# Patient Record
Sex: FEMALE | Race: ASIAN | NOT HISPANIC OR LATINO | Employment: FULL TIME | URBAN - METROPOLITAN AREA
[De-identification: names, ages, dates, MRNs, and addresses within clinical notes are randomized per-mention and may not be internally consistent; named-entity substitution may affect disease eponyms.]

---

## 2022-01-25 ENCOUNTER — CONSULT (OUTPATIENT)
Dept: PULMONOLOGY | Facility: MEDICAL CENTER | Age: 47
End: 2022-01-25
Payer: COMMERCIAL

## 2022-01-25 VITALS
DIASTOLIC BLOOD PRESSURE: 82 MMHG | OXYGEN SATURATION: 98 % | HEIGHT: 71 IN | TEMPERATURE: 97.2 F | RESPIRATION RATE: 12 BRPM | BODY MASS INDEX: 41.02 KG/M2 | HEART RATE: 86 BPM | SYSTOLIC BLOOD PRESSURE: 122 MMHG | WEIGHT: 293 LBS

## 2022-01-25 DIAGNOSIS — Z22.7 LATENT TUBERCULOSIS BY SKIN TEST: ICD-10-CM

## 2022-01-25 DIAGNOSIS — F17.210 CIGARETTE NICOTINE DEPENDENCE WITHOUT COMPLICATION: ICD-10-CM

## 2022-01-25 DIAGNOSIS — R91.1 LUNG NODULE: Primary | ICD-10-CM

## 2022-01-25 DIAGNOSIS — F17.210 TOBACCO SMOKER, 1 PACK OF CIGARETTES OR LESS PER DAY: ICD-10-CM

## 2022-01-25 DIAGNOSIS — E66.01 CLASS 3 SEVERE OBESITY DUE TO EXCESS CALORIES WITHOUT SERIOUS COMORBIDITY WITH BODY MASS INDEX (BMI) OF 40.0 TO 44.9 IN ADULT (HCC): ICD-10-CM

## 2022-01-25 DIAGNOSIS — G47.33 OBSTRUCTIVE SLEEP APNEA: ICD-10-CM

## 2022-01-25 PROBLEM — E66.813 CLASS 3 SEVERE OBESITY DUE TO EXCESS CALORIES WITHOUT SERIOUS COMORBIDITY WITH BODY MASS INDEX (BMI) OF 40.0 TO 44.9 IN ADULT (HCC): Status: ACTIVE | Noted: 2022-01-25

## 2022-01-25 PROCEDURE — 94010 BREATHING CAPACITY TEST: CPT | Performed by: INTERNAL MEDICINE

## 2022-01-25 PROCEDURE — 99204 OFFICE O/P NEW MOD 45 MIN: CPT | Performed by: INTERNAL MEDICINE

## 2022-01-25 RX ORDER — OMEPRAZOLE 40 MG/1
CAPSULE, DELAYED RELEASE ORAL DAILY
COMMUNITY

## 2022-01-25 RX ORDER — ACYCLOVIR 800 MG/1
400 TABLET ORAL 2 TIMES DAILY
COMMUNITY

## 2022-01-25 NOTE — ASSESSMENT & PLAN NOTE
She smoking about 3/4 pack of cigarettes per day but has been smoking since her teenage years  I did discuss smoking cessation with her  She tried Chantix in the past which helped but after she stated for while she started to feel very agitated  I did prescribe her Nicotrol inhaler to try    Spirometry done today shows mild restrictive pair which is pie due to patient being overweight rather than air trapping

## 2022-01-25 NOTE — PROGRESS NOTES
Assessment/Plan:       Problem List Items Addressed This Visit        Respiratory    Obstructive sleep apnea     Sharonda did have a home diagnostic sleep study done 05/27/2018  She did show me the report of this test on her portal for her testing center  This was done in [place]  She weighed 316 lb at the time  Overall AHI was 18 1 with oxygen gris of 59%  She spent 4% of time with O2 saturations below 80%  There were 180 apneas and 105 hypopneas    I did discuss the diagnosis and treatment of TERESITA with at present time she is not having excessive daytime fatigue  She does have some snoring  I did discuss option of weight loss and that she can lose more weight she always be testing in future to see if she still has sleep apnea  She has decided to go with this treatment rather than CPAP therapy  She will try to lose weight  Not have any excessive daytime somnolence            Other    Latent tuberculosis by skin test     In 1986 she was diagnosed with latent TB by skin test received 6 months of INH prophylactic therapy  States her sister mother had TB         Lung nodule - Primary     I reviewed CT of chest done 03/19/2021 at [facility]  This shows an 18 mm by 13 mm x 12 mm nodular density and the right lower lobe  This is in a posterior lateral part of the right lower lobe  This may be just a benign tumor or granuloma but with her smoking history cannot a carcinoma  I did order a follow-up CT scan of chest with contrast   She will have this done to [facility] and I did take her DVD of her head CT of chest done 03/19/2021 at [facility] to be uploaded to our PACS system so images can be compared    I did review images with Tessa    I instructed Sharonda to contact me couple days after CT of chest is done and I will review results with her    Follow-up will be determined based on results of this test         Relevant Orders    CT chest with contrast    CBC and differential    Comprehensive metabolic panel    Class 3 severe obesity due to excess calories without serious comorbidity with body mass index (BMI) of 40 0 to 44 9 in adult [facility])     She does have mild TERESITA per present having fatigue  Has some mild shortness of breath at times with more exertional activity  I did encourage her to watch her diet and try to lose weight  I told weight loss she may cause her obstructive sleep apnea to resolve    Tessa did moved to the [facility]  She did spend time in [facility] when she was younger and then was in [place]  She is not having established family doctor in [facility] and I did refer to [facility]    Because of her being overweight I did order hemoglobin A1c other labs including lipid profile         Relevant Orders    HEMOGLOBIN A1C W/ EAG ESTIMATION    Lipid panel    Tobacco smoker, 1 pack of cigarettes or less per day     She smoking about 3/4 pack of cigarettes per day but has been smoking since her teenage years  I did discuss smoking cessation with her  She tried Chantix in the past which helped but after she stated for while she started to feel very agitated  I did prescribe her Nicotrol inhaler to try    Spirometry done today shows mild restrictive pair which is pie due to patient being overweight rather than air trapping  Relevant Medications    nicotine (NICOTROL) 10 MG inhaler    Other Relevant Orders    POCT spirometry            Plan for follow up:      Return if symptoms worsen or fail to improve  All questions are answered to the patient's satisfaction and understanding  She verbalizes understanding  She is encouraged to call with any further questions or concerns  Portions of the record may have been created with voice recognition software  Occasional wrong word or "sound a like" substitutions may have occurred due to the inherent limitations of voice recognition software    Read the chart carefully and recognize, using context, where substitutions have occurred  a    Electronically Signed by Laly Mendes DO    ______________________________________________________________________    Chief Complaint:  Here for evaluation of lung nodule  Patient ID: Sylvia Nj is a 51 y o  y o  female has a past medical history of Acid reflux, Trejo's esophagus, GERD (gastroesophageal reflux disease), Hiatal hernia, Latent tuberculosis by skin test (1986), and Obstructive sleep apnea (05/27/2018)  1/25/2022  Patient presents today for initial visit for lung nodule initially detected in March of 2020 21 by CT scan chest      HPI      Sylvia Nj is 51years old and presents for evaluation of right lower lobe lung nodule noted on prior CT scan done in March of 2021  She is a smoker and has been smoking about half to 3/4 packs of cigarettes per day since around age 11  She started smoking at age 5 presently smoking about 3/4 packs of cigarettes per day  CT of her chest without contrast done on 03/19/2021 at [facility] which reported a lobular 18 mm x 13 mm x 12 mm solitary right lower lobe lung nodule  She did see a pulmonologist Dr Kristofer Dyer for this in the past but did not follow-up  She does have history of latent TB diagnosed 11  She sister family member had TB at that time  She was treated with 6 months of INH  She denies any history of asthma, heart disease or hypertension  She does have history of small hiatal hernia with gastric reflux was told she has Trejo's esophagitis  Gastric reflux symptoms are controlled medication now  She does not get short of breath with her usual activity but may have some mild shortness of breath when she does more exertional activity  No wheezing or cough  No fever or chills  No hemoptysis  She has been smoking about 3/4 pack cigarettes per day for past 36 years      Tessa did have a home diagnostic sleep study done on 05/27/2018  She weighed 316 lb at the time  This showed overall AHI of 18 1 with oxygen gris of 59%  She spent about 4% of the time with O2 saturation below 80%  She is not having any excessive daytime somnolence or nocturnal dyspnea  She did not pursue any type of CPAP therapy  She does have history of GERD for which he takes medication  Also has history of hiatal hernia  She did have upper endoscopy done back in 2018  She has history of hysterectomy in 2014 for dysfunctional uterine bleeding    Family history includes and paternal uncle who had lung cancer  Her father has history of stroke mode with had ovarian cancer    Occupational/Exposure history:  Works from home  Does work reviewing [employer]      Review of Systems   Constitutional: Negative for chills, fatigue, fever and unexpected weight change  HENT: Negative for congestion, rhinorrhea and sore throat  Eyes: Negative for discharge and redness  Respiratory: Negative for cough and wheezing  Does not have shortness of breath with her usual activity   Cardiovascular: Negative for chest pain, palpitations and leg swelling  Gastrointestinal: Negative for abdominal distention, abdominal pain and nausea  Endocrine: Negative for polydipsia and polyphagia  Genitourinary: Negative for dysuria  Musculoskeletal: Negative for joint swelling and myalgias  Skin: Negative for rash  Neurological: Negative for light-headedness  Psychiatric/Behavioral: Negative for decreased concentration  Social history: She reports that she has been smoking cigarettes  She has a 27 00 pack-year smoking history  She has never used smokeless tobacco  She reports that she does not drink alcohol and does not use drugs      Past surgical history:   Past Surgical History:   Procedure Laterality Date    AUGMENTATION BREAST Bilateral 2015    HYSTERECTOMY  2014    [facility] Riley    LIPOSUCTION  2015    REPAIR RECTOCELE  2018    TONSILLECTOMY  1984     Family history:   Family History   Problem Relation Age of Onset    Cancer Mother         ovarian    Stroke Father     Lung cancer Paternal Uncle        Immunization History   Administered Date(s) Administered    COVID-19 PFIZER VACCINE 0 3 ML IM 03/19/2021, 04/11/2021     Current Outpatient Medications   Medication Sig Dispense Refill    acyclovir (ZOVIRAX) 800 mg tablet Take 800 mg by mouth      omeprazole (PriLOSEC) 40 MG capsule omeprazole Take No date recorded No form recorded No frequency recorded No route recorded No set duration recorded No set duration amount recorded active No dosage strength recorded No dosage strength units of measure recorded      nicotine (NICOTROL) 10 MG inhaler Use 1 cartridge per hour as needed but do not exceed 10 cartridges per day 42 each 6     No current facility-administered medications for this visit  Allergies: Metronidazole and Nitrofurantoin    Objective:  Vitals:    01/25/22 1003   BP: 122/82   Pulse: 86   Resp: 12   Temp: (!) 97 2 °F (36 2 °C)   TempSrc: Temporal   SpO2: 98%   Weight: (!) 141 kg (310 lb 6 4 oz)   Height: 5' 11" (1 803 m)   Oxygen Therapy  SpO2: 98 %    Wt Readings from Last 3 Encounters:   01/25/22 (!) 141 kg (310 lb 6 4 oz)     Body mass index is 43 29 kg/m²  Physical Exam  Vitals reviewed  Constitutional:       General: She is not in acute distress  Appearance: Normal appearance  She is well-developed  She is obese  HENT:      Head: Normocephalic  Right Ear: External ear normal       Left Ear: External ear normal       Nose: Nose normal       Mouth/Throat:      Mouth: Mucous membranes are moist       Pharynx: Oropharynx is clear  No oropharyngeal exudate  Comments: Mallampati score is 2  Eyes:      Conjunctiva/sclera: Conjunctivae normal       Pupils: Pupils are equal, round, and reactive to light  Neck:      Vascular: No JVD  Trachea: No tracheal deviation  Cardiovascular:      Rate and Rhythm: Normal rate and regular rhythm  Heart sounds: Normal heart sounds  Pulmonary:      Effort: Pulmonary effort is normal       Comments: Lung sounds are clear  No wheezes crackles or rhonchi  Abdominal:      General: There is no distension  Palpations: Abdomen is soft  Tenderness: There is no abdominal tenderness  There is no guarding  Musculoskeletal:      Cervical back: Neck supple  Comments: No edema, cyanosis or clubbing   Lymphadenopathy:      Cervical: No cervical adenopathy  Skin:     General: Skin is warm and dry  Findings: No rash  Neurological:      Mental Status: She is alert and oriented to person, place, and time  Psychiatric:         Behavior: Behavior normal          Thought Content: Thought content normal          Lab Review:     Blood work from 08/18/2021 was reviewed  White blood cell count 10 5, hemoglobin 14 1, platelet count 354    Sodium 137, potassium 4 7 bicarb 27 BUN 10 and creatinine 0 62    Diagnostics:    CT scan of chest without contrast done at [facility] on 03/19/2021 was reviewed by me and this reveals a slightly lobulated slight linear 18 mm by 13 x 12 mm nodule in the right lower lobe  This is somewhat posterior lateral   No significant mediastinal or hilar adenopathy  No other lung lesions  Office Spirometry Results: done today    FVC - 3 26 L    73%  FEV1 - 2 62 L   73%  FEV1/FVC% - 80%    Mild restrictive impairment likely due to being overweight     ESS: Total score: 2  No results found

## 2022-01-25 NOTE — ASSESSMENT & PLAN NOTE
In 1986 she was diagnosed with latent TB by skin test received 6 months of INH prophylactic therapy    States her sister mother had TB

## 2022-01-25 NOTE — PATIENT INSTRUCTIONS
CT chest without contrast    Nicotrol inhaler - use 1 cartridge per our as needed but do not [id number]/day    Forward copy of sleep test to us     [facility] in [place]    Blood work ordered    Try to get weight down and down the road we can always consider a new home sleep study to see if things are improved    Backline   [phone]

## 2022-01-25 NOTE — ASSESSMENT & PLAN NOTE
Miller Srinivasan did have a home diagnostic sleep study done 05/27/2018  She did show me the report of this test on her portal for her testing center  This was done in [place]  She weighed 316 lb at the time  Overall AHI was 18 1 with oxygen gris of 59%  She spent 4% of time with O2 saturations below 80%  There were 180 apneas and 105 hypopneas    I did discuss the diagnosis and treatment of TERESITA with at present time she is not having excessive daytime fatigue  She does have some snoring  I did discuss option of weight loss and that she can lose more weight she always be testing in future to see if she still has sleep apnea  She has decided to go with this treatment rather than CPAP therapy  She will try to lose weight    Not have any excessive daytime somnolence

## 2022-01-25 NOTE — ASSESSMENT & PLAN NOTE
I reviewed CT of chest done 03/19/2021 at [facility]  This shows an 18 mm by 13 mm x 12 mm nodular density and the right lower lobe  This is in a posterior lateral part of the right lower lobe  This may be just a benign tumor or granuloma but with her smoking history cannot a carcinoma  I did order a follow-up CT scan of chest with contrast   She will have this done to [facility] and I did take her DVD of her head CT of chest done 03/19/2021 at [facility] to be uploaded to our PACS system so images can be compared    I did review images with Tessa    I instructed Sharonda to contact me couple days after CT of chest is done and I will review results with her    Follow-up will be determined based on results of this test

## 2022-01-25 NOTE — ASSESSMENT & PLAN NOTE
She does have mild TERESITA per present having fatigue  Has some mild shortness of breath at times with more exertional activity  I did encourage her to watch her diet and try to lose weight  I told weight loss she may cause her obstructive sleep apnea to resolve    Tessa did moved to the [place] area  She did spend time in [facility] when she was younger and then was in [place]      She is not having established family doctor in [facility] and I did refer to [facility]    Because of her being overweight I did order hemoglobin A1c other labs including lipid profile

## 2022-02-03 ENCOUNTER — APPOINTMENT (OUTPATIENT)
Dept: LAB | Facility: HOSPITAL | Age: 47
End: 2022-02-03
Attending: INTERNAL MEDICINE
Payer: COMMERCIAL

## 2022-02-03 ENCOUNTER — HOSPITAL ENCOUNTER (OUTPATIENT)
Dept: RADIOLOGY | Facility: HOSPITAL | Age: 47
Discharge: HOME | End: 2022-02-03
Attending: INTERNAL MEDICINE
Payer: COMMERCIAL

## 2022-02-03 DIAGNOSIS — R91.1 LUNG NODULE: ICD-10-CM

## 2022-02-03 DIAGNOSIS — E66.01 CLASS 3 SEVERE OBESITY DUE TO EXCESS CALORIES WITHOUT SERIOUS COMORBIDITY WITH BODY MASS INDEX (BMI) OF 40.0 TO 44.9 IN ADULT (HCC): ICD-10-CM

## 2022-02-03 LAB
ALBUMIN SERPL BCP-MCNC: 3.5 G/DL (ref 3.5–5)
ALP SERPL-CCNC: 95 U/L (ref 46–116)
ALT SERPL W P-5'-P-CCNC: 54 U/L (ref 12–78)
ANION GAP SERPL CALCULATED.3IONS-SCNC: 7 MMOL/L (ref 4–13)
AST SERPL W P-5'-P-CCNC: 45 U/L (ref 5–45)
BASOPHILS # BLD AUTO: 0.08 THOUSANDS/ΜL (ref 0–0.1)
BASOPHILS NFR BLD AUTO: 1 % (ref 0–1)
BILIRUB SERPL-MCNC: 0.39 MG/DL (ref 0.2–1)
BUN SERPL-MCNC: 9 MG/DL (ref 5–25)
CALCIUM SERPL-MCNC: 8.5 MG/DL (ref 8.3–10.1)
CHLORIDE SERPL-SCNC: 105 MMOL/L (ref 100–108)
CHOLEST SERPL-MCNC: 238 MG/DL
CO2 SERPL-SCNC: 27 MMOL/L (ref 21–32)
CREAT SERPL-MCNC: 0.81 MG/DL (ref 0.6–1.3)
EOSINOPHIL # BLD AUTO: 0.22 THOUSAND/ΜL (ref 0–0.61)
EOSINOPHIL NFR BLD AUTO: 2 % (ref 0–6)
ERYTHROCYTE [DISTWIDTH] IN BLOOD BY AUTOMATED COUNT: 13.2 % (ref 11.6–15.1)
EST. AVERAGE GLUCOSE BLD GHB EST-MCNC: 105 MG/DL
GFR SERPL CREATININE-BSD FRML MDRD: 86 ML/MIN/1.73SQ M
GLUCOSE P FAST SERPL-MCNC: 96 MG/DL (ref 65–99)
HBA1C MFR BLD: 5.3 %
HCT VFR BLD AUTO: 46.4 % (ref 34.8–46.1)
HDLC SERPL-MCNC: 49 MG/DL
HGB BLD-MCNC: 15.1 G/DL (ref 11.5–15.4)
IMM GRANULOCYTES # BLD AUTO: 0.04 THOUSAND/UL (ref 0–0.2)
IMM GRANULOCYTES NFR BLD AUTO: 0 % (ref 0–2)
LDLC SERPL CALC-MCNC: 161 MG/DL (ref 0–100)
LYMPHOCYTES # BLD AUTO: 3.63 THOUSANDS/ΜL (ref 0.6–4.47)
LYMPHOCYTES NFR BLD AUTO: 37 % (ref 14–44)
MCH RBC QN AUTO: 30 PG (ref 26.8–34.3)
MCHC RBC AUTO-ENTMCNC: 32.5 G/DL (ref 31.4–37.4)
MCV RBC AUTO: 92 FL (ref 82–98)
MONOCYTES # BLD AUTO: 0.47 THOUSAND/ΜL (ref 0.17–1.22)
MONOCYTES NFR BLD AUTO: 5 % (ref 4–12)
NEUTROPHILS # BLD AUTO: 5.28 THOUSANDS/ΜL (ref 1.85–7.62)
NEUTS SEG NFR BLD AUTO: 55 % (ref 43–75)
NONHDLC SERPL-MCNC: 189 MG/DL
NRBC BLD AUTO-RTO: 0 /100 WBCS
PLATELET # BLD AUTO: 199 THOUSANDS/UL (ref 149–390)
PMV BLD AUTO: 9.6 FL (ref 8.9–12.7)
POTASSIUM SERPL-SCNC: 3.9 MMOL/L (ref 3.5–5.3)
PROT SERPL-MCNC: 8.1 G/DL (ref 6.4–8.2)
RBC # BLD AUTO: 5.03 MILLION/UL (ref 3.81–5.12)
SODIUM SERPL-SCNC: 139 MMOL/L (ref 136–145)
TRIGL SERPL-MCNC: 142 MG/DL
WBC # BLD AUTO: 9.72 THOUSAND/UL (ref 4.31–10.16)

## 2022-02-03 PROCEDURE — G1004 CDSM NDSC: HCPCS

## 2022-02-03 PROCEDURE — 85025 COMPLETE CBC W/AUTO DIFF WBC: CPT

## 2022-02-03 PROCEDURE — 80061 LIPID PANEL: CPT

## 2022-02-03 PROCEDURE — 83036 HEMOGLOBIN GLYCOSYLATED A1C: CPT

## 2022-02-03 PROCEDURE — 71260 CT THORAX DX C+: CPT

## 2022-02-03 PROCEDURE — 36415 COLL VENOUS BLD VENIPUNCTURE: CPT

## 2022-02-03 PROCEDURE — 80053 COMPREHEN METABOLIC PANEL: CPT

## 2022-02-03 RX ADMIN — IOHEXOL 85 ML: 350 INJECTION, SOLUTION INTRAVENOUS at 09:29

## 2022-02-08 ENCOUNTER — TELEPHONE (OUTPATIENT)
Dept: PULMONOLOGY | Facility: CLINIC | Age: 47
End: 2022-02-08

## 2022-02-10 NOTE — TELEPHONE ENCOUNTER
Patient called, wanted to know if Dr Stephanie Sellers saw her CT results and what she should do  Please advise

## 2022-02-16 DIAGNOSIS — R91.1 LUNG NODULE: Primary | ICD-10-CM

## 2022-02-16 NOTE — TELEPHONE ENCOUNTER
Pt called re: would like a call from the doctor to go over her CT results    Please advise: [phone]

## 2022-02-16 NOTE — PROGRESS NOTES
Spoke with patient by telephone and discussed CT findings  She would like to pursue PET CT scan  I gave her options this her CT-guided needle biopsy  Nodule did increase slightly in size compared to 1 done in March of last year  She does have history of smoking 3/4 packs per per day since her teenage years

## 2022-03-04 ENCOUNTER — PREP FOR PROCEDURE (OUTPATIENT)
Dept: INTERVENTIONAL RADIOLOGY/VASCULAR | Facility: CLINIC | Age: 47
End: 2022-03-04

## 2022-03-04 ENCOUNTER — TELEPHONE (OUTPATIENT)
Dept: PULMONOLOGY | Facility: CLINIC | Age: 47
End: 2022-03-04

## 2022-03-04 ENCOUNTER — HOSPITAL ENCOUNTER (OUTPATIENT)
Dept: NUCLEAR MEDICINE | Facility: HOSPITAL | Age: 47
Discharge: HOME | End: 2022-03-04
Attending: INTERNAL MEDICINE
Payer: COMMERCIAL

## 2022-03-04 ENCOUNTER — TELEPHONE (OUTPATIENT)
Dept: PULMONOLOGY | Facility: MEDICAL CENTER | Age: 47
End: 2022-03-04

## 2022-03-04 DIAGNOSIS — R94.2 ABNORMAL PET OF RIGHT LUNG: ICD-10-CM

## 2022-03-04 DIAGNOSIS — R91.1 LUNG NODULE: ICD-10-CM

## 2022-03-04 DIAGNOSIS — R59.1 LYMPHADENOPATHY: Primary | ICD-10-CM

## 2022-03-04 DIAGNOSIS — R91.1 NODULE OF LOWER LOBE OF RIGHT LUNG: ICD-10-CM

## 2022-03-04 DIAGNOSIS — R59.0 CERVICAL LYMPHADENOPATHY: Primary | ICD-10-CM

## 2022-03-04 LAB — GLUCOSE SERPL-MCNC: 94 MG/DL (ref 65–140)

## 2022-03-04 PROCEDURE — G1004 CDSM NDSC: HCPCS

## 2022-03-04 PROCEDURE — 78815 PET IMAGE W/CT SKULL-THIGH: CPT

## 2022-03-04 PROCEDURE — A9552 F18 FDG: HCPCS

## 2022-03-04 PROCEDURE — 82948 REAGENT STRIP/BLOOD GLUCOSE: CPT

## 2022-03-04 NOTE — TELEPHONE ENCOUNTER
Attempted to call patient and unable to reach via telephone  Left voicemail for the patient to return phone call to the office to discuss test results       PET CT Results

## 2022-03-04 NOTE — TELEPHONE ENCOUNTER
Lenard Khan from [facility] calling with significant finding on PET scan done on 3/4

## 2022-03-04 NOTE — TELEPHONE ENCOUNTER
Dr Victorina Davidson discussed case w/ patient in regard to options presented for IR consult for cervical lymph node biopsy  Patient was negative for any upper respiratory symptoms  If remains negative thereafter would consider thoracic surgery consult for RLL lung nodule      Orders placed for IR consult

## 2022-03-04 NOTE — TELEPHONE ENCOUNTER
Patient calling asking for the results of her PET scan she had done today   Please advise [phone]

## 2022-03-04 NOTE — TELEPHONE ENCOUNTER
S PET-CT imaging follow-up    48-year-old lifelong smoker follow-up for right lower lobe pulmonary nodule  PET-CT indeterminate  Called and left message for plan to discuss further diagnostics with options for further monitoring versus biopsy  Confounding uptake in cervical lymph node  Consideration for IR guided CT biopsy of right lower lobe nodule      Pending contact follow-up for discussion

## 2022-03-21 ENCOUNTER — HOSPITAL ENCOUNTER (OUTPATIENT)
Dept: NON INVASIVE DIAGNOSTICS | Facility: HOSPITAL | Age: 47
Discharge: HOME | End: 2022-03-21
Attending: RADIOLOGY | Admitting: RADIOLOGY
Payer: COMMERCIAL

## 2022-03-21 VITALS
DIASTOLIC BLOOD PRESSURE: 82 MMHG | SYSTOLIC BLOOD PRESSURE: 139 MMHG | RESPIRATION RATE: 16 BRPM | OXYGEN SATURATION: 100 % | HEART RATE: 85 BPM

## 2022-03-21 DIAGNOSIS — R59.1 LYMPHADENOPATHY: ICD-10-CM

## 2022-03-21 PROCEDURE — 38505 NEEDLE BIOPSY LYMPH NODES: CPT

## 2022-03-21 PROCEDURE — 88341 IMHCHEM/IMCYTCHM EA ADD ANTB: CPT | Performed by: PATHOLOGY

## 2022-03-21 PROCEDURE — 88185 FLOWCYTOMETRY/TC ADD-ON: CPT

## 2022-03-21 PROCEDURE — 76942 ECHO GUIDE FOR BIOPSY: CPT | Performed by: RADIOLOGY

## 2022-03-21 PROCEDURE — 88342 IMHCHEM/IMCYTCHM 1ST ANTB: CPT | Performed by: PATHOLOGY

## 2022-03-21 PROCEDURE — 88184 FLOWCYTOMETRY/ TC 1 MARKER: CPT | Performed by: RADIOLOGY

## 2022-03-21 PROCEDURE — 38505 NEEDLE BIOPSY LYMPH NODES: CPT | Performed by: RADIOLOGY

## 2022-03-21 PROCEDURE — 88333 PATH CONSLTJ SURG CYTO XM 1: CPT | Performed by: PATHOLOGY

## 2022-03-21 RX ORDER — LIDOCAINE WITH 8.4% SOD BICARB 0.9%(10ML)
SYRINGE (ML) INJECTION CODE/TRAUMA/SEDATION MEDICATION
Status: COMPLETED | OUTPATIENT
Start: 2022-03-21 | End: 2022-03-21

## 2022-03-21 RX ADMIN — Medication 10 ML: at 12:47

## 2022-03-21 NOTE — DISCHARGE INSTRUCTIONS
Lymph Node Biopsy   WHAT YOU NEED TO KNOW:   A lymph node biopsy is done to remove all or part of a lymph node  A lymph node can be tested for infection, cancer, and other medical conditions  This information may help your healthcare provider decide if you need more tests or treatments  DISCHARGE INSTRUCTIONS:   Seek care immediately if:   Blood soaks through your bandage  · Your bruise suddenly gets larger and feels hard  Contact your healthcare provider if:   · You have a fever or chills  Your wound is red, swollen, or draining    · Your pain does not get better after you take medicine  · You have questions or concerns about your condition or care  Medicines: You may need any of the following:  · NSAIDs , such as ibuprofen, help decrease swelling, pain, and fever  This medicine is available with or without a doctor's order  NSAIDs can cause stomach bleeding or kidney problems in certain people  If you take blood thinner medicine, always ask your healthcare provider if NSAIDs are safe for you  Always read the medicine label and follow directions  · Acetaminophen  decreases pain and fever  It is available without a doctor's order  Ask how much to take and how often to take it  Follow directions  Read the labels of all other medicines you are using to see if they also contain acetaminophen, or ask your doctor or pharmacist  Acetaminophen can cause liver damage if not taken correctly  Do not use more than 4 grams (4,000 milligrams) total of acetaminophen in one day  · Prescription pain medicine  may be given  Ask your healthcare provider how to take this medicine safely  Some prescription pain medicines contain acetaminophen  Do not take other medicines that contain acetaminophen without talking to your healthcare provider  Too much acetaminophen may cause liver damage  Prescription pain medicine may cause constipation  Ask your healthcare provider how to prevent or treat constipation  · Take your medicine as directed  Call your healthcare provider if you think your medicine is not helping or if you have side effects  Tell him if you are allergic to any medicine  Keep a list of the medicines, vitamins, and herbs you take  Include the amounts, and when and why you take them  Bring the list or the pill bottles to follow-up visits  Carry your medicine list with you in case of an emergency  Care for your wound as directed:  Ask your healthcare provider when your biopsy site can get wet  Carefully wash around the biopsy site with soap and water  It is okay to let soap and water gently run over your biopsy site  Do not  scrub your biopsy site  You may remove the bandage in 24 hours  Check your biopsy site every day for signs of infection, such as redness, swelling, or pus  Do not put powders or lotions on your biopsy site      Self-care:   Apply ice  on your biopsy site for 15 to 20 minutes every hour or as directed  Use an ice pack, or put crushed ice in a plastic bag  Cover it with a towel before you apply it to your skin  Ice helps prevent tissue damage and decreases swelling        Do not do strenuous activities  for 24 to 48 hours  Strenuous activities include heavy lifting, sports, or running  Follow up with your healthcare provider as directed: You may need more tests  Write down your questions so you remember to ask them during your visits    Stella Amaya may contact the Interventional RN for any questions at [phone] til 4pm After 4pm you may contact your ordering MD   AVELINA

## 2022-03-21 NOTE — SEDATION DOCUMENTATION
Right cervical lymph node biopsy done , pt tolerated without incident  Band aid to site  Pt given discharge instructions with ice pack, discharged home in stable condition

## 2022-03-23 ENCOUNTER — TELEPHONE (OUTPATIENT)
Dept: PULMONOLOGY | Facility: CLINIC | Age: 47
End: 2022-03-23

## 2022-03-23 LAB — SCAN RESULT: NORMAL

## 2022-03-25 ENCOUNTER — APPOINTMENT (OUTPATIENT)
Dept: RADIOLOGY | Facility: CLINIC | Age: 47
End: 2022-03-25
Payer: COMMERCIAL

## 2022-03-25 ENCOUNTER — OFFICE VISIT (OUTPATIENT)
Dept: FAMILY MEDICINE CLINIC | Facility: CLINIC | Age: 47
End: 2022-03-25
Payer: COMMERCIAL

## 2022-03-25 ENCOUNTER — OFFICE VISIT (OUTPATIENT)
Dept: OBGYN CLINIC | Facility: CLINIC | Age: 47
End: 2022-03-25
Payer: COMMERCIAL

## 2022-03-25 VITALS
WEIGHT: 293 LBS | BODY MASS INDEX: 41.02 KG/M2 | RESPIRATION RATE: 16 BRPM | HEIGHT: 71 IN | HEART RATE: 88 BPM | TEMPERATURE: 97.9 F | SYSTOLIC BLOOD PRESSURE: 130 MMHG | DIASTOLIC BLOOD PRESSURE: 82 MMHG

## 2022-03-25 VITALS
SYSTOLIC BLOOD PRESSURE: 124 MMHG | HEIGHT: 71 IN | HEART RATE: 96 BPM | DIASTOLIC BLOOD PRESSURE: 84 MMHG | WEIGHT: 293 LBS | TEMPERATURE: 99 F | BODY MASS INDEX: 41.02 KG/M2

## 2022-03-25 DIAGNOSIS — M25.512 ACUTE PAIN OF LEFT SHOULDER: ICD-10-CM

## 2022-03-25 DIAGNOSIS — M75.42 IMPINGEMENT SYNDROME OF LEFT SHOULDER: ICD-10-CM

## 2022-03-25 DIAGNOSIS — R20.2 NUMBNESS AND TINGLING OF LOWER EXTREMITY: ICD-10-CM

## 2022-03-25 DIAGNOSIS — R20.0 NUMBNESS AND TINGLING OF LOWER EXTREMITY: ICD-10-CM

## 2022-03-25 DIAGNOSIS — M48.04 SPINAL STENOSIS, THORACIC REGION: Primary | ICD-10-CM

## 2022-03-25 DIAGNOSIS — E66.01 CLASS 3 SEVERE OBESITY DUE TO EXCESS CALORIES WITHOUT SERIOUS COMORBIDITY WITH BODY MASS INDEX (BMI) OF 40.0 TO 44.9 IN ADULT (HCC): ICD-10-CM

## 2022-03-25 DIAGNOSIS — K22.70 BARRETT'S ESOPHAGUS WITHOUT DYSPLASIA: Primary | ICD-10-CM

## 2022-03-25 DIAGNOSIS — Z12.31 ENCOUNTER FOR MAMMOGRAM TO ESTABLISH BASELINE MAMMOGRAM: ICD-10-CM

## 2022-03-25 DIAGNOSIS — M54.12 RADICULOPATHY, CERVICAL REGION: ICD-10-CM

## 2022-03-25 DIAGNOSIS — N20.0 KIDNEY STONE: ICD-10-CM

## 2022-03-25 PROCEDURE — 73030 X-RAY EXAM OF SHOULDER: CPT

## 2022-03-25 PROCEDURE — 99203 OFFICE O/P NEW LOW 30 MIN: CPT | Performed by: FAMILY MEDICINE

## 2022-03-25 PROCEDURE — 99204 OFFICE O/P NEW MOD 45 MIN: CPT | Performed by: ORTHOPAEDIC SURGERY

## 2022-03-25 PROCEDURE — 3008F BODY MASS INDEX DOCD: CPT | Performed by: FAMILY MEDICINE

## 2022-03-25 PROCEDURE — 3725F SCREEN DEPRESSION PERFORMED: CPT | Performed by: FAMILY MEDICINE

## 2022-03-25 RX ORDER — DICLOFENAC SODIUM 75 MG/1
75 TABLET, DELAYED RELEASE ORAL 2 TIMES DAILY
Qty: 60 TABLET | Refills: 0 | Status: SHIPPED | OUTPATIENT
Start: 2022-03-25 | End: 2022-04-26 | Stop reason: SDUPTHER

## 2022-03-25 NOTE — PROGRESS NOTES
Chief Complaint   Patient presents with   Kee Gibbs Establish Care     discuss medications , pt requests EGD, mammo        Patient ID: Kiran Lyons is a 51 y o  female  HPI  Pt is seeing to establish care -  H/o Trejo's esophagitis, kidney stones, MO    The following portions of the patient's history were reviewed and updated as appropriate: allergies, current medications, past family history, past medical history, past social history, past surgical history and problem list     Review of Systems   Constitutional: Negative  Respiratory: Negative  Cardiovascular: Negative  Gastrointestinal: Negative  Genitourinary: Negative  Musculoskeletal: Negative  Skin: Negative  Neurological: Negative  Current Outpatient Medications   Medication Sig Dispense Refill    acyclovir (ZOVIRAX) 800 mg tablet Take 800 mg by mouth      omeprazole (PriLOSEC) 40 MG capsule omeprazole Take No date recorded No form recorded No frequency recorded No route recorded No set duration recorded No set duration amount recorded active No dosage strength recorded No dosage strength units of measure recorded       No current facility-administered medications for this visit  Objective:    /82 (BP Location: Left arm, Patient Position: Sitting, Cuff Size: Large)   Pulse 88   Temp 97 9 °F (36 6 °C)   Resp 16   Ht 5' 11" (1 803 m)   Wt (!) 140 kg (309 lb 9 6 oz)   BMI 43 18 kg/m²        Physical Exam  Constitutional:       Appearance: She is obese  She is not ill-appearing  Cardiovascular:      Rate and Rhythm: Normal rate and regular rhythm  Heart sounds: No murmur heard  Pulmonary:      Effort: Pulmonary effort is normal  No respiratory distress  Breath sounds: No wheezing, rhonchi or rales  Abdominal:      Palpations: Abdomen is soft  Tenderness: There is no abdominal tenderness  Musculoskeletal:      Right lower leg: No edema  Left lower leg: No edema     Neurological: General: No focal deficit present  Mental Status: She is alert and oriented to person, place, and time  Psychiatric:         Mood and Affect: Mood normal          Thought Content: Thought content normal          Judgment: Judgment normal            Labs in chart were reviewed  Assessment/Plan:         Diagnoses and all orders for this visit:    Trejo's esophagus without dysplasia  -     Ambulatory Referral to Gastroenterology; Future    Encounter for mammogram to establish baseline mammogram  -     Mammo screening bilateral w 3d & cad; Future    Class 3 severe obesity due to excess calories without serious comorbidity with body mass index (BMI) of 40 0 to 44 9 in [facility])  -     Ambulatory Referral to Weight Management; Future    Kidney stone            BMI Counseling: Body mass index is 43 18 kg/m²  Discussed the patient's BMI with her  The BMI is above normal  Nutrition recommendations include reducing portion sizes, decreasing overall calorie intake, 3-5 servings of fruits/vegetables daily, reducing fast food intake, consuming healthier snacks, decreasing soda and/or juice intake and moderation in carbohydrate intake  Exercise recommendations include exercising 3-5 times per week         rto in 6 m           Jerzy Calderon MD

## 2022-03-25 NOTE — PROGRESS NOTES
Assessment/Plan:  1  Spinal stenosis, thoracic region  diclofenac (VOLTAREN) 75 mg EC tablet    Ambulatory referral to Physical Therapy    MRI thoracic spine wo contrast   2  Acute pain of left shoulder  XR shoulder 2+ vw left    Ambulatory referral to Physical Therapy   3  Impingement syndrome of left shoulder     4  Radiculopathy, cervical region  Ambulatory referral to Physical Therapy   5  Numbness and tingling of lower extremity  MRI thoracic spine wo contrast     Tessa has ongoing thoracic back pain and her recent CT scan shows significant degenerative changes and spinal stenosis at this level  Radiology recommended in their CT report to obtain an MRI to further evaluate the stenosis at this point  This does correlate with her ongoing pain in her thoracic spine and radiating pain around the thoracic region of the chest as well as the numbness and tingling in her lower extremities  I have ordered an MRI of her thoracic spine for further evaluation at this time  We will discuss follow-up based on her MRI results  She also has left-sided shoulder pain which may be an element of shoulder bursitis  She clearly does not have any weakness or injury to suggest a rotator cuff tear  Her x-rays are within normal limits  She does have some neck pain and cervical radiculopathy which may be giving her her shoulder pain  I recommended starting with formal physical therapy on her cervical spine and left shoulder region  She will follow up after therapy if pain persists or worsens we could consider further imaging such as an x-ray or MRI if warranted  Subjective:   Imtiaz Mckeon is a 51 y o  female who presents to the office for evaluation for thoracic back pain  She has had ongoing pain in her thoracic and lumbar spinal region for the last 4 years  She denies any specific injury or trauma    She recently had a separate workup following a CT scan of the chest which showed a pulmonary nodule which she is currently being worked up for  The CT also demonstrated stenosis in the thoracic spine and significant degenerative changes  She states that she has had persistent aching throbbing pain in the mid portion of her thoracic spine radiating to the right side  She will occasionally have burning and numbness to her right breast and pectoral region  This seems to increase at night when she is trying to lie flat  She states that certain times she will have numbness and tingling in her feet when lying flat  This seems to improve when she stands  She has tried over-the-counter anti-inflammatory medications and topical heat which have not significantly helped back  She also has left-sided shoulder pain that has been bothering her for 1 year  She feels like the pain began following her COVID vaccination a year ago  She had immediate pain and significant discomfort in her left shoulder down her left arm right after the vaccination  The pain continues to bother her off and on over the anterior aspect of the shoulder  It worsens with movement  She has not tried any treatment other than anti-inflammatories  Review of Systems   Constitutional: Negative for chills, fever and unexpected weight change  HENT: Negative for hearing loss, nosebleeds and sore throat  Eyes: Negative for pain, redness and visual disturbance  Respiratory: Negative for cough, shortness of breath and wheezing  Cardiovascular: Negative for chest pain, palpitations and leg swelling  Gastrointestinal: Negative for abdominal pain, nausea and vomiting  Endocrine: Negative for polydipsia and polyuria  Genitourinary: Negative for dysuria and hematuria  Musculoskeletal:        See HPI   Skin: Negative for rash and wound  Neurological: Negative for dizziness, numbness and headaches  Psychiatric/Behavioral: Negative for decreased concentration and suicidal ideas  The patient is not nervous/anxious            Past Medical History:   Diagnosis Date    Acid reflux     Trejo's esophagus     GERD (gastroesophageal reflux disease)     Hiatal hernia     Latent tuberculosis by skin test 1986    Was treated with INH for 6 months    Obstructive sleep apnea 05/27/2018    Mild TERESITA  AHI 18 1       Past Surgical History:   Procedure Laterality Date    AUGMENTATION BREAST Bilateral 2015    HYSTERECTOMY  2014    IR BIOPSY LYMPH NODE  3/21/2022    LAPAROSCOPIC CHOLECYSTECTOMY  1997    LIPOSUCTION  2015    REPAIR RECTOCELE  2018    TONSILLECTOMY  1984       Family History   Problem Relation Age of Onset    Cancer Mother         ovarian    Stroke Father     Lung cancer Paternal Uncle        Social History     Occupational History    Not on file   Tobacco Use    Smoking status: Current Every Day Smoker     Packs/day: 0 75     Years: 36 00     Pack years: 27 00     Types: Cigarettes    Smokeless tobacco: Never Used   Vaping Use    Vaping Use: Never used   Substance and Sexual Activity    Alcohol use: Never    Drug use: Never    Sexual activity: Not Currently         Current Outpatient Medications:     acyclovir (ZOVIRAX) 800 mg tablet, Take 800 mg by mouth, Disp: , Rfl:     omeprazole (PriLOSEC) 40 MG capsule, omeprazole Take No date recorded No form recorded No frequency recorded No route recorded No set duration recorded No set duration amount recorded active No dosage strength recorded No dosage strength units of measure recorded, Disp: , Rfl:     Allergies   Allergen Reactions    Metronidazole Anaphylaxis     "patient states she has been in anaphylaxis due to this medication prior"      Nitrofurantoin Shortness Of Breath       Objective:  Vitals:    03/25/22 1302   BP: 124/84   Pulse: 96   Temp: 99 °F (37 2 °C)       Left Shoulder Exam     Tenderness   Left shoulder tenderness location: Tenderness to palpation over the anterior aspect of the left shoulder and subacromial bursa  Discomfort over posterior subacromial bursa      Range of Motion   Active abduction: normal   Passive abduction: normal   Extension: normal   External rotation: normal   Forward flexion: normal   Internal rotation 0 degrees: normal     Muscle Strength   Abduction: 5/5   Internal rotation: 5/5   External rotation: 5/5   Supraspinatus: 5/5   Subscapularis: 5/5   Biceps: 5/5     Tests   Castillo test: negative  Impingement: negative  Drop arm: negative    Other   Erythema: absent  Sensation: normal  Pulse: present             Physical Exam  Vitals and nursing note reviewed  Constitutional:       Appearance: She is well-developed  HENT:      Head: Normocephalic and atraumatic  Eyes:      General: No scleral icterus  Conjunctiva/sclera: Conjunctivae normal    Neck:        Comments: Positive Spurling's maneuver on the left    Normal strength and sensation bilateral upper extremity  Cardiovascular:      Rate and Rhythm: Normal rate  Pulmonary:      Effort: Pulmonary effort is normal  No respiratory distress  Musculoskeletal:      Cervical back: Neck supple  No spinous process tenderness or muscular tenderness  Decreased range of motion  Comments: As noted in HPI   Skin:     General: Skin is warm and dry  Neurological:      Mental Status: She is alert and oriented to person, place, and time  Psychiatric:         Behavior: Behavior normal        I have personally reviewed pertinent films in PACS and my interpretation is as follows:  CT scan the chest dated 2/3/2022 demonstrates multiple levels of degenerative change in the thoracic spinal region with significant central canal stenosis  No fracture  Also noted is enlarging pulmonary nodule      Three-view x-ray of the left shoulder in the office today demonstrates no evidence of acute fracture or significant degenerative changes

## 2022-03-28 ENCOUNTER — TELEPHONE (OUTPATIENT)
Dept: OBGYN CLINIC | Facility: CLINIC | Age: 47
End: 2022-03-28

## 2022-03-28 NOTE — TELEPHONE ENCOUNTER
I can print home exercises for her    Please let us know which office she wants us to pick them up at or we can fax them

## 2022-03-28 NOTE — TELEPHONE ENCOUNTER
Patient called PT, PT looked into her insurance and they will not cover PT till she has met her $1,500  She states she cannot do that it is way too expensive  Wondering what else can be done? Can we get home exercises?

## 2022-04-07 ENCOUNTER — TELEPHONE (OUTPATIENT)
Dept: CARDIAC SURGERY | Facility: CLINIC | Age: 47
End: 2022-04-07

## 2022-04-07 NOTE — TELEPHONE ENCOUNTER
 Hello, can I please speak to (patient name) this is (enter your name here) calling from [facility]  [facility]) to remind you of your appointment on (date and time) at (location)  I am calling to review our no-show/cancelation policy and masking policy  Do you have a few minutes? We ask that you come at least 15 minutes early for your appointment to complete all paperwork  However, If you are up to 20 minutes late for your appointment, we may need to reschedule you  Any lateness to an appointment may result in an shorted visit, for our providers to offer you the most out of your consult, please arrive early  We require at least 24-hour notice for cancelations and if you miss your appointment 3 times, we may unfortunately not be able to reschedule any future visits  We ask that you please call our office in the event you are feeling ill as we may need to reschedule your appointment  You are allowed to bring only one visitor with you to your appointment, if your visitor is not feeling well we ask that you don't bring them  Our current masking policy is: if you are vaccinated masking is optional, if you are unvaccinated masking is required  We look forward to seeing you at your upcoming appointment!

## 2022-04-12 ENCOUNTER — OFFICE VISIT (OUTPATIENT)
Dept: CARDIAC SURGERY | Facility: CLINIC | Age: 47
End: 2022-04-12
Payer: COMMERCIAL

## 2022-04-12 VITALS
HEIGHT: 71 IN | BODY MASS INDEX: 41.02 KG/M2 | DIASTOLIC BLOOD PRESSURE: 78 MMHG | TEMPERATURE: 98.3 F | OXYGEN SATURATION: 95 % | WEIGHT: 293 LBS | RESPIRATION RATE: 16 BRPM | HEART RATE: 93 BPM | SYSTOLIC BLOOD PRESSURE: 122 MMHG

## 2022-04-12 DIAGNOSIS — R94.2 ABNORMAL PET OF RIGHT LUNG: ICD-10-CM

## 2022-04-12 DIAGNOSIS — R91.1 LUNG NODULE: Primary | ICD-10-CM

## 2022-04-12 DIAGNOSIS — R91.1 NODULE OF LOWER LOBE OF RIGHT LUNG: ICD-10-CM

## 2022-04-12 DIAGNOSIS — F17.210 TOBACCO SMOKER, 1 PACK OF CIGARETTES OR LESS PER DAY: ICD-10-CM

## 2022-04-12 PROCEDURE — 99205 OFFICE O/P NEW HI 60 MIN: CPT | Performed by: THORACIC SURGERY (CARDIOTHORACIC VASCULAR SURGERY)

## 2022-04-12 NOTE — ASSESSMENT & PLAN NOTE
Ms Chase Winston presents for evaluation of a right lower lobe pulmonary nodule initially discovered one year ago  Recent CT scan demonstrates minimal increase in size of the nodule, and PET shows mild FDG uptake  She also had a right cervical lymph node that was biopsied and pathology was benign  Dr Marilu Calzada is recommending a biopsy of this, via navigational bronchoscopy, as this is suspicious for a low grade malignancy  This was fully described and consent was obtained  She will need full PFTs as well for potential surgery

## 2022-04-12 NOTE — PROGRESS NOTES
Thoracic Consult  Assessment/Plan:    Lung nodule  Ms Yvon Godwin presents for evaluation of a right lower lobe pulmonary nodule initially discovered one year ago  Recent CT scan demonstrates minimal increase in size of the nodule, and PET shows mild FDG uptake  She also had a right cervical lymph node that was biopsied and pathology was benign  Dr Urban Trinh is recommending a biopsy of this, via navigational bronchoscopy, as this is suspicious for a low grade malignancy  This was fully described and consent was obtained  She will need full PFTs as well for potential surgery  Diagnoses and all orders for this visit:    Lung nodule  -     Complete PFT with post bronchodilator; Future  -     Case request operating room: BRONCHOSCOPY NAVIGATIONAL, BRONCHOSCOPY FLEXIBLE, ENDOBRONCHIAL ULTRASOUND (EBUS); Standing  -     Case request operating room: BRONCHOSCOPY NAVIGATIONAL, BRONCHOSCOPY FLEXIBLE, ENDOBRONCHIAL ULTRASOUND (EBUS)    Abnormal PET of right lung  -     Ambulatory Referral to Thoracic Surgery    Nodule of lower lobe of right lung  -     Ambulatory Referral to Thoracic Surgery    Tobacco smoker, 1 pack of cigarettes or less per day  -     Complete PFT with post bronchodilator; Future          Thoracic History   Diagnosis: Right lower lobe pulmonary nodule   Procedures/Surgeries:    Pathology:    Adjuvant Therapy:       Subjective:    Patient ID: Stef Kauffman is a 51 y o  female  HPI   Ms Yvon Godwin is a 51year old female referred to our office by Dr Oc Dao for evaluation of a pulmonary nodule  Chest CT 2/3/22 demonstrated a 1 8x1 2cm right lower lobe nodule, minimally increased compared to a CT one year prior  PET-CT 3/4/22 revealed SUV 3 1 in the nodule  There were mildly avid bilateral axillary lymph nodes, which appeared normal in size  There was a mildly enlarged right cervical lymph node 1 4x1 1cm SUV 9 3  IR biopsy of the cervical node was completed on 3/21/22 and pathology was benign     On discussion, she is smoking 3/4ppd for the last 36 years  She was on Chantix previously  She has tried Wellbutrin which didn't work  She has a lot of shortness of breath on exertion which is stable  She denies fevers, chills, weight loss, chest pain, or coughing  No vision changes, headaches, new bone/joint pains, weakness  She has a history of breast augmentation in 2015  The following portions of the patient's history were reviewed and updated as appropriate: allergies, current medications, past family history, past medical history, past social history, past surgical history and problem list     Past Medical History:   Diagnosis Date    Acid reflux     Trejo's esophagus     GERD (gastroesophageal reflux disease)     Hiatal hernia     Latent tuberculosis by skin test 1986    Was treated with INH for 6 months    Obstructive sleep apnea 05/27/2018    Mild TERESITA    AHI 18 1      Past Surgical History:   Procedure Laterality Date    AUGMENTATION BREAST Bilateral 2015    HYSTERECTOMY  2014    IR BIOPSY LYMPH NODE  3/21/2022    LAPAROSCOPIC CHOLECYSTECTOMY  1997    LIPOSUCTION  2015    REPAIR RECTOCELE  2018    TONSILLECTOMY  1984      Family History   Problem Relation Age of Onset    Cancer Mother         ovarian    Stroke Father     Lung cancer Paternal Uncle       Social History     Socioeconomic History    Marital status: Legally      Spouse name: Not on file    Number of children: Not on file    Years of education: Not on file    Highest education level: Not on file   Occupational History    Not on file   Tobacco Use    Smoking status: Current Every Day Smoker     Packs/day: 0 75     Years: 36 00     Pack years: 27 00     Types: Cigarettes    Smokeless tobacco: Never Used    Tobacco comment: smoking less than a pack a day    Vaping Use    Vaping Use: Never used   Substance and Sexual Activity    Alcohol use: Never    Drug use: Never    Sexual activity: Not Currently   Other Topics Concern    Not on file   Social History Narrative    Not on file     Social Determinants of Health     Financial Resource Strain: Not on file   Food Insecurity: Not on file   Transportation Needs: Not on file   Physical Activity: Not on file   Stress: Not on file   Social Connections: Not on file   Intimate Partner Violence: Not on file   Housing Stability: Not on file      Current Outpatient Medications   Medication Instructions    acyclovir (ZOVIRAX) 800 mg, Oral    diclofenac (VOLTAREN) 75 mg, Oral, 2 times daily    omeprazole (PriLOSEC) 40 MG capsule omeprazole Take No date recorded No form recorded No frequency recorded No route recorded No set duration recorded No set duration amount recorded active No dosage strength recorded No dosage strength units of measure recorded     Allergies   Allergen Reactions    Metronidazole Anaphylaxis     "patient states she has been in anaphylaxis due to this medication prior"      Nitrofurantoin Shortness Of Breath       Review of Systems   Constitutional: Negative  Eyes: Negative  Respiratory: Positive for shortness of breath  Negative for cough  Cardiovascular: Positive for chest pain (paresthesias right side )  Gastrointestinal: Negative  Musculoskeletal: Negative  Skin: Negative  Neurological: Negative  Hematological: Negative  Psychiatric/Behavioral: Negative  Objective:   Physical Exam  Constitutional:       General: She is not in acute distress  Appearance: Normal appearance  HENT:      Head: Normocephalic and atraumatic  Eyes:      Conjunctiva/sclera: Conjunctivae normal    Cardiovascular:      Rate and Rhythm: Normal rate and regular rhythm  Pulmonary:      Effort: Pulmonary effort is normal  No respiratory distress  Breath sounds: Normal breath sounds  Abdominal:      General: There is no distension  Palpations: Abdomen is soft  Musculoskeletal:      Cervical back: Neck supple        Right lower leg: No edema  Left lower leg: No edema  Lymphadenopathy:      Cervical: No cervical adenopathy  Skin:     General: Skin is warm and dry  Neurological:      General: No focal deficit present  Mental Status: She is alert and oriented to person, place, and time  Psychiatric:         Mood and Affect: Mood normal      /78 (BP Location: Left arm, Patient Position: Sitting, Cuff Size: Standard)   Pulse 93   Temp 98 3 °F (36 8 °C) (Temporal)   Resp 16   Ht 5' 11" (1 803 m)   Wt (!) 142 kg (313 lb 7 9 oz)   SpO2 95%   BMI 43 72 kg/m²       CT chest with contrast    Result Date: 2/7/2022  Narrative CT CHEST WITH IV CONTRAST INDICATION:   R91 1: Solitary pulmonary nodule  COMPARISON:  Prior outside CT study dated 3/19/2021  TECHNIQUE: CT examination of the chest was performed  Axial, sagittal, and coronal 2D reformatted images were created from the source data and submitted for interpretation  Radiation dose length product (DLP) for this visit:  1178 mGy-cm   This examination, like all CT scans performed in the [facility], was performed utilizing techniques to minimize radiation dose exposure, including the use of iterative reconstruction and automated exposure control  IV Contrast:  85 mL of iohexol (OMNIPAQUE) FINDINGS: LUNGS:  There is a lobulated nodule in the right lower lobe measuring 1 8 x 1 2 cm in the transverse dimension and 1 1 cm in craniocaudal dimensions  This appears minimally increased in size previously measuring 1 8 x 1 1 cm in the transverse dimension and 0 9 cm in craniocaudal dimensions  This could be due to slight differences in technique but either short-term follow-up in an additional 3 months or PET CT study may be helpful  No new nodules or masses  No obvious endobronchial lesion  PLEURA:  Unremarkable  HEART/GREAT VESSELS: No cardiomegaly  No thoracic aortic aneurysm  Moderate coronary artery calcifications  No pericardial effusion   MEDIASTINUM AND ABHI:  No significant mediastinal, hilar, or axillary lymphadenopathy  CHEST WALL AND LOWER NECK:   Bilateral breast implants are seen  No thyroid gland enlargement  VISUALIZED STRUCTURES IN THE UPPER ABDOMEN:  Small hiatal hernia including a small portion of the stomach and adjacent fat  Fatty infiltration of the liver  Status post cholecystectomy  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  OSSEOUS STRUCTURES:  No acute fracture or destructive osseous lesion  Degenerative changes throughout the thoracic spine with multilevel facet arthropathy resulting in multilevel central stenoses  Impression Stable to minimally increased lobulated pulmonary nodule right lower lobe as above  This can be further evaluated with either short-term follow-up CT in 3 months and/or PET CT study  No lymphadenopathy  Small hiatal hernia  Fatty liver  Left adrenal gland myelolipoma  Degenerative changes in the thoracic spine with multilevel central canal stenoses  If clinically warranted, this can be further evaluated with an MRI of the thoracic spine  The study was marked in EPIC for significant notification  Workstation performed: [id number]     NM PET CT skull base to mid thigh    Result Date: 3/4/2022  Narrative   PET/CT SCAN INDICATION:  R91 1: Solitary pulmonary nodule   further evaluation of right lower lobe opacity seen on CT study MODIFIER: PI COMPARISON: CT examination to 3/20/2022 and outside study 3/19/2021 CELL TYPE:  N/A TECHNIQUE:   12 7 mCi F-18-FDG administered IV  Multiplanar attenuation corrected and non attenuation corrected PET images were acquired 60 minutes post injection  Contiguous, low dose, axial CT sections were obtained from the skull base through the femurs   Intravenous contrast material was not utilized    This examination, like all CT scans performed in the [facility], was performed utilizing techniques to minimize radiation dose exposure, including the use of iterative reconstruction and automated exposure control  Fasting serum glucose: 94 mg/dl FINDINGS: VISUALIZED BRAIN:   No acute abnormalities are seen  HEAD/NECK:   There is a mildly enlarged and hypermetabolic right cervical lymph node image 25, measuring 1 4 x 1 1 cm SUV max 9 3  Smaller and only minimally hypermetabolic lymph node is seen directly posterior to the above-mentioned node, image 26, subcentimeter  CT images: No additional findings CHEST:   Nonspecific morphologically normal-appearing but mildly hypermetabolic axillary lymph nodes, on the left, image 62, SUV max 4 3 but with internal fatty replacement, on the right similar morphology, image 68, SUV max  Ovoid shaped right lower lobe lesion  image 85 measuring 1 9 x 1 1 cm, similar to the previous examination  SUV max is 3 1  There is no evidence of glucose avid intrathoracic adenopathy, no pleural or pericardial effusion  No other pulmonary nodules  Coronary artery calcification present with small hiatal hernia noted ABDOMEN:   No FDG avid soft tissue lesions are seen  CT images: There is mild hepatic steatosis and the patient is status post cholecystectomy  There are several punctate nonobstructing right renal calculi  Induration and nodularity of the anterior abdominal wall may be postsurgical in nature  PELVIS: No FDG avid soft tissue lesions are seen  CT images: Status post hysterectomy  A nonglucose avid soft tissue nodule in the right hemipelvis on image 189 measures 3 4 x 2 0 cm, this may represent the right ovary if it has not been surgically removed  OSSEOUS STRUCTURES: No FDG avid lesions are seen  CT images: No significant findings  Impression 1  Ovoid shaped right lower lobe pulmonary nodule unchanged in size since most recent CT study perhaps minimally increased from 3/19/2021  SUV max is 3 1    Differential consists of low-grade pulmonary neoplasm such as carcinoid versus benign nodule such  as hamartoma (which have been reported to show low levels of glucose avidity)  Management options include soft tissue sampling versus close follow-up noncontrast chest CT in 6 months 2  In addition, there is a mildly enlarged more hypermetabolic right cervical lymph node (image 25, SUV max 9 3 )  Further evaluation is warranted  Targeted ultrasound may be considered, with potential fine-needle aspiration 3  Nonspecific mildly increased glucose activity within otherwise normal-appearing axillary lymph nodes  These may be reactive in nature and can be reassessed during the time of follow-up The examination demonstrates a significant  finding and was documented as such in [facility] for liaison and referring practitioner notification  The examination demonstrates a finding requiring imaging follow-up and was logged as such in [address] [place] [address]   Workstation performed: [id number]

## 2022-04-12 NOTE — H&P (VIEW-ONLY)
Thoracic Consult  Assessment/Plan:    Lung nodule  Ms Marielle Boyce presents for evaluation of a right lower lobe pulmonary nodule initially discovered one year ago  Recent CT scan demonstrates minimal increase in size of the nodule, and PET shows mild FDG uptake  She also had a right cervical lymph node that was biopsied and pathology was benign  Dr Elida Wise is recommending a biopsy of this, via navigational bronchoscopy, as this is suspicious for a low grade malignancy  This was fully described and consent was obtained  She will need full PFTs as well for potential surgery  Diagnoses and all orders for this visit:    Lung nodule  -     Complete PFT with post bronchodilator; Future  -     Case request operating room: BRONCHOSCOPY NAVIGATIONAL, BRONCHOSCOPY FLEXIBLE, ENDOBRONCHIAL ULTRASOUND (EBUS); Standing  -     Case request operating room: BRONCHOSCOPY NAVIGATIONAL, BRONCHOSCOPY FLEXIBLE, ENDOBRONCHIAL ULTRASOUND (EBUS)    Abnormal PET of right lung  -     Ambulatory Referral to Thoracic Surgery    Nodule of lower lobe of right lung  -     Ambulatory Referral to Thoracic Surgery    Tobacco smoker, 1 pack of cigarettes or less per day  -     Complete PFT with post bronchodilator; Future          Thoracic History   Diagnosis: Right lower lobe pulmonary nodule   Procedures/Surgeries:    Pathology:    Adjuvant Therapy:       Subjective:    Patient ID: Lesa Mclaughlin is a 51 y o  female  HPI   Ms Marielle Boyce is a 51year old female referred to our office by Dr Sudeep Calhoun for evaluation of a pulmonary nodule  Chest CT 2/3/22 demonstrated a 1 8x1 2cm right lower lobe nodule, minimally increased compared to a CT one year prior  PET-CT 3/4/22 revealed SUV 3 1 in the nodule  There were mildly avid bilateral axillary lymph nodes, which appeared normal in size  There was a mildly enlarged right cervical lymph node 1 4x1 1cm SUV 9 3  IR biopsy of the cervical node was completed on 3/21/22 and pathology was benign     On discussion, she is smoking 3/4ppd for the last 36 years  She was on Chantix previously  She has tried Wellbutrin which didn't work  She has a lot of shortness of breath on exertion which is stable  She denies fevers, chills, weight loss, chest pain, or coughing  No vision changes, headaches, new bone/joint pains, weakness  She has a history of breast augmentation in 2015  The following portions of the patient's history were reviewed and updated as appropriate: allergies, current medications, past family history, past medical history, past social history, past surgical history and problem list     Past Medical History:   Diagnosis Date    Acid reflux     Trejo's esophagus     GERD (gastroesophageal reflux disease)     Hiatal hernia     Latent tuberculosis by skin test 1986    Was treated with INH for 6 months    Obstructive sleep apnea 05/27/2018    Mild TERESITA    AHI 18 1      Past Surgical History:   Procedure Laterality Date    AUGMENTATION BREAST Bilateral 2015    HYSTERECTOMY  2014    IR BIOPSY LYMPH NODE  3/21/2022    LAPAROSCOPIC CHOLECYSTECTOMY  1997    LIPOSUCTION  2015    REPAIR RECTOCELE  2018    TONSILLECTOMY  1984      Family History   Problem Relation Age of Onset    Cancer Mother         ovarian    Stroke Father     Lung cancer Paternal Uncle       Social History     Socioeconomic History    Marital status: Legally      Spouse name: Not on file    Number of children: Not on file    Years of education: Not on file    Highest education level: Not on file   Occupational History    Not on file   Tobacco Use    Smoking status: Current Every Day Smoker     Packs/day: 0 75     Years: 36 00     Pack years: 27 00     Types: Cigarettes    Smokeless tobacco: Never Used    Tobacco comment: smoking less than a pack a day    Vaping Use    Vaping Use: Never used   Substance and Sexual Activity    Alcohol use: Never    Drug use: Never    Sexual activity: Not Currently   Other Topics Concern    Not on file   Social History Narrative    Not on file     Social Determinants of Health     Financial Resource Strain: Not on file   Food Insecurity: Not on file   Transportation Needs: Not on file   Physical Activity: Not on file   Stress: Not on file   Social Connections: Not on file   Intimate Partner Violence: Not on file   Housing Stability: Not on file      Current Outpatient Medications   Medication Instructions    acyclovir (ZOVIRAX) 800 mg, Oral    diclofenac (VOLTAREN) 75 mg, Oral, 2 times daily    omeprazole (PriLOSEC) 40 MG capsule omeprazole Take No date recorded No form recorded No frequency recorded No route recorded No set duration recorded No set duration amount recorded active No dosage strength recorded No dosage strength units of measure recorded     Allergies   Allergen Reactions    Metronidazole Anaphylaxis     "patient states she has been in anaphylaxis due to this medication prior"      Nitrofurantoin Shortness Of Breath       Review of Systems   Constitutional: Negative  Eyes: Negative  Respiratory: Positive for shortness of breath  Negative for cough  Cardiovascular: Positive for chest pain (paresthesias right side )  Gastrointestinal: Negative  Musculoskeletal: Negative  Skin: Negative  Neurological: Negative  Hematological: Negative  Psychiatric/Behavioral: Negative  Objective:   Physical Exam  Constitutional:       General: She is not in acute distress  Appearance: Normal appearance  HENT:      Head: Normocephalic and atraumatic  Eyes:      Conjunctiva/sclera: Conjunctivae normal    Cardiovascular:      Rate and Rhythm: Normal rate and regular rhythm  Pulmonary:      Effort: Pulmonary effort is normal  No respiratory distress  Breath sounds: Normal breath sounds  Abdominal:      General: There is no distension  Palpations: Abdomen is soft  Musculoskeletal:      Cervical back: Neck supple        Right lower leg: No edema  Left lower leg: No edema  Lymphadenopathy:      Cervical: No cervical adenopathy  Skin:     General: Skin is warm and dry  Neurological:      General: No focal deficit present  Mental Status: She is alert and oriented to person, place, and time  Psychiatric:         Mood and Affect: Mood normal      /78 (BP Location: Left arm, Patient Position: Sitting, Cuff Size: Standard)   Pulse 93   Temp 98 3 °F (36 8 °C) (Temporal)   Resp 16   Ht 5' 11" (1 803 m)   Wt (!) 142 kg (313 lb 7 9 oz)   SpO2 95%   BMI 43 72 kg/m²       CT chest with contrast    Result Date: 2/7/2022  Narrative CT CHEST WITH IV CONTRAST INDICATION:   R91 1: Solitary pulmonary nodule  COMPARISON:  Prior outside CT study dated 3/19/2021  TECHNIQUE: CT examination of the chest was performed  Axial, sagittal, and coronal 2D reformatted images were created from the source data and submitted for interpretation  Radiation dose length product (DLP) for this visit:  1178 mGy-cm   This examination, like all CT scans performed in the [facility], was performed utilizing techniques to minimize radiation dose exposure, including the use of iterative reconstruction and automated exposure control  IV Contrast:  85 mL of iohexol (OMNIPAQUE) FINDINGS: LUNGS:  There is a lobulated nodule in the right lower lobe measuring 1 8 x 1 2 cm in the transverse dimension and 1 1 cm in craniocaudal dimensions  This appears minimally increased in size previously measuring 1 8 x 1 1 cm in the transverse dimension and 0 9 cm in craniocaudal dimensions  This could be due to slight differences in technique but either short-term follow-up in an additional 3 months or PET CT study may be helpful  No new nodules or masses  No obvious endobronchial lesion  PLEURA:  Unremarkable  HEART/GREAT VESSELS: No cardiomegaly  No thoracic aortic aneurysm  Moderate coronary artery calcifications  No pericardial effusion   MEDIASTINUM AND ABHI:  No significant mediastinal, hilar, or axillary lymphadenopathy  CHEST WALL AND LOWER NECK:   Bilateral breast implants are seen  No thyroid gland enlargement  VISUALIZED STRUCTURES IN THE UPPER ABDOMEN:  Small hiatal hernia including a small portion of the stomach and adjacent fat  Fatty infiltration of the liver  Status post cholecystectomy  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  OSSEOUS STRUCTURES:  No acute fracture or destructive osseous lesion  Degenerative changes throughout the thoracic spine with multilevel facet arthropathy resulting in multilevel central stenoses  Impression Stable to minimally increased lobulated pulmonary nodule right lower lobe as above  This can be further evaluated with either short-term follow-up CT in 3 months and/or PET CT study  No lymphadenopathy  Small hiatal hernia  Fatty liver  Left adrenal gland myelolipoma  Degenerative changes in the thoracic spine with multilevel central canal stenoses  If clinically warranted, this can be further evaluated with an MRI of the thoracic spine  The study was marked in EPIC for significant notification  Workstation performed: [id number]     NM PET CT skull base to mid thigh    Result Date: 3/4/2022  Narrative   PET/CT SCAN INDICATION:  R91 1: Solitary pulmonary nodule   further evaluation of right lower lobe opacity seen on CT study MODIFIER: PI COMPARISON: CT examination to 3/20/2022 and outside study 3/19/2021 CELL TYPE:  N/A TECHNIQUE:   12 7 mCi F-18-FDG administered IV  Multiplanar attenuation corrected and non attenuation corrected PET images were acquired 60 minutes post injection  Contiguous, low dose, axial CT sections were obtained from the skull base through the femurs   Intravenous contrast material was not utilized    This examination, like all CT scans performed in the [facility], was performed utilizing techniques to minimize radiation dose exposure, including the use of iterative reconstruction and automated exposure control  Fasting serum glucose: 94 mg/dl FINDINGS: VISUALIZED BRAIN:   No acute abnormalities are seen  HEAD/NECK:   There is a mildly enlarged and hypermetabolic right cervical lymph node image 25, measuring 1 4 x 1 1 cm SUV max 9 3  Smaller and only minimally hypermetabolic lymph node is seen directly posterior to the above-mentioned node, image 26, subcentimeter  CT images: No additional findings CHEST:   Nonspecific morphologically normal-appearing but mildly hypermetabolic axillary lymph nodes, on the left, image 62, SUV max 4 3 but with internal fatty replacement, on the right similar morphology, image 68, SUV max  Ovoid shaped right lower lobe lesion  image 85 measuring 1 9 x 1 1 cm, similar to the previous examination  SUV max is 3 1  There is no evidence of glucose avid intrathoracic adenopathy, no pleural or pericardial effusion  No other pulmonary nodules  Coronary artery calcification present with small hiatal hernia noted ABDOMEN:   No FDG avid soft tissue lesions are seen  CT images: There is mild hepatic steatosis and the patient is status post cholecystectomy  There are several punctate nonobstructing right renal calculi  Induration and nodularity of the anterior abdominal wall may be postsurgical in nature  PELVIS: No FDG avid soft tissue lesions are seen  CT images: Status post hysterectomy  A nonglucose avid soft tissue nodule in the right hemipelvis on image 189 measures 3 4 x 2 0 cm, this may represent the right ovary if it has not been surgically removed  OSSEOUS STRUCTURES: No FDG avid lesions are seen  CT images: No significant findings  Impression 1  Ovoid shaped right lower lobe pulmonary nodule unchanged in size since most recent CT study perhaps minimally increased from 3/19/2021  SUV max is 3 1    Differential consists of low-grade pulmonary neoplasm such as carcinoid versus benign nodule such  as hamartoma (which have been reported to show low levels of glucose avidity)  Management options include soft tissue sampling versus close follow-up noncontrast chest CT in 6 months 2  In addition, there is a mildly enlarged more hypermetabolic right cervical lymph node (image 25, SUV max 9 3 )  Further evaluation is warranted  Targeted ultrasound may be considered, with potential fine-needle aspiration 3  Nonspecific mildly increased glucose activity within otherwise normal-appearing axillary lymph nodes  These may be reactive in nature and can be reassessed during the time of follow-up The examination demonstrates a significant  finding and was documented as such in [facility] for liaison and referring practitioner notification  The examination demonstrates a finding requiring imaging follow-up and was logged as such in [address] [place] [address] [employer]   Workstation performed: [id number]

## 2022-04-13 ENCOUNTER — TELEPHONE (OUTPATIENT)
Dept: OBGYN CLINIC | Facility: CLINIC | Age: 47
End: 2022-04-13

## 2022-04-13 NOTE — TELEPHONE ENCOUNTER
MRI Thoracic denied:    Imaging requires six weeks of provider directed treatment to be completed  Supported treatments include (but are not limited to) drugs for swelling or pain, an in office workout (physical therapy), and/or oral or injected steroids  This must have been completed in the past three months without improved symptoms  Contact (via office visit, phone, email, or messaging) must occur after the treatment is completed  This has not been met because: You have not completed six weeks of provider directed treatment  The provider directed treatment did not occur within the last three months       There was no contact with your provider after completing treatment

## 2022-04-15 NOTE — TELEPHONE ENCOUNTER
Her MRI was denied by insurance  She needs to complete 6 weeks conservative treatment for they would cover an MRI  This is typically physical therapy but I do understand is a large out-of-pocket cost for her  Please have her continue the home exercises that were given to her and oral anti-inflammatory and follow up with me at her next office visit  If her pain continues and none of these treatments help then we could resubmit for the MRI

## 2022-04-15 NOTE — TELEPHONE ENCOUNTER
Patient called back and advised of below   We rescheduled her appointment for approx: 6 weeks from the time she started her home therapy program

## 2022-04-20 ENCOUNTER — TELEPHONE (OUTPATIENT)
Dept: FAMILY MEDICINE CLINIC | Facility: CLINIC | Age: 47
End: 2022-04-20

## 2022-04-20 ENCOUNTER — OFFICE VISIT (OUTPATIENT)
Dept: FAMILY MEDICINE CLINIC | Facility: CLINIC | Age: 47
End: 2022-04-20
Payer: COMMERCIAL

## 2022-04-20 VITALS
DIASTOLIC BLOOD PRESSURE: 80 MMHG | HEART RATE: 84 BPM | RESPIRATION RATE: 18 BRPM | SYSTOLIC BLOOD PRESSURE: 120 MMHG | TEMPERATURE: 97.5 F | WEIGHT: 293 LBS | BODY MASS INDEX: 41.02 KG/M2 | HEIGHT: 71 IN

## 2022-04-20 DIAGNOSIS — N76.0 ACUTE VAGINITIS: Primary | ICD-10-CM

## 2022-04-20 DIAGNOSIS — F41.8 SITUATIONAL ANXIETY: ICD-10-CM

## 2022-04-20 PROCEDURE — 99214 OFFICE O/P EST MOD 30 MIN: CPT | Performed by: FAMILY MEDICINE

## 2022-04-20 PROCEDURE — 3008F BODY MASS INDEX DOCD: CPT | Performed by: FAMILY MEDICINE

## 2022-04-20 PROCEDURE — 4004F PT TOBACCO SCREEN RCVD TLK: CPT | Performed by: FAMILY MEDICINE

## 2022-04-20 RX ORDER — ALPRAZOLAM 0.5 MG/1
0.5 TABLET ORAL
Qty: 15 TABLET | Refills: 0 | Status: SHIPPED | OUTPATIENT
Start: 2022-04-20 | End: 2022-07-05

## 2022-04-20 NOTE — PROGRESS NOTES
Chief Complaint   Patient presents with    Anxiety    Vaginal Discharge     odor, x 2 weeks        Patient ID: Stef Kauffman is a 51 y o  female  HPI  Pt is seeing for increased Anxiety and sleep problem prior to lung biopsy for lung nodule -  Also has vaginal odor x 2 wks -  No d/c -  H/o BV in the past     The following portions of the patient's history were reviewed and updated as appropriate: allergies, current medications, past family history, past medical history, past social history, past surgical history and problem list     Review of Systems   Constitutional: Negative  Respiratory: Negative  Cardiovascular: Negative  Gastrointestinal: Negative  Genitourinary: Negative for difficulty urinating, dysuria, pelvic pain, vaginal discharge and vaginal pain  Musculoskeletal: Negative  Skin: Negative  Neurological: Negative  Psychiatric/Behavioral: The patient is nervous/anxious  Current Outpatient Medications   Medication Sig Dispense Refill    acyclovir (ZOVIRAX) 800 mg tablet Take 800 mg by mouth      diclofenac (VOLTAREN) 75 mg EC tablet Take 1 tablet (75 mg total) by mouth 2 (two) times a day 60 tablet 0    omeprazole (PriLOSEC) 40 MG capsule omeprazole Take No date recorded No form recorded No frequency recorded No route recorded No set duration recorded No set duration amount recorded active No dosage strength recorded No dosage strength units of measure recorded       No current facility-administered medications for this visit  Objective:    /80   Pulse 84   Temp 97 5 °F (36 4 °C)   Resp 18   Ht 5' 11" (1 803 m)   Wt (!) 144 kg (317 lb)   BMI 44 21 kg/m²        Physical Exam  Constitutional:       Appearance: She is obese  She is not ill-appearing  Cardiovascular:      Rate and Rhythm: Normal rate  Pulmonary:      Effort: Pulmonary effort is normal  No respiratory distress     Neurological:      Mental Status: She is alert and oriented to person, place, and time  Psychiatric:         Mood and Affect: Mood normal          Thought Content: Thought content normal          Judgment: Judgment normal                  Assessment/Plan:         Diagnoses and all orders for this visit:    Acute vaginitis  -     clindamycin (CLEOCIN) 100 MG vaginal suppository; Insert 1 suppository (100 mg total) into the vagina daily at bedtime    Situational anxiety  -     ALPRAZolam (XANAX) 0 5 mg tablet;  Take 1 tablet (0 5 mg total) by mouth daily at bedtime as needed for anxiety or sleep            rto prn                Yasmin Cummins MD

## 2022-04-20 NOTE — TELEPHONE ENCOUNTER
Dr Tran Neat    Patient says clindamycin supposotory costs over $100  Wants to know if you can send rx for clidamyacin 40 grams 2 % cream instead to new pharmacy [facility]

## 2022-04-21 ENCOUNTER — HOSPITAL ENCOUNTER (OUTPATIENT)
Dept: RADIOLOGY | Facility: HOSPITAL | Age: 47
Discharge: HOME | End: 2022-04-21
Payer: COMMERCIAL

## 2022-04-21 DIAGNOSIS — R91.1 LUNG NODULE: ICD-10-CM

## 2022-04-21 PROCEDURE — G1004 CDSM NDSC: HCPCS

## 2022-04-21 PROCEDURE — 71250 CT THORAX DX C-: CPT

## 2022-04-22 DIAGNOSIS — F17.210 TOBACCO SMOKER, 1 PACK OF CIGARETTES OR LESS PER DAY: Primary | ICD-10-CM

## 2022-04-25 ENCOUNTER — ANESTHESIA EVENT (OUTPATIENT)
Dept: PERIOP | Facility: HOSPITAL | Age: 47
End: 2022-04-25
Payer: COMMERCIAL

## 2022-04-25 ENCOUNTER — TELEPHONE (OUTPATIENT)
Dept: PULMONOLOGY | Facility: MEDICAL CENTER | Age: 47
End: 2022-04-25

## 2022-04-25 NOTE — TELEPHONE ENCOUNTER
I did speak with patient today  Did have ultrasound-guided core biopsy of right neck cervical PET positive lymph node and no evidence of malignancy  She just had a follow-up CT of chest done on 04/21  I did look at images discussed with her  Is persistent oval-shaped right lower lobe lung lesion looks similar to previous CT scan  Previous on PET scan it had some mild hypermetabolic activity of 3 1  She see thoracic surgery on 04/12 and has been scheduled for navigational bronchoscopy presume biopsy of this right lower lobe lung lesion    Patient states she has been scheduled for May 3rd with Dr Hever Valdez

## 2022-04-29 RX ORDER — MULTIVITAMIN
1 CAPSULE ORAL DAILY
COMMUNITY
End: 2022-07-05

## 2022-04-29 RX ORDER — AMOXICILLIN 500 MG
CAPSULE ORAL DAILY
COMMUNITY

## 2022-04-29 NOTE — PRE-PROCEDURE INSTRUCTIONS
Pre-Surgery Instructions:   Medication Instructions    acyclovir (ZOVIRAX) 800 mg tablet Take day of surgery   ALPRAZolam (XANAX) 0 5 mg tablet Uses PRN- OK to take day of surgery    clindamycin (CLEOCIN) 2 % vaginal cream Hold day of surgery   diclofenac (VOLTAREN) 75 mg EC tablet Stop taking 7 days prior to surgery   Multiple Vitamin (multivitamin) capsule Stop taking 7 days prior to surgery   Omega-3 Fatty Acids (Fish Oil) 1200 MG CAPS Stop taking 7 days prior to surgery   omeprazole (PriLOSEC) 40 MG capsule Take day of surgery  Pt denies fever, sob,sore throat and cough  Pt verbalized understanding of shower and med instructions  Pt instructed to stop nsaids and supplements prior to surgery

## 2022-05-03 ENCOUNTER — APPOINTMENT (OUTPATIENT)
Dept: RADIOLOGY | Facility: HOSPITAL | Age: 47
End: 2022-05-03
Attending: INTERNAL MEDICINE
Payer: COMMERCIAL

## 2022-05-03 ENCOUNTER — APPOINTMENT (OUTPATIENT)
Dept: RADIOLOGY | Facility: HOSPITAL | Age: 47
End: 2022-05-03
Payer: COMMERCIAL

## 2022-05-03 ENCOUNTER — HOSPITAL ENCOUNTER (OUTPATIENT)
Facility: HOSPITAL | Age: 47
Setting detail: OUTPATIENT SURGERY
Discharge: HOME | End: 2022-05-03
Attending: THORACIC SURGERY (CARDIOTHORACIC VASCULAR SURGERY) | Admitting: THORACIC SURGERY (CARDIOTHORACIC VASCULAR SURGERY)
Payer: COMMERCIAL

## 2022-05-03 ENCOUNTER — ANESTHESIA (OUTPATIENT)
Dept: PERIOP | Facility: HOSPITAL | Age: 47
End: 2022-05-03
Payer: COMMERCIAL

## 2022-05-03 VITALS
HEART RATE: 94 BPM | DIASTOLIC BLOOD PRESSURE: 78 MMHG | TEMPERATURE: 97.9 F | OXYGEN SATURATION: 98 % | WEIGHT: 293 LBS | BODY MASS INDEX: 41.02 KG/M2 | SYSTOLIC BLOOD PRESSURE: 110 MMHG | RESPIRATION RATE: 16 BRPM | HEIGHT: 71 IN

## 2022-05-03 DIAGNOSIS — R91.1 LUNG NODULE: ICD-10-CM

## 2022-05-03 PROCEDURE — 88173 CYTOPATH EVAL FNA REPORT: CPT | Performed by: PATHOLOGY

## 2022-05-03 PROCEDURE — 31654 BRONCH EBUS IVNTJ PERPH LES: CPT | Performed by: THORACIC SURGERY (CARDIOTHORACIC VASCULAR SURGERY)

## 2022-05-03 PROCEDURE — 88342 IMHCHEM/IMCYTCHM 1ST ANTB: CPT | Performed by: PATHOLOGY

## 2022-05-03 PROCEDURE — 31627 NAVIGATIONAL BRONCHOSCOPY: CPT | Performed by: THORACIC SURGERY (CARDIOTHORACIC VASCULAR SURGERY)

## 2022-05-03 PROCEDURE — 88341 IMHCHEM/IMCYTCHM EA ADD ANTB: CPT | Performed by: PATHOLOGY

## 2022-05-03 PROCEDURE — 31652 BRONCH EBUS SAMPLNG 1/2 NODE: CPT | Performed by: THORACIC SURGERY (CARDIOTHORACIC VASCULAR SURGERY)

## 2022-05-03 PROCEDURE — 88305 TISSUE EXAM BY PATHOLOGIST: CPT | Performed by: PATHOLOGY

## 2022-05-03 PROCEDURE — 88172 CYTP DX EVAL FNA 1ST EA SITE: CPT | Performed by: PATHOLOGY

## 2022-05-03 PROCEDURE — 31624 DX BRONCHOSCOPE/LAVAGE: CPT | Performed by: THORACIC SURGERY (CARDIOTHORACIC VASCULAR SURGERY)

## 2022-05-03 PROCEDURE — 71045 X-RAY EXAM CHEST 1 VIEW: CPT

## 2022-05-03 PROCEDURE — 31629 BRONCHOSCOPY/NEEDLE BX EACH: CPT | Performed by: THORACIC SURGERY (CARDIOTHORACIC VASCULAR SURGERY)

## 2022-05-03 PROCEDURE — 88112 CYTOPATH CELL ENHANCE TECH: CPT | Performed by: PATHOLOGY

## 2022-05-03 RX ORDER — PROPOFOL 10 MG/ML
INJECTION, EMULSION INTRAVENOUS CONTINUOUS PRN
Status: DISCONTINUED | OUTPATIENT
Start: 2022-05-03 | End: 2022-05-03

## 2022-05-03 RX ORDER — PROPOFOL 10 MG/ML
INJECTION, EMULSION INTRAVENOUS AS NEEDED
Status: DISCONTINUED | OUTPATIENT
Start: 2022-05-03 | End: 2022-05-03

## 2022-05-03 RX ORDER — LEVOCETIRIZINE DIHYDROCHLORIDE 5 MG/1
5 TABLET, FILM COATED ORAL EVERY EVENING
COMMUNITY
End: 2022-06-13 | Stop reason: SDUPTHER

## 2022-05-03 RX ORDER — SODIUM CHLORIDE, SODIUM LACTATE, POTASSIUM CHLORIDE, CALCIUM CHLORIDE 600; 310; 30; 20 MG/100ML; MG/100ML; MG/100ML; MG/100ML
50 INJECTION, SOLUTION INTRAVENOUS CONTINUOUS
Status: DISCONTINUED | OUTPATIENT
Start: 2022-05-03 | End: 2022-05-03 | Stop reason: HOSPADM

## 2022-05-03 RX ORDER — EPHEDRINE SULFATE 50 MG/ML
INJECTION INTRAVENOUS AS NEEDED
Status: DISCONTINUED | OUTPATIENT
Start: 2022-05-03 | End: 2022-05-03

## 2022-05-03 RX ORDER — LIDOCAINE HYDROCHLORIDE 20 MG/ML
INJECTION, SOLUTION EPIDURAL; INFILTRATION; INTRACAUDAL; PERINEURAL AS NEEDED
Status: DISCONTINUED | OUTPATIENT
Start: 2022-05-03 | End: 2022-05-03

## 2022-05-03 RX ORDER — ALBUTEROL SULFATE 90 UG/1
AEROSOL, METERED RESPIRATORY (INHALATION) AS NEEDED
Status: DISCONTINUED | OUTPATIENT
Start: 2022-05-03 | End: 2022-05-03

## 2022-05-03 RX ORDER — FENTANYL CITRATE 50 UG/ML
INJECTION, SOLUTION INTRAMUSCULAR; INTRAVENOUS AS NEEDED
Status: DISCONTINUED | OUTPATIENT
Start: 2022-05-03 | End: 2022-05-03

## 2022-05-03 RX ORDER — LIDOCAINE HYDROCHLORIDE 10 MG/ML
0.5 INJECTION, SOLUTION EPIDURAL; INFILTRATION; INTRACAUDAL; PERINEURAL ONCE AS NEEDED
Status: DISCONTINUED | OUTPATIENT
Start: 2022-05-03 | End: 2022-05-03 | Stop reason: HOSPADM

## 2022-05-03 RX ORDER — HYDROMORPHONE HCL/PF 1 MG/ML
0.5 SYRINGE (ML) INJECTION
Status: DISCONTINUED | OUTPATIENT
Start: 2022-05-03 | End: 2022-05-03 | Stop reason: HOSPADM

## 2022-05-03 RX ORDER — ROCURONIUM BROMIDE 10 MG/ML
INJECTION, SOLUTION INTRAVENOUS AS NEEDED
Status: DISCONTINUED | OUTPATIENT
Start: 2022-05-03 | End: 2022-05-03

## 2022-05-03 RX ORDER — FENTANYL CITRATE/PF 50 MCG/ML
25 SYRINGE (ML) INJECTION
Status: DISCONTINUED | OUTPATIENT
Start: 2022-05-03 | End: 2022-05-03 | Stop reason: HOSPADM

## 2022-05-03 RX ORDER — ONDANSETRON 2 MG/ML
INJECTION INTRAMUSCULAR; INTRAVENOUS AS NEEDED
Status: DISCONTINUED | OUTPATIENT
Start: 2022-05-03 | End: 2022-05-03

## 2022-05-03 RX ORDER — DEXAMETHASONE SODIUM PHOSPHATE 10 MG/ML
INJECTION, SOLUTION INTRAMUSCULAR; INTRAVENOUS AS NEEDED
Status: DISCONTINUED | OUTPATIENT
Start: 2022-05-03 | End: 2022-05-03

## 2022-05-03 RX ORDER — GLYCOPYRROLATE 0.2 MG/ML
INJECTION INTRAMUSCULAR; INTRAVENOUS AS NEEDED
Status: DISCONTINUED | OUTPATIENT
Start: 2022-05-03 | End: 2022-05-03

## 2022-05-03 RX ORDER — ONDANSETRON 2 MG/ML
4 INJECTION INTRAMUSCULAR; INTRAVENOUS ONCE AS NEEDED
Status: DISCONTINUED | OUTPATIENT
Start: 2022-05-03 | End: 2022-05-03 | Stop reason: HOSPADM

## 2022-05-03 RX ORDER — MIDAZOLAM HYDROCHLORIDE 2 MG/2ML
INJECTION, SOLUTION INTRAMUSCULAR; INTRAVENOUS AS NEEDED
Status: DISCONTINUED | OUTPATIENT
Start: 2022-05-03 | End: 2022-05-03

## 2022-05-03 RX ORDER — SODIUM CHLORIDE, SODIUM LACTATE, POTASSIUM CHLORIDE, CALCIUM CHLORIDE 600; 310; 30; 20 MG/100ML; MG/100ML; MG/100ML; MG/100ML
INJECTION, SOLUTION INTRAVENOUS CONTINUOUS PRN
Status: DISCONTINUED | OUTPATIENT
Start: 2022-05-03 | End: 2022-05-03

## 2022-05-03 RX ADMIN — ONDANSETRON 4 MG: 2 INJECTION INTRAMUSCULAR; INTRAVENOUS at 09:58

## 2022-05-03 RX ADMIN — PROPOFOL 150 MG: 10 INJECTION, EMULSION INTRAVENOUS at 08:04

## 2022-05-03 RX ADMIN — EPHEDRINE SULFATE 5 MG: 50 INJECTION INTRAVENOUS at 08:29

## 2022-05-03 RX ADMIN — FENTANYL CITRATE 100 MCG: 50 INJECTION INTRAMUSCULAR; INTRAVENOUS at 08:04

## 2022-05-03 RX ADMIN — REMIFENTANIL HYDROCHLORIDE 0.2 MCG/KG/MIN: 1 INJECTION, POWDER, LYOPHILIZED, FOR SOLUTION INTRAVENOUS at 08:07

## 2022-05-03 RX ADMIN — MIDAZOLAM 2 MG: 1 INJECTION INTRAMUSCULAR; INTRAVENOUS at 07:57

## 2022-05-03 RX ADMIN — ALBUTEROL SULFATE 4 PUFF: 90 AEROSOL, METERED RESPIRATORY (INHALATION) at 08:07

## 2022-05-03 RX ADMIN — SUGAMMADEX 300 MG: 100 INJECTION, SOLUTION INTRAVENOUS at 09:58

## 2022-05-03 RX ADMIN — GLYCOPYRROLATE 0.1 MCG: 0.2 INJECTION, SOLUTION INTRAMUSCULAR; INTRAVENOUS at 08:46

## 2022-05-03 RX ADMIN — PROPOFOL 140 MCG/KG/MIN: 10 INJECTION, EMULSION INTRAVENOUS at 08:07

## 2022-05-03 RX ADMIN — ROCURONIUM BROMIDE 50 MG: 50 INJECTION INTRAVENOUS at 08:05

## 2022-05-03 RX ADMIN — PHENYLEPHRINE HYDROCHLORIDE 40 MCG/MIN: 10 INJECTION INTRAVENOUS at 08:07

## 2022-05-03 RX ADMIN — SODIUM CHLORIDE, SODIUM LACTATE, POTASSIUM CHLORIDE, AND CALCIUM CHLORIDE: .6; .31; .03; .02 INJECTION, SOLUTION INTRAVENOUS at 08:00

## 2022-05-03 RX ADMIN — DEXAMETHASONE SODIUM PHOSPHATE 10 MG: 10 INJECTION, SOLUTION INTRAMUSCULAR; INTRAVENOUS at 08:09

## 2022-05-03 RX ADMIN — PROPOFOL 50 MG: 10 INJECTION, EMULSION INTRAVENOUS at 08:48

## 2022-05-03 RX ADMIN — KETAMINE HYDROCHLORIDE 0.84 MG/KG/HR: 50 INJECTION, SOLUTION INTRAMUSCULAR; INTRAVENOUS at 08:07

## 2022-05-03 RX ADMIN — ALBUTEROL SULFATE 4 PUFF: 90 AEROSOL, METERED RESPIRATORY (INHALATION) at 09:59

## 2022-05-03 RX ADMIN — LIDOCAINE HYDROCHLORIDE 100 MG: 20 INJECTION, SOLUTION EPIDURAL; INFILTRATION; INTRACAUDAL; PERINEURAL at 08:04

## 2022-05-03 RX ADMIN — ROCURONIUM BROMIDE 20 MG: 50 INJECTION INTRAVENOUS at 08:52

## 2022-05-03 RX ADMIN — EPHEDRINE SULFATE 5 MG: 50 INJECTION INTRAVENOUS at 08:39

## 2022-05-03 NOTE — OP NOTE
OPERATIVE REPORT  PATIENT NAME: Marcelo Alba    :  1975  MRN: [id number]  Pt Location: [facility] OR ROOM 08    SURGERY DATE: 5/3/2022    Surgeon(s) and Role:     * Debbie Broussard MD - Primary     * Natalia Art DO - Observing    Preop Diagnosis:  Lung nodule [R91 1]    Post-Op Diagnosis Codes:     * Lung nodule [R91 1]    Procedure(s) (LRB):  BRONCHOSCOPY NAVIGATIONAL (N/A)  BRONCHOSCOPY FLEXIBLE (N/A)  ENDOBRONCHIAL ULTRASOUND (EBUS) (N/A)  1  Endobronchial ultrasound with biopsy of 2 lymph nodes  2  Navigational bronchoscopy with transbronchial biopsy of a right lower lobe mass  3  Radial EBUS to verify mass location   4  BAL      Specimen(s):  ID Type Source Tests Collected by Time Destination   1 :  FNA Lung, Right Lower Lobe FINE NEEDLE ASPIRATION Debbie Broussard MD 5/3/2022 1098    2 :  Lavage Lung, Right Lower Lobe Bronchoalveolar Lavage NON-GYNECOLOGIC CYTOLOGY Debbie Broussard MD 5/3/2022 4695    3 : level 10 R FNA Lymph Node FINE NEEDLE ASPIRATION Debbie Broussard MD 5/3/2022 0840    4 : level 7 FNA Lymph Node FINE NEEDLE ASPIRATION Debbie Broussard MD 5/3/2022 0940        Estimated Blood Loss:   Minimal    Drains:  * No LDAs found *    Anesthesia Type:   General    Operative Indications:  Lung nodule [R801]  21-year-old female with approximate 20 pack-year smoking history and enlarging 2 cm slightly PET avid right lower lobe lesion suspicious for malignancy    Operative Findings:  Definite evidence of malignancy in the right lower lobe mass  No evidence of malignancy in lymph nodes  Complications:   None    Procedure and Technique:  After obtaining informed consent from the patient, they were transported to the operating room and placed supine on the OR table  General anesthesia was administered without issue and an endotracheal tube was placed   A formal time-out was performed at this time including patient, date of birth, intended procedure, antibiotic usage as appropriate, beta-blocker usage as appropriate and plans for specimen handling  An adult bronchoscope was inserted into the endotracheal tube and a thorough bronchoscopy was then performed examining the trachea, mainstem bronchi, sigmoid segmental and subsegmental bronchi  All mucus was suctioned out to improve visualization  There is no suspicion or identified risk for TB or other air born infectious disease  During our bronchoscopy we positioned the patient's endotracheal tube at least 4 cm above the main cindy  The endotracheal tube was then cut just above the patient's nose and secured in place  The  Keaton endotracheal tube simeon and sheath was then attached to the endotracheal tube and secured to the bed in the appropriate position  The [place] field sensor was then positioned on the same side as the target lesion 3 patient pads were placed in the appropriate position  We verified that these were within our field that included the main trachea  The patient's bilateral arms were then tucked and we made sure to minimize any metal within our field  Prior to our procedure the patient had undergone a planning  CT of the chest, navigational protocol with thin slices  We had previously uploaded this to a [employer] where our navigational pathway/ plan was determined  This was uploaded to our system prior to initiation of this procedure  We then started the robotic navigational bronchoscopy portion of our procedure  The robotic bronchoscope was inserted through the sheath simeon and into the endotracheal tube  We then went through our initialization/registration process  Once synched we began to navigate our to our target lesion  There was good correlation between robotic mapping and bronchoscopic imaging  With the assistance of the fused imaging and navigation plan we navigated out to our target mass  Our target lesion was in the right lower lobe    Once we identified our target lesion via navigation this  was verified with radial EBUS  Our radial EBUS image was a eccentric view  Then using fluoroscopy we obtained a baseline image  The target lesion was able to be seen with fluoroscopy  Then using continuous fluoroscopy we began to take biopsies the target lesion  Multiple transbronchial biopsies were taken using the Olympus 21 gauge PeriView needle  Transbronchial needle biopsies were used to create slides that were evaluated via DELVIN with our pathologist in the room  In total 7 transbronchial needle biopsies were taken at our target lesion  Our specimens were immediately analyzed via DELVIN pathology in the room showing definite evidence of malignancy  Unable to make further differentiation in the OR  The remainder of the specimens were sent in cell block  We then performed a BAL  Using 20 mL of sterile saline we flushed this down the robotic bronchoscope  The scope was carefully drawn back with suction to extract our BAL specimen  This was sent for cytology  Once we confirmed hemostasis the robotic bronchoscope was safely withdrawn through the airway and our robotic navigational portion of the procedure was completed at this time  The Olympus EBUS scope was then inserted and used to identify mediastinal lymph node stations  Mediastinal stations 10R, 7, 4R, 2R, 4L and 2L were visualized using ultrasound guidance  Color Doppler was used as needed to identify and avoid surrounding vasculature  A 21 gauge EBUS needle was used to obtain node biopsies under direct visualization  Biopsies were obtained from stations 10R and 7  Level 4R and level 2R measured less than 5mm   These were immediately analyzed by DELVIN cytopathology showing lymphocytes but no malignancy  There was no significant bleeding seen from the airway  This portion of the procedure was completed at this time     An adult bronchoscope was again reintroduced into the airway and the airway was fully suctioned out  Once hemostasis was verified the patient awoke and was extubated without issue  They were transferred to the PACU in stable condition  All specimen, needle, and instrument counts were verified at the conclusion of the procedure and were correct  We ordered a chest x-ray to evaluate for a pneumothorax in the PACU          I was present for the entire procedure    Patient Disposition:  PACU       SIGNATURE: Dariela Rollins MD  DATE: May 3, 2022  TIME: 9:55 AM

## 2022-05-03 NOTE — DISCHARGE INSTRUCTIONS
Dr Evie Barrera office will call you to schedule pulmonary function testing and a PET scan   Dr Laurie Thompson will see you in the office after these are complete  Flexible Bronchoscopy   WHAT YOU NEED TO KNOW:   A flexible bronchoscopy is a procedure to look inside your respiratory system (nose, throat, and lungs)  Healthcare providers use a bronchoscope  This is a soft tube with a light and camera on the end  Pictures of your respiratory system appear on a monitor during the procedure  It is normal to have a sore or scratchy throat after your procedure  You may also cough up dark or brown mucus  These symptoms should get better within a few days  DISCHARGE INSTRUCTIONS:   Call 911 for any of the following:   · You have chest pain or feel like your heart is beating faster than normal       Seek care immediately if:   · You cannot swallow  · You cough up bright red blood  · You are confused and dizzy or feel like you are going to faint  · You have shortness of breath  · Your lips or nails turn blue  Contact your healthcare provider if:   · You have a fever  · Your pain does not go away, even after you take medicine  · You have new symptoms or your symptoms are worse than before  · You have questions or concerns about your condition or care  Do not smoke:  Smoking can cause severe damage to your airway  Ask your healthcare provider for information if you need help quitting  Talk to your healthcare provider about any smokeless tobacco products you regularly use  Follow up with your healthcare provider as directed: Ask when you should expect the results of your procedure  Write down your questions so you remember to ask them during your visits  © Copyright [employer] 2022 Information is for End User's use only and may not be sold, redistributed or otherwise used for commercial purposes   All illustrations and images included in CareNotes® are the copyrighted property of [employer]  or [facility]  The above information is an  only  It is not intended as medical advice for individual conditions or treatments  Talk to your doctor, nurse or pharmacist before following any medical regimen to see if it is safe and effective for you

## 2022-05-03 NOTE — ANESTHESIA PREPROCEDURE EVALUATION
Procedure:  BRONCHOSCOPY NAVIGATIONAL (N/A Bronchus)  BRONCHOSCOPY FLEXIBLE (N/A Bronchus)  ENDOBRONCHIAL ULTRASOUND (EBUS) (N/A Bronchus)    Relevant Problems   /RENAL   (+) Kidney stone      PULMONARY   (+) Obstructive sleep apnea   (+) Tobacco smoker, 1 pack of cigarettes or less per day      Other   (+) Class 3 severe obesity due to excess calories without serious comorbidity with body mass index (BMI) of 40 0 to 44 9 in adult (HCC)   (+) Latent tuberculosis by skin test   (+) Lung nodule             Anesthesia Plan  ASA Score- 3     Anesthesia Type- general with ASA Monitors  Additional Monitors:   Airway Plan: ETT  Plan Factors-    Chart reviewed  Induction- intravenous  Postoperative Plan- Plan for postoperative opioid use  Planned trial extubation    Informed Consent- Anesthetic plan and risks discussed with patient  I personally reviewed this patient with the CRNA  Discussed and agreed on the Anesthesia Plan with the CRNA  Gayle Manuel

## 2022-05-03 NOTE — INTERVAL H&P NOTE
H&P reviewed  After examining the patient I find no changes in the patients condition since the H&P had been written  Vitals:    05/03/22 0603   BP: 124/75   Pulse: 91   Resp: 18   Temp: (!) 96 1 °F (35 6 °C)   SpO2: 97%       The patient was met in the preoperative area and today's plan was discussed at that time  We discussed that they have a concerning lung nodule and the goal of today's procedure was to biopsy the mass as well as evaluate/biopsies any concerning lymph nodes  Technically this procedure will be a robotic navigational bronchoscopy with endobronchial biopsy, endobronchial brushing, and endobronchial forceps biopsy as well as Bal   Afterwards we would do EBUS with biopsy to evaluate the lymph nodes  We discussed risks, benefits and alternatives of the procedure  Specifically, we discussed there is a 3-4% risk of pneumothorax and 1% risk of significant bleeding related to the procedure  Additional potential risks include COPD exacerbation, atelectasis and pneumonia  They understand that diagnostic yield is generally 80-85%  In the event of a nondiagnostic procedure, patient understands additional testing and follow-up may be necessary  Patient verbalizes understanding and consent has been signed  We will plan to perform postprocedure chest x-ray prior to discharge  Proceed with this procedure as scheduled       Neeraj Brody MD

## 2022-05-03 NOTE — ANESTHESIA POSTPROCEDURE EVALUATION
Post-Op Assessment Note    CV Status:  Stable  Pain Score: 0    Pain management: adequate     Mental Status:  Alert   Hydration Status:  Stable   PONV Controlled:  Controlled   Airway Patency:  Patent      Post Op Vitals Reviewed: Yes      Staff: CRNA         No complications documented      /90 (05/03/22 1020)    Temp 98 7 °F (37 1 °C) (05/03/22 1020)    Pulse 92 (05/03/22 1020)   Resp 15 (05/03/22 1020)    SpO2 99 % (05/03/22 1020)

## 2022-05-04 ENCOUNTER — DOCUMENTATION (OUTPATIENT)
Dept: HEMATOLOGY ONCOLOGY | Facility: CLINIC | Age: 47
End: 2022-05-04

## 2022-05-05 NOTE — PROGRESS NOTES
Chart reviewed in preparation of Thoracic Tumor Conference presentation by Dr Dianne Lr tentatively for 5/23/22  She had navigational bronchoscopy/EBUS with biopsy on 5/3/22  Pathology is pending  PET/CT was ordered but not scheduled at this time

## 2022-05-11 ENCOUNTER — TELEPHONE (OUTPATIENT)
Dept: CARDIAC SURGERY | Facility: CLINIC | Age: 47
End: 2022-05-11

## 2022-05-11 NOTE — TELEPHONE ENCOUNTER
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

## 2022-05-18 ENCOUNTER — HOSPITAL ENCOUNTER (OUTPATIENT)
Dept: PULMONOLOGY | Facility: HOSPITAL | Age: 47
Discharge: HOME | End: 2022-05-18
Payer: COMMERCIAL

## 2022-05-18 DIAGNOSIS — R91.1 LUNG NODULE: ICD-10-CM

## 2022-05-18 DIAGNOSIS — F17.210 TOBACCO SMOKER, 1 PACK OF CIGARETTES OR LESS PER DAY: ICD-10-CM

## 2022-05-18 PROCEDURE — 94726 PLETHYSMOGRAPHY LUNG VOLUMES: CPT | Performed by: INTERNAL MEDICINE

## 2022-05-18 PROCEDURE — 94729 DIFFUSING CAPACITY: CPT | Performed by: INTERNAL MEDICINE

## 2022-05-18 PROCEDURE — 94060 EVALUATION OF WHEEZING: CPT | Performed by: INTERNAL MEDICINE

## 2022-05-18 PROCEDURE — 94729 DIFFUSING CAPACITY: CPT

## 2022-05-18 PROCEDURE — 94726 PLETHYSMOGRAPHY LUNG VOLUMES: CPT

## 2022-05-18 PROCEDURE — 94060 EVALUATION OF WHEEZING: CPT

## 2022-05-18 PROCEDURE — 94760 N-INVAS EAR/PLS OXIMETRY 1: CPT

## 2022-05-18 RX ORDER — ALBUTEROL SULFATE 2.5 MG/3ML
2.5 SOLUTION RESPIRATORY (INHALATION) ONCE
Status: COMPLETED | OUTPATIENT
Start: 2022-05-18 | End: 2022-05-18

## 2022-05-18 RX ADMIN — ALBUTEROL SULFATE 2.5 MG: 2.5 SOLUTION RESPIRATORY (INHALATION) at 07:42

## 2022-05-23 ENCOUNTER — DOCUMENTATION (OUTPATIENT)
Dept: HEMATOLOGY ONCOLOGY | Facility: CLINIC | Age: 47
End: 2022-05-23

## 2022-05-23 NOTE — PROGRESS NOTES
THORACIC ONCOLOGY MULTIDISCIPLINARY CASE REVIEW    DATE:  5/23/2022    PRESENTING DOCTOR:  Dr Leonie Stevenson    DIAGNOSIS:  Carcinoid Tumor  STAGING:  Vidal Mendieta is a 51 y o  female who was presented at the Thoracic Oncology Multidisciplinary Tumor Conference today  She has been followed for a RLL lung nodule noted on CT scan done 3/19/2021 at [facility]  PHYSICIAN RECOMMENDED PLAN:  Resection    Imaging reviewed:   4/21/2022 CT chest-Unchanged lobulated right lower lobe nodule measuring 2 0 x 1 2 x 1 3 cm and was mildly PET positive  3/4/2022 PET/CT    Pathology reviewed:   5/3/2022 [place] bronch/EBUS-Lung, RLL: Positive for neoplasm  Carcinoid tumor  Lymph node 10R, 7: negative for malignancy  PFT's reviewed:  No    Future imaging:  None at this time  Referrals:  None at this time  Procedures:  TBD    Team agreed to plan  NCCN guidelines were readily available for review at this discussion    The final treatment plan will be left to the discretion of the patient and the treating physician  DISCLAIMERS:  TO THE TREATING PHYSICIAN:  This conference is a meeting of clinicians from various specialty areas who evaluate and discuss patients for whom a multidisciplinary treatment approach is being considered  Please note that the above opinion was a consensus of the conference attendees and is intended only to assist in quality care of the discussed patient  The responsibility for follow up on the input given during the conference, along with any final decisions regarding plan of care, is that of the patient and the patient's provider  Accordingly, appointments have only been recommended based on this information and have NOT been scheduled unless otherwise noted  TO THE PATIENT:  This summary is a brief record of major aspects of your cancer treatment  You may choose to share a copy with any of your doctors or nurses   However, this is not a detailed or comprehensive record of your care

## 2022-05-24 ENCOUNTER — OFFICE VISIT (OUTPATIENT)
Dept: CARDIAC SURGERY | Facility: CLINIC | Age: 47
End: 2022-05-24
Payer: COMMERCIAL

## 2022-05-24 VITALS
BODY MASS INDEX: 41.02 KG/M2 | SYSTOLIC BLOOD PRESSURE: 132 MMHG | DIASTOLIC BLOOD PRESSURE: 82 MMHG | OXYGEN SATURATION: 99 % | HEART RATE: 88 BPM | WEIGHT: 293 LBS | HEIGHT: 71 IN | RESPIRATION RATE: 16 BRPM | TEMPERATURE: 97.1 F

## 2022-05-24 DIAGNOSIS — R91.1 LUNG NODULE: Primary | ICD-10-CM

## 2022-05-24 PROCEDURE — 99214 OFFICE O/P EST MOD 30 MIN: CPT | Performed by: THORACIC SURGERY (CARDIOTHORACIC VASCULAR SURGERY)

## 2022-05-24 PROCEDURE — 3008F BODY MASS INDEX DOCD: CPT | Performed by: THORACIC SURGERY (CARDIOTHORACIC VASCULAR SURGERY)

## 2022-05-24 PROCEDURE — 4004F PT TOBACCO SCREEN RCVD TLK: CPT | Performed by: THORACIC SURGERY (CARDIOTHORACIC VASCULAR SURGERY)

## 2022-05-24 RX ORDER — HEPARIN SODIUM 5000 [USP'U]/ML
5000 INJECTION, SOLUTION INTRAVENOUS; SUBCUTANEOUS
Status: CANCELLED | OUTPATIENT
Start: 2022-05-25 | End: 2022-05-26

## 2022-05-24 RX ORDER — CELECOXIB 200 MG/1
200 CAPSULE ORAL ONCE
Status: CANCELLED | OUTPATIENT
Start: 2022-05-24 | End: 2022-05-24

## 2022-05-24 RX ORDER — ACETAMINOPHEN 325 MG/1
975 TABLET ORAL ONCE
Status: CANCELLED | OUTPATIENT
Start: 2022-05-24 | End: 2022-05-24

## 2022-05-24 RX ORDER — GABAPENTIN 300 MG/1
600 CAPSULE ORAL ONCE
Status: CANCELLED | OUTPATIENT
Start: 2022-05-24 | End: 2022-05-24

## 2022-05-24 NOTE — PROGRESS NOTES
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  Her PFT's were within normal limits  Since she is clinical stage I, she would be a candidate for resection  This would involve a flexible bronchoscopy and right thoracoscopic lower lobectomy for treatment  The procedure, possible complications, and post op course were explained and all questions were answered  The patient would like to lose about 20 lbs prior to surgery  We could see her back in a couple of months to schedule this surgery, around July 2022  She will work with our   However, she lives 40 min away and would like to obtain an updated H and P with her PCP  I think we are able to do that  We will just order the labs  She does not require an EKG  Diagnoses and all orders for this visit:    Lung nodule  -     Type and screen; Future  -     APTT; Future  -     CBC and Platelet; Future  -     Basic metabolic panel; Future  -     Case request operating room: LOBECTOMY LUNG, THORACOSCOPY VIDEO ASSISTED SURGERY (VATS), BRONCHOSCOPY FLEXIBLE; Standing  -     Protime-INR; Future  -     Case request operating room: LOBECTOMY LUNG, THORACOSCOPY VIDEO ASSISTED SURGERY (VATS), BRONCHOSCOPY FLEXIBLE    Other orders  -     Diet NPO; Sips with meds; Standing  -     Nursing communication Please give pre-op Carbohydrate drink to patient 2-4 hours prior to surgery; Standing  -     Void on call to OR; Standing  -     Insert peripheral IV;  Standing  -     Place sequential compression device; Standing  -     acetaminophen (TYLENOL) tablet 975 mg  -     gabapentin (NEURONTIN) capsule 600 mg  -     heparin (porcine) subcutaneous injection 5,000 Units  -     celecoxib (CeleBREX) capsule 200 mg  -     ceFAZolin (ANCEF) 3,000 mg in dextrose 5 % 100 mL IVPB             Thoracic History Diagnosis: Right lower lobe pulmonary nodule  Procedure: navigational bronchoscopy by Dr Karolina Devries on 5/3/22  Pathology: LN levels 7 and 10R is negative for malignancy  Right lower lobe BAL atypical cellular changes seen  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  Patient ID: Sarah Saravia is a 51 y o  female  ECOG 0    HPI    As per HPI from 4/12/22:     Ms David Roman is a 51year old female referred to our office by Dr Noemi Elizondo for evaluation of a pulmonary nodule  Chest CT 2/3/22 demonstrated a 1 8x1 2cm right lower lobe nodule, minimally increased compared to a CT one year prior  PET-CT 3/4/22 revealed SUV 3 1 in the nodule  There were mildly avid bilateral axillary lymph nodes, which appeared normal in size  There was a mildly enlarged right cervical lymph node 1 4x1 1cm SUV 9 3  IR biopsy of the cervical node was completed on 3/21/22 and pathology was benign  On discussion, she is smoking 3/4ppd for the last 36 years  She was on Chantix previously  She has tried Wellbutrin which didn't work      We recommended a navigational bronchoscopy and PFT's  She returns today to discuss these results  PFT's from 5/18/22 revealed a FVC of 4 258L, 95%, FEV1 of 2 96%, 83%, and a DLCO of 75% predicted  Navigational bronchoscopy revealed a low grade neuroendocrine neoplasm without any lymph node involvement  On discussion, she has some dyspnea on exertion with stairs and inclines, chronic right chest pain which radiates into her back and breast  She also has occasional dizziness, which she attributes to anxiety  She was COVID vaccinated  She is currently smoking 7 cigarettes a day       The following portions of the patient's history were reviewed and updated as appropriate: allergies, current medications, past family history, past medical history, past social history, past surgical history and problem list     Past Medical History:   Diagnosis Date    Acid reflux     Trejo's esophagus     GERD (gastroesophageal reflux disease)     Hiatal hernia     Latent tuberculosis by skin test 1986    Was treated with INH for 6 months    Obstructive sleep apnea 05/27/2018    Mild TERESITA  AHI 18 1     Past Surgical History:   Procedure Laterality Date    ABDOMINOPLASTY      AUGMENTATION BREAST Bilateral 2015    BRONCHOSCOPY N/A 5/3/2022    Procedure: BRONCHOSCOPY NAVIGATIONAL;  Surgeon: Tevin Wilkins MD;  [facility];  Service: Thoracic    ENDOBRONCHIAL ULTRASOUND (EBUS) N/A 5/3/2022    Procedure: ENDOBRONCHIAL ULTRASOUND (EBUS);   Surgeon: Tevin Wilkins MD;  [facility];  Service: Thoracic    HYSTERECTOMY  2014    IR BIOPSY LYMPH NODE  3/21/2022    LAPAROSCOPIC CHOLECYSTECTOMY  1997    LIPOSUCTION  2015    ME [address] N/A 5/3/2022    Procedure: Arpita Deep;  Surgeon: Tevin Wilkins MD;  [facility];  Service: Thoracic    REPAIR RECTOCELE  2018    TONSILLECTOMY  1984     Family History   Problem Relation Age of Onset    Cancer Mother         ovarian    Stroke Father     Lung cancer Paternal Uncle      Social History     Socioeconomic History    Marital status:      Spouse name: Not on file    Number of children: Not on file    Years of education: Not on file    Highest education level: Not on file   Occupational History    Not on file   Tobacco Use    Smoking status: Current Every Day Smoker     Packs/day: 0 50     Years: 36 00     Pack years: 18 00     Types: Cigarettes    Smokeless tobacco: Never Used   Vaping Use    Vaping Use: Never used   Substance and Sexual Activity    Alcohol use: Never    Drug use: Never    Sexual activity: Not Currently   Other Topics Concern    Not on file   Social History Narrative    Not on file     Social Determinants of Health     Financial Resource Strain: Not on file   Food Insecurity: Not on file   Transportation Needs: Not on file   Physical Activity: Not on file   Stress: Not on file   Social Connections: Not on file   Intimate Partner Violence: Not on file   Housing Stability: Not on file     Allergies   Allergen Reactions    Metronidazole Anaphylaxis     "patient states she has been in anaphylaxis due to this medication prior"      Nitrofurantoin Shortness Of Breath     Current Outpatient Medications on File Prior to Visit   Medication Sig Dispense Refill    acyclovir (ZOVIRAX) 800 mg tablet Take 800 mg by mouth 2 (two) times a day        ALPRAZolam (XANAX) 0 5 mg tablet Take 1 tablet (0 5 mg total) by mouth daily at bedtime as needed for anxiety or sleep (Patient taking differently: Take 0 25 mg by mouth daily at bedtime as needed for anxiety or sleep) 15 tablet 0    diclofenac (VOLTAREN) 75 mg EC tablet Take 1 tablet (75 mg total) by mouth 2 (two) times a day 60 tablet 0    levocetirizine (XYZAL) 5 MG tablet Take 5 mg by mouth every evening      Multiple Vitamin (multivitamin) capsule Take 1 capsule by mouth daily      nicotine (NICOTROL) 10 MG inhaler Inhale 1 puff as needed for smoking cessation 120 each 0    Omega-3 Fatty Acids (Fish Oil) 1200 MG CAPS Take by mouth      omeprazole (PriLOSEC) 40 MG capsule daily        [DISCONTINUED] clindamycin (CLEOCIN) 2 % vaginal cream Insert 1 applicator into the vagina daily at bedtime 40 g 0     No current facility-administered medications on file prior to visit  Review of Systems   Constitutional: Negative  HENT: Negative for congestion, postnasal drip, sore throat, trouble swallowing and voice change  Eyes: Negative  Respiratory: Positive for shortness of breath (dyspnea on exertion)  Negative for cough  Cardiovascular: Positive for chest pain (paresthesias right side, chronic since 2018  )  Gastrointestinal: Negative for abdominal pain, nausea and vomiting  Musculoskeletal: Negative for arthralgias, back pain and neck pain  Skin: Negative      Neurological: Positive for dizziness (chronic)  Negative for syncope, light-headedness and headaches  Psychiatric/Behavioral: The patient is nervous/anxious  All other systems reviewed and are negative  Objective:   Physical Exam  Vitals reviewed  Constitutional:       General: She is not in acute distress  Appearance: Normal appearance  She is well-developed  She is not diaphoretic  HENT:      Head: Normocephalic and atraumatic  Mouth/Throat:      Comments: Masked   Eyes:      General: No scleral icterus  Extraocular Movements: Extraocular movements intact  Neck:      Trachea: No tracheal deviation  Cardiovascular:      Rate and Rhythm: Normal rate and regular rhythm  Pulses: Normal pulses  Heart sounds: Normal heart sounds  No murmur heard  Pulmonary:      Effort: Pulmonary effort is normal  No respiratory distress  Breath sounds: Normal breath sounds  No wheezing  Abdominal:      General: Bowel sounds are normal  There is no distension  Palpations: Abdomen is soft  Musculoskeletal:         General: Normal range of motion  Cervical back: Normal range of motion and neck supple  Right lower leg: No edema  Left lower leg: No edema  Lymphadenopathy:      Cervical: No cervical adenopathy  Skin:     General: Skin is warm and dry  Neurological:      Mental Status: She is alert and oriented to person, place, and time  Cranial Nerves: No cranial nerve deficit  Psychiatric:         Mood and Affect: Mood normal          Behavior: Behavior normal          Thought Content: Thought content normal      /82   Pulse 88   Temp (!) 97 1 °F (36 2 °C)   Resp 16   Ht 5' 11" (1 803 m)   Wt (!) 144 kg (317 lb 3 9 oz)   SpO2 99%   BMI 44 25 kg/m²     CT chest wo contrast    Result Date: 4/26/2022  Narrative CT CHEST WITHOUT IV CONTRAST INDICATION:   R91 1: Solitary pulmonary nodule   COMPARISON:  Compared with CT chest of 2/3/2022 and PET scan of 3/4/2022 TECHNIQUE: CT examination of the chest was performed without intravenous contrast   Axial, sagittal, and coronal 2D reformatted images were created from the source data and submitted for interpretation  Radiation dose length product (DLP) for this visit:  904 01 mGy-cm   This examination, like all CT scans performed in the [facility], was performed utilizing techniques to minimize radiation dose exposure, including the use of iterative  reconstruction and automated exposure control  FINDINGS: LUNGS:  Lobulated nodule in the right lower lobe measuring 2 0 x 1 2 x 1 3 cm is unchanged  Lungs are otherwise clear  There is no tracheal or endobronchial lesion  PLEURA:  Unremarkable  HEART/GREAT VESSELS: Heart is unremarkable for patient's age  No thoracic aortic aneurysm  MEDIASTINUM AND ABHI:  Unremarkable  CHEST WALL AND LOWER NECK:  Bilateral saline breast implants  VISUALIZED STRUCTURES IN THE UPPER ABDOMEN:  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

## 2022-05-24 NOTE — ASSESSMENT & PLAN NOTE
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  Her PFT's were within normal limits  Since she is clinical stage I, she would be a candidate for resection  This would involve a flexible bronchoscopy and right thoracoscopic lower lobectomy for treatment  The procedure, possible complications, and post op course were explained and all questions were answered  The patient would like to lose about 20 lbs prior to surgery  We could see her back in a couple of months to schedule this surgery, around July 2022  She will work with our   However, she lives 40 min away and would like to obtain an updated H and P with her PCP  I think we are able to do that  We will just order the labs  She does not require an EKG

## 2022-06-13 DIAGNOSIS — T78.40XD ALLERGY, SUBSEQUENT ENCOUNTER: Primary | ICD-10-CM

## 2022-06-13 RX ORDER — LEVOCETIRIZINE DIHYDROCHLORIDE 5 MG/1
5 TABLET, FILM COATED ORAL EVERY EVENING
Qty: 90 TABLET | Refills: 3 | Status: SHIPPED | OUTPATIENT
Start: 2022-06-13 | End: 2023-04-21

## 2022-06-13 RX ORDER — LEVOCETIRIZINE DIHYDROCHLORIDE 5 MG/1
5 TABLET, FILM COATED ORAL EVERY EVENING
Qty: 90 TABLET | Refills: 0 | Status: CANCELLED | OUTPATIENT
Start: 2022-06-13

## 2022-06-23 ENCOUNTER — OFFICE VISIT (OUTPATIENT)
Dept: FAMILY MEDICINE CLINIC | Facility: CLINIC | Age: 47
End: 2022-06-23
Payer: COMMERCIAL

## 2022-06-23 VITALS
SYSTOLIC BLOOD PRESSURE: 130 MMHG | WEIGHT: 293 LBS | TEMPERATURE: 97.7 F | BODY MASS INDEX: 41.02 KG/M2 | DIASTOLIC BLOOD PRESSURE: 90 MMHG | HEIGHT: 71 IN | RESPIRATION RATE: 14 BRPM | HEART RATE: 84 BPM

## 2022-06-23 DIAGNOSIS — G89.4 CHRONIC PAIN SYNDROME: ICD-10-CM

## 2022-06-23 DIAGNOSIS — R19.7 DIARRHEA, UNSPECIFIED TYPE: Primary | ICD-10-CM

## 2022-06-23 PROCEDURE — 3008F BODY MASS INDEX DOCD: CPT | Performed by: FAMILY MEDICINE

## 2022-06-23 PROCEDURE — 99214 OFFICE O/P EST MOD 30 MIN: CPT | Performed by: FAMILY MEDICINE

## 2022-06-23 PROCEDURE — 1036F TOBACCO NON-USER: CPT | Performed by: FAMILY MEDICINE

## 2022-06-23 RX ORDER — CIPROFLOXACIN 500 MG/1
500 TABLET, FILM COATED ORAL EVERY 12 HOURS SCHEDULED
Qty: 14 TABLET | Refills: 0 | Status: SHIPPED | OUTPATIENT
Start: 2022-06-23 | End: 2022-06-30

## 2022-06-23 RX ORDER — ALBUTEROL SULFATE 2.5 MG/3ML
SOLUTION RESPIRATORY (INHALATION)
COMMUNITY
End: 2022-06-23

## 2022-06-23 RX ORDER — GABAPENTIN 300 MG/1
300 CAPSULE ORAL 3 TIMES DAILY
Qty: 90 CAPSULE | Refills: 1 | Status: SHIPPED | OUTPATIENT
Start: 2022-06-23

## 2022-06-23 NOTE — PROGRESS NOTES
Chief Complaint   Patient presents with    Diarrhea     X2 weeks        Patient ID: Vinay Myaorga is a 51 y o  female  Diarrhea   This is a new problem  The current episode started 1 to 4 weeks ago  The problem occurs 2 to 4 times per day  The problem has been unchanged  The stool consistency is described as watery  The patient states that diarrhea does not awaken her from sleep  Associated symptoms include arthralgias and myalgias  Pertinent negatives include no abdominal pain, bloating, chills, coughing, fever, headaches, increased  flatus, sweats, URI, vomiting or weight loss  Nothing aggravates the symptoms  Risk factors: recently Dx with carcinoid tumor in the lung -  will need surgery  She has tried increased fluids and change of diet for the symptoms  The treatment provided no relief  There is no history of bowel resection, inflammatory bowel disease, irritable bowel syndrome, malabsorption or a recent abdominal surgery  The following portions of the patient's history were reviewed and updated as appropriate: allergies, current medications, past family history, past medical history, past social history, past surgical history and problem list     Review of Systems   Constitutional: Negative for chills, fever and weight loss  HENT: Negative  Respiratory: Negative  Negative for cough  Gastrointestinal: Positive for diarrhea  Negative for abdominal pain, bloating, flatus and vomiting  Musculoskeletal: Positive for arthralgias, back pain, myalgias and neck stiffness  Negative for joint swelling and neck pain  Neurological: Negative for headaches         Current Outpatient Medications   Medication Sig Dispense Refill    acyclovir (ZOVIRAX) 800 mg tablet Take 800 mg by mouth 2 (two) times a day        ALPRAZolam (XANAX) 0 5 mg tablet Take 1 tablet (0 5 mg total) by mouth daily at bedtime as needed for anxiety or sleep (Patient taking differently: Take 0 25 mg by mouth daily at bedtime as needed for anxiety or sleep) 15 tablet 0    levocetirizine (XYZAL) 5 MG tablet Take 1 tablet (5 mg total) by mouth every evening 90 tablet 3    Multiple Vitamin (multivitamin) capsule Take 1 capsule by mouth daily      nicotine (NICOTROL) 10 MG inhaler Inhale 1 puff as needed for smoking cessation 120 each 0    Omega-3 Fatty Acids (Fish Oil) 1200 MG CAPS Take by mouth      omeprazole (PriLOSEC) 40 MG capsule daily        albuterol (2 5 mg/3 mL) 0 083 % nebulizer solution Inhale (Patient not taking: Reported on 6/23/2022)      diclofenac (VOLTAREN) 75 mg EC tablet Take 1 tablet (75 mg total) by mouth 2 (two) times a day (Patient not taking: Reported on 6/23/2022) 60 tablet 0     No current facility-administered medications for this visit  Objective:    /90 (BP Location: Right arm, Patient Position: Sitting, Cuff Size: Adult)   Pulse 84   Temp 97 7 °F (36 5 °C) (Temporal)   Resp 14   Ht 5' 11" (1 803 m)   Wt (!) 141 kg (311 lb)   BMI 43 38 kg/m²        Physical Exam  Constitutional:       Appearance: She is obese  Cardiovascular:      Rate and Rhythm: Normal rate and regular rhythm  Pulmonary:      Effort: Pulmonary effort is normal  No respiratory distress  Breath sounds: No wheezing or rhonchi  Abdominal:      General: There is no distension  Palpations: Abdomen is soft  Tenderness: There is no abdominal tenderness  Neurological:      Mental Status: She is alert  Assessment/Plan:         Diagnoses and all orders for this visit:    Diarrhea, unspecified type  -     ciprofloxacin (CIPRO) 500 mg tablet; Take 1 tablet (500 mg total) by mouth every 12 (twelve) hours for 7 days    Chronic pain syndrome  -     gabapentin (Neurontin) 300 mg capsule; Take 1 capsule (300 mg total) by mouth 3 (three) times a day    Other orders  -     Discontinue: albuterol (2 5 mg/3 mL) 0 083 % nebulizer solution;  Inhale (Patient not taking: Reported on 6/23/2022)        rto prn Rivka Guido MD

## 2022-06-24 ENCOUNTER — ANESTHESIA EVENT (OUTPATIENT)
Dept: PERIOP | Facility: HOSPITAL | Age: 47
DRG: 164 | End: 2022-06-24
Payer: COMMERCIAL

## 2022-06-24 RX ORDER — SACCHAROMYCES BOULARDII 250 MG
250 CAPSULE ORAL 2 TIMES DAILY
COMMUNITY
End: 2022-07-05

## 2022-06-24 NOTE — PRE-PROCEDURE INSTRUCTIONS
Pre-Surgery Instructions:   Medication Instructions    acyclovir (ZOVIRAX) 800 mg tablet Take day of surgery   ciprofloxacin (CIPRO) 500 mg tablet Hold day of surgery   gabapentin (Neurontin) 300 mg capsule Take day of surgery   levocetirizine (XYZAL) 5 MG tablet Take day of surgery   Multiple Vitamin (multivitamin) capsule Stop taking 7 days prior to surgery   nicotine (NICOTROL) 10 MG inhaler Take day of surgery   Omega-3 Fatty Acids (Fish Oil) 1200 MG CAPS Stop taking 7 days prior to surgery   omeprazole (PriLOSEC) 40 MG capsule Take day of surgery   saccharomyces boulardii (FLORASTOR) 250 mg capsule Hold day of surgery  Covid screening negative as per patient  Reviewed pre op medicine and showering instructions with patient via phone call, verbalizes understanding  Advised patient to stop taking non prescribed vitamins and herbal meds 7 days pre op  Advised to stop taking NSAID's 3 days pre op but may take Tylenol products if needed  Advised to take DOS medicine with a small sip water  Advised NPO after MN prior to surgery and surgical services will call (7/13) with scheduled time of hospital arrival     Aware of 3 carb drinks pre op as instructed by surgeon  Pt encouraged to maintain not smoking status  Using nicotine inhaler daily  Pt interested in weight management and has a referral in place  Will pursue after recovery from current surgery  Pt verbalized understanding of all instructions

## 2022-06-28 ENCOUNTER — APPOINTMENT (OUTPATIENT)
Dept: LAB | Facility: CLINIC | Age: 47
End: 2022-06-28
Payer: COMMERCIAL

## 2022-06-28 DIAGNOSIS — R91.1 LUNG NODULE: ICD-10-CM

## 2022-06-28 LAB
ABO GROUP BLD: NORMAL
ANION GAP SERPL CALCULATED.3IONS-SCNC: 5 MMOL/L (ref 4–13)
APTT PPP: 37 SECONDS (ref 23–37)
BLD GP AB SCN SERPL QL: NEGATIVE
BUN SERPL-MCNC: 12 MG/DL (ref 5–25)
CALCIUM SERPL-MCNC: 8.9 MG/DL (ref 8.3–10.1)
CHLORIDE SERPL-SCNC: 107 MMOL/L (ref 100–108)
CO2 SERPL-SCNC: 24 MMOL/L (ref 21–32)
CREAT SERPL-MCNC: 0.81 MG/DL (ref 0.6–1.3)
ERYTHROCYTE [DISTWIDTH] IN BLOOD BY AUTOMATED COUNT: 13.2 % (ref 11.6–15.1)
GFR SERPL CREATININE-BSD FRML MDRD: 86 ML/MIN/1.73SQ M
GLUCOSE P FAST SERPL-MCNC: 98 MG/DL (ref 65–99)
HCT VFR BLD AUTO: 44.4 % (ref 34.8–46.1)
HGB BLD-MCNC: 14.4 G/DL (ref 11.5–15.4)
INR PPP: 1.07 (ref 0.84–1.19)
MCH RBC QN AUTO: 30.1 PG (ref 26.8–34.3)
MCHC RBC AUTO-ENTMCNC: 32.4 G/DL (ref 31.4–37.4)
MCV RBC AUTO: 93 FL (ref 82–98)
PLATELET # BLD AUTO: 238 THOUSANDS/UL (ref 149–390)
PMV BLD AUTO: 10.7 FL (ref 8.9–12.7)
POTASSIUM SERPL-SCNC: 4.2 MMOL/L (ref 3.5–5.3)
PROTHROMBIN TIME: 13.5 SECONDS (ref 11.6–14.5)
RBC # BLD AUTO: 4.79 MILLION/UL (ref 3.81–5.12)
RH BLD: POSITIVE
SODIUM SERPL-SCNC: 136 MMOL/L (ref 136–145)
SPECIMEN EXPIRATION DATE: NORMAL
WBC # BLD AUTO: 11.66 THOUSAND/UL (ref 4.31–10.16)

## 2022-06-28 PROCEDURE — 85027 COMPLETE CBC AUTOMATED: CPT

## 2022-06-28 PROCEDURE — 85730 THROMBOPLASTIN TIME PARTIAL: CPT

## 2022-06-28 PROCEDURE — 85610 PROTHROMBIN TIME: CPT

## 2022-06-28 PROCEDURE — 86901 BLOOD TYPING SEROLOGIC RH(D): CPT

## 2022-06-28 PROCEDURE — 36415 COLL VENOUS BLD VENIPUNCTURE: CPT

## 2022-06-28 PROCEDURE — 86900 BLOOD TYPING SEROLOGIC ABO: CPT

## 2022-06-28 PROCEDURE — 86850 RBC ANTIBODY SCREEN: CPT

## 2022-06-28 PROCEDURE — 80048 BASIC METABOLIC PNL TOTAL CA: CPT

## 2022-06-30 ENCOUNTER — TELEPHONE (OUTPATIENT)
Dept: FAMILY MEDICINE CLINIC | Facility: CLINIC | Age: 47
End: 2022-06-30

## 2022-06-30 DIAGNOSIS — L98.9 SKIN LESION: Primary | ICD-10-CM

## 2022-06-30 NOTE — TELEPHONE ENCOUNTER
Patient noticed a black spot on her inner thigh and had a biopsy done today as it is a possible melanoma  She would like to follow up with Dr Malina Biggs who requests an order be placed in her chart to expedite her apt

## 2022-06-30 NOTE — TELEPHONE ENCOUNTER
I spoke to pt she found a blackened area on her groin she went to see Dr Brandee Rodriguez in [place] [address] who did a biopsy however she did not like him so she wanted to see Dr Litzy Hook who required a script in order for her to be scheduled  Dr Aleksey Carrillo wrote order it is in the pts chart Pt is aware

## 2022-07-05 ENCOUNTER — OFFICE VISIT (OUTPATIENT)
Dept: FAMILY MEDICINE CLINIC | Facility: CLINIC | Age: 47
End: 2022-07-05
Payer: COMMERCIAL

## 2022-07-05 VITALS
HEIGHT: 71 IN | SYSTOLIC BLOOD PRESSURE: 130 MMHG | BODY MASS INDEX: 41.02 KG/M2 | DIASTOLIC BLOOD PRESSURE: 95 MMHG | WEIGHT: 293 LBS | HEART RATE: 88 BPM | RESPIRATION RATE: 16 BRPM | TEMPERATURE: 97.9 F

## 2022-07-05 DIAGNOSIS — D3A.8 NEUROENDOCRINE TUMOR: ICD-10-CM

## 2022-07-05 DIAGNOSIS — R03.0 ELEVATED BP WITHOUT DIAGNOSIS OF HYPERTENSION: ICD-10-CM

## 2022-07-05 DIAGNOSIS — Z01.818 PREOPERATIVE CLEARANCE: Primary | ICD-10-CM

## 2022-07-05 PROCEDURE — 99215 OFFICE O/P EST HI 40 MIN: CPT | Performed by: FAMILY MEDICINE

## 2022-07-05 PROCEDURE — 93000 ELECTROCARDIOGRAM COMPLETE: CPT | Performed by: FAMILY MEDICINE

## 2022-07-05 NOTE — PROGRESS NOTES
Chief Complaint   Patient presents with    Pre-op Exam   surgery date 7/14/2022 Dr Bartlett      Patient ID: Benny Cisneros is a 51 y o  female  HPI  Pt is seeing for preop exam prior to R lung lobectomy for neuroendocrine tumor     The following portions of the patient's history were reviewed and updated as appropriate: allergies, current medications, past family history, past medical history, past social history, past surgical history and problem list     Review of Systems   Constitutional: Negative for fatigue, fever and unexpected weight change  HENT: Negative for congestion, ear discharge, ear pain, hearing loss, rhinorrhea, sinus pressure, sore throat and trouble swallowing  Eyes: Negative  Respiratory: Negative  Cardiovascular: Negative  Gastrointestinal: Negative  Endocrine: Negative  Genitourinary: Negative  Musculoskeletal: Positive for myalgias  Skin: Negative  Neurological: Negative for dizziness, weakness, light-headedness and numbness  Hematological: Negative  Psychiatric/Behavioral: Negative  Current Outpatient Medications   Medication Sig Dispense Refill    acyclovir (ZOVIRAX) 800 mg tablet Take 400 mg by mouth 2 (two) times a day      gabapentin (Neurontin) 300 mg capsule Take 1 capsule (300 mg total) by mouth 3 (three) times a day -  Taking 2 times a day now  90 capsule 1    levocetirizine (XYZAL) 5 MG tablet Take 1 tablet (5 mg total) by mouth every evening (Patient taking differently: Take 5 mg by mouth in the morning) 90 tablet 3    nicotine (NICOTROL) 10 MG inhaler Inhale 1 puff as needed for smoking cessation 120 each 0    omeprazole (PriLOSEC) 40 MG capsule daily        Omega-3 Fatty Acids (Fish Oil) 1200 MG CAPS Take by mouth in the morning (Patient not taking: Reported on 7/5/2022)       No current facility-administered medications for this visit         Objective:    /95 Comment: by MD  Pulse 88   Temp 97 9 °F (36 6 °C)   Resp 16   Ht 5' 11" (1 803 m)   Wt (!) 142 kg (314 lb)   BMI 43 79 kg/m²        Physical Exam  Constitutional:       Appearance: She is obese  She is not ill-appearing  HENT:      Nose: No congestion or rhinorrhea  Mouth/Throat:      Pharynx: No oropharyngeal exudate or posterior oropharyngeal erythema  Cardiovascular:      Rate and Rhythm: Normal rate and regular rhythm  Heart sounds: No murmur heard  Pulmonary:      Effort: Pulmonary effort is normal  No respiratory distress  Breath sounds: No wheezing, rhonchi or rales  Musculoskeletal:      Right lower leg: No edema  Left lower leg: No edema  Neurological:      General: No focal deficit present  Mental Status: She is alert and oriented to person, place, and time  Psychiatric:         Mood and Affect: Mood normal            Labs in chart were reviewed        Assessment/Plan:         Diagnoses and all orders for this visit:    Preoperative clearance  -     POCT ECG    Neuroendocrine tumor    Elevated BP without diagnosis of hypertension    Was advised to monitor BP at home         Pt is cleared for surgery     Was advised to take Neurontin 600 mg at night and 300 mg in am       rto in 3 m for BP check         Madelyn Wilburn MD

## 2022-07-07 ENCOUNTER — CONSULT (OUTPATIENT)
Dept: DERMATOLOGY | Facility: CLINIC | Age: 47
End: 2022-07-07
Payer: COMMERCIAL

## 2022-07-07 VITALS — WEIGHT: 293 LBS | HEIGHT: 71 IN | BODY MASS INDEX: 41.02 KG/M2

## 2022-07-07 DIAGNOSIS — L81.8 LABIAL MELANOTIC MACULE: ICD-10-CM

## 2022-07-07 PROCEDURE — 99203 OFFICE O/P NEW LOW 30 MIN: CPT | Performed by: DERMATOLOGY

## 2022-07-07 NOTE — PROGRESS NOTES
[facility] Note     Patient Name: Magalis Carmichael  Encounter Date: 7/7/22     Have you been cared for by a [facility] Dermatologist in the last 3 years and, if so, which one? No    · Have you traveled outside of the [facility] in the past 3 months? No     May we call your Preferred Phone number to discuss your specific medical information? Yes     May we leave a detailed message that includes your specific medical information? Yes      Today's Chief Concerns:   Concern #1:  Skin lesions      Past Medical History:  Have you personally ever had or currently have any of the following? · Skin cancer (such as Melanoma, Basal Cell Carcinoma, Squamous Cell Carcinoma? (If Yes, please provide more detail)- No  · Eczema: No  · Psoriasis: No  · HIV/AIDS: No  · Hepatitis B or C: No  · Tuberculosis: YES, tests positive for TB   · Systemic Immunosuppression such as Diabetes, Biologic or Immunotherapy, Chemotherapy, Organ Transplantation, Bone Marrow Transplantation (If YES, please provide more detail): No  · Radiation Treatment (If YES, please provide more detail): No  · Any other major medical conditions/concerns? 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 Family history:    Have any of your "first degree relatives" (parent, brother, sister, or child) had any of the following       · Skin cancer such as Melanoma or Merkel Cell Carcinoma or Pancreatic Cancer? No  · Eczema, Asthma, Hay Fever or Seasonal Allergies: No  · Psoriasis or Psoriatic Arthritis: No  · Do any other medical conditions seem to run in your family? If Yes, what condition and which relatives?   Ovarian Cancer    Current Medications   Current Outpatient Medications:     acyclovir (ZOVIRAX) 800 mg tablet, Take 400 mg by mouth 2 (two) times a day, Disp: , Rfl:     gabapentin (Neurontin) 300 mg capsule, Take 1 capsule (300 mg total) by mouth 3 (three) times a day, Disp: 90 capsule, Rfl: 1    levocetirizine (XYZAL) 5 MG tablet, Take 1 tablet (5 mg total) by mouth every evening (Patient taking differently: Take 5 mg by mouth in the morning), Disp: 90 tablet, Rfl: 3    nicotine (NICOTROL) 10 MG inhaler, Inhale 1 puff as needed for smoking cessation, Disp: 120 each, Rfl: 0    Omega-3 Fatty Acids (Fish Oil) 1200 MG CAPS, Take by mouth in the morning (Patient not taking: Reported on 7/5/2022), Disp: , Rfl:     omeprazole (PriLOSEC) 40 MG capsule, daily  , Disp: , Rfl:       Review of Systems/System Alerts:  Have you recently had or currently have any of the following? If YES, what are you doing for the problem? · Fever, chills or unintended weight loss: No  · Sudden loss or change in your vision: No  · Nausea, vomiting or blood in your stool: No  · Painful or swollen joints: YES  · Wheezing or cough: No  · Changing mole or non-healing wound: No  · Nosebleeds: No  · Excessive sweating: No  · Easy or prolonged bleeding? No  · Over the last 2 weeks, how often have you been bothered by the following problems? · Taking little interest or pleasure in doing things: 2 - Several days  · Feeling down, depressed, or hopeless: 2 - Several days  · Rapid heartbeat with epinephrine:  No  · FEMALES ONLY:    · Are you pregnant or planning to become pregnant? N/A  · Are you currently or planning to be nursing or breast feeding? N/A  · Any known allergies? YES, see below  Allergies   Allergen Reactions    Metronidazole Anaphylaxis     "patient states she has been in anaphylaxis due to this medication prior"      Nitrofurantoin Shortness Of Breath   ·       PHYSICAL EXAM:       Was a chaperone (Derm Clinical Assistant) present throughout the entire Physical Exam? Yes     Did the Dermatology Team specifically  the patient on the importance of a Full Skin Exam to be sure that nothing is missed clinically?  Yes}  o Did the patient request or accept a Full Skin Exam?  NO  o Did the patient specifically refuse to have the areas "under-the-bra" examined by the Dermatologist? No  o Did the patient specifically refuse to have the areas "under-the-underwear" examined by the Dermatologist? No      CONSTITUTIONAL  There were no vitals filed for this visit  PSYCH: Normal mood and affect  EYES: Normal conjunctiva  ENT: Normal lips and oral mucosa  CARDIOVASCULAR: No edema  RESPIRATORY: Normal respirations  HEME/LYMPH/IMMUNO:  No regional lymphadenopathy except as noted below in ASSESSMENT AND PLAN BY DIAGNOSIS    SKIN:  FULL ORGAN SYSTEM EXAM  Hair, Scalp, Ears, Face Normal except as noted below in Assessment   Neck, Cervical Chain Nodes Normal except as noted below in Assessment   Right Arm/Hand/Fingers Normal except as noted below in Assessment   Left Arm/Hand/Fingers Normal except as noted below in Assessment   Chest/Breasts/Axillae Viewed areas Normal except as noted below in Assessment   Abdomen, Umbilicus Normal except as noted below in Assessment   Back/Spine Normal except as noted below in Assessment   Groin/Genitalia/Buttocks Normal except as noted below in Assessment   Right Leg, Foot, Toes Normal except as noted below in Assessment   Left Leg, Foot, Toes Normal except as noted below in Assessment        ASSESSMENT AND PLAN BY DIAGNOSIS:    History of Present Condition:     Duration:  How long has this been an issue for you?    o  unsure    Location Affected:  Where on the body is this affecting you?   o  genital area   Quality:  Is there any bleeding, pain, itch, burning/irritation, or redness associated with the skin lesion? o  flat dark lesions   Severity:  Describe any bleeding, pain, itch, burning/irritation, or redness on a scale of 1 to 10 (with 10 being the worst)    o  0 symptoms, but concerned about lesions   Timing:  Does this condition seem to be there pretty constantly or do you notice it more at specific times throughout the day? o  consistent   Context:  Have you ever noticed that this condition seems to be associated with specific activities you do?    o  denies   Modifying Factors:    o Anything that seems to make the condition worse?    -  denies  o What have you tried to do to make the condition better?    -  denies   Associated Signs and Symptoms:  Does this skin lesion seem to be associated with any of the following:  o Dark lesions    NEOPLASM OF UNCERTAIN BEHAVIOR OF SKIN; DIFF DX inlcudes Labial Macule versus Verrucous papule    Physical Exam:   Anatomic Location Affected:  Left posterior aspect of labia majora   Morphological Description:  Two dark brown macules superior to healing biopsy site    Pertinent Positives:   Pertinent Negatives: no itching, no bleeding, no lymphadenopathy    Additional History of Present Condition:  Patient present with concerns of lesions in the groin area  Patient had biopsy previously taken by Dr Krysten Reddy, but results not final   Was told it was a wart and could be treat with cryotherapy  Here to discuss treatment  Patient will send over the results from Dr Demetrio Archer office  Having a Lobectomy done next week of a carcinoma of the lung  Assessment and Plan:  Based on a thorough discussion of this condition and the management approach to it (including a comprehensive discussion of the known risks, side effects and potential benefits of treatment), the patient (family) agrees to implement the following specific plan:    · Awaiting biopsy results from Dr Demetrio Archer office  · Will contact us after she obtains biopsy results, hopefully before her Lobectomy but if results not back before, she will contact us afterwards  If results are a wart she would like to pursue cryotherapy treatment        Scribe Attestation    I,:  Cassie Claude, MA am acting as a scribe while in the presence of the attending physician :       I,:  Roger Cuenca MD personally performed the services described in this documentation    as scribed in my presence :         Jacob Maradiaga MD  Dermatology, PGY-2

## 2022-07-07 NOTE — PATIENT INSTRUCTIONS
LABIAL MACULE      Assessment and Plan:  Based on a thorough discussion of this condition and the management approach to it (including a comprehensive discussion of the known risks, side effects and potential benefits of treatment), the patient (family) agrees to implement the following specific plan:    Awaiting biopsy results from Dr Kathi Mortimer office  Will contact us after the Lobectomy if results are a wart and needs to be treated

## 2022-07-08 ENCOUNTER — TELEPHONE (OUTPATIENT)
Dept: OTHER | Facility: HOSPITAL | Age: 47
End: 2022-07-08

## 2022-07-08 ENCOUNTER — TELEPHONE (OUTPATIENT)
Dept: CARDIAC SURGERY | Facility: CLINIC | Age: 47
End: 2022-07-08

## 2022-07-08 DIAGNOSIS — C7A.090 MALIGNANT CARCINOID TUMOR OF LUNG (HCC): Primary | ICD-10-CM

## 2022-07-08 NOTE — TELEPHONE ENCOUNTER
Patient calling in asking for someone to call her back to discuss pathology results from her dermatologist  She can be reached at [phone]

## 2022-07-08 NOTE — TELEPHONE ENCOUNTER
Spoke with patient  She has MOHS surgery next week  Would like to move to 7/28  Will check with sx  if this is available  Dr Santa Garcia approved  Also, repeat CT chest since it's been nearly 3 months

## 2022-07-11 ENCOUNTER — TELEPHONE (OUTPATIENT)
Dept: DERMATOLOGY | Facility: CLINIC | Age: 47
End: 2022-07-11

## 2022-07-11 NOTE — TELEPHONE ENCOUNTER
Patient called saw her pathology results online and say that she does have Squamous cell carcinoma  Patient would like a call back with next steps   [phone]

## 2022-07-13 ENCOUNTER — HOSPITAL ENCOUNTER (OUTPATIENT)
Dept: RADIOLOGY | Facility: HOSPITAL | Age: 47
Discharge: HOME | End: 2022-07-13
Payer: COMMERCIAL

## 2022-07-13 DIAGNOSIS — F41.9 ANXIETY: Primary | ICD-10-CM

## 2022-07-13 DIAGNOSIS — C7A.090 MALIGNANT CARCINOID TUMOR OF LUNG (HCC): ICD-10-CM

## 2022-07-13 PROCEDURE — G1004 CDSM NDSC: HCPCS

## 2022-07-13 PROCEDURE — 71250 CT THORAX DX C-: CPT

## 2022-07-13 RX ORDER — ALPRAZOLAM 0.25 MG/1
0.25 TABLET ORAL
Qty: 30 TABLET | Refills: 0 | Status: SHIPPED | OUTPATIENT
Start: 2022-07-13 | End: 2022-09-20 | Stop reason: SDUPTHER

## 2022-07-14 ENCOUNTER — ANESTHESIA (OUTPATIENT)
Dept: PERIOP | Facility: HOSPITAL | Age: 47
DRG: 164 | End: 2022-07-14
Payer: COMMERCIAL

## 2022-07-18 ENCOUNTER — OFFICE VISIT (OUTPATIENT)
Dept: FAMILY MEDICINE CLINIC | Facility: CLINIC | Age: 47
End: 2022-07-18
Payer: COMMERCIAL

## 2022-07-18 VITALS
OXYGEN SATURATION: 97 % | RESPIRATION RATE: 16 BRPM | WEIGHT: 293 LBS | HEART RATE: 96 BPM | SYSTOLIC BLOOD PRESSURE: 126 MMHG | BODY MASS INDEX: 41.02 KG/M2 | HEIGHT: 71 IN | DIASTOLIC BLOOD PRESSURE: 80 MMHG | TEMPERATURE: 97.8 F

## 2022-07-18 DIAGNOSIS — R19.7 DIARRHEA, UNSPECIFIED TYPE: Primary | ICD-10-CM

## 2022-07-18 PROCEDURE — 99213 OFFICE O/P EST LOW 20 MIN: CPT | Performed by: FAMILY MEDICINE

## 2022-07-18 RX ORDER — CIPROFLOXACIN 250 MG/1
250 TABLET, FILM COATED ORAL EVERY 12 HOURS SCHEDULED
Qty: 14 TABLET | Refills: 0 | Status: SHIPPED | OUTPATIENT
Start: 2022-07-18 | End: 2022-07-25

## 2022-07-18 NOTE — PROGRESS NOTES
Chief Complaint   Patient presents with    changes in bowls        Patient ID: Siva Gordon is a 51 y o  female  HPI   pt is seeing for recurrence of diarrhea x few days after  Tx with Cipro for 7 days 3 wks ago -  Diarrhea resolved initially, came back last wk for 3 days -  Resolved 2 days ago -  Normal BM yesterday     The following portions of the patient's history were reviewed and updated as appropriate: allergies, current medications, past family history, past medical history, past social history, past surgical history and problem list     Review of Systems   Constitutional: Negative for fatigue, fever and unexpected weight change  Respiratory: Negative  Cardiovascular: Negative  Gastrointestinal: Negative for abdominal pain, blood in stool, nausea and vomiting  Endocrine: Negative  Genitourinary: Negative  Musculoskeletal: Positive for myalgias  Neurological: Negative for dizziness, weakness, light-headedness and numbness  Hematological: Negative  Current Outpatient Medications   Medication Sig Dispense Refill    acyclovir (ZOVIRAX) 800 mg tablet Take 400 mg by mouth 2 (two) times a day      ALPRAZolam (XANAX) 0 25 mg tablet Take 1 tablet (0 25 mg total) by mouth daily at bedtime as needed for anxiety 30 tablet 0    gabapentin (Neurontin) 300 mg capsule Take 1 capsule (300 mg total) by mouth 3 (three) times a day 90 capsule 1    levocetirizine (XYZAL) 5 MG tablet Take 1 tablet (5 mg total) by mouth every evening (Patient taking differently: Take 5 mg by mouth in the morning) 90 tablet 3    nicotine (NICOTROL) 10 MG inhaler Inhale 1 puff as needed for smoking cessation 120 each 0    omeprazole (PriLOSEC) 40 MG capsule daily        Omega-3 Fatty Acids (Fish Oil) 1200 MG CAPS Take by mouth in the morning (Patient not taking: No sig reported)       No current facility-administered medications for this visit         Objective:    /80 (BP Location: Left arm, Patient Position: Sitting, Cuff Size: Large)   Pulse 96   Temp 97 8 °F (36 6 °C)   Resp 16   Ht 5' 11" (1 803 m)   Wt (!) 144 kg (317 lb)   SpO2 97%   BMI 44 21 kg/m²        Physical Exam  Constitutional:       Appearance: She is obese  She is not ill-appearing  Cardiovascular:      Rate and Rhythm: Normal rate  Pulmonary:      Effort: Pulmonary effort is normal  No respiratory distress  Abdominal:      Palpations: Abdomen is soft  Tenderness: There is no abdominal tenderness  Neurological:      Mental Status: She is alert  Assessment/Plan:         Diagnoses and all orders for this visit:    Diarrhea, unspecified type  -  If symptoms return -    ciprofloxacin (CIPRO) 250 mg tablet; Take 1 tablet (250 mg total) by mouth every 12 (twelve) hours for 7 days  -     Stool culture;  Future        rto prn                     Dee Stuart MD

## 2022-07-18 NOTE — H&P (VIEW-ONLY)
Chief Complaint   Patient presents with    changes in bowls        Patient ID: Theador Headings is a 51 y o  female  HPI   pt is seeing for recurrence of diarrhea x few days after  Tx with Cipro for 7 days 3 wks ago -  Diarrhea resolved initially, came back last wk for 3 days -  Resolved 2 days ago -  Normal BM yesterday     The following portions of the patient's history were reviewed and updated as appropriate: allergies, current medications, past family history, past medical history, past social history, past surgical history and problem list     Review of Systems   Constitutional: Negative for fatigue, fever and unexpected weight change  Respiratory: Negative  Cardiovascular: Negative  Gastrointestinal: Negative for abdominal pain, blood in stool, nausea and vomiting  Endocrine: Negative  Genitourinary: Negative  Musculoskeletal: Positive for myalgias  Neurological: Negative for dizziness, weakness, light-headedness and numbness  Hematological: Negative  Current Outpatient Medications   Medication Sig Dispense Refill    acyclovir (ZOVIRAX) 800 mg tablet Take 400 mg by mouth 2 (two) times a day      ALPRAZolam (XANAX) 0 25 mg tablet Take 1 tablet (0 25 mg total) by mouth daily at bedtime as needed for anxiety 30 tablet 0    gabapentin (Neurontin) 300 mg capsule Take 1 capsule (300 mg total) by mouth 3 (three) times a day 90 capsule 1    levocetirizine (XYZAL) 5 MG tablet Take 1 tablet (5 mg total) by mouth every evening (Patient taking differently: Take 5 mg by mouth in the morning) 90 tablet 3    nicotine (NICOTROL) 10 MG inhaler Inhale 1 puff as needed for smoking cessation 120 each 0    omeprazole (PriLOSEC) 40 MG capsule daily        Omega-3 Fatty Acids (Fish Oil) 1200 MG CAPS Take by mouth in the morning (Patient not taking: No sig reported)       No current facility-administered medications for this visit         Objective:    /80 (BP Location: Left arm, Patient Position: Sitting, Cuff Size: Large)   Pulse 96   Temp 97 8 °F (36 6 °C)   Resp 16   Ht 5' 11" (1 803 m)   Wt (!) 144 kg (317 lb)   SpO2 97%   BMI 44 21 kg/m²        Physical Exam  Constitutional:       Appearance: She is obese  She is not ill-appearing  Cardiovascular:      Rate and Rhythm: Normal rate  Pulmonary:      Effort: Pulmonary effort is normal  No respiratory distress  Abdominal:      Palpations: Abdomen is soft  Tenderness: There is no abdominal tenderness  Neurological:      Mental Status: She is alert  Assessment/Plan:         Diagnoses and all orders for this visit:    Diarrhea, unspecified type  -  If symptoms return -    ciprofloxacin (CIPRO) 250 mg tablet; Take 1 tablet (250 mg total) by mouth every 12 (twelve) hours for 7 days  -     Stool culture;  Future        rto prn                     Arin Coles MD

## 2022-07-20 ENCOUNTER — TELEPHONE (OUTPATIENT)
Dept: CARDIAC SURGERY | Facility: CLINIC | Age: 47
End: 2022-07-20

## 2022-07-20 NOTE — TELEPHONE ENCOUNTER
I emailed patients revised Medical Certificate to her personal email as requested and received confirmation that she received the email and document

## 2022-07-20 NOTE — TELEPHONE ENCOUNTER
Called patient and left VM to see if she has received her revised disability paperwork  If she has not I would like a fax or email to send it to assuring that she receives it in a timely manner  When patient calls back I requested that she ask for me

## 2022-07-28 ENCOUNTER — APPOINTMENT (INPATIENT)
Dept: RADIOLOGY | Facility: HOSPITAL | Age: 47
DRG: 164 | End: 2022-07-28
Payer: COMMERCIAL

## 2022-07-28 ENCOUNTER — HOSPITAL ENCOUNTER (INPATIENT)
Facility: HOSPITAL | Age: 47
LOS: 7 days | Discharge: HOME | DRG: 164 | End: 2022-08-04
Attending: THORACIC SURGERY (CARDIOTHORACIC VASCULAR SURGERY) | Admitting: THORACIC SURGERY (CARDIOTHORACIC VASCULAR SURGERY)
Payer: COMMERCIAL

## 2022-07-28 DIAGNOSIS — D3A.090 BENIGN CARCINOID TUMOR OF LUNG: Primary | ICD-10-CM

## 2022-07-28 DIAGNOSIS — J95.811 POSTPROCEDURAL PNEUMOTHORAX: ICD-10-CM

## 2022-07-28 DIAGNOSIS — R91.1 LUNG NODULE: ICD-10-CM

## 2022-07-28 DIAGNOSIS — C34.91 LOCALIZED CANCER OF RIGHT LUNG (HCC): ICD-10-CM

## 2022-07-28 LAB
ABO GROUP BLD: NORMAL
ANION GAP SERPL CALCULATED.3IONS-SCNC: 2 MMOL/L (ref 4–13)
BLD GP AB SCN SERPL QL: NEGATIVE
BUN SERPL-MCNC: 9 MG/DL (ref 5–25)
CALCIUM SERPL-MCNC: 8.2 MG/DL (ref 8.3–10.1)
CHLORIDE SERPL-SCNC: 109 MMOL/L (ref 96–108)
CO2 SERPL-SCNC: 28 MMOL/L (ref 21–32)
CREAT SERPL-MCNC: 0.94 MG/DL (ref 0.6–1.3)
ERYTHROCYTE [DISTWIDTH] IN BLOOD BY AUTOMATED COUNT: 12.8 % (ref 11.6–15.1)
GFR SERPL CREATININE-BSD FRML MDRD: 72 ML/MIN/1.73SQ M
GLUCOSE SERPL-MCNC: 126 MG/DL (ref 65–140)
GLUCOSE SERPL-MCNC: 134 MG/DL (ref 65–140)
HCT VFR BLD AUTO: 43.5 % (ref 34.8–46.1)
HGB BLD-MCNC: 13.9 G/DL (ref 11.5–15.4)
MAGNESIUM SERPL-MCNC: 2.4 MG/DL (ref 1.6–2.6)
MCH RBC QN AUTO: 29.8 PG (ref 26.8–34.3)
MCHC RBC AUTO-ENTMCNC: 32 G/DL (ref 31.4–37.4)
MCV RBC AUTO: 93 FL (ref 82–98)
PLATELET # BLD AUTO: 164 THOUSANDS/UL (ref 149–390)
PMV BLD AUTO: 9.6 FL (ref 8.9–12.7)
POTASSIUM SERPL-SCNC: 4.3 MMOL/L (ref 3.5–5.3)
RBC # BLD AUTO: 4.66 MILLION/UL (ref 3.81–5.12)
RH BLD: POSITIVE
SODIUM SERPL-SCNC: 139 MMOL/L (ref 135–147)
SPECIMEN EXPIRATION DATE: NORMAL
WBC # BLD AUTO: 9.63 THOUSAND/UL (ref 4.31–10.16)

## 2022-07-28 PROCEDURE — 82948 REAGENT STRIP/BLOOD GLUCOSE: CPT

## 2022-07-28 PROCEDURE — 88342 IMHCHEM/IMCYTCHM 1ST ANTB: CPT | Performed by: PATHOLOGY

## 2022-07-28 PROCEDURE — 86850 RBC ANTIBODY SCREEN: CPT | Performed by: THORACIC SURGERY (CARDIOTHORACIC VASCULAR SURGERY)

## 2022-07-28 PROCEDURE — 83735 ASSAY OF MAGNESIUM: CPT | Performed by: PHYSICIAN ASSISTANT

## 2022-07-28 PROCEDURE — 86901 BLOOD TYPING SEROLOGIC RH(D): CPT | Performed by: THORACIC SURGERY (CARDIOTHORACIC VASCULAR SURGERY)

## 2022-07-28 PROCEDURE — 88341 IMHCHEM/IMCYTCHM EA ADD ANTB: CPT | Performed by: PATHOLOGY

## 2022-07-28 PROCEDURE — 88305 TISSUE EXAM BY PATHOLOGIST: CPT | Performed by: PATHOLOGY

## 2022-07-28 PROCEDURE — 32663 THORACOSCOPY W/LOBECTOMY: CPT | Performed by: THORACIC SURGERY (CARDIOTHORACIC VASCULAR SURGERY)

## 2022-07-28 PROCEDURE — 94760 N-INVAS EAR/PLS OXIMETRY 1: CPT

## 2022-07-28 PROCEDURE — 0BBF4ZZ EXCISION OF RIGHT LOWER LUNG LOBE, PERCUTANEOUS ENDOSCOPIC APPROACH: ICD-10-PCS | Performed by: THORACIC SURGERY (CARDIOTHORACIC VASCULAR SURGERY)

## 2022-07-28 PROCEDURE — 0BJ08ZZ INSPECTION OF TRACHEOBRONCHIAL TREE, VIA NATURAL OR ARTIFICIAL OPENING ENDOSCOPIC: ICD-10-PCS | Performed by: THORACIC SURGERY (CARDIOTHORACIC VASCULAR SURGERY)

## 2022-07-28 PROCEDURE — 85027 COMPLETE CBC AUTOMATED: CPT | Performed by: PHYSICIAN ASSISTANT

## 2022-07-28 PROCEDURE — 71045 X-RAY EXAM CHEST 1 VIEW: CPT

## 2022-07-28 PROCEDURE — 07B74ZZ EXCISION OF THORAX LYMPHATIC, PERCUTANEOUS ENDOSCOPIC APPROACH: ICD-10-PCS | Performed by: THORACIC SURGERY (CARDIOTHORACIC VASCULAR SURGERY)

## 2022-07-28 PROCEDURE — 32674 THORACOSCOPY LYMPH NODE EXC: CPT | Performed by: THORACIC SURGERY (CARDIOTHORACIC VASCULAR SURGERY)

## 2022-07-28 PROCEDURE — 80048 BASIC METABOLIC PNL TOTAL CA: CPT | Performed by: PHYSICIAN ASSISTANT

## 2022-07-28 PROCEDURE — 88309 TISSUE EXAM BY PATHOLOGIST: CPT | Performed by: PATHOLOGY

## 2022-07-28 PROCEDURE — C9290 INJ, BUPIVACAINE LIPOSOME: HCPCS | Performed by: THORACIC SURGERY (CARDIOTHORACIC VASCULAR SURGERY)

## 2022-07-28 PROCEDURE — 86900 BLOOD TYPING SEROLOGIC ABO: CPT | Performed by: THORACIC SURGERY (CARDIOTHORACIC VASCULAR SURGERY)

## 2022-07-28 RX ORDER — DOCUSATE SODIUM 100 MG/1
100 CAPSULE, LIQUID FILLED ORAL 2 TIMES DAILY
Status: DISCONTINUED | OUTPATIENT
Start: 2022-07-28 | End: 2022-08-04 | Stop reason: HOSPADM

## 2022-07-28 RX ORDER — HYDROMORPHONE HCL/PF 1 MG/ML
SYRINGE (ML) INJECTION AS NEEDED
Status: DISCONTINUED | OUTPATIENT
Start: 2022-07-28 | End: 2022-07-28

## 2022-07-28 RX ORDER — HYDROMORPHONE HCL/PF 1 MG/ML
0.5 SYRINGE (ML) INJECTION
Status: DISCONTINUED | OUTPATIENT
Start: 2022-07-28 | End: 2022-07-31

## 2022-07-28 RX ORDER — ROCURONIUM BROMIDE 10 MG/ML
INJECTION, SOLUTION INTRAVENOUS AS NEEDED
Status: DISCONTINUED | OUTPATIENT
Start: 2022-07-28 | End: 2022-07-28

## 2022-07-28 RX ORDER — BUPIVACAINE HYDROCHLORIDE 2.5 MG/ML
INJECTION, SOLUTION EPIDURAL; INFILTRATION; INTRACAUDAL AS NEEDED
Status: DISCONTINUED | OUTPATIENT
Start: 2022-07-28 | End: 2022-07-28 | Stop reason: HOSPADM

## 2022-07-28 RX ORDER — HYDROMORPHONE HCL/PF 1 MG/ML
0.4 SYRINGE (ML) INJECTION
Status: DISCONTINUED | OUTPATIENT
Start: 2022-07-28 | End: 2022-07-28 | Stop reason: HOSPADM

## 2022-07-28 RX ORDER — DEXAMETHASONE SODIUM PHOSPHATE 4 MG/ML
INJECTION, SOLUTION INTRA-ARTICULAR; INTRALESIONAL; INTRAMUSCULAR; INTRAVENOUS; SOFT TISSUE AS NEEDED
Status: DISCONTINUED | OUTPATIENT
Start: 2022-07-28 | End: 2022-07-28

## 2022-07-28 RX ORDER — MIDAZOLAM HYDROCHLORIDE 2 MG/2ML
INJECTION, SOLUTION INTRAMUSCULAR; INTRAVENOUS AS NEEDED
Status: DISCONTINUED | OUTPATIENT
Start: 2022-07-28 | End: 2022-07-28

## 2022-07-28 RX ORDER — GABAPENTIN 300 MG/1
600 CAPSULE ORAL ONCE
Status: COMPLETED | OUTPATIENT
Start: 2022-07-28 | End: 2022-07-28

## 2022-07-28 RX ORDER — PANTOPRAZOLE SODIUM 40 MG/1
40 TABLET, DELAYED RELEASE ORAL
Status: DISCONTINUED | OUTPATIENT
Start: 2022-07-28 | End: 2022-08-04 | Stop reason: HOSPADM

## 2022-07-28 RX ORDER — ONDANSETRON 2 MG/ML
4 INJECTION INTRAMUSCULAR; INTRAVENOUS ONCE AS NEEDED
Status: DISCONTINUED | OUTPATIENT
Start: 2022-07-28 | End: 2022-07-28 | Stop reason: HOSPADM

## 2022-07-28 RX ORDER — HEPARIN SODIUM 5000 [USP'U]/ML
5000 INJECTION, SOLUTION INTRAVENOUS; SUBCUTANEOUS
Status: COMPLETED | OUTPATIENT
Start: 2022-07-28 | End: 2022-07-28

## 2022-07-28 RX ORDER — FENTANYL CITRATE 50 UG/ML
INJECTION, SOLUTION INTRAMUSCULAR; INTRAVENOUS AS NEEDED
Status: DISCONTINUED | OUTPATIENT
Start: 2022-07-28 | End: 2022-07-28

## 2022-07-28 RX ORDER — GABAPENTIN 300 MG/1
300 CAPSULE ORAL 3 TIMES DAILY
Status: DISCONTINUED | OUTPATIENT
Start: 2022-07-28 | End: 2022-08-04 | Stop reason: HOSPADM

## 2022-07-28 RX ORDER — ACETAMINOPHEN 325 MG/1
975 TABLET ORAL ONCE
Status: COMPLETED | OUTPATIENT
Start: 2022-07-28 | End: 2022-07-28

## 2022-07-28 RX ORDER — CEFAZOLIN SODIUM 1 G/3ML
INJECTION, POWDER, FOR SOLUTION INTRAMUSCULAR; INTRAVENOUS AS NEEDED
Status: DISCONTINUED | OUTPATIENT
Start: 2022-07-28 | End: 2022-07-28

## 2022-07-28 RX ORDER — CELECOXIB 200 MG/1
200 CAPSULE ORAL ONCE
Status: COMPLETED | OUTPATIENT
Start: 2022-07-28 | End: 2022-07-28

## 2022-07-28 RX ORDER — CELECOXIB 100 MG/1
100 CAPSULE ORAL 2 TIMES DAILY
Status: DISCONTINUED | OUTPATIENT
Start: 2022-07-28 | End: 2022-07-29

## 2022-07-28 RX ORDER — ONDANSETRON 2 MG/ML
INJECTION INTRAMUSCULAR; INTRAVENOUS AS NEEDED
Status: DISCONTINUED | OUTPATIENT
Start: 2022-07-28 | End: 2022-07-28

## 2022-07-28 RX ORDER — ENOXAPARIN SODIUM 100 MG/ML
40 INJECTION SUBCUTANEOUS DAILY
Status: DISCONTINUED | OUTPATIENT
Start: 2022-07-28 | End: 2022-08-04 | Stop reason: HOSPADM

## 2022-07-28 RX ORDER — SODIUM CHLORIDE 9 MG/ML
INJECTION INTRAVENOUS AS NEEDED
Status: DISCONTINUED | OUTPATIENT
Start: 2022-07-28 | End: 2022-07-28 | Stop reason: HOSPADM

## 2022-07-28 RX ORDER — SODIUM CHLORIDE, SODIUM LACTATE, POTASSIUM CHLORIDE, CALCIUM CHLORIDE 600; 310; 30; 20 MG/100ML; MG/100ML; MG/100ML; MG/100ML
100 INJECTION, SOLUTION INTRAVENOUS CONTINUOUS
Status: DISCONTINUED | OUTPATIENT
Start: 2022-07-28 | End: 2022-07-29 | Stop reason: ALTCHOICE

## 2022-07-28 RX ORDER — SODIUM CHLORIDE, SODIUM LACTATE, POTASSIUM CHLORIDE, CALCIUM CHLORIDE 600; 310; 30; 20 MG/100ML; MG/100ML; MG/100ML; MG/100ML
60 INJECTION, SOLUTION INTRAVENOUS CONTINUOUS
Status: DISCONTINUED | OUTPATIENT
Start: 2022-07-28 | End: 2022-07-29 | Stop reason: ALTCHOICE

## 2022-07-28 RX ORDER — OXYCODONE HYDROCHLORIDE 5 MG/1
5 TABLET ORAL EVERY 4 HOURS PRN
Status: DISCONTINUED | OUTPATIENT
Start: 2022-07-28 | End: 2022-07-29

## 2022-07-28 RX ORDER — POLYETHYLENE GLYCOL 3350 17 G/17G
17 POWDER, FOR SOLUTION ORAL DAILY PRN
Status: DISCONTINUED | OUTPATIENT
Start: 2022-07-28 | End: 2022-08-04 | Stop reason: HOSPADM

## 2022-07-28 RX ORDER — FENTANYL CITRATE/PF 50 MCG/ML
50 SYRINGE (ML) INJECTION
Status: DISCONTINUED | OUTPATIENT
Start: 2022-07-28 | End: 2022-07-28 | Stop reason: HOSPADM

## 2022-07-28 RX ORDER — SODIUM CHLORIDE 9 MG/ML
INJECTION, SOLUTION INTRAVENOUS CONTINUOUS PRN
Status: DISCONTINUED | OUTPATIENT
Start: 2022-07-28 | End: 2022-07-28

## 2022-07-28 RX ORDER — PROPOFOL 10 MG/ML
INJECTION, EMULSION INTRAVENOUS AS NEEDED
Status: DISCONTINUED | OUTPATIENT
Start: 2022-07-28 | End: 2022-07-28

## 2022-07-28 RX ORDER — ONDANSETRON 2 MG/ML
4 INJECTION INTRAMUSCULAR; INTRAVENOUS EVERY 6 HOURS PRN
Status: DISCONTINUED | OUTPATIENT
Start: 2022-07-28 | End: 2022-08-04 | Stop reason: HOSPADM

## 2022-07-28 RX ORDER — ACETAMINOPHEN 325 MG/1
975 TABLET ORAL EVERY 6 HOURS
Status: DISCONTINUED | OUTPATIENT
Start: 2022-07-28 | End: 2022-08-04 | Stop reason: HOSPADM

## 2022-07-28 RX ORDER — SODIUM CHLORIDE, SODIUM LACTATE, POTASSIUM CHLORIDE, CALCIUM CHLORIDE 600; 310; 30; 20 MG/100ML; MG/100ML; MG/100ML; MG/100ML
INJECTION, SOLUTION INTRAVENOUS CONTINUOUS PRN
Status: DISCONTINUED | OUTPATIENT
Start: 2022-07-28 | End: 2022-07-28

## 2022-07-28 RX ORDER — SENNOSIDES 8.6 MG
1 TABLET ORAL DAILY
Status: DISCONTINUED | OUTPATIENT
Start: 2022-07-28 | End: 2022-08-04 | Stop reason: HOSPADM

## 2022-07-28 RX ORDER — EPHEDRINE SULFATE 50 MG/ML
INJECTION INTRAVENOUS AS NEEDED
Status: DISCONTINUED | OUTPATIENT
Start: 2022-07-28 | End: 2022-07-28

## 2022-07-28 RX ORDER — LIDOCAINE HYDROCHLORIDE 10 MG/ML
INJECTION, SOLUTION EPIDURAL; INFILTRATION; INTRACAUDAL; PERINEURAL AS NEEDED
Status: DISCONTINUED | OUTPATIENT
Start: 2022-07-28 | End: 2022-07-28

## 2022-07-28 RX ADMIN — Medication 50 MCG: at 10:43

## 2022-07-28 RX ADMIN — HEPARIN SODIUM 5000 UNITS: 5000 INJECTION INTRAVENOUS; SUBCUTANEOUS at 07:13

## 2022-07-28 RX ADMIN — CELECOXIB 100 MG: 100 CAPSULE ORAL at 17:08

## 2022-07-28 RX ADMIN — OXYCODONE HYDROCHLORIDE 5 MG: 5 TABLET ORAL at 17:07

## 2022-07-28 RX ADMIN — LIDOCAINE HYDROCHLORIDE 100 MG: 10 INJECTION, SOLUTION EPIDURAL; INFILTRATION; INTRACAUDAL; PERINEURAL at 08:04

## 2022-07-28 RX ADMIN — SODIUM CHLORIDE: 0.9 INJECTION, SOLUTION INTRAVENOUS at 08:11

## 2022-07-28 RX ADMIN — ONDANSETRON HYDROCHLORIDE 4 MG: 2 INJECTION, SOLUTION INTRAMUSCULAR; INTRAVENOUS at 09:58

## 2022-07-28 RX ADMIN — ACETAMINOPHEN 975 MG: 325 TABLET ORAL at 07:06

## 2022-07-28 RX ADMIN — GABAPENTIN 300 MG: 300 CAPSULE ORAL at 20:10

## 2022-07-28 RX ADMIN — ACETAMINOPHEN 975 MG: 325 TABLET ORAL at 12:45

## 2022-07-28 RX ADMIN — HYDROMORPHONE HYDROCHLORIDE 0.5 MG: 1 INJECTION, SOLUTION INTRAMUSCULAR; INTRAVENOUS; SUBCUTANEOUS at 20:16

## 2022-07-28 RX ADMIN — ENOXAPARIN SODIUM 40 MG: 40 INJECTION SUBCUTANEOUS at 12:45

## 2022-07-28 RX ADMIN — PROPOFOL 30 MG: 10 INJECTION, EMULSION INTRAVENOUS at 10:22

## 2022-07-28 RX ADMIN — EPHEDRINE SULFATE 10 MG: 50 INJECTION INTRAVENOUS at 09:53

## 2022-07-28 RX ADMIN — SODIUM CHLORIDE, SODIUM LACTATE, POTASSIUM CHLORIDE, AND CALCIUM CHLORIDE: .6; .31; .03; .02 INJECTION, SOLUTION INTRAVENOUS at 08:01

## 2022-07-28 RX ADMIN — HYDROMORPHONE HYDROCHLORIDE 0.5 MG: 1 INJECTION, SOLUTION INTRAMUSCULAR; INTRAVENOUS; SUBCUTANEOUS at 08:33

## 2022-07-28 RX ADMIN — CELECOXIB 100 MG: 100 CAPSULE ORAL at 14:07

## 2022-07-28 RX ADMIN — SENNOSIDES 8.6 MG: 8.6 TABLET, FILM COATED ORAL at 12:45

## 2022-07-28 RX ADMIN — DOCUSATE SODIUM 100 MG: 100 CAPSULE, LIQUID FILLED ORAL at 12:45

## 2022-07-28 RX ADMIN — ACETAMINOPHEN 975 MG: 325 TABLET ORAL at 20:10

## 2022-07-28 RX ADMIN — HYDROMORPHONE HYDROCHLORIDE 0.5 MG: 1 INJECTION, SOLUTION INTRAMUSCULAR; INTRAVENOUS; SUBCUTANEOUS at 14:19

## 2022-07-28 RX ADMIN — PROPOFOL 200 MG: 10 INJECTION, EMULSION INTRAVENOUS at 08:04

## 2022-07-28 RX ADMIN — SUGAMMADEX 550 MG: 100 INJECTION, SOLUTION INTRAVENOUS at 10:21

## 2022-07-28 RX ADMIN — CELECOXIB 200 MG: 200 CAPSULE ORAL at 07:06

## 2022-07-28 RX ADMIN — ROCURONIUM BROMIDE 20 MG: 10 INJECTION INTRAVENOUS at 09:32

## 2022-07-28 RX ADMIN — SODIUM CHLORIDE, SODIUM LACTATE, POTASSIUM CHLORIDE, AND CALCIUM CHLORIDE 60 ML/HR: .6; .31; .03; .02 INJECTION, SOLUTION INTRAVENOUS at 11:08

## 2022-07-28 RX ADMIN — FENTANYL CITRATE 100 MCG: 50 INJECTION INTRAMUSCULAR; INTRAVENOUS at 08:03

## 2022-07-28 RX ADMIN — MIDAZOLAM 2 MG: 1 INJECTION INTRAMUSCULAR; INTRAVENOUS at 07:53

## 2022-07-28 RX ADMIN — GABAPENTIN 300 MG: 300 CAPSULE ORAL at 17:07

## 2022-07-28 RX ADMIN — Medication 50 MCG: at 11:01

## 2022-07-28 RX ADMIN — ROCURONIUM BROMIDE 50 MG: 10 INJECTION INTRAVENOUS at 08:05

## 2022-07-28 RX ADMIN — DOCUSATE SODIUM 100 MG: 100 CAPSULE, LIQUID FILLED ORAL at 17:09

## 2022-07-28 RX ADMIN — ROCURONIUM BROMIDE 30 MG: 10 INJECTION INTRAVENOUS at 08:32

## 2022-07-28 RX ADMIN — HYDROMORPHONE HYDROCHLORIDE 0.5 MG: 1 INJECTION, SOLUTION INTRAMUSCULAR; INTRAVENOUS; SUBCUTANEOUS at 23:16

## 2022-07-28 RX ADMIN — OXYCODONE HYDROCHLORIDE 5 MG: 5 TABLET ORAL at 12:45

## 2022-07-28 RX ADMIN — HYDROMORPHONE HYDROCHLORIDE 0.4 MG: 1 INJECTION, SOLUTION INTRAMUSCULAR; INTRAVENOUS; SUBCUTANEOUS at 11:13

## 2022-07-28 RX ADMIN — OXYCODONE HYDROCHLORIDE 5 MG: 5 TABLET ORAL at 21:27

## 2022-07-28 RX ADMIN — GABAPENTIN 600 MG: 300 CAPSULE ORAL at 07:06

## 2022-07-28 RX ADMIN — CEFAZOLIN SODIUM 3000 MG: 1 INJECTION, POWDER, FOR SOLUTION INTRAMUSCULAR; INTRAVENOUS at 08:24

## 2022-07-28 RX ADMIN — DEXAMETHASONE SODIUM PHOSPHATE 10 MG: 4 INJECTION INTRA-ARTICULAR; INTRALESIONAL; INTRAMUSCULAR; INTRAVENOUS; SOFT TISSUE at 08:03

## 2022-07-28 RX ADMIN — HYDROMORPHONE HYDROCHLORIDE 0.5 MG: 1 INJECTION, SOLUTION INTRAMUSCULAR; INTRAVENOUS; SUBCUTANEOUS at 10:05

## 2022-07-28 NOTE — OP NOTE
OPERATIVE REPORT  PATIENT NAME: Jihan Bethea    :  1975  MRN: [id number]  Pt Location: [facility] OR ROOM 08    SURGERY DATE: 2022    Surgeon(s) and Role:     * Armando Kovacs MD - Primary     * Vanesa Glass PA-C - Assisting     * Gino Bedolla MD - Assisting    Preop Diagnosis:  Right lower lobe typical carcinoid tumor  Clinical stage I A    Post-Op Diagnosis Codes:  Right lower lobe typical carcinoid tumor  Clinical stage I A    Procedure(s) (LRB):  BRONCHOSCOPY FLEXIBLE (N/A)  RIGHT THORACOSCOPIC LOWER LOBECTOMY  MEDIASTINAL LYMPH NODE DISSECTION    Specimen(s):  ID Type Source Tests Collected by Time Destination   1 : level 7 Tissue Lymph Node TISSUE EXAM Armando Kovacs MD 2022 0850    2 : level 11 Tissue Lymph Node TISSUE EXAM Armando Kovacs MD 2022 0909    3 : level 11 #2 Tissue Lymph Node TISSUE EXAM Armando Kovacs MD 2022 0144    4 :  Tissue Lung, Right Lower Lobe TISSUE EXAM Armando Kovacs MD 2022 0935    5 : level 2R Tissue Lymph Node TISSUE EXAM Armando Kovacs MD 2022 0945    6 : level 4R Tissue Lymph Node TISSUE EXAM Armando Kovacs MD 2022 1432        Estimated Blood Loss:   Minimal    Drains:  Chest Tube 1 Right Pleural 24 Fr  (Active)   Number of days: 0       Anesthesia Type:   General    Operative Indications:  Right lower lobe typical carcinoid tumor  Clinical stage I A  Ms Jae Becerra has a biopsy-proven clinical stage I A typical carcinoid tumor of her right lower lobe  She has adequate performance status and pulmonary function tests and is brought to the operating room for resection      Operative Findings:  Right lower lobe carcinoid tumor completely removed    Complications:   None    Procedure and Technique:  No suspicion or identified risk for TB or other airborne infectious disease; bronchoscopy procedure being performed for diagnostic purposes  The patient is brought to the operating room and placed in the supine position  After institution of adequate general anesthesia, the fiberoptic bronchoscope was inserted  The distal trachea is normal in the cindy is sharp  The airways are examined to a subsegmental level bilaterally  There are no endobronchial lesions, the anatomy appears normal, and secretions are scant  After placement of a double-lumen endotracheal tube the patient is turned into the left lateral decubitus position  The patient's entire right chest is prepped and draped in the usual sterile fashion  Standard port incisions are utilized with a 0 5 cm incision in the eighth intercostal space in the posterior axillary line as well as a 4 cm incision more anteriorly  The thoracoscope is inserted and there is no evidence of pleural carcinomatosis  The mass was identified in the right lower lobe  The inferior pulmonary ligament was divided and no level 9 lymph nodes are visible  The level 7 space is then opened and completely dissected  The posterior pleura is opened to expose the bronchus intermedius and dissection is performed between the superior segmental bronchus and the bronchus intermedius  The level 11 at lymph node between the superior segmental bronchus and the upper lobe bronchus is resected  The anterior hilum was opened in order to visualize the middle lobe component of the superior pulmonary vein  The inferior pulmonary vein is then circumferentially dissected and divided with a linear stapler  This exposes the underlying bronchus  The middle lobe bronchus was clearly identified and a level 11 lymph node between the middle lobe bronchus and the lower lobe bronchus is completely dissected free for permanent sectioning and labeled level 11 #2   The lower lobe bronchus is then circumferentially dissected and divided with a linear stapler  The distal divided bronchus is grasped and retracted laterally exposing the underlying pulmonary artery   The pulmonary artery is then circumferentially dissected and divided with a linear stapler  The lower lobe is then placed back into its anatomic position and the oblique fissure is divided with several fires of the linear stapler  The specimen is then placed inside of a protective bag and removed  The pleura above the azygos vein is then opened and both levels 4R and 2R are dissected for permanent sections  Hemostasis is assured  Paravertebral blocks are then placed with 20 cc of 0 25% Marcaine mixed with 20 cc of Exparel over 8 levels  A 24 Maori chest tube was then placed through the camera port to lie posteriorly and upwards the apex  The remaining lung is then watched as it reexpands  The anterior access incision was then closed in layers with running absorbable suture to the pectoral fascia, the subcutaneous and subcuticular layers  The chest tube is affixed to the chest wall and placed to Thopaz drainage  Dry sterile dressings were applied, the patient was extubated, and transferred to the thoracic recovery area  I was present and scrubbed for the entire procedure  The physician assistant was critical to the performance of this operation  She was scrubbed for the entire procedure, drove the thoracoscope, provided retraction, as well as wound closure     I was present for the entire procedure    Patient Disposition:  PACU       SIGNATURE: Concetta Faye MD  DATE: July 28, 2022  TIME: 10:11 AM

## 2022-07-28 NOTE — PLAN OF CARE
Problem: MOBILITY - ADULT  Goal: Maintain or return to baseline ADL function  Description: INTERVENTIONS:  -  Assess patient's ability to carry out ADLs; assess patient's baseline for ADL function and identify physical deficits which impact ability to perform ADLs (bathing, care of mouth/teeth, toileting, grooming, dressing, etc )  - Assess/evaluate cause of self-care deficits   - Assess range of motion  - Assess patient's mobility; develop plan if impaired  - Assess patient's need for assistive devices and provide as appropriate  - Encourage maximum independence but intervene and supervise when necessary  - Involve family in performance of ADLs  - Assess for home care needs following discharge   - Consider OT consult to assist with ADL evaluation and planning for discharge  - Provide patient education as appropriate  Outcome: Progressing  Goal: Maintains/Returns to pre admission functional level  Description: INTERVENTIONS:  - Perform BMAT or MOVE assessment daily    - Set and communicate daily mobility goal to care team and patient/family/caregiver     - Collaborate with rehabilitation services on mobility goals if consulted  Problem: RESPIRATORY - ADULT  Goal: Achieves optimal ventilation and oxygenation  Description: INTERVENTIONS:  - Assess for changes in respiratory status  - Assess for changes in mentation and behavior  - Position to facilitate oxygenation and minimize respiratory effort  - Oxygen administered by appropriate delivery if ordered  - Initiate smoking cessation education as indicated  - Encourage broncho-pulmonary hygiene including cough, deep breathe, Incentive Spirometry  - Assess the need for suctioning and aspirate as needed  - Assess and instruct to report SOB or any respiratory difficulty  - Respiratory Therapy support as indicated  Outcome: Progressing     Problem: PAIN - ADULT  Goal: Verbalizes/displays adequate comfort level or baseline comfort level  Description: Interventions:  - Encourage patient to monitor pain and request assistance  - Assess pain using appropriate pain scale  - Administer analgesics based on type and severity of pain and evaluate response  - Implement non-pharmacological measures as appropriate and evaluate response  - Consider cultural and social influences on pain and pain management  - Notify physician/advanced practitioner if interventions unsuccessful or patient reports new pain  Outcome: Progressing     - Record patient progress and toleration of activity level   Outcome: Progressing

## 2022-07-28 NOTE — RESPIRATORY THERAPY NOTE
RT Protocol Note  Nicole Davey 51 y o  female MRN: [id number]  Unit/Bed#: [facility] 409-01 Encounter: [id number]    Assessment    Principal Problem:    Benign carcinoid tumor of lung  Active Problems:    Class 3 severe obesity due to excess calories without serious comorbidity with body mass index (BMI) of 40 0 to 44 9 in adult [facility])      Home Pulmonary Medications:  na       Past Medical History:   Diagnosis Date    Acid reflux     Trejo's esophagus     GERD (gastroesophageal reflux disease)     Hiatal hernia     Kidney stone     Latent tuberculosis by skin test     Was treated with INH for 6 months    Obstructive sleep apnea 2018    Mild TERESITA  AHI 18 1     Social History     Socioeconomic History    Marital status:      Spouse name: None    Number of children: None    Years of education: None    Highest education level: None   Occupational History    None   Tobacco Use    Smoking status: Former Smoker     Packs/day: 0 75     Years: 36 00     Pack years: 27 00     Types: Cigarettes     Quit date: 2022     Years since quittin 1    Smokeless tobacco: Never Used   Vaping Use    Vaping Use: Never used   Substance and Sexual Activity    Alcohol use: Never    Drug use: Never     Comment: CBD gummies    Sexual activity: Not Currently   Other Topics Concern    None   Social History Narrative    None     Social Determinants of Health     Financial Resource Strain: Not on file   Food Insecurity: Not on file   Transportation Needs: Not on file   Physical Activity: Not on file   Stress: Not on file   Social Connections: Not on file   Intimate Partner Violence: Not on file   Housing Stability: Not on file       Subjective         Objective    Physical Exam:   Cough: Dry, Non-productive  O2 Device: (P) nc    Vitals:  Blood pressure 99/65, pulse 80, temperature (!) 96 9 °F (36 1 °C), temperature source Temporal, resp   rate (!) 10, height 5' 11" (1 803 m), weight (!) 144 kg (317 lb), SpO2 98 %  Imaging and other studies: I have personally reviewed pertinent reports        O2 Device: (P) nc     Plan       Airway Clearance Plan: Incentive Spirometer     Resp Comments: (P) pt instructed on the use of IS tolerated very well, will continue to monitor per protocol, cxr shows clear lungs

## 2022-07-28 NOTE — INTERVAL H&P NOTE
H&P reviewed  After examining the patient I find no changes in the patients condition since the H&P had been written  Abdomen soft, non tender    There were no vitals filed for this visit

## 2022-07-28 NOTE — INTERVAL H&P NOTE
H&P reviewed  After examining the patient I find no changes in the patients condition since the H&P had been written  No lower extremity edema  Impression:  Clinical stage I typical carcinoid of the right lower lobe  Plan for bronchoscopy, right thoracoscopic lower lobectomy    There were no vitals filed for this visit

## 2022-07-28 NOTE — ANESTHESIA POSTPROCEDURE EVALUATION
Post-Op Assessment Note    CV Status:  Stable    Pain management: adequate     Mental Status:  Alert and awake   Hydration Status:  Euvolemic   PONV Controlled:  Controlled   Airway Patency:  Patent      Post Op Vitals Reviewed: Yes      Staff: CRNA         No complications documented      /65 (07/28/22 1035)    Temp 97 8 °F (36 6 °C) (07/28/22 1035)    Pulse 85 (07/28/22 1035)   Resp 18 (07/28/22 1035)    SpO2 98 % (07/28/22 1035)

## 2022-07-28 NOTE — QUICK NOTE
Postoperative evaluation:    26-year-old female with right lower lobe carcinoid tumor, now status post right VATS lower lobectomy  Patient doing okay postoperatively  She is experiencing some chest pain  She denies nausea, vomiting  She has not urinated yet since surgery  She is tolerating a diet      Afebrile, VSS  Postoperative labs within normal limits  Postop chest x-ray showed no pneumothorax    Well-developed well-nourished female in no acute distress sitting up in bed on nasal cannula  Right chest tube in place to negative 8 with air leak in the 100s-200s  Chest tube site clean dry and intact      Plan  Regular diet  LR at 60  And maintain right chest tube to -8 and monitor for air leak and output  Multimodal pain regimen  Encourage out of bed and ambulation and IS use  Lovenox for dvt px      Delia Hernadez MD  2:18 PM  07/28/22

## 2022-07-28 NOTE — ANESTHESIA PREPROCEDURE EVALUATION
Procedure:  LOBECTOMY LUNG (Right Chest)  THORACOSCOPY VIDEO ASSISTED SURGERY (VATS) (Right Chest)  BRONCHOSCOPY FLEXIBLE (N/A Bronchus)    Relevant Problems   /RENAL   (+) Kidney stone      PULMONARY   (+) Obstructive sleep apnea   (+) Tobacco smoker, 1 pack of cigarettes or less per day      Respiratory   (+) Benign carcinoid tumor of lung      Other   (+) Class 3 severe obesity due to excess calories without serious comorbidity with body mass index (BMI) of 40 0 to 44 9 in adult [facility])      Lab Results   Component Value Date    WBC 11 66 (H) 06/28/2022    HGB 14 4 06/28/2022    HCT 44 4 06/28/2022    MCV 93 06/28/2022     06/28/2022     Lab Results   Component Value Date    K 4 2 06/28/2022    CO2 24 06/28/2022     06/28/2022    BUN 12 06/28/2022    CREATININE 0 81 06/28/2022     Lab Results   Component Value Date    INR 1 07 06/28/2022    PROTIME 13 5 06/28/2022     Lab Results   Component Value Date    PTT 37 06/28/2022     Lab Results   Component Value Date    HGBA1C 5 3 02/03/2022       Type and Screen:  B    Physical Exam    Airway    Mallampati score: II  TM Distance: >3 FB  Neck ROM: full     Dental   No notable dental hx     Cardiovascular      Pulmonary      Other Findings        Anesthesia Plan  ASA Score- 3     Anesthesia Type- general with ASA Monitors  Additional Monitors:   Airway Plan:     Comment: Discussed need for DL ETT  Discussed possible arterial line if hemodynamic instability, significant blood loss, or unanticipated ventilatory issues requiring blood gas sampling  Plan Factors-Exercise tolerance (METS): >4 METS  Chart reviewed  Existing labs reviewed  Patient summary reviewed  Induction- intravenous  Postoperative Plan- Plan for postoperative opioid use  Planned trial extubation    Informed Consent- Anesthetic plan and risks discussed with patient  I personally reviewed this patient with the CRNA   Discussed and agreed on the Anesthesia Plan with the CRNA  Kelechi Barrera

## 2022-07-29 LAB
ANION GAP SERPL CALCULATED.3IONS-SCNC: 2 MMOL/L (ref 4–13)
BUN SERPL-MCNC: 15 MG/DL (ref 5–25)
CALCIUM SERPL-MCNC: 8.1 MG/DL (ref 8.3–10.1)
CHLORIDE SERPL-SCNC: 110 MMOL/L (ref 96–108)
CO2 SERPL-SCNC: 24 MMOL/L (ref 21–32)
CREAT SERPL-MCNC: 0.74 MG/DL (ref 0.6–1.3)
ERYTHROCYTE [DISTWIDTH] IN BLOOD BY AUTOMATED COUNT: 12.9 % (ref 11.6–15.1)
GFR SERPL CREATININE-BSD FRML MDRD: 96 ML/MIN/1.73SQ M
GLUCOSE SERPL-MCNC: 135 MG/DL (ref 65–140)
HCT VFR BLD AUTO: 39.2 % (ref 34.8–46.1)
HGB BLD-MCNC: 12.4 G/DL (ref 11.5–15.4)
MAGNESIUM SERPL-MCNC: 2.3 MG/DL (ref 1.6–2.6)
MCH RBC QN AUTO: 29.5 PG (ref 26.8–34.3)
MCHC RBC AUTO-ENTMCNC: 31.6 G/DL (ref 31.4–37.4)
MCV RBC AUTO: 93 FL (ref 82–98)
PLATELET # BLD AUTO: 205 THOUSANDS/UL (ref 149–390)
PMV BLD AUTO: 9.9 FL (ref 8.9–12.7)
POTASSIUM SERPL-SCNC: 4.2 MMOL/L (ref 3.5–5.3)
RBC # BLD AUTO: 4.2 MILLION/UL (ref 3.81–5.12)
SODIUM SERPL-SCNC: 136 MMOL/L (ref 135–147)
WBC # BLD AUTO: 17.85 THOUSAND/UL (ref 4.31–10.16)

## 2022-07-29 PROCEDURE — 83735 ASSAY OF MAGNESIUM: CPT | Performed by: PHYSICIAN ASSISTANT

## 2022-07-29 PROCEDURE — 80048 BASIC METABOLIC PNL TOTAL CA: CPT | Performed by: PHYSICIAN ASSISTANT

## 2022-07-29 PROCEDURE — 99024 POSTOP FOLLOW-UP VISIT: CPT | Performed by: THORACIC SURGERY (CARDIOTHORACIC VASCULAR SURGERY)

## 2022-07-29 PROCEDURE — 85027 COMPLETE CBC AUTOMATED: CPT | Performed by: PHYSICIAN ASSISTANT

## 2022-07-29 PROCEDURE — 97162 PT EVAL MOD COMPLEX 30 MIN: CPT

## 2022-07-29 PROCEDURE — 97166 OT EVAL MOD COMPLEX 45 MIN: CPT

## 2022-07-29 RX ORDER — KETOROLAC TROMETHAMINE 30 MG/ML
15 INJECTION, SOLUTION INTRAMUSCULAR; INTRAVENOUS EVERY 6 HOURS SCHEDULED
Status: COMPLETED | OUTPATIENT
Start: 2022-07-29 | End: 2022-07-31

## 2022-07-29 RX ORDER — OXYCODONE HYDROCHLORIDE 5 MG/1
5 TABLET ORAL EVERY 4 HOURS PRN
Status: DISCONTINUED | OUTPATIENT
Start: 2022-07-29 | End: 2022-08-04 | Stop reason: HOSPADM

## 2022-07-29 RX ORDER — HYDROMORPHONE HCL/PF 1 MG/ML
0.5 SYRINGE (ML) INJECTION ONCE
Status: COMPLETED | OUTPATIENT
Start: 2022-07-29 | End: 2022-07-29

## 2022-07-29 RX ORDER — DIPHENHYDRAMINE HCL 25 MG
25 TABLET ORAL EVERY 6 HOURS PRN
Status: DISCONTINUED | OUTPATIENT
Start: 2022-07-29 | End: 2022-08-04 | Stop reason: HOSPADM

## 2022-07-29 RX ORDER — METHOCARBAMOL 500 MG/1
500 TABLET, FILM COATED ORAL EVERY 6 HOURS SCHEDULED
Status: DISCONTINUED | OUTPATIENT
Start: 2022-07-30 | End: 2022-07-31

## 2022-07-29 RX ORDER — LIDOCAINE 50 MG/G
1 PATCH TOPICAL DAILY
Status: DISCONTINUED | OUTPATIENT
Start: 2022-07-30 | End: 2022-08-04 | Stop reason: HOSPADM

## 2022-07-29 RX ORDER — OXYCODONE HYDROCHLORIDE 10 MG/1
10 TABLET ORAL EVERY 4 HOURS PRN
Status: DISCONTINUED | OUTPATIENT
Start: 2022-07-29 | End: 2022-08-04 | Stop reason: HOSPADM

## 2022-07-29 RX ADMIN — HYDROMORPHONE HYDROCHLORIDE 0.5 MG: 1 INJECTION, SOLUTION INTRAMUSCULAR; INTRAVENOUS; SUBCUTANEOUS at 04:01

## 2022-07-29 RX ADMIN — PANTOPRAZOLE SODIUM 40 MG: 40 TABLET, DELAYED RELEASE ORAL at 05:31

## 2022-07-29 RX ADMIN — DIPHENHYDRAMINE HCL 25 MG: 25 TABLET ORAL at 20:37

## 2022-07-29 RX ADMIN — HYDROMORPHONE HYDROCHLORIDE 0.5 MG: 1 INJECTION, SOLUTION INTRAMUSCULAR; INTRAVENOUS; SUBCUTANEOUS at 08:38

## 2022-07-29 RX ADMIN — GABAPENTIN 300 MG: 300 CAPSULE ORAL at 20:00

## 2022-07-29 RX ADMIN — ACETAMINOPHEN 975 MG: 325 TABLET ORAL at 08:33

## 2022-07-29 RX ADMIN — OXYCODONE HYDROCHLORIDE 10 MG: 10 TABLET ORAL at 06:45

## 2022-07-29 RX ADMIN — OXYCODONE HYDROCHLORIDE 10 MG: 10 TABLET ORAL at 12:52

## 2022-07-29 RX ADMIN — HYDROMORPHONE HYDROCHLORIDE 0.5 MG: 1 INJECTION, SOLUTION INTRAMUSCULAR; INTRAVENOUS; SUBCUTANEOUS at 14:26

## 2022-07-29 RX ADMIN — DOCUSATE SODIUM 100 MG: 100 CAPSULE, LIQUID FILLED ORAL at 08:34

## 2022-07-29 RX ADMIN — OXYCODONE HYDROCHLORIDE 10 MG: 10 TABLET ORAL at 16:51

## 2022-07-29 RX ADMIN — OXYCODONE HYDROCHLORIDE 10 MG: 10 TABLET ORAL at 20:38

## 2022-07-29 RX ADMIN — GABAPENTIN 300 MG: 300 CAPSULE ORAL at 16:21

## 2022-07-29 RX ADMIN — ACETAMINOPHEN 975 MG: 325 TABLET ORAL at 20:00

## 2022-07-29 RX ADMIN — DOCUSATE SODIUM 100 MG: 100 CAPSULE, LIQUID FILLED ORAL at 17:03

## 2022-07-29 RX ADMIN — HYDROMORPHONE HYDROCHLORIDE 0.5 MG: 1 INJECTION, SOLUTION INTRAMUSCULAR; INTRAVENOUS; SUBCUTANEOUS at 21:50

## 2022-07-29 RX ADMIN — OXYCODONE HYDROCHLORIDE 5 MG: 5 TABLET ORAL at 02:25

## 2022-07-29 RX ADMIN — GABAPENTIN 300 MG: 300 CAPSULE ORAL at 08:34

## 2022-07-29 RX ADMIN — HYDROMORPHONE HYDROCHLORIDE 0.5 MG: 1 INJECTION, SOLUTION INTRAMUSCULAR; INTRAVENOUS; SUBCUTANEOUS at 18:04

## 2022-07-29 RX ADMIN — ACETAMINOPHEN 975 MG: 325 TABLET ORAL at 02:25

## 2022-07-29 RX ADMIN — CELECOXIB 100 MG: 100 CAPSULE ORAL at 08:34

## 2022-07-29 RX ADMIN — SENNOSIDES 8.6 MG: 8.6 TABLET, FILM COATED ORAL at 08:34

## 2022-07-29 RX ADMIN — ENOXAPARIN SODIUM 40 MG: 40 INJECTION SUBCUTANEOUS at 08:34

## 2022-07-29 RX ADMIN — KETOROLAC TROMETHAMINE 15 MG: 30 INJECTION, SOLUTION INTRAMUSCULAR; INTRAVENOUS at 17:03

## 2022-07-29 RX ADMIN — ACETAMINOPHEN 975 MG: 325 TABLET ORAL at 16:21

## 2022-07-29 RX ADMIN — KETOROLAC TROMETHAMINE 15 MG: 30 INJECTION, SOLUTION INTRAMUSCULAR; INTRAVENOUS at 13:43

## 2022-07-29 NOTE — RESTORATIVE TECHNICIAN NOTE
Restorative Technician Note      Patient Name: Benny Cisneros     Restorative Tech Visit Date: 7/29/2022  Note Type: Mobility  Patient Position Upon Consult: Standing  Activity Performed: Ambulated  Assistive Device: Other (Comment) (none)  Patient Position at End of Consult: All needs within reach;  Bedside chair

## 2022-07-29 NOTE — UTILIZATION REVIEW
Initial Clinical Review    Elective IP surgical procedure  Age/Sex: 51 y o  female  Surgery Date: 7/28/2022  Procedure:   BRONCHOSCOPY FLEXIBLE   RIGHT THORACOSCOPIC LOWER LOBECTOMY  MEDIASTINAL LYMPH NODE DISSECTION  Anesthesia: General  Operative Findings: Right lower lobe carcinoid tumor completely removed    POD#1 Progress Note: Pt reported that she is in a lot of pain and was not able to sleep  She rated her pan 7/10 baseline and 9/10 when using the incentive spirometer  She also endorsed SOB, but she is satting at 99% on 2L NC  She denied fever, chills, nausea, or vomiting  Chest tube and surgical site c/d/i  R CT: (-8,+AL): 225 serous-sang  UOP x3  Regular diet  Maintain CT to water seal  Continue current pain regimen, oxy prn for severe pain added  D/c IVFs  Lovenox sq, SCDs  Encourage incentive spirometry, OOB/ambulation        Admission Orders: Date/Time/Statement:   Admission Orders (From admission, onward)     Ordered        07/28/22 1027  Inpatient Admission  Once                      Orders Placed This Encounter   Procedures    Inpatient Admission     Standing Status:   Standing     Number of Occurrences:   1     Order Specific Question:   Level of Care     Answer:   Level 1 Stepdown [13]     Order Specific Question:   Estimated length of stay     Answer:   Inpatient Only Surgery     Vital Signs:   Date/Time Temp Pulse Resp BP MAP (mmHg) SpO2 Calculated FIO2 (%) - Nasal Cannula O2 Flow Rate (L/min) Nasal Cannula O2 Flow Rate (L/min) O2 Device   07/29/22 0752 98 1 °F (36 7 °C) -- -- 126/72 -- -- -- -- -- --   07/29/22 0300 98 °F (36 7 °C) 74 18 99/61 73 98 % 28 -- 2 L/min Nasal cannula   07/28/22 1900 97 8 °F (36 6 °C) 87 20 95/66 -- 97 % -- -- -- --   07/28/22 1800 -- -- -- 92/54 62 Abnormal  -- -- -- -- --   07/28/22 1641 -- -- -- 89/63 Abnormal  71 -- -- -- -- --   07/28/22 1600 -- 83 -- 83/58 Abnormal  72 98 % 28 -- 2 L/min Nasal cannula   07/28/22 1530 -- 88 -- 90/57 71 96 % 28 -- 2 L/min Nasal cannula   07/28/22 1500 98 7 °F (37 1 °C) 88 18 82/53 Abnormal  63 Abnormal  96 % -- -- -- --   07/28/22 1200 -- 80 10 Abnormal  99/65 72 98 % -- -- -- --   07/28/22 1115 96 9 °F (36 1 °C) Abnormal   82 14 83/64 Abnormal  73 99 % -- 2 L/min -- Nasal cannula   Temp: pt states this is baseline temp for her at 07/28/22 1115   07/28/22 1100 -- 82 14 99/67 74 99 % -- 2 L/min -- Nasal cannula   07/28/22 1035 97 8 °F (36 6 °C) 85 18 116/65 -- 98 % -- 6 L/min -- Simple mask   07/28/22 0653 98 °F (36 7 °C) 91 18 127/83 -- 97 % -- -- -- None (Room air)       Pertinent Labs/Diagnostic Test Results:   XR chest portable   Final Result by Jarvis Meigs, MD (07/28 1222)      Expected postsurgical changes              Results from last 7 days   Lab Units 07/29/22 0527 07/28/22  1045   WBC Thousand/uL 17 85* 9 63   HEMOGLOBIN g/dL 12 4 13 9   HEMATOCRIT % 39 2 43 5   PLATELETS Thousands/uL 205 164     Results from last 7 days   Lab Units 07/29/22 0527 07/28/22  1045   SODIUM mmol/L 136 139   POTASSIUM mmol/L 4 2 4 3   CHLORIDE mmol/L 110* 109*   CO2 mmol/L 24 28   ANION GAP mmol/L 2* 2*   BUN mg/dL 15 9   CREATININE mg/dL 0 74 0 94   EGFR ml/min/1 73sq m 96 72   CALCIUM mg/dL 8 1* 8 2*   MAGNESIUM mg/dL 2 3 2 4     Results from last 7 days   Lab Units 07/28/22  1046   POC GLUCOSE mg/dl 134     Results from last 7 days   Lab Units 07/29/22  0527 07/28/22  1045   GLUCOSE RANDOM mg/dL 135 126     Scheduled Medications:  acetaminophen, 975 mg, Oral, Q6H  celecoxib, 100 mg, Oral, BID  docusate sodium, 100 mg, Oral, BID  enoxaparin, 40 mg, Subcutaneous, Daily  gabapentin, 300 mg, Oral, TID  pantoprazole, 40 mg, Oral, Early Morning  senna, 1 tablet, Oral, Daily    PRN Meds:  HYDROmorphone, 0 5 mg, Intravenous, Q3H PRN 7/28 x3, 7/29 x2  ondansetron, 4 mg, Intravenous, Q6H PRN  oxyCODONE, 10 mg, Oral, Q4H PRN 7/29 x1  oxyCODONE, 5 mg, Oral, Q4H PRN 7/28 x3, 7/29 x1  polyethylene glycol, 17 g, Oral, Daily PRN        Network Utilization Review Department  ATTENTION: Please call with any questions or concerns to [phone] and carefully listen to the prompts so that you are directed to the right person  All voicemails are confidential   Viv Mayes all requests for admission clinical reviews, approved or denied determinations and any other requests to dedicated fax number below belonging to the campus where the patient is receiving treatment   List of dedicated fax numbers for the Facilities:  [address] DENIALS (Administrative/Medical Necessity) [phone]   [address] (Maternity/NICU/Pediatrics) [phone]   [address]  [address] [facility] [address] [address] [place] [address] [address] [place] [address] [phone]

## 2022-07-29 NOTE — DISCHARGE INSTRUCTIONS
Gently wash your incisions daily with soap and water, do not soak in a tub  Do not apply any lotions, creams, or ointments to incisions  No lifting over 10 lbs or strenuous exercise  No driving until seen at your post operative visit  The blue stitch will be removed at your post operative visit  Please obtain a pa/lat chest xray at a [facility] within 3 days of your follow up visit  Please call the office first if you have any questions or concerns during your post op period, prior to going to the emergency room or urgent care  You will be prescribed 3 medications to take at home, that should be taken exactly as directed  These have been shown to greatly improve post-operative pain:     Gabapentin 300mg tablet  Take 1 tablet by mouth 3x a day for 3 weeks  2    Tylenol 325mg tablet  Take 2 tablets by mouth, every 6 hours, for 1 week  3    Ibuprofen 200mg tablet  Take 3 tablets by mouth, 3x a day with meals for 1 week  You will also be prescribed a medication that you may take on an as needed basis:  Oxycodone 5mg tablet  Take 1-2 tablets every 4 hours as needed for pain  TUBE CARE INSTRUCTIONS    Care after your procedure:    Resume your normal diet  Small sips of flat soda will help with nausea  1  The properly functioning catheter should be forward flushed once (1x) daily with 10ml of normal saline using clean technique  You will be given a prescription for flushes  To flush the tube, clean both connections with alcohol swab  Twist off the drainage bag/ bulb  tubing and twist the saline syringe into the drainage tube and flush  Remove the syringe and twist the drainage bag / bulb tubing tubing back on     2  The drainage bag/bulb may be emptied as necessary  Keep a record of the amount of fluid you drain from your tube  This should be done with clean technique as well  3  A fresh dressing should be applied daily over the tube insertion site       4  As the tube is secured to the skin with only a suture,try not to pull on your tube  Tub baths are not permitted  Showers are permitted if the patient's skin entry site is prevented from getting wet  Similarly, washcloth "baths" are acceptable  Contact Interventional Radiology at [phone] [place] PATIENTS: Contact Interventional Radiology at [phone]) Dulce Riser PATIENTS: Contact Interventional Radiology at [phone]) if:    1  Leakage or large amounts of liquid around the catheter  2  Fever of 101 degrees lasting several hours without other obvious cause (such as sore throat, flu, etc)  3  Persistent nausea or vomiting  4  Diminished drainage, which may be associated with pressure or pain  Or when the     drainage from your tube is less than 10mls for 48 hours  5  Catheter pulled back or falls out  The following pharmacies carry the flush syringes  [facility]                     [facility]  [address]  Phone [phone]            Phone [phone] Sherly Duron 61             [address] [facility]                                [phone]  [address] [place] [address] Jeannette CHRISTIANSEN                      [address] [facility]  Phone [phone]            Phone [phone]                      [facility]                                                                                                          [phone]  [facility]  [facility] 46    [address]  Phone [phone]

## 2022-07-29 NOTE — OCCUPATIONAL THERAPY NOTE
Occupational Therapy Evaluation     Patient Name: Sumit Sauceda  KYTXU'K Date: 7/29/2022  Problem List  Principal Problem:    Benign carcinoid tumor of lung  Active Problems:    Class 3 severe obesity due to excess calories without serious comorbidity with body mass index (BMI) of 40 0 to 44 9 in adult [facility])    Past Medical History  Past Medical History:   Diagnosis Date    Acid reflux     Trejo's esophagus     GERD (gastroesophageal reflux disease)     Hiatal hernia     Kidney stone     Latent tuberculosis by skin test 1986    Was treated with INH for 6 months    Obstructive sleep apnea 05/27/2018    Mild TERESITA  AHI 18 1     Past Surgical History  Past Surgical History:   Procedure Laterality Date    ABDOMINOPLASTY      AUGMENTATION BREAST Bilateral 2015    BRONCHOSCOPY N/A 05/03/2022    Procedure: BRONCHOSCOPY NAVIGATIONAL;  Surgeon: Francisco Trotter MD;  [facility];  Service: Thoracic    EGD  2018    ENDOBRONCHIAL ULTRASOUND (EBUS) N/A 05/03/2022    Procedure: ENDOBRONCHIAL ULTRASOUND (EBUS); Surgeon: Francisco Trotter MD;  [facility];  Service: Thoracic    HYSTERECTOMY  2014    IR BIOPSY LYMPH NODE  03/21/2022    LAPAROSCOPIC CHOLECYSTECTOMY  1997    LIPOSUCTION  2015    OK BRONCHOSCOPY,DIAGNOSTIC N/A 05/03/2022    Procedure: Piffard Stall;  Surgeon: Francisco Trotter MD;  [facility];  Service: Thoracic    REPAIR RECTOCELE  2018    TONSILLECTOMY  1984         07/29/22 0811   OT Last Visit   OT Visit Date 07/29/22   Note Type   Note type Evaluation   Restrictions/Precautions   Other Precautions   (topaz)   Pain Assessment   Pain Assessment Tool 0-10   Pain Score 4   Pain Location/Orientation Location: Incision   Hospital Pain Intervention(s) Repositioned; Ambulation/increased activity; Emotional support   Home Living   Type of [address] One level;Stairs to enter with rails   Bathroom Shower/Tub Tub/shower unit   [employer] Standard   Prior Function   Level of [place] Independent with ADLs and functional mobility   Lives With Alone   Receives Help From Family   ADL Assistance Independent   IADLs Independent   Falls in the last 6 months 0   Vocational Full time employment   Lifestyle   Autonomy pta pt reports i  in ADLs/IADls/functional mobility   Reciprocal Relationships lives alone   Service to Others works from home full time   [address] reports she doesn't have many interests   Psychosocial   Psychosocial (WDL) WDL   Subjective   Subjective "I"m ready to go home"   ADL   Where Assessed Chair   Eating Assistance 7  Independent   Grooming Assistance 7  Independent   UB Bathing Assistance 6  Modified Independent   LB [address] 6  [address] [facility] 6  Modified independent   [address] [facility] Floor 6  Modified independent   [address]  6  Modified independent   Transfers   Sit to Stand 6  Modified independent   Stand to Sit 6  Modified independent   Functional Mobility   Functional Mobility 6  Modified independent   Balance   Static Sitting Normal   Dynamic Sitting Good   Static Standing Fair +   Dynamic Standing Fair +   Ambulatory Fair +   Activity Tolerance   Activity Tolerance Patient tolerated treatment well   Medical Staff Made Aware PT hung 2* medical status and comorbidities   Nurse Made Aware okay to see per RN   RUE Assessment   RUE Assessment WFL   LUE Assessment   LUE Assessment WFL   Hand Function   Gross Motor Coordination Functional   Fine Motor Coordination Functional   Cognition   Overall Cognitive Status WFL   Arousal/Participation Cooperative   Attention Within functional limits   Orientation Level Oriented X4   Memory Within functional limits   Following Commands Follows all commands and directions without difficulty   Comments pt pleasant and cooperative   Assessment   Prognosis Good   Assessment Pt is a 51 y o  YO  female admitted to [facility] on 7/28/2022 s/p "POD 1 s/p flexible bronchoscopy, right VATS lower lobectomy, and mediastinal lymph node dissection"  Pt  has a past medical history of Acid reflux, Trejo's esophagus, GERD (gastroesophageal reflux disease), Hiatal hernia, Kidney stone, Latent tuberculosis by skin test, and Obstructive sleep apnea  Pt with active OT orders and ambulate  orders    Pt resides in a apt  Pt was I w/  ADLS and IADLS, (+) drove, & required no use of DME PTA  Currently pt is mod I for ADLs and functional mobility  Pt is limited at this time 2*: pain, endurance, activity tolerance and decreased I w/ ADLS/IADLS  The following Occupational Performance Areas to address include: functional mobility, community mobility, household maintenance and job performance/volunteering  Based on the aforementioned OT evaluation, functional performance deficits, and assessments, pt has been identified as a moderate complexity evaluation  From OT standpoint, anticipate d/c home with family support  The patient's raw score on the AM-PAC Daily Activity inpatient short form is 24, standardized score is 57 54, greater than 39 4  Patients at this level are likely to benefit from discharge to home  Please refer to the recommendation of the Occupational Therapist for safe discharge planning  Recommend continued participation in ADLs and functional mobility w/ staff  No further acute OT needs, d/c OT  Please re-consult if necessary     Goals   Patient Goals go home   Recommendation   OT Discharge Recommendation No rehabilitation needs   AM-PAC Daily Activity Inpatient   Lower Body Dressing 4   Bathing 4   Toileting 4   Upper Body Dressing 4   Grooming 4   Eating 4   Daily Activity Raw Score 24   Daily Activity Standardized Score (Calc for Raw Score >=11) 57 54   AM-PAC Applied Cognition Inpatient   Following a Speech/Presentation 4   Understanding Ordinary Conversation 4   Taking Medications 4   Remembering Where Things Are Placed or Put Away 4   Remembering List of 4-5 Errands 4   Taking Care of Complicated Tasks 4   Applied Cognition Raw Score 24   Applied Cognition Standardized Score 62 21   Modified Malena Scale   Modified Malena Scale 2     Hazel Newton MS, OTR/L

## 2022-07-29 NOTE — PHYSICAL THERAPY NOTE
Physical Therapy Evaluation    Patient's Name: Manuel Park    Admitting Diagnosis  Lung nodule [R91 1]  Localized cancer of right lung ([address] ) [C34 91]    Problem List  Patient Active Problem List   Diagnosis    Latent tuberculosis by skin test    Obstructive sleep apnea    Lung nodule    Class 3 severe obesity due to excess calories without serious comorbidity with body mass index (BMI) of 40 0 to 44 9 in adult [facility])    Tobacco smoker, 1 pack of cigarettes or less per day    Kidney stone    Benign carcinoid tumor of lung       Past Medical History  Past Medical History:   Diagnosis Date    Acid reflux     Trejo's esophagus     GERD (gastroesophageal reflux disease)     Hiatal hernia     Kidney stone     Latent tuberculosis by skin test 1986    Was treated with INH for 6 months    Obstructive sleep apnea 05/27/2018    Mild TERESITA  AHI 18 1       Past Surgical History  Past Surgical History:   Procedure Laterality Date    ABDOMINOPLASTY      AUGMENTATION BREAST Bilateral 2015    BRONCHOSCOPY N/A 05/03/2022    Procedure: BRONCHOSCOPY NAVIGATIONAL;  Surgeon: Sugey Babin MD;  [facility];  Service: Thoracic    EGD  2018    ENDOBRONCHIAL ULTRASOUND (EBUS) N/A 05/03/2022    Procedure: ENDOBRONCHIAL ULTRASOUND (EBUS);   Surgeon: Sugey Babin MD;  [facility];  Service: Thoracic    HYSTERECTOMY  2014    IR BIOPSY LYMPH NODE  03/21/2022    LAPAROSCOPIC CHOLECYSTECTOMY  1997    LIPOSUCTION  2015    SC BRONCHOSCOPY,DIAGNOSTIC N/A 05/03/2022    Procedure: Silvana Runner;  Surgeon: Sugey Babin MD;  [facility];  Service: Thoracic    SC [address] N/A 7/28/2022    Procedure: Silvana Runner;  Surgeon: Elza Boyle MD;  [facility];  Service: Thoracic    SC THORACOSCOPY SURG LOBECTOMY Right 7/28/2022    Procedure: LOBECTOMY LUNG;  Surgeon: Elza Boyle MD;  [facility];  Service: Thoracic    SC THORACOSCOPY SURG LOBECTOMY Right 7/28/2022    Procedure: THORACOSCOPY VIDEO ASSISTED SURGERY (VATS); Surgeon: Abbie Jang MD;  [facility];  Service: Thoracic    REPAIR RECTOCELE  2018    TONSILLECTOMY  1984        07/29/22 0812   PT Last Visit   PT Visit Date 07/29/22   Note Type   Note type Evaluation   Pain Assessment   Pain Assessment Tool 0-10   Pain Score 4   Pain Location/Orientation Orientation: Right;Location: Incision   Hospital Pain Intervention(s) Repositioned; Ambulation/increased activity   Restrictions/Precautions   Other Precautions Pain  (CT)   Home Living   Type of [address] One level  (1-2 HEIDY)   Home Equipment   (denies)   Additional Comments Pt lives in a 1st floor apt with 1-2 HEIDY   Normally ambulates without any AD   Prior Function   Level of [place] Independent with ADLs and functional mobility   Lives With Alone   Receives Help From Other (Comment)  (reports limited social support)   ADL Assistance Independent   IADLs Independent   Falls in the last 6 months 0   Cognition   Overall Cognitive Status WFL   Orientation Level Oriented X4   Memory Within functional limits   Following Commands Follows all commands and directions without difficulty   RLE Assessment   RLE Assessment WFL   LLE Assessment   LLE Assessment WFL   Bed Mobility   Additional Comments pt seated OOB upon arrival   Transfers   Sit to Stand 6  Modified independent   Stand to Sit 6  Modified independent   Additional Comments transfers without any AD   Ambulation/Elevation   Gait pattern Step through pattern   Gait Assistance 6  Modified independent   Additional items Assist x 1  (for CT mgmt)   Assistive Device None   Distance 240ft   Stair Management Assistance 6  Modified independent   Stair Management Technique One rail L;Foreward;Reciprocal   Number of Stairs 7   Balance   Static Sitting Good   Dynamic Sitting Good   Static Standing Fair +   Dynamic Standing Fair +   Ambulatory Fair   Activity Tolerance   Activity Tolerance Patient tolerated treatment well   Medical Staff Made Aware Seen with OT 2* pt's medical complexity, recent surgery  PT focus on functional transfers, gait, and endurance   Nurse Made Aware ok to see per RN   Assessment   Prognosis Good   Assessment Pt is a 51 y o  female seen for PT evaluation s/p admit to [facility] [place] on 7/28/2022  Pt was admitted with a primary dx of: benign carcinoid tumor of lung  Pt is POD#1 s/p "flexible bronchoscopy, right VATS lower lobectomy, and mediastinal lymph node dissection "  PT now consulted for assessment of mobility and d/c needs  Pt with Ambulate, PT evaluation orders  Pts current comorbidities effecting treatment include: arciniega's esophagus, GERD, sleep apnea  Personal factors impacting pt include: living alone  Pts current clinical presentation is Evolving (medium complexity) due to Ongoing medical management for primary dx, s/p surgical intervention, CT in place  Prior to admission, pt was living alone in a 1st floor apt with 1-2 HEIDY  Pt is normally I with ADLs and ambulates without any AD  Upon evaluation, pt currently is at mod (I) level for transfers; mod (I) for ambulation 240 ft w/ no AD; and Mod (I)  for navigating 7 steps up/down with single HR  Pt presents at PT eval functioning near her baseline mobility level  No further acute PT needs identified, PT signing off  Educated pt on ambulating 3-4x/day, pt safe to ambulate independently  At conclusion of PT session pt returned back in chair with phone and call bell within reach  Pt denies any further questions at this time  The patient's AM-PAC Basic Mobility Inpatient Short Form Raw Score is 23  A Raw score of greater than 16 suggests the patient may benefit from discharge to home  Please also refer to the recommendation of the Physical Therapist for safe discharge planning  Recommend home with no PT needs upon hospital D/C     Barriers to Discharge None   Goals   Patient Goals to go home   Plan   PT Frequency Other (Comment)  (eval only, D/C PT)   Recommendation   PT Discharge Recommendation No rehabilitation needs   AM-PAC Basic Mobility Inpatient   Turning in Bed Without Bedrails 4   Lying on Back to Sitting on Edge of Flat Bed 4   Moving Bed to Chair 4   Standing Up From Chair 4   Walk in Room 4   Climb 3-5 Stairs 3   Basic Mobility Inpatient Raw Score 23   Basic Mobility Standardized Score 50 88   Highest Level Of Mobility   JH-HLM Goal 7: Walk 25 feet or more   JH-HLM Achieved 7: Walk 25 feet or more   Modified Arabi Scale   Modified Malena Scale 2   End of Consult   Patient Position at End of Consult Bedside chair; All needs within reach       Bishop Segal, PT, DPT

## 2022-07-29 NOTE — PROGRESS NOTES
Progress Note - General Surgery Thoracic  Lenn Stager 51 y o  female MRN: [id number]  Unit/Bed#: [facility] 409-01 Encounter: [id number]    Assessment:  Adilene Moy is a 54yo F with history of ERD, hiatal hernia, TERESITA, obesity (BMI 44), hysterectomy (2014), lap choly (1997) abdominoplasty, bilateral breast augmentation (2015)    POD 1 s/p flexible bronchoscopy, right VATS lower lobectomy, and mediastinal lymph node dissection  Pt tolerated the procedure well with no complications  EBL: minimal  Right chest tube was placed intra op  Pt is afebrile and all other vital signs stable  Her pain is not very well controlled, rated 7/10  Chest tube and surgical site c/d/i    R CT: (-8,+AL): 225 seroussang  Urine output: x3      Plan:  - regular diet  - maintain CT to water seal  - continue current pain regimen, oxy 10 for severe pain added  - d/c fluids  - DVT ppx: lovenox  - encourage IS use  - encourage ambulation    Subjective/Objective   Subjective: Pt reported that she is in a lot of pain and was not able to sleep  She rated her pan 7/10 baseline and 9/10 when using the incentive spirometer  She also endorsed SOB, but she is satting at 99% on 2L NC  She denied fever, chills, nausea, or vomiting  Objective:    Blood pressure 99/61, pulse 74, temperature 98 °F (36 7 °C), temperature source Oral, resp  rate 18, height 5' 11" (1 803 m), weight (!) 144 kg (317 lb), SpO2 98 %  ,Body mass index is 44 21 kg/m²  I/O last 24 hours: In: 2052 [I V :2052]  Out: 1300 [Urine:1200;  Chest Tube:100]    Invasive Devices  Report    Peripheral Intravenous Line  Duration           Peripheral IV 07/28/22 Left;Dorsal (posterior) Hand <1 day    Peripheral IV 07/28/22 Right;Dorsal (posterior) Hand <1 day          Drain  Duration           Chest Tube 1 Right Pleural 24 Fr  <1 day                Physical Exam: General appearance: alert and oriented, in no acute distress and morbidly obese  Head: Normocephalic, without obvious abnormality, atraumatic  Lungs: clear to auscultation bilaterally  Heart: regular rate and rhythm  Abdomen: soft, non-tender; bowel sounds normal; no masses,  no organomegaly  Pulses: 2+ and symmetric  Skin: Skin color, texture, turgor normal  No rashes or lesions or redness around surgical sites  Neurologic: Grossly normal    Lab, Imaging and other studies:  Lab Results   Component Value Date    WBC 9 63 07/28/2022    HGB 13 9 07/28/2022    HCT 43 5 07/28/2022    MCV 93 07/28/2022     07/28/2022      Lab Results   Component Value Date    CALCIUM 8 2 (L) 07/28/2022    K 4 3 07/28/2022    CO2 28 07/28/2022     (H) 07/28/2022    BUN 9 07/28/2022    CREATININE 0 94 07/28/2022       VTE Pharmacologic Prophylaxis: Enoxaparin (Lovenox)  VTE Mechanical Prophylaxis: sequential compression device    Gini Fofana (MS-4)  [address]

## 2022-07-30 PROCEDURE — 99024 POSTOP FOLLOW-UP VISIT: CPT | Performed by: THORACIC SURGERY (CARDIOTHORACIC VASCULAR SURGERY)

## 2022-07-30 RX ORDER — ACETAMINOPHEN 325 MG/1
650 TABLET ORAL EVERY 6 HOURS
Qty: 56 TABLET | Refills: 0 | Status: SHIPPED | OUTPATIENT
Start: 2022-07-30 | End: 2022-08-06

## 2022-07-30 RX ORDER — DOCUSATE SODIUM 100 MG/1
100 CAPSULE, LIQUID FILLED ORAL 2 TIMES DAILY PRN
Qty: 20 CAPSULE | Refills: 0 | Status: SHIPPED | OUTPATIENT
Start: 2022-07-30 | End: 2022-10-18

## 2022-07-30 RX ORDER — OXYCODONE HYDROCHLORIDE 5 MG/1
5 TABLET ORAL EVERY 4 HOURS PRN
Qty: 30 TABLET | Refills: 0 | Status: SHIPPED | OUTPATIENT
Start: 2022-07-30 | End: 2022-08-09

## 2022-07-30 RX ORDER — IBUPROFEN 600 MG/1
600 TABLET ORAL EVERY 8 HOURS SCHEDULED
Qty: 21 TABLET | Refills: 0 | Status: SHIPPED | OUTPATIENT
Start: 2022-07-30 | End: 2022-08-06

## 2022-07-30 RX ADMIN — KETOROLAC TROMETHAMINE 15 MG: 30 INJECTION, SOLUTION INTRAMUSCULAR; INTRAVENOUS at 00:32

## 2022-07-30 RX ADMIN — GABAPENTIN 300 MG: 300 CAPSULE ORAL at 15:37

## 2022-07-30 RX ADMIN — METHOCARBAMOL TABLETS 500 MG: 500 TABLET, COATED ORAL at 17:41

## 2022-07-30 RX ADMIN — DOCUSATE SODIUM 100 MG: 100 CAPSULE, LIQUID FILLED ORAL at 09:24

## 2022-07-30 RX ADMIN — ACETAMINOPHEN 975 MG: 325 TABLET ORAL at 15:37

## 2022-07-30 RX ADMIN — METHOCARBAMOL TABLETS 500 MG: 500 TABLET, COATED ORAL at 12:07

## 2022-07-30 RX ADMIN — OXYCODONE HYDROCHLORIDE 10 MG: 10 TABLET ORAL at 05:49

## 2022-07-30 RX ADMIN — METHOCARBAMOL TABLETS 500 MG: 500 TABLET, COATED ORAL at 05:49

## 2022-07-30 RX ADMIN — ACETAMINOPHEN 975 MG: 325 TABLET ORAL at 09:24

## 2022-07-30 RX ADMIN — KETOROLAC TROMETHAMINE 15 MG: 30 INJECTION, SOLUTION INTRAMUSCULAR; INTRAVENOUS at 12:07

## 2022-07-30 RX ADMIN — SENNOSIDES 8.6 MG: 8.6 TABLET, FILM COATED ORAL at 09:24

## 2022-07-30 RX ADMIN — PANTOPRAZOLE SODIUM 40 MG: 40 TABLET, DELAYED RELEASE ORAL at 05:49

## 2022-07-30 RX ADMIN — HYDROMORPHONE HYDROCHLORIDE 0.5 MG: 1 INJECTION, SOLUTION INTRAMUSCULAR; INTRAVENOUS; SUBCUTANEOUS at 17:41

## 2022-07-30 RX ADMIN — OXYCODONE HYDROCHLORIDE 10 MG: 10 TABLET ORAL at 20:49

## 2022-07-30 RX ADMIN — KETOROLAC TROMETHAMINE 15 MG: 30 INJECTION, SOLUTION INTRAMUSCULAR; INTRAVENOUS at 17:41

## 2022-07-30 RX ADMIN — HYDROMORPHONE HYDROCHLORIDE 0.5 MG: 1 INJECTION, SOLUTION INTRAMUSCULAR; INTRAVENOUS; SUBCUTANEOUS at 01:57

## 2022-07-30 RX ADMIN — GABAPENTIN 300 MG: 300 CAPSULE ORAL at 20:49

## 2022-07-30 RX ADMIN — HYDROMORPHONE HYDROCHLORIDE 0.5 MG: 1 INJECTION, SOLUTION INTRAMUSCULAR; INTRAVENOUS; SUBCUTANEOUS at 22:31

## 2022-07-30 RX ADMIN — KETOROLAC TROMETHAMINE 15 MG: 30 INJECTION, SOLUTION INTRAMUSCULAR; INTRAVENOUS at 23:43

## 2022-07-30 RX ADMIN — OXYCODONE HYDROCHLORIDE 10 MG: 10 TABLET ORAL at 11:06

## 2022-07-30 RX ADMIN — METHOCARBAMOL TABLETS 500 MG: 500 TABLET, COATED ORAL at 00:33

## 2022-07-30 RX ADMIN — ACETAMINOPHEN 975 MG: 325 TABLET ORAL at 02:02

## 2022-07-30 RX ADMIN — DIPHENHYDRAMINE HCL 25 MG: 25 TABLET ORAL at 23:43

## 2022-07-30 RX ADMIN — OXYCODONE HYDROCHLORIDE 10 MG: 10 TABLET ORAL at 15:36

## 2022-07-30 RX ADMIN — ACETAMINOPHEN 975 MG: 325 TABLET ORAL at 20:49

## 2022-07-30 RX ADMIN — HYDROMORPHONE HYDROCHLORIDE 0.5 MG: 1 INJECTION, SOLUTION INTRAMUSCULAR; INTRAVENOUS; SUBCUTANEOUS at 13:24

## 2022-07-30 RX ADMIN — DOCUSATE SODIUM 100 MG: 100 CAPSULE, LIQUID FILLED ORAL at 17:41

## 2022-07-30 RX ADMIN — METHOCARBAMOL TABLETS 500 MG: 500 TABLET, COATED ORAL at 23:43

## 2022-07-30 RX ADMIN — KETOROLAC TROMETHAMINE 15 MG: 30 INJECTION, SOLUTION INTRAMUSCULAR; INTRAVENOUS at 05:49

## 2022-07-30 RX ADMIN — ENOXAPARIN SODIUM 40 MG: 40 INJECTION SUBCUTANEOUS at 09:24

## 2022-07-30 RX ADMIN — OXYCODONE HYDROCHLORIDE 10 MG: 10 TABLET ORAL at 00:33

## 2022-07-30 RX ADMIN — HYDROMORPHONE HYDROCHLORIDE 0.5 MG: 1 INJECTION, SOLUTION INTRAMUSCULAR; INTRAVENOUS; SUBCUTANEOUS at 07:48

## 2022-07-30 RX ADMIN — GABAPENTIN 300 MG: 300 CAPSULE ORAL at 09:24

## 2022-07-30 NOTE — PROGRESS NOTES
Progress Note - Thoracic Surgery  : Thoracic Surgery Resident on [facility]     Jett Sapp 51 y o  female MRN: [id number]  Unit/Bed#: [facility] 409-01 Encounter: [id number]    Assessment:  51 y o  female s/p VATS RLL, flexible bronchoscopy, and mediastinal lymph node dissection on 7/28  Afebrile, without tachycardia, hypotension or desaturations on room air    Right chest tube - 525cc serosanguineous, 10-30ml air leak on water seal    IS - regular use, max 1750ml    Pain not well controlled at this time, rates 8/10  CT with out air leak and increased output of 525cc SS over the past 24hr  Plan:  -Maintain chest tube to water seal today, monitor air leak and output   -Aggressive pulmonary toilet  -Pain control  -Encourage continued IS use, 10x per hour   -Encourage ambulation  -No rehab needs per PT/ OT notes  -Discharge planning     Subjective/Objective     Subjective: Patient was examined at bedside  She reports pain this morning, localized to her right chest and worsened with deep inspiration  She rates this pain 8/10  She Slept well last night, is ambulating, and using her IS with volumes up to 1750  She denies nausea/ vomiting/ fever/ chills, and SOB at this time  Objective:     Physical Exam:  GEN: NAD  HEENT: MMM  CV: RRR  Lung: Right sided wheeze on ascultation  Chest: Right sided incisions clean, dry, and intact  Chest tube in place, functioning well  Ab: Soft, ND/NT   Extrem: No CCE  Neuro: A+Ox3    Vitals: Temp:  [97 5 °F (36 4 °C)-98 3 °F (36 8 °C)] 97 6 °F (36 4 °C)  HR:  [70-78] 70  Resp:  [18] 18  BP: (103-143)/(56-83) 114/69  Body mass index is 44 62 kg/m²  I/O       07/28 0701 07/29 0700 07/29 0701 07/30 0700    P  O   1080    I V  (mL/kg) 2400 (16 6)     Total Intake(mL/kg) 2400 (16 6) 1080 (7 4)    Urine (mL/kg/hr) 1200 (0 3) 1850 (0 5)    Chest Tube 200 525    Total Output 1400 2375    Net +1000 -1295                  Lab, Imaging and other studies: I have personally reviewed pertinent reports    , CBC with diff: No results found for: WBC, HGB, HCT, MCV, PLT, ADJUSTEDWBC, MCH, MCHC, RDW, MPV, NRBC, BMP/CMP: No results found for: SODIUM, K, CL, CO2, ANIONGAP, BUN, CREATININE, GLUCOSE, CALCIUM, AST, ALT, ALKPHOS, PROT, BILITOT, EGFR  VTE Pharmacologic Prophylaxis: Enoxaparin (Lovenox)  VTE Mechanical Prophylaxis: sequential compression device    UpdateTime 0625    Andrew Brody  7/30/2022 6:08 AM

## 2022-07-31 ENCOUNTER — APPOINTMENT (INPATIENT)
Dept: RADIOLOGY | Facility: HOSPITAL | Age: 47
DRG: 164 | End: 2022-07-31
Payer: COMMERCIAL

## 2022-07-31 PROCEDURE — 71046 X-RAY EXAM CHEST 2 VIEWS: CPT

## 2022-07-31 PROCEDURE — 99024 POSTOP FOLLOW-UP VISIT: CPT | Performed by: THORACIC SURGERY (CARDIOTHORACIC VASCULAR SURGERY)

## 2022-07-31 PROCEDURE — 0WP9X0Z REMOVAL OF DRAINAGE DEVICE FROM RIGHT PLEURAL CAVITY, EXTERNAL APPROACH: ICD-10-PCS | Performed by: THORACIC SURGERY (CARDIOTHORACIC VASCULAR SURGERY)

## 2022-07-31 RX ORDER — METHOCARBAMOL 750 MG/1
750 TABLET, FILM COATED ORAL EVERY 6 HOURS SCHEDULED
Status: DISCONTINUED | OUTPATIENT
Start: 2022-07-31 | End: 2022-08-04 | Stop reason: HOSPADM

## 2022-07-31 RX ORDER — HYDROMORPHONE HCL/PF 1 MG/ML
0.5 SYRINGE (ML) INJECTION EVERY 4 HOURS PRN
Status: DISCONTINUED | OUTPATIENT
Start: 2022-07-31 | End: 2022-08-04 | Stop reason: HOSPADM

## 2022-07-31 RX ADMIN — HYDROMORPHONE HYDROCHLORIDE 0.5 MG: 1 INJECTION, SOLUTION INTRAMUSCULAR; INTRAVENOUS; SUBCUTANEOUS at 08:44

## 2022-07-31 RX ADMIN — ACETAMINOPHEN 975 MG: 325 TABLET ORAL at 02:29

## 2022-07-31 RX ADMIN — DOCUSATE SODIUM 100 MG: 100 CAPSULE, LIQUID FILLED ORAL at 08:15

## 2022-07-31 RX ADMIN — DOCUSATE SODIUM 100 MG: 100 CAPSULE, LIQUID FILLED ORAL at 17:35

## 2022-07-31 RX ADMIN — OXYCODONE HYDROCHLORIDE 10 MG: 10 TABLET ORAL at 08:15

## 2022-07-31 RX ADMIN — OXYCODONE HYDROCHLORIDE 10 MG: 10 TABLET ORAL at 17:39

## 2022-07-31 RX ADMIN — GABAPENTIN 300 MG: 300 CAPSULE ORAL at 21:41

## 2022-07-31 RX ADMIN — ACETAMINOPHEN 975 MG: 325 TABLET ORAL at 08:15

## 2022-07-31 RX ADMIN — HYDROMORPHONE HYDROCHLORIDE 0.5 MG: 1 INJECTION, SOLUTION INTRAMUSCULAR; INTRAVENOUS; SUBCUTANEOUS at 16:18

## 2022-07-31 RX ADMIN — HYDROMORPHONE HYDROCHLORIDE 0.5 MG: 1 INJECTION, SOLUTION INTRAMUSCULAR; INTRAVENOUS; SUBCUTANEOUS at 15:11

## 2022-07-31 RX ADMIN — OXYCODONE HYDROCHLORIDE 10 MG: 10 TABLET ORAL at 02:29

## 2022-07-31 RX ADMIN — OXYCODONE HYDROCHLORIDE 10 MG: 10 TABLET ORAL at 21:41

## 2022-07-31 RX ADMIN — PANTOPRAZOLE SODIUM 40 MG: 40 TABLET, DELAYED RELEASE ORAL at 06:16

## 2022-07-31 RX ADMIN — GABAPENTIN 300 MG: 300 CAPSULE ORAL at 08:15

## 2022-07-31 RX ADMIN — GABAPENTIN 300 MG: 300 CAPSULE ORAL at 15:11

## 2022-07-31 RX ADMIN — HYDROMORPHONE HYDROCHLORIDE 0.5 MG: 1 INJECTION, SOLUTION INTRAMUSCULAR; INTRAVENOUS; SUBCUTANEOUS at 05:02

## 2022-07-31 RX ADMIN — ACETAMINOPHEN 975 MG: 325 TABLET ORAL at 21:41

## 2022-07-31 RX ADMIN — ACETAMINOPHEN 975 MG: 325 TABLET ORAL at 15:11

## 2022-07-31 RX ADMIN — SENNOSIDES 8.6 MG: 8.6 TABLET, FILM COATED ORAL at 08:15

## 2022-07-31 RX ADMIN — OXYCODONE HYDROCHLORIDE 10 MG: 10 TABLET ORAL at 13:04

## 2022-07-31 RX ADMIN — METHOCARBAMOL TABLETS 500 MG: 500 TABLET, COATED ORAL at 06:16

## 2022-07-31 RX ADMIN — KETOROLAC TROMETHAMINE 15 MG: 30 INJECTION, SOLUTION INTRAMUSCULAR; INTRAVENOUS at 06:11

## 2022-07-31 RX ADMIN — METHOCARBAMOL TABLETS 750 MG: 750 TABLET, COATED ORAL at 17:35

## 2022-07-31 RX ADMIN — ENOXAPARIN SODIUM 40 MG: 40 INJECTION SUBCUTANEOUS at 08:15

## 2022-07-31 RX ADMIN — METHOCARBAMOL TABLETS 500 MG: 500 TABLET, COATED ORAL at 12:04

## 2022-07-31 NOTE — QUICK NOTE
07/31/22    Procedure: Chest tube removal    Right chest tube removed in routine fashion without incident  The patient tolerated the procedure well  A dry, sterile dressing was placed  Will check a pa/lat chest x-ray       Mau Newman PA-C

## 2022-07-31 NOTE — PROGRESS NOTES
Progress Note - Thoracic Surgery  [facility]     Nikolay Orozco 51 y o  female MRN: [id number]  Unit/Bed#: [facility] 409-01 Encounter: [id number]    Assessment:  51 y o  female s/p VATS RLL, flexible bronchoscopy, and mediastinal lymph node dissection on 7/28  AVSS    R  cc SS output  No air leak      Plan:  -Diet as tolerated  -Likely discontinue chest tube this AM, follow up with attending   -Aggressive pulmonary toilet, encourage deep breathing coughing, is 10x per hours  -pain control prn  -DVT ppx  -OOB and ambulate  -Dispo planning  Subjective/Objective     Subjective: No acute events overnight, complaining of pain at chest tube  IS 1750  No nausea or vomiting  Objective:     Physical Exam:  General: NAD  HENT: NCAT MMM  Neck: supple, no JVD  CV: nl rate  Lungs: nl wob  No resp distress  Incision c/d/i  Chest tube in place to WS, SS output, +AL with cough  ABD: Soft, nontender, nondistended  Extrem: No CCE  Neuro: AAOx3      Vitals: Temp:  [97 8 °F (36 6 °C)-98 3 °F (36 8 °C)] 98 1 °F (36 7 °C)  HR:  [78-82] 79  Resp:  [18-19] 18  BP: (104-121)/(59-67) 104/59  Body mass index is 44 92 kg/m²  I/O       07/28 0701  07/29 0700 07/29 0701  07/30 0700    P  O   1080    I V  (mL/kg) 2400 (16 6)     Total Intake(mL/kg) 2400 (16 6) 1080 (7 4)    Urine (mL/kg/hr) 1200 (0 3) 1850 (0 5)    Chest Tube 200 525    Total Output 1400 2375    Net +1000 -1295                  Lab, Imaging and other studies: I have personally reviewed pertinent reports    , CBC with diff: No results found for: WBC, HGB, HCT, MCV, PLT, ADJUSTEDWBC, MCH, MCHC, RDW, MPV, NRBC, BMP/CMP: No results found for: SODIUM, K, CL, CO2, ANIONGAP, BUN, CREATININE, GLUCOSE, CALCIUM, AST, ALT, ALKPHOS, PROT, BILITOT, EGFR  VTE Pharmacologic Prophylaxis: Enoxaparin (Lovenox)  VTE Mechanical Prophylaxis: sequential compression device    UpdateTime 0625    Ganga Coronado DO  7/31/2022 6:54 AM

## 2022-08-01 ENCOUNTER — APPOINTMENT (INPATIENT)
Dept: RADIOLOGY | Facility: HOSPITAL | Age: 47
DRG: 164 | End: 2022-08-01
Payer: COMMERCIAL

## 2022-08-01 PROCEDURE — 99024 POSTOP FOLLOW-UP VISIT: CPT | Performed by: THORACIC SURGERY (CARDIOTHORACIC VASCULAR SURGERY)

## 2022-08-01 PROCEDURE — 99024 POSTOP FOLLOW-UP VISIT: CPT | Performed by: RADIOLOGY

## 2022-08-01 PROCEDURE — C1769 GUIDE WIRE: HCPCS

## 2022-08-01 PROCEDURE — 32557 INSERT CATH PLEURA W/ IMAGE: CPT | Performed by: RADIOLOGY

## 2022-08-01 PROCEDURE — C1729 CATH, DRAINAGE: HCPCS

## 2022-08-01 PROCEDURE — 71046 X-RAY EXAM CHEST 2 VIEWS: CPT

## 2022-08-01 PROCEDURE — 0W9930Z DRAINAGE OF RIGHT PLEURAL CAVITY WITH DRAINAGE DEVICE, PERCUTANEOUS APPROACH: ICD-10-PCS | Performed by: RADIOLOGY

## 2022-08-01 PROCEDURE — 99152 MOD SED SAME PHYS/QHP 5/>YRS: CPT

## 2022-08-01 PROCEDURE — 71045 X-RAY EXAM CHEST 1 VIEW: CPT

## 2022-08-01 PROCEDURE — 32557 INSERT CATH PLEURA W/ IMAGE: CPT

## 2022-08-01 RX ORDER — DIAPER,BRIEF,INFANT-TODD,DISP
EACH MISCELLANEOUS 4 TIMES DAILY PRN
Status: DISCONTINUED | OUTPATIENT
Start: 2022-08-01 | End: 2022-08-04 | Stop reason: HOSPADM

## 2022-08-01 RX ORDER — FENTANYL CITRATE 50 UG/ML
INJECTION, SOLUTION INTRAMUSCULAR; INTRAVENOUS CODE/TRAUMA/SEDATION MEDICATION
Status: COMPLETED | OUTPATIENT
Start: 2022-08-01 | End: 2022-08-01

## 2022-08-01 RX ADMIN — FENTANYL CITRATE 50 MCG: 50 INJECTION INTRAMUSCULAR; INTRAVENOUS at 12:46

## 2022-08-01 RX ADMIN — METHOCARBAMOL TABLETS 750 MG: 750 TABLET, COATED ORAL at 17:32

## 2022-08-01 RX ADMIN — HYDROMORPHONE HYDROCHLORIDE 0.5 MG: 1 INJECTION, SOLUTION INTRAMUSCULAR; INTRAVENOUS; SUBCUTANEOUS at 15:02

## 2022-08-01 RX ADMIN — HYDROMORPHONE HYDROCHLORIDE 0.5 MG: 1 INJECTION, SOLUTION INTRAMUSCULAR; INTRAVENOUS; SUBCUTANEOUS at 23:09

## 2022-08-01 RX ADMIN — DIPHENHYDRAMINE HCL 25 MG: 25 TABLET ORAL at 04:25

## 2022-08-01 RX ADMIN — OXYCODONE HYDROCHLORIDE 10 MG: 10 TABLET ORAL at 17:33

## 2022-08-01 RX ADMIN — GABAPENTIN 300 MG: 300 CAPSULE ORAL at 09:39

## 2022-08-01 RX ADMIN — PANTOPRAZOLE SODIUM 40 MG: 40 TABLET, DELAYED RELEASE ORAL at 05:22

## 2022-08-01 RX ADMIN — METHOCARBAMOL TABLETS 750 MG: 750 TABLET, COATED ORAL at 23:09

## 2022-08-01 RX ADMIN — GABAPENTIN 300 MG: 300 CAPSULE ORAL at 15:02

## 2022-08-01 RX ADMIN — OXYCODONE HYDROCHLORIDE 10 MG: 10 TABLET ORAL at 20:28

## 2022-08-01 RX ADMIN — ACETAMINOPHEN 975 MG: 325 TABLET ORAL at 09:39

## 2022-08-01 RX ADMIN — ENOXAPARIN SODIUM 40 MG: 40 INJECTION SUBCUTANEOUS at 09:39

## 2022-08-01 RX ADMIN — OXYCODONE HYDROCHLORIDE 10 MG: 10 TABLET ORAL at 09:42

## 2022-08-01 RX ADMIN — DIPHENHYDRAMINE HCL 25 MG: 25 TABLET ORAL at 10:32

## 2022-08-01 RX ADMIN — METHOCARBAMOL TABLETS 750 MG: 750 TABLET, COATED ORAL at 13:26

## 2022-08-01 RX ADMIN — HYDROMORPHONE HYDROCHLORIDE 0.5 MG: 1 INJECTION, SOLUTION INTRAMUSCULAR; INTRAVENOUS; SUBCUTANEOUS at 19:05

## 2022-08-01 RX ADMIN — FENTANYL CITRATE 50 MCG: 50 INJECTION INTRAMUSCULAR; INTRAVENOUS at 12:33

## 2022-08-01 RX ADMIN — DIPHENHYDRAMINE HCL 25 MG: 25 TABLET ORAL at 17:32

## 2022-08-01 RX ADMIN — HYDROMORPHONE HYDROCHLORIDE 0.5 MG: 1 INJECTION, SOLUTION INTRAMUSCULAR; INTRAVENOUS; SUBCUTANEOUS at 01:06

## 2022-08-01 RX ADMIN — OXYCODONE HYDROCHLORIDE 10 MG: 10 TABLET ORAL at 04:25

## 2022-08-01 RX ADMIN — GABAPENTIN 300 MG: 300 CAPSULE ORAL at 20:19

## 2022-08-01 RX ADMIN — OXYCODONE HYDROCHLORIDE 10 MG: 10 TABLET ORAL at 13:46

## 2022-08-01 RX ADMIN — ACETAMINOPHEN 975 MG: 325 TABLET ORAL at 15:02

## 2022-08-01 RX ADMIN — ACETAMINOPHEN 975 MG: 325 TABLET ORAL at 20:19

## 2022-08-01 NOTE — SEDATION DOCUMENTATION
Right upper chest tube placement completed by Dr Ravi Hall without complications  Pt is for a one hour bedrest starting at 1300

## 2022-08-01 NOTE — BRIEF OP NOTE (RAD/CATH)
INTERVENTIONAL RADIOLOGY PROCEDURE NOTE    Date: 8/1/2022    Procedure:   IR CHEST TUBE PLACEMENT    Preoperative diagnosis:   1  Benign carcinoid tumor of lung    2  Lung nodule    3  Localized cancer of right lung ([address] )    4  Postprocedural pneumothorax         Postoperative diagnosis: Same  Surgeon: Dalton Alexander MD     Assistant: None  No qualified resident was available  Blood loss: 0    Specimens: 0     Findings: large right pneumothorax    10 Macedonian anterior tube placement    Complications: None immediate      Anesthesia: local and IV Fentanyl

## 2022-08-01 NOTE — PROGRESS NOTES
Progress Note - Thoracic Surgery  Kiran Lyons 51 y o  female MRN: [id number]  Unit/Bed#: [facility] 409-01 Encounter: [id number]    Assessment:  48-year-old female with right lower lobe carcinoid tumor, now status post right VATS lower lobectomy on 7/28  R CT removed yesterday, CXR showed r ptx, repeat CXR showed slightly larger small ptx  Afebrile,VSS on RA     IS: 1750    CT site and surgical incisions clean, dry and intact    Plan:  Diet as tolerated  PA/Lateral CXR  Likely dc later today  DVT px  Aggressive pulmonary toilet  Encourage out of bed and ambulation  Encourage IS use 10x per hour while awake  CM for dispo: pt needs lyft home    Subjective/Objective     Subjective:   No acute events overnight  Having erythematous urticarial rash on forearms and a few spots on back as well  Objective:    Blood pressure 155/72, pulse 94, temperature 98 3 °F (36 8 °C), temperature source Oral, resp  rate 20, height 5' 11" (1 803 m), weight (!) 146 kg (322 lb 1 5 oz), SpO2 100 %  ,Body mass index is 44 92 kg/m²  Intake/Output Summary (Last 24 hours) at 8/1/2022 0135  Last data filed at 7/31/2022 0701  Gross per 24 hour   Intake --   Output 100 ml   Net -100 ml       Invasive Devices  Report    Peripheral Intravenous Line  Duration           Peripheral IV 07/31/22 Dorsal (posterior); Right Forearm <1 day                Physical Exam  Vitals reviewed  Constitutional:       General: She is not in acute distress  Appearance: She is not ill-appearing, toxic-appearing or diaphoretic  HENT:      Head: Normocephalic and atraumatic  Eyes:      Extraocular Movements: Extraocular movements intact  Cardiovascular:      Rate and Rhythm: Normal rate  Pulmonary:      Effort: Pulmonary effort is normal  No respiratory distress  Chest:      Comments: Incisions clean, dry and intact  CT site dressing in place c/d/i  Skin:     General: Skin is warm and dry  Findings: Erythema and rash present     Neurological: Mental Status: She is alert and oriented to person, place, and time     Psychiatric:         Mood and Affect: Mood normal          Behavior: Behavior normal              Results from last 7 days   Lab Units 07/29/22  0527 07/28/22  1045   WBC Thousand/uL 17 85* 9 63   HEMOGLOBIN g/dL 12 4 13 9   HEMATOCRIT % 39 2 43 5   PLATELETS Thousands/uL 205 164     Results from last 7 days   Lab Units 07/29/22 0527 07/28/22  1045   POTASSIUM mmol/L 4 2 4 3   CHLORIDE mmol/L 110* 109*   CO2 mmol/L 24 28   BUN mg/dL 15 9   CREATININE mg/dL 0 74 0 94   CALCIUM mg/dL 8 1* 8 2*

## 2022-08-01 NOTE — UTILIZATION REVIEW
Continued Stay Review    Date: 7/30/2022                        Current Patient Class: inpatient  Current Level of Care:   HPI:46 y o  female initially admitted on 7/28  Assessment/Plan: POD #2 s/p VATS RLL lobectomy  Pain not well controlled at chest tube site  CT air leak improved, 525 out, serosang  Using incentive spirometry regularly as well as ambulating  Output too high to remove chest tube  Maintain chest tube to water seal today  Will re-evaluate tube at noon and if output low, will consider d/c tube today  Continue pain control  Encourage IS, OOB/ambulate  SCD's  Reg diet  No rehab needs   Plan to d/c home when medically stable         Vital Signs:   Date/Time Temp Pulse Resp BP MAP (mmHg) SpO2 O2 Device   07/30/22 2300 98 3 °F (36 8 °C) 81 18 111/63 82 97 % None (Room air)   07/30/22 1922 98 1 °F (36 7 °C) 78 18 105/65 81 99 % None (Room air)   07/30/22 1508 98 1 °F (36 7 °C) 82 18 121/67 88 99 % None (Room air)   07/30/22 1106 97 8 °F (36 6 °C) 81 19 113/65 83 99 % None (Room air)   07/30/22 0800 -- -- -- -- -- 96 % None (Room air)   07/30/22 0306 97 6 °F (36 4 °C) 70 18 114/69 84 97 % None (Room air)       Pertinent Labs/Diagnostic Results:     Results from last 7 days   Lab Units 07/29/22  0527 07/28/22  1045   WBC Thousand/uL 17 85* 9 63   HEMOGLOBIN g/dL 12 4 13 9   HEMATOCRIT % 39 2 43 5   PLATELETS Thousands/uL 205 164     Results from last 7 days   Lab Units 07/29/22  0527 07/28/22  1045   SODIUM mmol/L 136 139   POTASSIUM mmol/L 4 2 4 3   CHLORIDE mmol/L 110* 109*   CO2 mmol/L 24 28   ANION GAP mmol/L 2* 2*   BUN mg/dL 15 9   CREATININE mg/dL 0 74 0 94   EGFR ml/min/1 73sq m 96 72   CALCIUM mg/dL 8 1* 8 2*   MAGNESIUM mg/dL 2 3 2 4     Results from last 7 days   Lab Units 07/29/22  0527 07/28/22  1045   GLUCOSE RANDOM mg/dL 135 126       Medications:   Scheduled Medications:  acetaminophen, 975 mg, Oral, Q6H  docusate sodium, 100 mg, Oral, BID  enoxaparin, 40 mg, Subcutaneous, Daily  gabapentin, 300 mg, Oral, TID  lidocaine, 1 patch, Topical, Daily  methocarbamol, 750 mg, Oral, Q6H ANIKET  pantoprazole, 40 mg, Oral, Early Morning  senna, 1 tablet, Oral, Daily    PRN Meds:  diphenhydrAMINE, 25 mg, Oral, Q6H PRN 7/29 x1, 7/30 x1  hydrocortisone, , Topical, 4x Daily PRN  hydrocortisone, , Topical, 4x Daily PRN  HYDROmorphone, 0 5 mg, Intravenous, Q4H PRN 7/29 x4, 7/30 x5  ondansetron, 4 mg, Intravenous, Q6H PRN  oxyCODONE, 10 mg, Oral, Q4H PRN 7/29 x4, 7/3 x5  oxyCODONE, 5 mg, Oral, Q4H PRN 7/29 x1  polyethylene glycol, 17 g, Oral, Daily PRN        Discharge Plan:D    Network Utilization Review Department  ATTENTION: Please call with any questions or concerns to [phone] and carefully listen to the prompts so that you are directed to the right person  All voicemails are confidential   Marley Grand all requests for admission clinical reviews, approved or denied determinations and any other requests to dedicated fax number below belonging to the campus where the patient is receiving treatment   List of dedicated fax numbers for the Facilities:  [address] DENIALS (Administrative/Medical Necessity) [phone]   [address] (Maternity/NICU/Pediatrics) [phone]   [address]  [address] [facility] [place] [address] [address] [place] [address] [address] [facility] [phone] no distress

## 2022-08-01 NOTE — UTILIZATION REVIEW
Continued Stay Review    Date: 7/31 - 8/1/2022                        Current Patient Class: inpatient  Current Level of Care: med/surg    HPI:46 y o  female initially admitted on 7/28 s/p VATS RLL, flexible bronchoscopy, and mediastinal lymph node dissection on 7/28  Assessment/Plan:   7/31 -- POD #3 -- Pain controlled  CT - 250 out, air leak 20-40  D/c chest tube  Check PA/Lat CXR  Aggressive pulmonary toilet, encourage deep breathing coughing, is 10x per hours  Pain control  SCDs  OOB/ambulate  8/1 -- POD #4 -- pain improved today  Urticarial rash present today that wasn't there yesterday  Post-pull CXR from yesterday with small apical ptx  Repeat CXR this AM   Benadryl and hydrocortisone for rash  IR consulted later d/t worsening ptx  Chest tube placement indicated  IR note -- 10 Polish anterior tube placement   Complications: None immediate  Continue analgesics prn  SCD's  Reg diet  SCD's   OOB/ambulate        Vital Signs:   Date/Time Temp Pulse Resp BP MAP (mmHg) SpO2 O2 Device Patient Position - Orthostatic VS   08/01/22 1249 -- 85 -- 131/61 -- 100 % -- --   08/01/22 1244 -- 85 -- 130/59 -- 100 % -- --   08/01/22 1239 -- 86 -- 130/67 -- 98 % -- --   08/01/22 1234 -- 87 -- 131/71 -- 99 % -- --   08/01/22 12:32:14 -- 86 16 130/74 -- 99 % -- --   08/01/22 1130 98 2 °F (36 8 °C) 85 18 130/69 -- 98 % -- --   08/01/22 0652 98 °F (36 7 °C) 98 18 139/78 103 99 % None (Room air) Sitting   07/31/22 2141 98 3 °F (36 8 °C) 94 20 155/72 -- 100 % None (Room air) Sitting   07/31/22 1847 98 1 °F (36 7 °C) 94 18 140/61 88 99 % None (Room air) Lying   07/31/22 1630 98 °F (36 7 °C) 88 17 123/79 94 100 % None (Room air) Sitting   07/31/22 1208 97 7 °F (36 5 °C) 84 19 114/63 84 100 % None (Room air) Sitting   07/31/22 0804 98 3 °F (36 8 °C) 79 19 123/64 87 100 % None (Room air) Sitting   07/31/22 0229 98 1 °F (36 7 °C) 79 -- 104/59 75 96 % -- --         Pertinent Labs/Diagnostic Results:      CXR 7/31 @ 0956: Persistent small right pneumothorax  CXR 7/31 @ 1528: Slightly increased right pneumothorax extending to the 5th rib (previously 4th rib)  CXR 8/1: Enlarging moderate to large right pneumothorax     Medications:   Scheduled Medications:  acetaminophen, 975 mg, Oral, Q6H  docusate sodium, 100 mg, Oral, BID  enoxaparin, 40 mg, Subcutaneous, Daily  gabapentin, 300 mg, Oral, TID  lidocaine, 1 patch, Topical, Daily  methocarbamol, 750 mg, Oral, Q6H ANIKET  pantoprazole, 40 mg, Oral, Early Morning  senna, 1 tablet, Oral, Daily    PRN Meds:  diphenhydrAMINE, 25 mg, Oral, Q6H PRN  hydrocortisone, , Topical, 4x Daily PRN  hydrocortisone, , Topical, 4x Daily PRN  HYDROmorphone, 0 5 mg, Intravenous, Q4H PRN 7/30 x5, 7/31 x4, 8/1 x1  ondansetron, 4 mg, Intravenous, Q6H PRN  oxyCODONE, 10 mg, Oral, Q4H PRN 7/30 x5, 7/31 x5, 8/1 x3  oxyCODONE, 5 mg, Oral, Q4H PRN  polyethylene glycol, 17 g, Oral, Daily PRN        Discharge Plan: D    Network Utilization Review Department  ATTENTION: Please call with any questions or concerns to [phone] and carefully listen to the prompts so that you are directed to the right person  All voicemails are confidential   Sudie Crigler all requests for admission clinical reviews, approved or denied determinations and any other requests to dedicated fax number below belonging to the campus where the patient is receiving treatment   List of dedicated fax numbers for the Facilities:  [address] DENIALS (Administrative/Medical Necessity) [phone]   [address] (Maternity/NICU/Pediatrics) 261 [facility],[address] Floor [address]  [address] Medical [facility] [facility] [address] [facility] [address] [place] [address] Ernie Durant 1481 [address] [facility] [phone]

## 2022-08-01 NOTE — CONSULTS
Consultation - Interventional Radiology  Jett Sapp 51 y o  female MRN: [id number]  Unit/Bed#: [facility] 409-01 Encounter: [id number]        Consults    ASSESSMENT/PLAN:    51F w/ R VATS lobectomy for carcinoid 7/28    Surgical right chest tube removed 7/31    Now with worsening pneumothorax    Chest tube placement indicated    Risks benefits alternatives discussed    Notably she has no dyspnea    consent obtained    We will proceed with local and fentanyl (pt not NPO)    /71   Pulse 87   Temp 98 2 °F (36 8 °C)   Resp 16   Ht 5' 11" (1 803 m)   Wt (!) 146 kg (322 lb 1 5 oz)   SpO2 99%   BMI 44 92 kg/m²       Medical Problems             Problem List     * (Principal) Benign carcinoid tumor of lung    Latent tuberculosis by skin test    Overview Signed 1/25/2022 12:55 PM by Ronan Dubois DO     Was treated with INH for 6 months         Obstructive sleep apnea    Overview Signed 1/25/2022 12:55 PM by Ronan Dubois DO     Mild TERESITA  AHI 18 1         Lung nodule    Class 3 severe obesity due to excess calories without serious comorbidity with body mass index (BMI) of 40 0 to 44 9 in adult [facility])    Tobacco smoker, 1 pack of cigarettes or less per day    Kidney stone                Reason for Consult / Principal Problem:    HPI: Jett Sapp is a 51y o  year old female who presents with Benign carcinoid tumor of lung      Review of Systems      Past Medical History:  Past Medical History:   Diagnosis Date    Acid reflux     Trejo's esophagus     GERD (gastroesophageal reflux disease)     Hiatal hernia     Kidney stone     Latent tuberculosis by skin test 1986    Was treated with INH for 6 months    Obstructive sleep apnea 05/27/2018    Mild TERESITA    AHI 18 1       Past Surgical History:  Past Surgical History:   Procedure Laterality Date    ABDOMINOPLASTY      AUGMENTATION BREAST Bilateral 2015    BRONCHOSCOPY N/A 05/03/2022    Procedure: BRONCHOSCOPY NAVIGATIONAL;  Surgeon: Russell Butt MD;  [facility];  Service: Thoracic    EGD  2018    ENDOBRONCHIAL ULTRASOUND (EBUS) N/A 2022    Procedure: ENDOBRONCHIAL ULTRASOUND (EBUS); Surgeon: Susan Jarquin MD;  [facility];  Service: Thoracic    HYSTERECTOMY      IR BIOPSY LYMPH NODE  2022    LAPAROSCOPIC CHOLECYSTECTOMY  1997    LIPOSUCTION      [place] [address] N/A 2022    Procedure: Dahlia Achilles;  Surgeon: Susan Jraquin MD;  [facility];  Service: Thoracic    [place] [address] N/A 2022    Procedure: Dahlia Achilles;  Surgeon: Abbie Jang MD;  [facility];  Service: Thoracic    TN THORACOSCOPY SURG LOBECTOMY Right 2022    Procedure: LOBECTOMY LUNG;  Surgeon: Abbie Jang MD;  [facility];  Service: Thoracic    TN THORACOSCOPY SURG LOBECTOMY Right 2022    Procedure: THORACOSCOPY VIDEO ASSISTED SURGERY (VATS); Surgeon: Abbie Jang MD;  [facility];  Service: Thoracic    REPAIR RECTOCELE  2018    TONSILLECTOMY  1984       Social History:  Social History     Substance and Sexual Activity   Alcohol Use Never     Social History     Substance and Sexual Activity   Drug Use Never    Comment: CBD gummies     Social History     Tobacco Use   Smoking Status Former Smoker    Packs/day: 0 75    Years: 36 00    Pack years: 27 00    Types: Cigarettes    Quit date: 2022    Years since quittin 1   Smokeless Tobacco Never Used       Family History:  Family History   Problem Relation Age of Onset    Cancer Mother         ovarian    Stroke Father     Lung cancer Paternal Uncle        Allergies:   Allergies   Allergen Reactions    Metronidazole Anaphylaxis     "patient states she has been in anaphylaxis due to this medication prior"      Nitrofurantoin Shortness Of Breath       Medications:  Medications Prior to Admission   Medication    acyclovir (ZOVIRAX) 800 mg tablet    ALPRAZolam (XANAX) 0 25 mg tablet    gabapentin (Neurontin) 300 mg capsule    levocetirizine (XYZAL) 5 MG tablet    omeprazole (PriLOSEC) 40 MG capsule    nicotine (NICOTROL) 10 MG inhaler    Omega-3 Fatty Acids (Fish Oil) 1200 MG CAPS     Current Facility-Administered Medications   Medication Dose Route Frequency    acetaminophen (TYLENOL) tablet 975 mg  975 mg Oral Q6H    diphenhydrAMINE (BENADRYL) tablet 25 mg  25 mg Oral Q6H PRN    docusate sodium (COLACE) capsule 100 mg  100 mg Oral BID    enoxaparin (LOVENOX) subcutaneous injection 40 mg  40 mg Subcutaneous Daily    fentanyl citrate (PF) 100 MCG/2ML   Intravenous Code/Trauma/Sedation Med    gabapentin (NEURONTIN) capsule 300 mg  300 mg Oral TID    hydrocortisone 1 % cream   Topical 4x Daily PRN    hydrocortisone 1 % cream   Topical 4x Daily PRN    HYDROmorphone (DILAUDID) injection 0 5 mg  0 5 mg Intravenous Q4H PRN    lidocaine (LIDODERM) 5 % patch 1 patch  1 patch Topical Daily    methocarbamol (ROBAXIN) tablet 750 mg  750 mg Oral Q6H [address]    ondansetron (ZOFRAN) injection 4 mg  4 mg Intravenous Q6H PRN    oxyCODONE (ROXICODONE) immediate release tablet 10 mg  10 mg Oral Q4H PRN    oxyCODONE (ROXICODONE) IR tablet 5 mg  5 mg Oral Q4H PRN    pantoprazole (PROTONIX) EC tablet 40 mg  40 mg Oral Early Morning    polyethylene glycol (MIRALAX) packet 17 g  17 g Oral Daily PRN    senna (SENOKOT) tablet 8 6 mg  1 tablet Oral Daily       Vitals:  /71   Pulse 87   Temp 98 2 °F (36 8 °C)   Resp 16   Ht 5' 11" (1 803 m)   Wt (!) 146 kg (322 lb 1 5 oz)   SpO2 99%   BMI 44 92 kg/m²   Body mass index is 44 92 kg/m²    Weight (last 2 days)     Date/Time Weight    07/30/22 0600 146 (322 09)          I/Os:  No intake or output data in the 24 hours ending 08/01/22 1252    PHYSICAL EXAM  General appearance: alert and oriented, in no acute distress  Skin:   R flank wound from prior chest tube  Head: Normocephalic, without obvious abnormality    Lungs: on O2  Abdomen: nontender    Anxious  Crying  obese    Neurological: normal without focal findings, mental status, speech normal, alert and oriented x3 and RAMAN    Lab Results and Cultures:   CBC with diff:   Lab Results   Component Value Date    WBC 17 85 (H) 07/29/2022    HGB 12 4 07/29/2022    HCT 39 2 07/29/2022    MCV 93 07/29/2022     07/29/2022    MCH 29 5 07/29/2022    MCHC 31 6 07/29/2022    RDW 12 9 07/29/2022    MPV 9 9 07/29/2022    NRBC 0 02/03/2022      BMP/CMP:  Lab Results   Component Value Date    K 4 2 07/29/2022     (H) 07/29/2022    CO2 24 07/29/2022    BUN 15 07/29/2022    CREATININE 0 74 07/29/2022    CALCIUM 8 1 (L) 07/29/2022    AST 45 02/03/2022    ALT 54 02/03/2022    ALKPHOS 95 02/03/2022    EGFR 96 07/29/2022   ,     Coags:   Lab Results   Component Value Date    PTT 37 06/28/2022    INR 1 07 06/28/2022   ,          Lipid Panel: No results found for: CHOL  Lab Results   Component Value Date    HDL 49 (L) 02/03/2022     Lab Results   Component Value Date    HDL 49 (L) 02/03/2022     Lab Results   Component Value Date    LDLCALC 161 (H) 02/03/2022     Lab Results   Component Value Date    TRIG 142 02/03/2022       HgbA1c:   Lab Results   Component Value Date    HGBA1C 5 3 02/03/2022       Blood Culture: No results found for: BLOODCX,   Urinalysis: No results found for: [place] Fraction, SPECGRAV, PHUR, LEUKOCYTESUR, NITRITE, PROTEINUA, GLUCOSEU, KETONESU, BILIRUBINUR, BLOODU,   Urine Culture: No results found for: URINECX,   Wound Culure:  No results found for: WOUNDCULT    Imaging Studies: I have personally reviewed pertinent films in PACS    Counseling / Coordination of Care  Total time spent today  30 minutes  Greater than 50% of total time was spent with the patient and / or family counseling and / or coordination of care  Thank you for allowing me to participate in the care of Matt Search  Please don't hesitate to contact us with any questions

## 2022-08-01 NOTE — PLAN OF CARE
Problem: MOBILITY - ADULT  Goal: Maintain or return to baseline ADL function  Description: INTERVENTIONS:  -  Assess patient's ability to carry out ADLs; assess patient's baseline for ADL function and identify physical deficits which impact ability to perform ADLs (bathing, care of mouth/teeth, toileting, grooming, dressing, etc )  - Assess/evaluate cause of self-care deficits   - Assess range of motion  - Assess patient's mobility; develop plan if impaired  - Assess patient's need for assistive devices and provide as appropriate  - Encourage maximum independence but intervene and supervise when necessary  - Involve family in performance of ADLs  - Assess for home care needs following discharge   - Consider OT consult to assist with ADL evaluation and planning for discharge  - Provide patient education as appropriate  Outcome: Progressing  Goal: Maintains/Returns to pre admission functional level  Description: INTERVENTIONS:  - Perform BMAT or MOVE assessment daily    - Set and communicate daily mobility goal to care team and patient/family/caregiver     - Collaborate with rehabilitation services on mobility goals if consulted  - Ambulate patient 4 times a day  - Out of bed to chair 3 times a day   - Out of bed for meals 3 times a day  - Out of bed for toileting  - Record patient progress and toleration of activity level   Outcome: Progressing

## 2022-08-02 ENCOUNTER — APPOINTMENT (INPATIENT)
Dept: RADIOLOGY | Facility: HOSPITAL | Age: 47
DRG: 164 | End: 2022-08-02
Payer: COMMERCIAL

## 2022-08-02 PROCEDURE — 99024 POSTOP FOLLOW-UP VISIT: CPT | Performed by: THORACIC SURGERY (CARDIOTHORACIC VASCULAR SURGERY)

## 2022-08-02 PROCEDURE — 71045 X-RAY EXAM CHEST 1 VIEW: CPT

## 2022-08-02 RX ADMIN — METHOCARBAMOL TABLETS 750 MG: 750 TABLET, COATED ORAL at 05:17

## 2022-08-02 RX ADMIN — HYDROMORPHONE HYDROCHLORIDE 0.5 MG: 1 INJECTION, SOLUTION INTRAMUSCULAR; INTRAVENOUS; SUBCUTANEOUS at 03:10

## 2022-08-02 RX ADMIN — ENOXAPARIN SODIUM 40 MG: 40 INJECTION SUBCUTANEOUS at 08:43

## 2022-08-02 RX ADMIN — GABAPENTIN 300 MG: 300 CAPSULE ORAL at 08:44

## 2022-08-02 RX ADMIN — METHOCARBAMOL TABLETS 750 MG: 750 TABLET, COATED ORAL at 13:18

## 2022-08-02 RX ADMIN — ACETAMINOPHEN 975 MG: 325 TABLET ORAL at 08:43

## 2022-08-02 RX ADMIN — OXYCODONE HYDROCHLORIDE 10 MG: 10 TABLET ORAL at 15:52

## 2022-08-02 RX ADMIN — OXYCODONE HYDROCHLORIDE 10 MG: 10 TABLET ORAL at 23:40

## 2022-08-02 RX ADMIN — DIPHENHYDRAMINE HCL 25 MG: 25 TABLET ORAL at 00:42

## 2022-08-02 RX ADMIN — HYDROMORPHONE HYDROCHLORIDE 0.5 MG: 1 INJECTION, SOLUTION INTRAMUSCULAR; INTRAVENOUS; SUBCUTANEOUS at 21:50

## 2022-08-02 RX ADMIN — METHOCARBAMOL TABLETS 750 MG: 750 TABLET, COATED ORAL at 23:40

## 2022-08-02 RX ADMIN — OXYCODONE HYDROCHLORIDE 10 MG: 10 TABLET ORAL at 00:42

## 2022-08-02 RX ADMIN — HYDROMORPHONE HYDROCHLORIDE 0.5 MG: 1 INJECTION, SOLUTION INTRAMUSCULAR; INTRAVENOUS; SUBCUTANEOUS at 08:44

## 2022-08-02 RX ADMIN — OXYCODONE HYDROCHLORIDE 10 MG: 10 TABLET ORAL at 19:20

## 2022-08-02 RX ADMIN — OXYCODONE HYDROCHLORIDE 10 MG: 10 TABLET ORAL at 10:07

## 2022-08-02 RX ADMIN — PANTOPRAZOLE SODIUM 40 MG: 40 TABLET, DELAYED RELEASE ORAL at 05:17

## 2022-08-02 RX ADMIN — OXYCODONE HYDROCHLORIDE 10 MG: 10 TABLET ORAL at 05:17

## 2022-08-02 RX ADMIN — ACETAMINOPHEN 975 MG: 325 TABLET ORAL at 02:44

## 2022-08-02 RX ADMIN — GABAPENTIN 300 MG: 300 CAPSULE ORAL at 21:14

## 2022-08-02 RX ADMIN — GABAPENTIN 300 MG: 300 CAPSULE ORAL at 15:53

## 2022-08-02 RX ADMIN — ACETAMINOPHEN 975 MG: 325 TABLET ORAL at 15:53

## 2022-08-02 RX ADMIN — HYDROMORPHONE HYDROCHLORIDE 0.5 MG: 1 INJECTION, SOLUTION INTRAMUSCULAR; INTRAVENOUS; SUBCUTANEOUS at 17:28

## 2022-08-02 RX ADMIN — HYDROMORPHONE HYDROCHLORIDE 0.5 MG: 1 INJECTION, SOLUTION INTRAMUSCULAR; INTRAVENOUS; SUBCUTANEOUS at 13:05

## 2022-08-02 RX ADMIN — METHOCARBAMOL TABLETS 750 MG: 750 TABLET, COATED ORAL at 17:28

## 2022-08-02 RX ADMIN — ACETAMINOPHEN 975 MG: 325 TABLET ORAL at 21:14

## 2022-08-02 NOTE — UTILIZATION REVIEW
Continued Stay Review    Date: 8/2                          Current Patient Class: INpatient   Current Level of Care: MEd/surg    HPI:46 y o  female initially admitted on 7/28     Assessment/Plan: CXR on 8/1 shows enlarging mod to large right pneumothorax  Monitor chest tube output  POD 5 s/p flexible bronchoscopy, right VATS lower lobectomy, and mediastinal lymph node dissection and CT placement for carcinoid tumor  Diet as tolerated  Leave tube to suction today  Encourage IS  Ambulate  Vital Signs:   Time Temp Pulse Resp BP MAP (mmHg) SpO2 O2 Device Patient Position - Orthostatic VS   08/02/22 1100 98 1 °F (36 7 °C) 83 14 115/60 81 94 % None (Room air) Sitting   08/02/22 0700 98 °F (36 7 °C) 80 16 123/71 93 97 % None (Room air) Sitting   08/02/22 0240 97 7 °F (36 5 °C) 83 -- 131/65 90 95 % -- --       Pertinent Labs/Diagnostic Results:   8/2 CXR:   Improved right pneumothorax status post chest tube placement   Small residual pneumothorax persists         Results from last 7 days   Lab Units 07/29/22 0527 07/28/22  1045   WBC Thousand/uL 17 85* 9 63   HEMOGLOBIN g/dL 12 4 13 9   HEMATOCRIT % 39 2 43 5   PLATELETS Thousands/uL 205 164         Results from last 7 days   Lab Units 07/29/22  0527 07/28/22  1045   SODIUM mmol/L 136 139   POTASSIUM mmol/L 4 2 4 3   CHLORIDE mmol/L 110* 109*   CO2 mmol/L 24 28   ANION GAP mmol/L 2* 2*   BUN mg/dL 15 9   CREATININE mg/dL 0 74 0 94   EGFR ml/min/1 73sq m 96 72   CALCIUM mg/dL 8 1* 8 2*   MAGNESIUM mg/dL 2 3 2 4         Results from last 7 days   Lab Units 07/28/22  1046   POC GLUCOSE mg/dl 134     Results from last 7 days   Lab Units 07/29/22  0527 07/28/22  1045   GLUCOSE RANDOM mg/dL 135 126       Medications:   Scheduled Medications:  acetaminophen, 975 mg, Oral, Q6H  docusate sodium, 100 mg, Oral, BID  enoxaparin, 40 mg, Subcutaneous, Daily  gabapentin, 300 mg, Oral, TID  lidocaine, 1 patch, Topical, Daily  methocarbamol, 750 mg, Oral, Q6H ANIKET  pantoprazole, 40 mg, Oral, Early Morning  senna, 1 tablet, Oral, Daily      Continuous IV Infusions:     PRN Meds:  diphenhydrAMINE, 25 mg, Oral, Q6H PRN  hydrocortisone, , Topical, 4x Daily PRN  hydrocortisone, , Topical, 4x Daily PRN  HYDROmorphone, 0 5 mg, Intravenous, Q4H PRN x 4 on 8/1, x 3 on 8/2   ondansetron, 4 mg, Intravenous, Q6H PRN  oxyCODONE, 10 mg, Oral, Q4H PRN x5 on 8/1, x 3 on 8/2  oxyCODONE, 5 mg, Oral, Q4H PRN  polyethylene glycol, 17 g, Oral, Daily PRN        Discharge Plan: D    Network Utilization Review Department  ATTENTION: Please call with any questions or concerns to [phone] and carefully listen to the prompts so that you are directed to the right person  All voicemails are confidential   [facility] all requests for admission clinical reviews, approved or denied determinations and any other requests to dedicated fax number below belonging to the campus where the patient is receiving treatment   List of dedicated fax numbers for the Facilities:  [address] DENIALS (Administrative/Medical Necessity) [phone]   [address] (Maternity/NICU/Pediatrics) [phone]   [address]  [address] Medical [place] [address] [address] [place] [address] Ernie Marcio Dur[address] 14[address] [place] [address] [phone]

## 2022-08-02 NOTE — PROGRESS NOTES
Progress Note - General Surgery Thoracic  Siva Heidi 51 y o  female MRN: [id number]  Unit/Bed#: [facility] 409-01 Encounter: [id number]    Assessment:  Robbie Marmolejo is a 54yo F with history of GERD, hiatal hernia, TERESITA, obesity (BMI 44), hysterectomy (2014), lap choly (1997) abdominoplasty, bilateral breast augmentation (2015)     POD 5 s/p flexible bronchoscopy, right VATS lower lobectomy, and mediastinal lymph node dissection and CT placement for carcinoid tumor  R CT removed on 7/31  Post-procedure day 1 IR chest tube placement    Pt is afebrile and all other VSS  Her pain is well controlled    CT output: 110, no air leak  CT site and surgical sites are c/d/i    8/1 CXR: Enlarging moderate to large right pneumothorax    Plan:  - diet as tolerated  - monitor chest tube output   - continue current pain regimen  - DVT ppx  - aggressive pulmonary toilet  - encourage IS use  - encourage ambulation    Subjective/Objective   Subjective: Pt reports doing ok, her pain is controlled  She denied any fever, chills, nausea, vomitting, or SOB  Objective:    Blood pressure 131/65, pulse 83, temperature 97 7 °F (36 5 °C), resp  rate 20, height 5' 11" (1 803 m), weight (!) 145 kg (319 lb 10 7 oz), SpO2 95 %  ,Body mass index is 44 58 kg/m²  I/O last 24 hours: In: -   Out: 37 [Chest Tube:43]    Invasive Devices  Report    Peripheral Intravenous Line  Duration           Peripheral IV 07/31/22 Dorsal (posterior); Right Forearm 1 day          Drain  Duration           Chest Tube Right Pleural 10 Fr  <1 day                Physical Exam: General appearance: alert and oriented, in no acute distress  Head: Normocephalic, without obvious abnormality, atraumatic  Lungs: clear to auscultation bilaterally  Heart: regular rate and rhythm, S1, S2 normal, no murmur, click, rub or gallop  Abdomen: soft, non-tender; bowel sounds normal; no masses,  no organomegaly  Pulses: 2+ and symmetric  Skin: Skin color, texture, turgor normal  No rashes or lesions  Neurologic: Grossly normal    Lab, Imaging and other studies:  Lab Results   Component Value Date    WBC 17 85 (H) 07/29/2022    HGB 12 4 07/29/2022    HCT 39 2 07/29/2022    MCV 93 07/29/2022     07/29/2022      Lab Results   Component Value Date    CALCIUM 8 1 (L) 07/29/2022    K 4 2 07/29/2022    CO2 24 07/29/2022     (H) 07/29/2022    BUN 15 07/29/2022    CREATININE 0 74 07/29/2022       VTE Pharmacologic Prophylaxis: Enoxaparin (Lovenox)  VTE Mechanical Prophylaxis: sequential compression device    Saad Hernandez (MS-4)  [facility] [address]

## 2022-08-03 ENCOUNTER — APPOINTMENT (INPATIENT)
Dept: RADIOLOGY | Facility: HOSPITAL | Age: 47
DRG: 164 | End: 2022-08-03
Payer: COMMERCIAL

## 2022-08-03 PROCEDURE — 99024 POSTOP FOLLOW-UP VISIT: CPT | Performed by: THORACIC SURGERY (CARDIOTHORACIC VASCULAR SURGERY)

## 2022-08-03 PROCEDURE — 71046 X-RAY EXAM CHEST 2 VIEWS: CPT

## 2022-08-03 PROCEDURE — 71045 X-RAY EXAM CHEST 1 VIEW: CPT

## 2022-08-03 RX ADMIN — HYDROMORPHONE HYDROCHLORIDE 0.5 MG: 1 INJECTION, SOLUTION INTRAMUSCULAR; INTRAVENOUS; SUBCUTANEOUS at 10:51

## 2022-08-03 RX ADMIN — OXYCODONE HYDROCHLORIDE 10 MG: 10 TABLET ORAL at 22:36

## 2022-08-03 RX ADMIN — OXYCODONE HYDROCHLORIDE 10 MG: 10 TABLET ORAL at 09:06

## 2022-08-03 RX ADMIN — GABAPENTIN 300 MG: 300 CAPSULE ORAL at 15:52

## 2022-08-03 RX ADMIN — HYDROMORPHONE HYDROCHLORIDE 0.5 MG: 1 INJECTION, SOLUTION INTRAMUSCULAR; INTRAVENOUS; SUBCUTANEOUS at 06:14

## 2022-08-03 RX ADMIN — ACETAMINOPHEN 975 MG: 325 TABLET ORAL at 20:25

## 2022-08-03 RX ADMIN — GABAPENTIN 300 MG: 300 CAPSULE ORAL at 09:06

## 2022-08-03 RX ADMIN — HYDROMORPHONE HYDROCHLORIDE 0.5 MG: 1 INJECTION, SOLUTION INTRAMUSCULAR; INTRAVENOUS; SUBCUTANEOUS at 02:13

## 2022-08-03 RX ADMIN — ACETAMINOPHEN 975 MG: 325 TABLET ORAL at 02:13

## 2022-08-03 RX ADMIN — OXYCODONE HYDROCHLORIDE 10 MG: 10 TABLET ORAL at 04:39

## 2022-08-03 RX ADMIN — ENOXAPARIN SODIUM 40 MG: 40 INJECTION SUBCUTANEOUS at 09:06

## 2022-08-03 RX ADMIN — OXYCODONE HYDROCHLORIDE 10 MG: 10 TABLET ORAL at 13:03

## 2022-08-03 RX ADMIN — GABAPENTIN 300 MG: 300 CAPSULE ORAL at 20:25

## 2022-08-03 RX ADMIN — METHOCARBAMOL TABLETS 750 MG: 750 TABLET, COATED ORAL at 17:19

## 2022-08-03 RX ADMIN — OXYCODONE HYDROCHLORIDE 10 MG: 10 TABLET ORAL at 17:19

## 2022-08-03 RX ADMIN — ACETAMINOPHEN 975 MG: 325 TABLET ORAL at 17:20

## 2022-08-03 RX ADMIN — PANTOPRAZOLE SODIUM 40 MG: 40 TABLET, DELAYED RELEASE ORAL at 05:03

## 2022-08-03 RX ADMIN — METHOCARBAMOL TABLETS 750 MG: 750 TABLET, COATED ORAL at 05:03

## 2022-08-03 RX ADMIN — HYDROMORPHONE HYDROCHLORIDE 0.5 MG: 1 INJECTION, SOLUTION INTRAMUSCULAR; INTRAVENOUS; SUBCUTANEOUS at 20:25

## 2022-08-03 RX ADMIN — HYDROMORPHONE HYDROCHLORIDE 0.5 MG: 1 INJECTION, SOLUTION INTRAMUSCULAR; INTRAVENOUS; SUBCUTANEOUS at 15:52

## 2022-08-03 RX ADMIN — METHOCARBAMOL TABLETS 750 MG: 750 TABLET, COATED ORAL at 13:00

## 2022-08-03 RX ADMIN — ACETAMINOPHEN 975 MG: 325 TABLET ORAL at 09:06

## 2022-08-03 NOTE — PROGRESS NOTES
Progress Note - General Surgery Thoracic  Siva Heidi 51 y o  female MRN: [id number]  Unit/Bed#: [facility] 409-01 Encounter: [id number]    Assessment:  Robbie Marmolejo is a 54yo F with history of GERD, hiatal hernia, TERESITA, obesity (BMI 44), hysterectomy (2014), lap choly (1997) abdominoplasty, bilateral breast augmentation (2015)     6 Days Post-Op s/p flexible bronchoscopy, right VATS lower lobectomy, and mediastinal lymph node dissection and CT placement for carcinoid tumor  R CT removed on 7/31  Post-procedure day 2 IR chest tube placement    She is afebrile and hemodynamically stable  She is tolerating PO  Her pain is well controlled  CXR yesterday evening is improved  No air leak today  CT output: 150 ss  Air Leak: No  IS: 1500cc      Plan:  - diet as tolerated  - CT to water seal today, continue to monitor output  - continue multimodal pain control  - DVT ppx  - aggressive pulmonary toilet  - encourage IS use  - encourage ambulation    Subjective/Objective   Subjective: She is afebrile and hemodynamically stable  She is tolerating PO  She endorses pain with movement at the chest tube site  CXR yesterday evening is improved  No air leak today  She denied any fever, chills, nausea, vomitting, or SOB  Objective:    Blood pressure 119/62, pulse 79, temperature 98 1 °F (36 7 °C), resp  rate 19, height 5' 11" (1 803 m), weight (!) 146 kg (322 lb 1 5 oz), SpO2 99 %  ,Body mass index is 44 92 kg/m²  I/O last 24 hours: In: 418 [P O :418]  Out: 150 [Chest Tube:150]    Invasive Devices  Report    Peripheral Intravenous Line  Duration           Peripheral IV 07/31/22 Dorsal (posterior); Right Forearm 2 days          Drain  Duration           Chest Tube Right Pleural 10 Fr  1 day                Physical Exam:   General appearance: alert and oriented, in no acute distress  Head: Normocephalic, without obvious abnormality, atraumatic  Lungs: No increased work of breathing  Heart: regular rate and rhythm  Abdomen: Soft, non-tender, not distended  Pulses: 2+ and symmetric  Skin: Skin color, texture, turgor normal  No rashes or lesions  Neurologic: Grossly normal    Lab, Imaging and other studies:  Lab Results   Component Value Date    WBC 17 85 (H) 07/29/2022    HGB 12 4 07/29/2022    HCT 39 2 07/29/2022    MCV 93 07/29/2022     07/29/2022      Lab Results   Component Value Date    CALCIUM 8 1 (L) 07/29/2022    K 4 2 07/29/2022    CO2 24 07/29/2022     (H) 07/29/2022    BUN 15 07/29/2022    CREATININE 0 74 07/29/2022       VTE Pharmacologic Prophylaxis: Enoxaparin (Lovenox)  VTE Mechanical Prophylaxis: sequential compression device    Gabriela Hernandez MD  General Surgery   08/03/22

## 2022-08-03 NOTE — UTILIZATION REVIEW
Continued Stay Review    Date: 8/3/2022                        Current Patient Class: inpatient  Current Level of Care: med/surg    HPI:46 y o  female initially admitted on 7/28   6 Days Post-Op s/p flexible bronchoscopy, right VATS lower lobectomy, and mediastinal lymph node dissection and CT placement for carcinoid tumor  R CT removed on 7/31  Post-procedure day 2 IR chest tube placement    Assessment/Plan:  VSS  Tolerating po  Pain controlled  CXR yesterday evening is improved  CT output: 150 serosang  Incentive spirometry to 1500 cc  No air leak  CXR this AM with fully expanded lung  Place to water seal, re-check CXR  If reamains as is, will possibly d/c chest tube tomorrow  Continue diet as eliu  Continue to monitor CT output  Multimodal pain control  Aggressive pulm toilet  Encourage incentive spirometry  Encourage ambulation  SCD's    Vital Signs:   Date/Time Temp Pulse Resp BP MAP (mmHg) SpO2 O2 Device   08/03/22 1122 98 3 °F (36 8 °C) 78 17 116/71 88 97 % None (Room air)   08/03/22 0733 97 4 °F (36 3 °C) Abnormal  74 13 112/64 82 100 % None (Room air)   08/03/22 0300 98 1 °F (36 7 °C) 79 -- 119/62 85 99 % --   08/02/22 1900 98 3 °F (36 8 °C) 88 19 108/69 81 100 % None (Room air)   08/02/22 1500 98 2 °F (36 8 °C) 80 19 117/60 80 97 % --   08/02/22 1100 98 1 °F (36 7 °C) 83 14 115/60 81 94 % None (Room air)         Pertinent Labs/Diagnostic Results:      CXR 8/3 @ 0845: Postsurgical changes after right lower lobectomy  Unchanged position of chest tube   No radiographically-evident residua of pneumothorax demonstrated though there is suggestion of a new miniscule effusion  Persistent chest wall emphysema  CXR 8/3 @ 1000 -- Stably positioned right-sided pigtail catheter  No radiographically detectable pneumothorax  Persistent right chest wall subcutaneous emphysema  Minimal right pleural effusion, similar to prior       Results from last 7 days   Lab Units 07/29/22  0527 07/28/22  1045   WBC Thousand/uL 17 85* 9 63   HEMOGLOBIN g/dL 12 4 13 9   HEMATOCRIT % 39 2 43 5   PLATELETS Thousands/uL 205 164     Results from last 7 days   Lab Units 07/29/22  0527 07/28/22  1045   SODIUM mmol/L 136 139   POTASSIUM mmol/L 4 2 4 3   CHLORIDE mmol/L 110* 109*   CO2 mmol/L 24 28   ANION GAP mmol/L 2* 2*   BUN mg/dL 15 9   CREATININE mg/dL 0 74 0 94   EGFR ml/min/1 73sq m 96 72   CALCIUM mg/dL 8 1* 8 2*   MAGNESIUM mg/dL 2 3 2 4     Results from last 7 days   Lab Units 07/28/22  1046   POC GLUCOSE mg/dl 134     Results from last 7 days   Lab Units 07/29/22  0527 07/28/22  1045   GLUCOSE RANDOM mg/dL 135 126       Medications:   Scheduled Medications:  acetaminophen, 975 mg, Oral, Q6H  docusate sodium, 100 mg, Oral, BID  enoxaparin, 40 mg, Subcutaneous, Daily  gabapentin, 300 mg, Oral, TID  lidocaine, 1 patch, Topical, Daily  methocarbamol, 750 mg, Oral, Q6H ANIKET  pantoprazole, 40 mg, Oral, Early Morning  senna, 1 tablet, Oral, Daily    PRN Meds:  diphenhydrAMINE, 25 mg, Oral, Q6H PRN 8/2 x1  hydrocortisone, , Topical, 4x Daily PRN  hydrocortisone, , Topical, 4x Daily PRN  HYDROmorphone, 0 5 mg, Intravenous, Q4H PRN 8/2 x5, 8/3 x3  ondansetron, 4 mg, Intravenous, Q6H PRN  oxyCODONE, 10 mg, Oral, Q4H PRN 8/2 x6, 8/3 x3  oxyCODONE, 5 mg, Oral, Q4H PRN  polyethylene glycol, 17 g, Oral, Daily PRN        Discharge Plan: [facility]    Network Utilization Review Department  ATTENTION: Please call with any questions or concerns to [phone] and carefully listen to the prompts so that you are directed to the right person  All voicemails are confidential   [facility] all requests for admission clinical reviews, approved or denied determinations and any other requests to dedicated fax number below belonging to the campus where the patient is receiving treatment   List of dedicated fax numbers for the Facilities:  [address] DENIALS (Administrative/Medical Necessity) [phone]   [address] (Maternity/NICU/Pediatrics) 261 [facility],7Th Floor [facility] 40 125 [facility]  [phone]   Anshul Calix 50 150 [facility] [address] [address] [address] Marcio Little 1481 [address] [facility] [place] [address] [phone]

## 2022-08-04 ENCOUNTER — APPOINTMENT (INPATIENT)
Dept: RADIOLOGY | Facility: HOSPITAL | Age: 47
DRG: 164 | End: 2022-08-04
Payer: COMMERCIAL

## 2022-08-04 VITALS
HEIGHT: 71 IN | TEMPERATURE: 98.1 F | RESPIRATION RATE: 18 BRPM | WEIGHT: 293 LBS | BODY MASS INDEX: 41.02 KG/M2 | SYSTOLIC BLOOD PRESSURE: 121 MMHG | DIASTOLIC BLOOD PRESSURE: 71 MMHG | OXYGEN SATURATION: 99 % | HEART RATE: 77 BPM

## 2022-08-04 PROCEDURE — 71045 X-RAY EXAM CHEST 1 VIEW: CPT

## 2022-08-04 PROCEDURE — 99024 POSTOP FOLLOW-UP VISIT: CPT | Performed by: THORACIC SURGERY (CARDIOTHORACIC VASCULAR SURGERY)

## 2022-08-04 PROCEDURE — 71046 X-RAY EXAM CHEST 2 VIEWS: CPT

## 2022-08-04 PROCEDURE — NC001 PR NO CHARGE: Performed by: THORACIC SURGERY (CARDIOTHORACIC VASCULAR SURGERY)

## 2022-08-04 RX ADMIN — PANTOPRAZOLE SODIUM 40 MG: 40 TABLET, DELAYED RELEASE ORAL at 05:30

## 2022-08-04 RX ADMIN — ACETAMINOPHEN 975 MG: 325 TABLET ORAL at 10:53

## 2022-08-04 RX ADMIN — METHOCARBAMOL TABLETS 750 MG: 750 TABLET, COATED ORAL at 13:01

## 2022-08-04 RX ADMIN — HYDROMORPHONE HYDROCHLORIDE 0.5 MG: 1 INJECTION, SOLUTION INTRAMUSCULAR; INTRAVENOUS; SUBCUTANEOUS at 15:00

## 2022-08-04 RX ADMIN — ENOXAPARIN SODIUM 40 MG: 40 INJECTION SUBCUTANEOUS at 09:00

## 2022-08-04 RX ADMIN — OXYCODONE HYDROCHLORIDE 10 MG: 10 TABLET ORAL at 02:57

## 2022-08-04 RX ADMIN — HYDROMORPHONE HYDROCHLORIDE 0.5 MG: 1 INJECTION, SOLUTION INTRAMUSCULAR; INTRAVENOUS; SUBCUTANEOUS at 10:53

## 2022-08-04 RX ADMIN — OXYCODONE HYDROCHLORIDE 10 MG: 10 TABLET ORAL at 09:00

## 2022-08-04 RX ADMIN — HYDROMORPHONE HYDROCHLORIDE 0.5 MG: 1 INJECTION, SOLUTION INTRAMUSCULAR; INTRAVENOUS; SUBCUTANEOUS at 01:25

## 2022-08-04 RX ADMIN — HYDROMORPHONE HYDROCHLORIDE 0.5 MG: 1 INJECTION, SOLUTION INTRAMUSCULAR; INTRAVENOUS; SUBCUTANEOUS at 05:35

## 2022-08-04 RX ADMIN — OXYCODONE HYDROCHLORIDE 10 MG: 10 TABLET ORAL at 17:49

## 2022-08-04 RX ADMIN — ACETAMINOPHEN 975 MG: 325 TABLET ORAL at 17:49

## 2022-08-04 RX ADMIN — METHOCARBAMOL TABLETS 750 MG: 750 TABLET, COATED ORAL at 17:48

## 2022-08-04 RX ADMIN — ACETAMINOPHEN 975 MG: 325 TABLET ORAL at 02:57

## 2022-08-04 RX ADMIN — GABAPENTIN 300 MG: 300 CAPSULE ORAL at 09:00

## 2022-08-04 RX ADMIN — METHOCARBAMOL TABLETS 750 MG: 750 TABLET, COATED ORAL at 05:30

## 2022-08-04 RX ADMIN — GABAPENTIN 300 MG: 300 CAPSULE ORAL at 16:17

## 2022-08-04 RX ADMIN — METHOCARBAMOL TABLETS 750 MG: 750 TABLET, COATED ORAL at 01:24

## 2022-08-04 RX ADMIN — OXYCODONE HYDROCHLORIDE 10 MG: 10 TABLET ORAL at 13:01

## 2022-08-04 NOTE — PROGRESS NOTES
Right apical IR chest tube pulled at bedside, dressing placed over chest tube insertion site for reinforcement including Tegaderm, dry gauze, Xeroform  Patient tolerated this well without complications  Nursing made aware, told reach out with any questions or concerns  Will obtain PA/lat CXR now

## 2022-08-04 NOTE — DISCHARGE SUMMARY
Discharge Summary - Colorectal Surgery   Manuel Park 51 y o  female MRN: [id number]  Unit/Bed#: [facility] 409-01 Encounter: [id number]        Admitting Diagnosis: Right lower lobe typical carcinoid tumor    Admit Date: 7/28/2022    Discharge Diagnosis: Right lower lobe typical carcinoid tumor    Discharge Date: 8/4/2022    HPI: Tay Moya PA-C's note from 5/24/2022:  "Ms Radha Woody is a 51year old female referred to our office by Dr Troy Richter for evaluation of a pulmonary nodule  Chest CT 2/3/22 demonstrated a 1 8x1 2cm right lower lobe nodule, minimally increased compared to a CT one year prior  PET-CT 3/4/22 revealed SUV 3 1 in the nodule  There were mildly avid bilateral axillary lymph nodes, which appeared normal in size  There was a mildly enlarged right cervical lymph node 1 4x1 1cm SUV 9 3  IR biopsy of the cervical node was completed on 3/21/22 and pathology was benign  On discussion, she is smoking 3/4ppd for the last 36 years  She was on Chantix previously  She has tried Wellbutrin which didn't work        We recommended a navigational bronchoscopy and PFT's  She returns today to discuss these results  PFT's from 5/18/22 revealed a FVC of 4 258L, 95%, FEV1 of 2 96%, 83%, and a DLCO of 75% predicted  Navigational bronchoscopy revealed a low grade neuroendocrine neoplasm without any lymph node involvement       On discussion, she has some dyspnea on exertion with stairs and inclines, chronic right chest pain which radiates into her back and breast  She also has occasional dizziness, which she attributes to anxiety  She was COVID vaccinated  She is currently smoking 7 cigarettes a day "    Procedures Performed:   7/28/2022: Right thoracoscopic lower lobectomy with mediastinal lymph node dissection      Hospital Course: Ms Radha Woody came [facility] for a scheduled right lower lobectomy with Dr Sammy Dalton on 7/28/2022   There were no intraoperative complications noted and the patient was stable on the night of her surgery  There was a chest tube that was placed during the surgery that was monitored carefully for air leaks and output  POD 1 and 2 were uncomplicated  She was afebrile, had normal vital signs, and her pain was well-controlled  An attempt was made to remove the patient's chest tube on POD 2, but due to the large amount of output, the decision was made to keep the chest tube in place  Ms Tej Cardona continued to recover well otherwise  On POD 3, the chest tube was removed, but a follow-up chest X-ray was concerning for an apical pneumothorax  The decision was made for interventional radiology to place a new chest tube on 8/1/2022  She tolerated that procedure well  Ms Tej Cardona was monitored closely for appropriate lung expansion and the chest tube was connected to thopaz on POD 4 (8/1)  On POD 5 and 6, the chest tube was placed to water seal and subsequent chest X-ray was notable for a very small apical pneumothorax  On POD 7, a clamp trial of the chest tube and following chest X-ray was reassuring for safe removal of this chest tube  Once the chest tube was removed, a follow-up chest X-ray including PA and lateral views confirmed that Ms Hills lungs were stable so she was safely discharged  Lab Results: I have personally reviewed pertinent lab results  Complications:   After removal of the initial chest tube placed on 7/28 and removed on 7/31, IR had to place a second chest tube on 8/1 due to a small apical pneumothorax found on chest xray  Condition at Discharge: stable     Discharge instructions/Information to patient and family:   See after visit summary for information provided to patient and family  Provisions for Follow-Up Care:  See after visit summary for information related to follow-up care and any pertinent home health orders  Disposition: Home    Planned Readmission: No    Discharge Statement   I spent 20 minutes discharging the patient   This time was spent on the day of discharge  I had direct contact with the patient on the day of discharge  Additional documentation is required if more than 30 minutes were spent on discharge  Discharge Medications:  See after visit summary for reconciled discharge medications provided to patient and family

## 2022-08-04 NOTE — PROGRESS NOTES
Progress Note - General Surgery Thoracic  [facility] 51 y o  female MRN: [id number]  Unit/Bed#: [facility] 409-01 Encounter: [id number]    Assessment:  Finn Rain is a 54yo F with history of GERD, hiatal hernia, TERESITA, obesity (BMI 44), hysterectomy (2014), lap choly (1997) abdominoplasty, bilateral breast augmentation (2015)     POD 7 s/p flexible bronchoscopy, right VATS lower lobectomy, and mediastinal lymph node dissection and CT placement for carcinoid tumor  R CT removed on 7/31  Post-procedure day 3 IR chest tube placement     Pt is afebrile and all other VSS  Her pain is well controlled  Chest tube output:   CT site and surgical sites c/d/i    8/3 AM CXR: Stably positioned right-sided pigtail catheter  No radiographically detectable pneumothorax  Persistent right chest wall subcutaneous emphysema  Minimal right pleural effusion, similar to prior  Plan:  - d/c chest tube  - post chest tube pull CXR  - continue current pain regimen  - diet as tolerated  - DVT ppx: lovenox  - encourage IS use  - encourage ambulation    Subjective/Objective   Subjective: Pt reported doing "okay"  She endorsed some rib pain, but not uncontrolled  She denied fever, chills, SOB, adominal pain, nausea or vomitting  Objective:    Blood pressure 131/65, pulse 80, temperature 98 °F (36 7 °C), temperature source Oral, resp  rate 18, height 5' 11" (1 803 m), weight (!) 147 kg (324 lb 4 8 oz), SpO2 97 %  ,Body mass index is 45 23 kg/m²  I/O last 24 hours: In: 100 [P O :100]  Out: 50 [Chest Tube:50]    Invasive Devices  Report    Peripheral Intravenous Line  Duration           Peripheral IV 07/31/22 Dorsal (posterior); Right Forearm 3 days          Drain  Duration           Chest Tube Right Pleural 10 Fr  2 days                Physical Exam: General appearance: alert and oriented, in no acute distress  Head: Normocephalic, without obvious abnormality, atraumatic  Lungs: Normal effort  No respiratory distress    Heart: regular rate and rhythm  Abdomen: normal findings: soft, non-tender  Pulses: 2+ and symmetric  Skin: Skin color, texture, turgor normal  No rashes or lesions or redness around incision sites  Dressings c/d/i    Neurologic: Grossly normal    Lab, Imaging and other studies:  Lab Results   Component Value Date    WBC 17 85 (H) 07/29/2022    HGB 12 4 07/29/2022    HCT 39 2 07/29/2022    MCV 93 07/29/2022     07/29/2022      Lab Results   Component Value Date    CALCIUM 8 1 (L) 07/29/2022    K 4 2 07/29/2022    CO2 24 07/29/2022     (H) 07/29/2022    BUN 15 07/29/2022    CREATININE 0 74 07/29/2022       VTE Pharmacologic Prophylaxis: Enoxaparin (Lovenox)  VTE Mechanical Prophylaxis: sequential compression device    Jossue Bridges (MS-4)  [address]  Sherly NIEVES Dhaliwal 106

## 2022-08-05 DIAGNOSIS — R91.1 LUNG NODULE: Primary | ICD-10-CM

## 2022-08-05 NOTE — UTILIZATION REVIEW
Notification of Discharge   This is a Notification of Discharge from our facility [address]  Please be advised that this patient has been discharge from our facility  Below you will find the admission and discharge date and time including the patients disposition  UTILIZATION REVIEW CONTACT:  Haley Larson  Utilization   Network Utilization Review Department  Phone: [phone] x carefully listen to the prompts  All voicemails are confidential   Email: [email or web address]     PHYSICIAN ADVISORY SERVICES:  FOR ILAQ-CL-FFCC REVIEW - MEDICAL NECESSITY DENIAL  Phone: [phone]  Fax: [phone]  Email: [email or web address]     PRESENTATION DATE: 7/28/2022  5:40 AM  OBERVATION ADMISSION DATE:   INPATIENT ADMISSION DATE: 7/28/22 10:28 AM   DISCHARGE DATE: 8/4/2022  8:30 PM  DISPOSITION: Home/Self Care Home/Self Care      IMPORTANT INFORMATION:  Send all requests for admission clinical reviews, approved or denied determinations and any other requests to dedicated fax number below belonging to the campus where the patient is receiving treatment   List of dedicated fax numbers:  [address] [place] [address] DENIALS (Administrative/Medical Necessity) [phone]   [address] (Maternity/NICU/Pediatrics) [phone]   [facility] [phone]   130 [facility] [phone]   [address] [place] [address] [phone]   [address] [place] [address] [phone]   [facility]  [phone]   [address] [place] [address], [facility]  2401 [facility] 1000 [address] [facility] [phone]

## 2022-08-10 ENCOUNTER — TELEPHONE (OUTPATIENT)
Dept: CARDIAC SURGERY | Facility: CLINIC | Age: 47
End: 2022-08-10

## 2022-08-10 ENCOUNTER — HOSPITAL ENCOUNTER (OUTPATIENT)
Dept: RADIOLOGY | Facility: HOSPITAL | Age: 47
Discharge: HOME | End: 2022-08-10
Payer: COMMERCIAL

## 2022-08-10 ENCOUNTER — PATIENT MESSAGE (OUTPATIENT)
Dept: CARDIAC SURGERY | Facility: CLINIC | Age: 47
End: 2022-08-10

## 2022-08-10 DIAGNOSIS — R91.1 LUNG NODULE: ICD-10-CM

## 2022-08-10 DIAGNOSIS — G89.18 POST-OP PAIN: Primary | ICD-10-CM

## 2022-08-10 PROCEDURE — 71046 X-RAY EXAM CHEST 2 VIEWS: CPT

## 2022-08-10 RX ORDER — OXYCODONE HYDROCHLORIDE 5 MG/1
5 TABLET ORAL EVERY 4 HOURS PRN
Qty: 20 TABLET | Refills: 0 | Status: SHIPPED | OUTPATIENT
Start: 2022-08-10

## 2022-08-10 NOTE — TELEPHONE ENCOUNTER
called patient and addressed her questions  She has a pleural effusion and I can order a thoracentesis  She wishes to wait as her shortness of breath is not that bad  She does have a lot of pain on her side and has run out of oxycodone  I encouraged gabapentin TID, motrin and Tylenol  I will give her a few more oxycodone  She will increase her activity and IS use and call me if symptoms worsen  We will discuss next steps regarding pathology at her visit on Tuesday

## 2022-08-16 ENCOUNTER — OFFICE VISIT (OUTPATIENT)
Dept: CARDIAC SURGERY | Facility: CLINIC | Age: 47
End: 2022-08-16

## 2022-08-16 ENCOUNTER — TELEPHONE (OUTPATIENT)
Dept: DERMATOLOGY | Facility: CLINIC | Age: 47
End: 2022-08-16

## 2022-08-16 ENCOUNTER — DOCUMENTATION (OUTPATIENT)
Dept: SURGICAL ONCOLOGY | Facility: CLINIC | Age: 47
End: 2022-08-16

## 2022-08-16 VITALS
DIASTOLIC BLOOD PRESSURE: 70 MMHG | BODY MASS INDEX: 41.02 KG/M2 | RESPIRATION RATE: 16 BRPM | TEMPERATURE: 97.5 F | WEIGHT: 293 LBS | SYSTOLIC BLOOD PRESSURE: 120 MMHG | HEIGHT: 71 IN | HEART RATE: 95 BPM | OXYGEN SATURATION: 98 %

## 2022-08-16 DIAGNOSIS — C7A.090 MALIGNANT CARCINOID TUMOR OF LUNG (HCC): Primary | ICD-10-CM

## 2022-08-16 DIAGNOSIS — C7A.00 CARCINOID TUMOR METASTATIC TO INTRATHORACIC LYMPH NODE (HCC): ICD-10-CM

## 2022-08-16 DIAGNOSIS — C7B.09 CARCINOID TUMOR METASTATIC TO INTRATHORACIC LYMPH NODE (HCC): ICD-10-CM

## 2022-08-16 PROBLEM — R91.1 LUNG NODULE: Status: RESOLVED | Noted: 2022-01-25 | Resolved: 2022-08-16

## 2022-08-16 PROCEDURE — 99024 POSTOP FOLLOW-UP VISIT: CPT | Performed by: THORACIC SURGERY (CARDIOTHORACIC VASCULAR SURGERY)

## 2022-08-16 NOTE — TELEPHONE ENCOUNTER
PRIOR AUTHORIZATION FORM         TAX GL#[id number]    ? Dionisio Medici (BRANDEE#[id number])   ? Joni Macias (QUIRINO#[id number])       X  Cholo Rincon ([facility]#[id number])    MOHS Procedure:    ? 69800 head, neck , hands, feet, genitalia (include 11725 for each additional stage)     X 16777 trunk, arms, legs (include 43384 for each additional stage)     Repair CPT code:16051-52384 (intermediate closure)    ? Diagnosis/ICD code:SCCIS left buttock        Primary Insurance:   [facility]     Phone#: [phone]    Member [place]#:[id number]     Secondary insurance:     Phone#:    Member ID#:    ?Need Authorization: Yes  , [id number]                         ? Needs Referral: No    Reference#:

## 2022-08-16 NOTE — PROGRESS NOTES
Thoracic Follow-Up  Assessment/Plan:    Carcinoid tumor of lung  Ms  Herb Oconnor presents for a post-operative visit following right thoracoscopic lower lobectomy on 7/28/22  She is recovery from surgery with some right chest wall pain which actually has been chronic for her since 2018  The pain is increased since surgery, and she is taking Tylenol, ibuprofen, and gabapentin  We encouraged her to get off of the oxycodone as soon as she can  She reports that her shortness of breath is improving daily, but she does have a little dry cough when she is conversing with us  XR demonstrates a moderate pleural effusion  We discussed OP thoracentesis which she will call us if she wishes to pursue  Her pathology is consistent with Stage IIIA typical carcinoid tumor  Adjuvant therapy is not suggested for this type of tumor  We will proceed with routine lung cancer surveillance  She will return in one month with another chest xray with routine post-operative check  Diagnoses and all orders for this visit:    Malignant carcinoid tumor of lung (HCC)  -     XR chest pa & lateral; Future    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)  -     XR chest pa & lateral; Future          Thoracic History   Diagnosis: Stage IIIA typical carcinoid tumor  Procedure: right thoracoscopic lower lobectomy 7/28/22  Pathology: 17x1  5x1 5cm typical carcinoid tumor, G1, no visceral pleural invasion  +lymphatic invasion  5/11 lymph nodes involved including 2R, 4R, 11R, and 7  AJCC Prognostic Stage Group (8th Ed ):  Stage IIIA - pT1b, pN2, MX, G1   Cancer Staging  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)  Staging form: Lung, AJCC 8th Edition  - Clinical stage from 7/28/2022: Stage IIIA (cT1b, cN2, cM0) - Signed by Jareth Luis PA-C on 8/16/2022  Histopathologic type: Carcinoid tumor, NOS  Histologic grade (G): G1  Histologic grading system: 4 grade system      Subjective:    Patient ID: Najma Mcmanus is a 51 y o  female  ecog 0  HPI   Ms Tej Cardona is a 51year old female who underwent a right thoracoscopic lower lobectomy 7/28/22 for a clinical stage IA typical carcinoid tumor  Her post-operative course was slightly prolonged secondary to air leak, but her chest tube was successfully removed on 8/4 and she was discharged  CXR 8/10/22 shows a moderate right pleural effusion  We had discussed this over the phone and she declined thoracentesis  On discussion, she is taking oxycodone every 4 hours, 0 5-1 5 tab  She is also taking gabapentin TID, ibuprofen TID and Tylenol TID  She has been having pain actually for years on that side, but it is worse since surgery  She has shortness of breath which is greatly improving every day  She denies fevers, chills, or significant cough  The following portions of the patient's history were reviewed and updated as appropriate: allergies, current medications, past family history, past medical history, past social history, past surgical history and problem list     Review of Systems   Constitutional: Positive for fatigue  Negative for chills and fever  Respiratory: Positive for shortness of breath  Negative for cough  Cardiovascular: Positive for chest pain  Gastrointestinal: Negative  Musculoskeletal: Negative  Skin: Negative  Hematological: Negative  Objective:   Physical Exam  Constitutional:       General: She is not in acute distress  Appearance: Normal appearance  HENT:      Head: Normocephalic and atraumatic  Eyes:      General: No scleral icterus  Conjunctiva/sclera: Conjunctivae normal    Cardiovascular:      Rate and Rhythm: Normal rate and regular rhythm  Pulmonary:      Effort: Pulmonary effort is normal  No respiratory distress  Breath sounds: Normal breath sounds  Comments: Right VATS incisions healing well, one prolene suture removed without incident  Abdominal:      General: There is no distension        Palpations: Abdomen is soft  Musculoskeletal:      Cervical back: Neck supple  Right lower leg: No edema  Left lower leg: No edema  Lymphadenopathy:      Cervical: No cervical adenopathy  Skin:     General: Skin is warm and dry  Neurological:      General: No focal deficit present  Mental Status: She is alert and oriented to person, place, and time  Psychiatric:         Mood and Affect: Mood normal      /70 (BP Location: Left arm, Patient Position: Sitting, Cuff Size: Standard)   Pulse 95   Temp 97 5 °F (36 4 °C) (Temporal)   Resp 16   Ht 5' 11" (1 803 m)   Wt (!) 141 kg (310 lb 13 6 oz)   SpO2 98%   BMI 43 35 kg/m²     XR chest pa & lateral    Result Date: 8/10/2022  Impression Persistent moderate right pleural effusion  Pleural air no longer identified  Otherwise no acute finding compared to 422  Workstation performed: [id number]     CT chest wo contrast    Result Date: 7/14/2022  Narrative CT CHEST WITHOUT IV CONTRAST INDICATION:   C7A 090: Malignant carcinoid tumor of the bronchus and lung  Right lower lobe nodule biopsy via navigational bronchoscopy on 5/3/2022 consistent with low-grade neuroendocrine neoplasm  Preop right lower lobectomy  COMPARISON:  Chest CT from 4/21/2022 and baseline chest CT from 3/19/2021, PET/CT from 3/4/2022 TECHNIQUE: Chest CT without intravenous contrast   Axial, sagittal, coronal 2D reformats and coronal MIPS from source data  Radiation dose length product (DLP):  815 78 mGy-cm   Radiation dose exposure minimized using iterative reconstruction and automated exposure control  FINDINGS: LUNGS:  Stable 2 0 x 1 2 cm right lower lobe nodule (3/75)  No new nodules  AIRWAYS: No significant filling defects  PLEURA:  Unremarkable  HEART/GREAT VESSELS:  Normal heart size  Moderate coronary artery calcification indicating atherosclerotic heart disease  MEDIASTINUM AND ABHI:  Stable herniation of a small amount of omental fat into the posterior mediastinum  CHEST WALL AND LOWER NECK: Bilateral breast implants  UPPER ABDOMEN:  Cholecystectomy  Left adrenal myelolipoma  Stable mild splenomegaly at 15 cm  Mild hepatic steatosis  OSSEOUS STRUCTURES: Mild degenerative disease in the spine  Impression 2 0 x 1 2 cm biopsy-proven right lower lobe carcinoid tumor, stable since April 2022  Moderate coronary artery calcification, greater than expected for the patient's age  Stable mild splenomegaly at 15 cm  Mild hepatic steatosis  Workstation performed: [id number]     CT chest wo contrast    Result Date: 4/26/2022  Narrative CT CHEST WITHOUT IV CONTRAST INDICATION:   R91 1: Solitary pulmonary nodule  COMPARISON:  Compared with CT chest of 2/3/2022 and PET scan of 3/4/2022 TECHNIQUE: CT examination of the chest was performed without intravenous contrast   Axial, sagittal, and coronal 2D reformatted images were created from the source data and submitted for interpretation  Radiation dose length product (DLP) for this visit:  904 01 mGy-cm   This examination, like all CT scans performed in the [facility], was performed utilizing techniques to minimize radiation dose exposure, including the use of iterative  reconstruction and automated exposure control  FINDINGS: LUNGS:  Lobulated nodule in the right lower lobe measuring 2 0 x 1 2 x 1 3 cm is unchanged  Lungs are otherwise clear  There is no tracheal or endobronchial lesion  PLEURA:  Unremarkable  HEART/GREAT VESSELS: Heart is unremarkable for patient's age  No thoracic aortic aneurysm  MEDIASTINUM AND ABHI:  Unremarkable  CHEST WALL AND LOWER NECK:  Bilateral saline breast implants  VISUALIZED STRUCTURES IN THE UPPER ABDOMEN:  Left adrenal myolipoma faintly identified measuring 1 6 cm  Cholecystectomy clips  Herniation of fat in the left paraesophageal region  OSSEOUS STRUCTURES:  No acute fracture or destructive osseous lesion       Impression Unchanged lobulated right lower lobe nodule measuring 2 0 x 1 2 x 1 3 cm and was mildly PET positive     Patient scheduled for navigational bronchoscopy guided biopsy  Workstation performed: [id number]     CT chest with contrast    Result Date: 2/7/2022  Narrative CT CHEST WITH IV CONTRAST INDICATION:   R91 1: Solitary pulmonary nodule  COMPARISON:  Prior outside CT study dated 3/19/2021  TECHNIQUE: CT examination of the chest was performed  Axial, sagittal, and coronal 2D reformatted images were created from the source data and submitted for interpretation  Radiation dose length product (DLP) for this visit:  1178 mGy-cm   This examination, like all CT scans performed in the [facility], was performed utilizing techniques to minimize radiation dose exposure, including the use of iterative reconstruction and automated exposure control  IV Contrast:  85 mL of iohexol (OMNIPAQUE) FINDINGS: LUNGS:  There is a lobulated nodule in the right lower lobe measuring 1 8 x 1 2 cm in the transverse dimension and 1 1 cm in craniocaudal dimensions  This appears minimally increased in size previously measuring 1 8 x 1 1 cm in the transverse dimension and 0 9 cm in craniocaudal dimensions  This could be due to slight differences in technique but either short-term follow-up in an additional 3 months or PET CT study may be helpful  No new nodules or masses  No obvious endobronchial lesion  PLEURA:  Unremarkable  HEART/GREAT VESSELS: No cardiomegaly  No thoracic aortic aneurysm  Moderate coronary artery calcifications  No pericardial effusion  MEDIASTINUM AND ABHI:  No significant mediastinal, hilar, or axillary lymphadenopathy  CHEST WALL AND LOWER NECK:   Bilateral breast implants are seen  No thyroid gland enlargement  VISUALIZED STRUCTURES IN THE UPPER ABDOMEN:  Small hiatal hernia including a small portion of the stomach and adjacent fat  Fatty infiltration of the liver  Status post cholecystectomy  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   OSSEOUS STRUCTURES:  No acute fracture or destructive osseous lesion  Degenerative changes throughout the thoracic spine with multilevel facet arthropathy resulting in multilevel central stenoses  Impression Stable to minimally increased lobulated pulmonary nodule right lower lobe as above  This can be further evaluated with either short-term follow-up CT in 3 months and/or PET CT study  No lymphadenopathy  Small hiatal hernia  Fatty liver  Left adrenal gland myelolipoma  Degenerative changes in the thoracic spine with multilevel central canal stenoses  If clinically warranted, this can be further evaluated with an MRI of the thoracic spine  The study was marked in EPIC for significant notification  Workstation performed: [id number]     NM PET CT skull base to mid thigh    Result Date: 3/4/2022  Narrative   PET/CT SCAN INDICATION:  R91 1: Solitary pulmonary nodule   further evaluation of right lower lobe opacity seen on CT study MODIFIER: PI COMPARISON: CT examination to 3/20/2022 and outside study 3/19/2021 CELL TYPE:  N/A TECHNIQUE:   12 7 mCi F-18-FDG administered IV  Multiplanar attenuation corrected and non attenuation corrected PET images were acquired 60 minutes post injection  Contiguous, low dose, axial CT sections were obtained from the skull base through the femurs   Intravenous contrast material was not utilized  This examination, like all CT scans performed in the [facility], was performed utilizing techniques to minimize radiation dose exposure, including the use of iterative reconstruction and automated exposure control  Fasting serum glucose: 94 mg/dl FINDINGS: VISUALIZED BRAIN:   No acute abnormalities are seen  HEAD/NECK:   There is a mildly enlarged and hypermetabolic right cervical lymph node image 25, measuring 1 4 x 1 1 cm SUV max 9 3  Smaller and only minimally hypermetabolic lymph node is seen directly posterior to the above-mentioned node, image 26, subcentimeter  CT images:  No additional findings CHEST:   Nonspecific morphologically normal-appearing but mildly hypermetabolic axillary lymph nodes, on the left, image 62, SUV max 4 3 but with internal fatty replacement, on the right similar morphology, image 68, SUV max  Ovoid shaped right lower lobe lesion  image 85 measuring 1 9 x 1 1 cm, similar to the previous examination  SUV max is 3 1  There is no evidence of glucose avid intrathoracic adenopathy, no pleural or pericardial effusion  No other pulmonary nodules  Coronary artery calcification present with small hiatal hernia noted ABDOMEN:   No FDG avid soft tissue lesions are seen  CT images: There is mild hepatic steatosis and the patient is status post cholecystectomy  There are several punctate nonobstructing right renal calculi  Induration and nodularity of the anterior abdominal wall may be postsurgical in nature  PELVIS: No FDG avid soft tissue lesions are seen  CT images: Status post hysterectomy  A nonglucose avid soft tissue nodule in the right hemipelvis on image 189 measures 3 4 x 2 0 cm, this may represent the right ovary if it has not been surgically removed  OSSEOUS STRUCTURES: No FDG avid lesions are seen  CT images: No significant findings  Impression 1  Ovoid shaped right lower lobe pulmonary nodule unchanged in size since most recent CT study perhaps minimally increased from 3/19/2021  SUV max is 3 1  Differential consists of low-grade pulmonary neoplasm such as carcinoid versus benign nodule such  as hamartoma (which have been reported to show low levels of glucose avidity)  Management options include soft tissue sampling versus close follow-up noncontrast chest CT in 6 months 2  In addition, there is a mildly enlarged more hypermetabolic right cervical lymph node (image 25, SUV max 9 3 )  Further evaluation is warranted  Targeted ultrasound may be considered, with potential fine-needle aspiration 3   Nonspecific mildly increased glucose activity within otherwise normal-appearing axillary lymph nodes  These may be reactive in nature and can be reassessed during the time of follow-up The examination demonstrates a significant  finding and was documented as such in Epic for liaison and referring practitioner notification  The examination demonstrates a finding requiring imaging follow-up and was logged as such in [facility]   Workstation performed: [id number]

## 2022-08-16 NOTE — TELEPHONE ENCOUNTER
Pre- operative Mohs Telephone Scheduling Note    Do you have a pacemaker or defibrillator? no    Do you take antibiotics before skin or dental procedures? no  If yes, will likely require pre-operative antibiotics  Ask  the patient why they take the antibiotics (usually because of joint replacement)  Do you have a history of a joint replacements within the past 2 years? no   If yes, will likely require pre-operative antibiotics  Ask if orthopaedic surgeon has prescribed pre-operative antibiotics to take before procedures/dental work? Do you take any OTC medications that thin your blood (Aspirin, Aleve, Ibuprofen) or supplements that thin your blood (fish oil, garlic, vitamin E, Ginko Biloba)? no    Do you take any prescribed medications that thin your blood (Coumadin, Plavix, Xarelto, Eliquis or another prescribed blood thinner)? no    Do you have an allergy to lidocaine or epinephrine? no    Do you have an allergy to shellfish? no    Do you smoke? no      If yes,  patient to try and stop 2 days before surgery and 7 days after the surgery  Minimizing smoking as much as possible during this time will improve healing and the cosmetic result after surgery  Date scheduled: 8/18/2022 with Dr Chantell Chan @ 9:30 am, SCCIS Left buttock      Coordination of Care with other provider (Oculoplastics, Plastics, ENT) required? no   IF YES, PLEASE FORWARD TO APPROPRIATE PERSONNEL TO HELP COORDINATE  Are there remaining tumors to be scheduled? no    Was Prior Authorization obtained?  No (please use  [email or web address] to document prior auth)

## 2022-08-16 NOTE — ASSESSMENT & PLAN NOTE
Ms Martina Gambino presents for a post-operative visit following right thoracoscopic lower lobectomy on 7/28/22  She is recovery from surgery with some right chest wall pain which actually has been chronic for her since 2018  The pain is increased since surgery, and she is taking Tylenol, ibuprofen, and gabapentin  We encouraged her to get off of the oxycodone as soon as she can  She reports that her shortness of breath is improving daily, but she does have a little dry cough when she is conversing with us  XR demonstrates a moderate pleural effusion  We discussed OP thoracentesis which she will call us if she wishes to pursue  Her pathology is consistent with Stage IIIA typical carcinoid tumor  Adjuvant therapy is not suggested for this type of tumor  We will proceed with routine lung cancer surveillance  She will return in one month with another chest xray with routine post-operative check

## 2022-08-18 ENCOUNTER — PROCEDURE VISIT (OUTPATIENT)
Dept: DERMATOLOGY | Facility: CLINIC | Age: 47
End: 2022-08-18
Payer: COMMERCIAL

## 2022-08-18 VITALS
DIASTOLIC BLOOD PRESSURE: 70 MMHG | TEMPERATURE: 97.8 F | OXYGEN SATURATION: 97 % | HEIGHT: 71 IN | SYSTOLIC BLOOD PRESSURE: 118 MMHG | HEART RATE: 95 BPM | BODY MASS INDEX: 41.02 KG/M2 | WEIGHT: 293 LBS

## 2022-08-18 DIAGNOSIS — D09.9 SQUAMOUS CELL CARCINOMA IN SITU: Primary | ICD-10-CM

## 2022-08-18 PROCEDURE — 17313 MOHS 1 STAGE T/A/L: CPT | Performed by: STUDENT IN AN ORGANIZED HEALTH CARE EDUCATION/TRAINING PROGRAM

## 2022-08-18 PROCEDURE — 12032 INTMD RPR S/A/T/EXT 2.6-7.5: CPT | Performed by: STUDENT IN AN ORGANIZED HEALTH CARE EDUCATION/TRAINING PROGRAM

## 2022-08-18 NOTE — PATIENT INSTRUCTIONS
Surgery After Care    WOUND CARE AFTER SURGERY:    Try NOT to remove the pressure bandage for 48 hours  Keep the area clean and dry while this bandage is on  After removing the bandage for the first time, gently clean the area with soap and water  If the bandage is difficult to remove, getting the bandage wet in the shower will sometimes help soften the adhesive and allow it to be removed more easily  You will now need to cleanse this area daily in the shower with gentle soap  There is no need to scrub the area  There is no need for further wound care for 2 weeks  You will notice over this time that the glue will start to flake off  Do not apply and Vaseline or Aquaphor to the area as this will dissolve your skin glue  If you would like to keep the area covered, non stick dressing and paper tape (or Hypafix) are recommended for sensitive skin but a bandaid is fine if it covers the area well  After 2 weeks, you can go ahead and use some Vaseline or Aquaphor to help dissolve any remaining glue  RESTRICTIONS:     For two DAYS:   - You will need to take it very easy as this time is highest risk for bleeding  Being a "couch potato" during these two days is generally recommended  - For surgeries on the face/neck/scalp: Avoid leaning down to pick things up off the floor as this brings blood up to your head  Instead, squat down to pick things up  For two WEEKS:   - No heavy lifting (anything greater than 10 pounds)   - You can start to do slow, gentle activities such as slow walking but nothing to increase your heart rate and blood pressure too much (such as cardiovascular exercise)  It is important to take it easy as there is still a risk for bleeding and a high risk popping of stitches open during this time  MANAGING YOUR PAIN AFTER SURGERY     You can expect to have some pain after surgery   This is normal  The pain is typically worse the first two days after surgery, and quickly begins to get better  The best strategy for controlling your pain after surgery is around the clock pain control  You can take over the counter Acetaminophen (Tylenol) for discomfort, if no contraindications  If you are taking this at the maximum dose, you can alternate this with Motrin (ibuprofen or Advil) as well  Alternating these medications with each other allows you to maximize your pain control  In addition to Tylenol and Motrin, you can use heating pads or ice packs on your incisions to help reduce your pain  How will I alternate your regular strength over-the-counter pain medication? You will take a dose of pain medication every three hours  Start by taking 650 mg of Tylenol (2 pills of 325 mg)   3 hours later take 600 mg of Motrin (3 pills of 200 mg)   3 hours after taking the Motrin take 650 mg of Tylenol   3 hours after that take 600 mg of Motrin  See example - if your first dose of Tylenol is at 12:00 PM     12:00 PM  Tylenol 650 mg (2 pills of 325 mg)    3:00 PM  Motrin 600 mg (3 pills of 200 mg)    6:00 PM  Tylenol 650 mg (2 pills of 325 mg)    9:00 PM  Motrin 600 mg (3 pills of 200 mg)    Continue alternating every 3 hours      Important:   Do not take more than 4000mg of Tylenol or 3200mg of Motrin in a 24-hour period  What if I still have pain? If you have pain that is not controlled with the over-the-counter pain medications (Tylenol and Motrin or Advil), don't hesitate to call our staff using the number provided  We will help make sure you are managing your pain in the best way possible, and if necessary, we can provide a prescription for additional pain medication  CALL OUR OFFICE IMMEDIATELY FOR ANY SIGNS OF INFECTION:    This includes fever, chills, increased redness, warmth, tenderness, severe discomfort/pain, or pus or foul smell coming from the wound   [facility] directly at [phone] (SKIN)    IF BLEEDING IS NOTICED:    Place a clean cloth over the area and apply firm pressure for thirty minutes  Check the wound ONLY after 30 minutes of direct pressure; do not cheat and sneak a peak, as that does not count  If bleeding persists after 30 minutes of legitimate direct pressure, then try one more round of direct pressure to the area  Should the bleeding become heavier or not stop after the second attempt, call [facility] directly at [phone] (SKIN)  Your call will get routed to the dermatology surgeon on call even after hours

## 2022-08-18 NOTE — PROGRESS NOTES
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  Past Medical History:   Diagnosis Date    Acid reflux     Trejo's esophagus     GERD (gastroesophageal reflux disease)     Hiatal hernia     Kidney stone     Latent tuberculosis by skin test     Was treated with INH for 6 months    Obstructive sleep apnea 2018    Mild TERESITA  AHI 18 1       Past Surgical History:   Procedure Laterality Date    ABDOMINOPLASTY      AUGMENTATION BREAST Bilateral 2015    BRONCHOSCOPY N/A 2022    Procedure: BRONCHOSCOPY NAVIGATIONAL;  Surgeon: Ehsan De Leon MD;  [facility];  Service: Thoracic    EGD      ENDOBRONCHIAL ULTRASOUND (EBUS) N/A 2022    Procedure: ENDOBRONCHIAL ULTRASOUND (EBUS); Surgeon: Ehsan De Leon MD;  [facility];  Service: Thoracic    HYSTERECTOMY      IR BIOPSY LYMPH NODE  2022    IR CHEST TUBE PLACEMENT  2022    LAPAROSCOPIC CHOLECYSTECTOMY  1997    LIPOSUCTION      [place] [address] N/A 2022    Procedure: Minor Lea;  Surgeon: Ehsan De Leon MD;  [facility];  Service: Thoracic    ND [address] N/A 2022    Procedure: Minorazul Tate;  Surgeon: Charley Donaldson MD;  [facility];  Service: Thoracic    ND THORACOSCOPY SURG LOBECTOMY Right 2022    Procedure: LOBECTOMY LUNG;  Surgeon: Charley Donaldson MD;  [facility];  Service: Thoracic    ND THORACOSCOPY SURG LOBECTOMY Right 2022    Procedure: THORACOSCOPY VIDEO ASSISTED SURGERY (VATS);   Surgeon: Charley Donaldson MD;  [facility];  Service: Thoracic    REPAIR RECTOCELE  2018    TONSILLECTOMY  1984         Current Outpatient Medications:     acyclovir (ZOVIRAX) 800 mg tablet, Take 400 mg by mouth 2 (two) times a day, Disp: , Rfl:     ALPRAZolam (XANAX) 0 25 mg tablet, Take 1 tablet (0 25 mg total) by mouth daily at bedtime as needed for anxiety, Disp: 30 tablet, Rfl: 0    docusate sodium (COLACE) 100 mg capsule, Take 1 capsule (100 mg total) by mouth 2 (two) times a day as needed for constipation, Disp: 20 capsule, Rfl: 0    gabapentin (Neurontin) 300 mg capsule, Take 1 capsule (300 mg total) by mouth 3 (three) times a day, Disp: 90 capsule, Rfl: 1    ibuprofen (MOTRIN) 600 mg tablet, Take 1 tablet (600 mg total) by mouth every 8 (eight) hours for 7 days (Patient not taking: Reported on 8/16/2022), Disp: 21 tablet, Rfl: 0    levocetirizine (XYZAL) 5 MG tablet, Take 1 tablet (5 mg total) by mouth every evening (Patient taking differently: Take 5 mg by mouth in the morning), Disp: 90 tablet, Rfl: 3    nicotine (NICOTROL) 10 MG inhaler, Inhale 1 puff as needed for smoking cessation, Disp: 120 each, Rfl: 0    Omega-3 Fatty Acids (Fish Oil) 1200 MG CAPS, Take by mouth in the morning (Patient not taking: No sig reported), Disp: , Rfl:     omeprazole (PriLOSEC) 40 MG capsule, daily  , Disp: , Rfl:     oxyCODONE (Roxicodone) 5 immediate release tablet, Take 1 tablet (5 mg total) by mouth every 4 (four) hours as needed for severe pain Max Daily Amount: 30 mg, Disp: 20 tablet, Rfl: 0    Allergies   Allergen Reactions    Metronidazole Anaphylaxis     "patient states she has been in anaphylaxis due to this medication prior"      Nitrofurantoin Shortness Of Breath       Physical Exam: There were no vitals filed for this visit    General: Awake, Alert, Oriented x 3, Mood and affect appropriate  Respiratory: Respirations even and unlabored  Cardiovascular: Peripheral pulses intact; no edema  Musculoskeletal Exam: n/a  Skin: 2 0 x 1 8 pink scar on left buttock    Assessment: 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    Plan: MOHS    MOHS Procedure Timeout    Flowsheet Row Most Recent Value Timeout: 0945   Patient Identity Verified: Yes   Correct Site Verified: Yes   Correct Procedure Verified: Yes          MOHS Diagnosis/Indication/Location/ID    Flowsheet Row Most Recent Value   Pathology Type Squamous cell carcinoma   Anatomic Site --  [Left Buttock]   Indications for MOHS Tumor location   MOHS ID [id number]          MOHS Site/Accession/Pre-Post    Flowsheet Row Most Recent Value   Original Site Identified (as submitted by referring clinician) Photo, Referral   Biopsy Accession/Specimen # (as submitted by referring clincian) [id number]   Pre-MOHS Size Length (cm) 2   Pre-MOHS Size Width (cm) 1 8   Post-MOHS Size-Length (cm) 2 5   Post MOHS Size-Width (cm) 2 0   Repair Type Intermediate layered closure- Combined closure, see repair details in Mohs Note 2    Anesthetic Used 1% Lidocaine with epinephrine  [with bicarb]     MOHS Tumor Stage 1 Information    Flowsheet Row Most Recent Value   Tissue Sections (blocks) 1   Microscopic Exam Section 1: No tumor identified in section  Tumor Clear After Stage I? Yes           Frozen Section Biopsy      Physical Exam:  · (Anatomic Location); (Size and Morphological Description); (Differential Diagnosis): ? Ventral left buttocks; 1 5 cm x 1 8 cm dark brown patch, favor macular SK vs macular condyloma vs SCCis       Additional History of Present Condition:  Patient here for MOHS for left buttocks and states the lesion near upper area from previous biopsy looks similar and is concerned        Assessment and Plan:  · I have discussed with the patient that a sample of skin via a "skin biopsy under frozen section would be potentially helpful to further make a specific diagnosis under the microscope  Discussed biopsy rather than taking this as part of first Mohs layer for primary Mohs site given the sites discontinuous nature     · Based on a thorough discussion of this condition and the management approach to it (including a comprehensive discussion of the known risks, side effects and potential benefits of treatment), the patient (family) agrees to implement the following specific plan:     § Procedure:  Frozen section biopsy   § After a thorough discussion of treatment options and risk/benefits/alternatives (including but not limited to local pain, scarring, dyspigmentation, blistering, possible superinfection, and inability to confirm a diagnosis via histopathology), verbal and written consent were obtained and portion of the rash was biopsied for tissue sample  See below for consent that was obtained from patient and subsequent Procedure Note  Pathology on frozen section review: Within the epidermis there are focal areas of  full thickness proliferation of atypical keratinocytes with scattered mitoses  There are also foci of partial thickness atypical keratinocytes  No invasion is noted  The pathology is c/w SCC in situ        MOHS Procedure 2 Note      History of Present Illness: The patient is a 51 y o  female here for Mohs surgery of the ventral buttocks with an additional frozen-section-confirmed  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 ventral      Physical Exam: There were no vitals filed for this visit    General: Awake, Alert, Oriented x 3, Mood and affect appropriate  Respiratory: Respirations even and unlabored  Cardiovascular: Peripheral pulses intact; no edema  Musculoskeletal Exam: n/a  Skin: 1 5 cm x 1 8 cm dark brown patch on the left ventral buttock (left medial buttock, lateral to labia and ventral to first Mohs site)    Assessment: 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 on frozen section    Plan: MOHS     MOHS Procedure Timeout    Flowsheet Row Most Recent Value   Timeout: 0945   Patient Identity Verified: Yes   Correct Site Verified: Yes   Correct Procedure Verified: Yes          MOHS Diagnosis/Indication/Location/ID    Flowsheet Row Most Recent Value   Pathology Type Squamous cell carcinoma, in situ   Anatomic Site --  [Left ventral buttocks] MOHS ID [id number]            MOHS Site/Accession/Pre-Post    Flowsheet Row Most Recent Value   Original Site Identified (as submitted by referring clinician) Identified by pt given concern and biopsied with frozen section today given proximity to initial Mohs site  Biopsy Accession/Specimen # (as submitted by referring clincian) [id number]   Pre-MOHS Size Length (cm) 1 5   Pre-MOHS Size Width (cm) 1 8   Post-MOHS Size-Length (cm) 2   Post MOHS Size-Width (cm) 1 8   Repair Type Intermediate layered closure- Combined closure for site 1 and 2   Suture Type Monocryl, Vicryl, Fast absorbing gut   Fast Absorbing Suture Size 5   Monocryl Suture Size 4   Vicryl Suture Size 3   Final repair length (cm): 4 5   Anesthetic Used 1% Lidocaine with epinephrine  [with bicarb]        MOHS Tumor Stage 1 Information    Flowsheet Row Most Recent Value   Tissue Sections (blocks) 1   Microscopic Exam Section 1: No tumor identified in section  Tumor Clear After Stage I? Yes                   Patient identified, procedure verified, site identified and verified  Time out completed  Surgical removal of the lesion discussed with the patient (risks and benefits, including possibility of scarring, infection, recurrence or potential for further treatment)  I have specifically identified the site with the patient  I have discussed the fact that the patient will have a scar after the procedure regardless of granulation or repair with sutures  I have discussed that the repair options can range from granulation in some cases to linear or curvilinear closures to larger flaps or grafts  There are sometimes flaps or grafts used that require multiples stages of surgery and will not be completed today, rather be completed over a series of appointments   I have discussed that occasionally due to location, size or depth of the lesion I may recommend consultation with and transfer of care for further removal or the reconstruction to another provider such as ophthalmology surgery, plastic surgery, ENT surgery, or surgical oncology  There are cases in which other testing such as imaging with MRI or CT scan or testing of lymph nodes is recommended because of the nature/depth/location of tumor seen during the removal  There is a risk of injury to nerves causing temporary or permanent numbness or the inability to move muscles full such as the inability to lift eyebrows  Questions answered and verbal and written consent was obtained  The tumor qualifies for Mohs based on AUC criteria  With the patient in the supine position and under adequate local anesthesia with 1% lidocaine with epinephrine 1:100,000, the defect was scrubbed with Hibiclens  Sterile drapes were placed from the sterile tray  Because of the location of the surgical defect, an intermediate closure was judged to give the best possible cosmetic and functional result  The edges of the defect were carefully debrided removing any dead or coagulated tissue  Hemostasis was obtained by pinpoint electrocoagulation  Careful planning of removal of redundant tissue at either end of the defect was drawn out so that the suture lines would fall in the optimal orientation with regard to the relaxed skin tension lines  These were then removed with a #15 blade scalpel  The wound was then approximated by a deep layer of buried vertical mattress sutures and the cutaneous margins were approximated and closed by superficial sutures as noted above  Estimated blood loss was less than 5 mL  The patient tolerated the procedure well  The wound was dressed with cyanoacrylate glue, a non-stick pad, and a compression dressing  Sandra Augustin MD served as the surgeon and pathologist during the procedure  Postoperative care: Wound care discussed at length  I urged the patient to call us if any problems or question should arise       Complications: none  Post-op medications: none  Patient condition after procedure: stable  Discharge plans: Plan for follow-up for wound evaluation in 14 days      Scribe Attestation    I,:  Ho Jiménez am acting as a scribe while in the presence of the attending physician :       I,:  Jn Garcia MD personally performed the services described in this documentation    as scribed in my presence :

## 2022-08-26 ENCOUNTER — TELEPHONE (OUTPATIENT)
Dept: DERMATOLOGY | Facility: CLINIC | Age: 47
End: 2022-08-26

## 2022-08-26 NOTE — TELEPHONE ENCOUNTER
Pt LM with wound concerns from her MOHS procedure on 8/18/22 with Dr Manish Clarke  Contacted patient  Pt states the glue that was applied during the procedure came off about 2 days post surgery  She started applying Vaseline and a bandage on the area as directed from that point on  Pt states the wound appears to be open  She tried contacting Dr Manish Clarke through the [facility] portal by replying to Dr Niki Calderon previous messages through the portal  Informed patient that no messages came through dermatology since July, but there are messages through her [facility] which would not notify Dr Manish Clarke directly  Recommended patient to be seen in the office today by at least a nurse to assess the area if she feels the wound has opened  Patient does not want to travel to the office today  She feels this can wait until early next week  Offered patient Monday with Dr Mindi Fishman, but she prefers to see only Dr Manish Clarke given the area of the procedure  Appointment made for Tuesday with Dr Manish Clarke  Advised patient to contact office or be seen at an Urgent Care/ED if symptoms worsen before Tuesday's appt

## 2022-08-29 ENCOUNTER — TELEPHONE (OUTPATIENT)
Dept: OTHER | Facility: OTHER | Age: 47
End: 2022-08-29

## 2022-08-29 NOTE — TELEPHONE ENCOUNTER
Patient wants to know if you received her photo and does she have to come in for her appointment today  She has an appointment at 12:15  She needs to know ASAP  Was the photo sufficient? She doesn't want to drive that far if she doesn't have to  Pls return the patients call

## 2022-08-30 ENCOUNTER — TELEPHONE (OUTPATIENT)
Dept: DERMATOLOGY | Facility: CLINIC | Age: 47
End: 2022-08-30

## 2022-08-30 ENCOUNTER — PATIENT MESSAGE (OUTPATIENT)
Dept: CARDIAC SURGERY | Facility: CLINIC | Age: 47
End: 2022-08-30

## 2022-08-30 ENCOUNTER — OFFICE VISIT (OUTPATIENT)
Dept: DERMATOLOGY | Facility: CLINIC | Age: 47
End: 2022-08-30
Payer: COMMERCIAL

## 2022-08-30 DIAGNOSIS — G89.4 CHRONIC PAIN SYNDROME: ICD-10-CM

## 2022-08-30 DIAGNOSIS — Z51.89 VISIT FOR WOUND CHECK: ICD-10-CM

## 2022-08-30 DIAGNOSIS — B37.31 VULVOVAGINAL CANDIDIASIS: Primary | ICD-10-CM

## 2022-08-30 PROCEDURE — 99213 OFFICE O/P EST LOW 20 MIN: CPT | Performed by: STUDENT IN AN ORGANIZED HEALTH CARE EDUCATION/TRAINING PROGRAM

## 2022-08-30 RX ORDER — FLUCONAZOLE 150 MG/1
150 TABLET ORAL ONCE
Qty: 1 TABLET | Refills: 0 | Status: SHIPPED | OUTPATIENT
Start: 2022-08-30 | End: 2022-08-30

## 2022-08-30 RX ORDER — GABAPENTIN 300 MG/1
300 CAPSULE ORAL 3 TIMES DAILY
Qty: 90 CAPSULE | Refills: 1 | Status: SHIPPED | OUTPATIENT
Start: 2022-08-30 | End: 2022-10-03 | Stop reason: SDUPTHER

## 2022-08-30 NOTE — TELEPHONE ENCOUNTER
Pt called and wanted to make sure she had to come in today even though she is scheduled for 9/6 at the [place] office for the wound check  I did explain that it looks like Dr Neeraj Trejo did say in the notes as of yesterday around 10ish that she would like her to come in b/c it looks like it's open from what she could see in the picture  Pt said ok but she would rather not drive and if she doesn't have to  Pt said she has to leave her house around 11:15 to make it to her appt  She said if she doesn't hear back by that time then she will just have to come in  I did let her know that Dr Neeraj Trejo and her staff are in surgeries and may not be able to respond in time

## 2022-08-30 NOTE — PATIENT INSTRUCTIONS
WOUND CHECK       Assessment and Plan:  Based on a thorough discussion of this condition and the management approach to it (including a comprehensive discussion of the known risks, side effects and potential benefits of treatment), the patient (family) agrees to implement the following specific plan:  Mupirocin applied in office today  Start Mupirocin ointment 2%, 3 times daily as needed  Start Diflucan 150 mg, take one tablet by mouth for 1 dose  Follow up in 6-8 weeks, scheduled for 10/18/22

## 2022-08-30 NOTE — PROGRESS NOTES
WOUND CHECK    Physical Exam:   Anatomic Location Affected:  Left buttock    Description of wound: well healing dehisced wound with fibrous tissue   Closure Type: intermediate     Additional History of Present Condition:  Patient presents today for a wound check on the left buttocks  Patient states that her incision site opened but denies any pain at this time just a little itching near the surgical site  She has been applying Vaseline daily  Notes vulvar itch as well since surgery    Assessment and Plan:  Based on a thorough discussion of this condition and the management approach to it (including a comprehensive discussion of the known risks, side effects and potential benefits of treatment), the patient (family) agrees to implement the following specific plan:   Discussed wound healing well, would allow to heal secondarily at this time  Mupirocin applied in office today  Cont daily cleansing and cleansing and changing bandage with BMs or urination   Start Mupirocin ointment 2%, 3 times daily as needed   Start Diflucan 150 mg, take one tablet by mouth for 1 dose for sx of vulvar itch s/o Vulvovaginal candidiasis   Follow up in 6-8 weeks, scheduled for 10/18/22            Scribe Attestation    I,:  Vanesa Rome MA am acting as a scribe while in the presence of the attending physician :       I,:  Woody Lorenz MD personally performed the services described in this documentation    as scribed in my presence :

## 2022-08-30 NOTE — TELEPHONE ENCOUNTER
Tried calling pt back who left a message asking if she needs to come to her appt, I called 3x times and it was picked up and hung up on  Unable to leave a vm

## 2022-09-01 RX ADMIN — Medication: at 16:48

## 2022-09-06 ENCOUNTER — PATIENT MESSAGE (OUTPATIENT)
Dept: CARDIAC SURGERY | Facility: CLINIC | Age: 47
End: 2022-09-06

## 2022-09-15 ENCOUNTER — HOSPITAL ENCOUNTER (OUTPATIENT)
Dept: RADIOLOGY | Facility: HOSPITAL | Age: 47
Discharge: HOME | End: 2022-09-15
Payer: COMMERCIAL

## 2022-09-15 DIAGNOSIS — C7B.09 CARCINOID TUMOR METASTATIC TO INTRATHORACIC LYMPH NODE (HCC): ICD-10-CM

## 2022-09-15 DIAGNOSIS — C7A.00 CARCINOID TUMOR METASTATIC TO INTRATHORACIC LYMPH NODE (HCC): ICD-10-CM

## 2022-09-15 DIAGNOSIS — C7A.090 MALIGNANT CARCINOID TUMOR OF LUNG (HCC): ICD-10-CM

## 2022-09-15 PROCEDURE — 71046 X-RAY EXAM CHEST 2 VIEWS: CPT

## 2022-09-16 ENCOUNTER — TELEMEDICINE (OUTPATIENT)
Dept: CARDIAC SURGERY | Facility: CLINIC | Age: 47
End: 2022-09-16

## 2022-09-16 ENCOUNTER — TELEPHONE (OUTPATIENT)
Dept: HEMATOLOGY ONCOLOGY | Facility: CLINIC | Age: 47
End: 2022-09-16

## 2022-09-16 DIAGNOSIS — J90 PLEURAL EFFUSION: Primary | ICD-10-CM

## 2022-09-16 DIAGNOSIS — C7A.00 CARCINOID TUMOR METASTATIC TO INTRATHORACIC LYMPH NODE (HCC): ICD-10-CM

## 2022-09-16 DIAGNOSIS — C7B.09 CARCINOID TUMOR METASTATIC TO INTRATHORACIC LYMPH NODE (HCC): ICD-10-CM

## 2022-09-16 PROCEDURE — 99024 POSTOP FOLLOW-UP VISIT: CPT | Performed by: PHYSICIAN ASSISTANT

## 2022-09-16 NOTE — PROGRESS NOTES
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 ([facility] [address] )     Ms Zaina Yarbrough is progressing well from a thoracic surgery standpoint  She does have a moderate right pleural effusion on her cxr, but again declines a thoracentesis  I would like one more cxr in one month with a virtual visit with a PA to ensure it's not affecting her daily activities and decreasing  She is in agreement with the plan  Other Visit Diagnoses     Pleural effusion    -  Primary    Relevant Orders    XR chest pa & lateral               Reason for visit is   Chief Complaint   Patient presents with    Virtual Regular Visit        Encounter provider Sophia Thomas PA-C    Provider located at [address] [place] [id number] [phone]      Recent Visits  No visits were found meeting these conditions  Showing recent visits within past 7 days and meeting all other requirements  Today's Visits  Date Type Provider Dept   09/16/22 Telemedicine Sophia Thomas PA-C [facility]   Showing today's visits and meeting all other requirements  Future Appointments  No visits were found meeting these conditions  Showing future appointments within next 150 days and meeting all other requirements       The patient was identified by name and date of birth  Jenell Sandhoff was informed that this is a telemedicine visit and that the visit is being conducted through [address] [place] [address] Now and patient was informed that this is a secure, HIPAA-compliant platform  She agrees to proceed     My office door was closed  No one else was in the room  She acknowledged consent and understanding of privacy and security of the video platform   The patient has agreed to participate and understands they can discontinue the visit at any time  Patient is aware this is a billable service  ECOG 0    Nikolay Orozco is a 51 y o  female who underwent a right thoracoscopic lower lobectomy on 7/28/22 for a stage IIIA typical carcinoid tumor  She did not require any adjuvant therapy, since it was typical carcinoid  She was last seen on 8/16/22 at which point we offered a thoracentesis for her right pleural effusion, but she declined  Her repeat cxr from 9/15/22 revealed a persistent right moderate pleural effusion, but the patient states she is handling all of her daily activities without limitation and feels her breathing continues to improve  She is still using her IS and is able to get it to the top without a problem  She does have a slight cough with clear sputum, but denies fever, chills, green sputum, hemoptysis, or excessive shortness of breath  She is no longer taking any pain medication  HPI     Past Medical History:   Diagnosis Date    Acid reflux     Trejo's esophagus     GERD (gastroesophageal reflux disease)     Hiatal hernia     Kidney stone     Latent tuberculosis by skin test 1986    Was treated with INH for 6 months    Obstructive sleep apnea 05/27/2018    Mild TERESITA  AHI 18 1    Squamous cell skin cancer     Left buttocks       Past Surgical History:   Procedure Laterality Date    ABDOMINOPLASTY      AUGMENTATION BREAST Bilateral 2015    BRONCHOSCOPY N/A 05/03/2022    Procedure: BRONCHOSCOPY NAVIGATIONAL;  Surgeon: Wanda Winston MD;  [facility];  Service: Thoracic    EGD  2018    ENDOBRONCHIAL ULTRASOUND (EBUS) N/A 05/03/2022    Procedure: ENDOBRONCHIAL ULTRASOUND (EBUS);   Surgeon: Wanda Winston MD;  [facility];  Service: Thoracic    HYSTERECTOMY  2014    IR BIOPSY LYMPH NODE  03/21/2022    IR CHEST TUBE PLACEMENT  08/01/2022    LAPAROSCOPIC CHOLECYSTECTOMY  1997    LIPOSUCTION  2015    MOHS SURGERY Left 08/18/2022    left buttocks    MN BRONCHOSCOPY,DIAGNOSTIC N/A 05/03/2022    Procedure: BRONCHOSCOPY FLEXIBLE;  Surgeon: Nicki Acosta MD;  [facility];  Service: Thoracic    MN BRONCHOSCOPY,DIAGNOSTIC N/A 07/28/2022    Procedure: Pedro Mallet;  Surgeon: Lon Brunner, MD;  [facility];  Service: Thoracic    MN THORACOSCOPY SURG LOBECTOMY Right 07/28/2022    Procedure: LOBECTOMY LUNG;  Surgeon: Lon Brunner, MD;  [facility];  Service: Thoracic    MN THORACOSCOPY SURG LOBECTOMY Right 07/28/2022    Procedure: THORACOSCOPY VIDEO ASSISTED SURGERY (VATS);   Surgeon: Lon Brunner, MD;  [facility];  Service: Thoracic    REPAIR RECTOCELE  2018    TONSILLECTOMY  1984       Current Outpatient Medications   Medication Sig Dispense Refill    acyclovir (ZOVIRAX) 800 mg tablet Take 400 mg by mouth 2 (two) times a day      ALPRAZolam (XANAX) 0 25 mg tablet Take 1 tablet (0 25 mg total) by mouth daily at bedtime as needed for anxiety 30 tablet 0    docusate sodium (COLACE) 100 mg capsule Take 1 capsule (100 mg total) by mouth 2 (two) times a day as needed for constipation 20 capsule 0    gabapentin (Neurontin) 300 mg capsule Take 1 capsule (300 mg total) by mouth 3 (three) times a day 90 capsule 1    ibuprofen (MOTRIN) 600 mg tablet Take 1 tablet (600 mg total) by mouth every 8 (eight) hours for 7 days (Patient not taking: Reported on 8/16/2022) 21 tablet 0    levocetirizine (XYZAL) 5 MG tablet Take 1 tablet (5 mg total) by mouth every evening 90 tablet 3    nicotine (NICOTROL) 10 MG inhaler Inhale 1 puff as needed for smoking cessation 120 each 0    Omega-3 Fatty Acids (Fish Oil) 1200 MG CAPS Take by mouth in the morning (Patient not taking: No sig reported)      omeprazole (PriLOSEC) 40 MG capsule daily        oxyCODONE (Roxicodone) 5 immediate release tablet Take 1 tablet (5 mg total) by mouth every 4 (four) hours as needed for severe pain Max Daily Amount: 30 mg 20 tablet 0     No current facility-administered medications for this visit  Allergies   Allergen Reactions    Metronidazole Anaphylaxis     "patient states she has been in anaphylaxis due to this medication prior"      Nitrofurantoin Shortness Of Breath       Review of Systems   as above in HPI  Video Exam    There were no vitals filed for this visit  Physical Exam  Vitals reviewed  Constitutional:       General: She is not in acute distress  Appearance: Normal appearance  She is not ill-appearing  HENT:      Head: Normocephalic and atraumatic  Eyes:      General: No scleral icterus  Extraocular Movements: Extraocular movements intact  Pulmonary:      Effort: Pulmonary effort is normal  No respiratory distress  Musculoskeletal:      Cervical back: Normal range of motion and neck supple  Skin:     General: Skin is dry  Neurological:      General: No focal deficit present  Mental Status: She is alert and oriented to person, place, and time     Psychiatric:         Mood and Affect: Mood normal          Behavior: Behavior normal           I spent 10 minutes directly with the patient during this visit

## 2022-09-16 NOTE — ASSESSMENT & PLAN NOTE
Ms Dixie Holter is progressing well from a thoracic surgery standpoint  She does have a moderate right pleural effusion on her cxr, but again declines a thoracentesis  I would like one more cxr in one month with a virtual visit with a PA to ensure it's not affecting her daily activities and decreasing  She is in agreement with the plan

## 2022-09-20 DIAGNOSIS — F41.9 ANXIETY: ICD-10-CM

## 2022-09-20 RX ORDER — ALPRAZOLAM 0.25 MG/1
0.25 TABLET ORAL
Qty: 30 TABLET | Refills: 0 | Status: SHIPPED | OUTPATIENT
Start: 2022-09-20

## 2022-09-30 ENCOUNTER — DOCUMENTATION (OUTPATIENT)
Dept: CARDIAC SURGERY | Facility: CLINIC | Age: 47
End: 2022-09-30

## 2022-10-13 ENCOUNTER — TELEPHONE (OUTPATIENT)
Dept: DERMATOLOGY | Facility: CLINIC | Age: 47
End: 2022-10-13

## 2022-10-13 NOTE — TELEPHONE ENCOUNTER
Called patient to ensure that everything is healing well, since she is cancelling her post-op appointment  Patient reports that everything is well healed and she is having no issues at this time  She did message Dr Regan De La Rosa to ensure she doesn't need to return as well as sent pictures  I told the patient I would reach out to Dr Regan De La Rosa to confirm, and that I would call her back if we needed her to come in, if not that we are happy she is healing well and doing great  I encouraged patient to get yearly full body skin exams moving forward now that she has had a skin cancer

## 2022-10-14 ENCOUNTER — TELEPHONE (OUTPATIENT)
Dept: SURGICAL ONCOLOGY | Facility: CLINIC | Age: 47
End: 2022-10-14

## 2022-10-14 NOTE — TELEPHONE ENCOUNTER
Spoke with patient regarding virtual appointment with Ramez Olsen on 10/17/2022  Patient is aware that she needs a CXR done before the appointment, she sates she will be going to get it done on 10/17/2022 before her appointment time with [facility]

## 2022-10-17 ENCOUNTER — HOSPITAL ENCOUNTER (OUTPATIENT)
Dept: RADIOLOGY | Facility: HOSPITAL | Age: 47
Discharge: HOME | End: 2022-10-17
Payer: COMMERCIAL

## 2022-10-17 ENCOUNTER — TELEMEDICINE (OUTPATIENT)
Dept: CARDIAC SURGERY | Facility: CLINIC | Age: 47
End: 2022-10-17

## 2022-10-17 ENCOUNTER — TELEPHONE (OUTPATIENT)
Dept: FAMILY MEDICINE CLINIC | Facility: CLINIC | Age: 47
End: 2022-10-17

## 2022-10-17 DIAGNOSIS — C7A.00 CARCINOID TUMOR METASTATIC TO INTRATHORACIC LYMPH NODE (HCC): Primary | ICD-10-CM

## 2022-10-17 DIAGNOSIS — J90 PLEURAL EFFUSION: ICD-10-CM

## 2022-10-17 DIAGNOSIS — C7B.09 CARCINOID TUMOR METASTATIC TO INTRATHORACIC LYMPH NODE (HCC): Primary | ICD-10-CM

## 2022-10-17 PROCEDURE — 99024 POSTOP FOLLOW-UP VISIT: CPT | Performed by: PHYSICIAN ASSISTANT

## 2022-10-17 PROCEDURE — 71046 X-RAY EXAM CHEST 2 VIEWS: CPT

## 2022-10-17 NOTE — ASSESSMENT & PLAN NOTE
Ms Jose Angel Coreas is progressing well from a thoracic surgerstandpoint  Her breathing is improving and her effusion is small  I will have her return in 3 months with a CT scan of the chest to being routine surveillance  I would also have her discuss her right lower quadrant pain with her PCP, in the even this is a separate entity from her lung surgery/ chest tube  I will also send a note to her PCP regarding this  The patient is in agreement with the plan

## 2022-10-17 NOTE — PROGRESS NOTES
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 ([facility] [address] ) - Primary     Ms  Shane Estevez is progressing well from a thoracic surgerstandpoint  Her breathing is improving and her effusion is small  I will have her return in 3 months with a CT scan of the chest to being routine surveillance  I would also have her discuss her right lower quadrant pain with her PCP, in the even this is a separate entity from her lung surgery/ chest tube  I will also send a note to her PCP regarding this  The patient is in agreement with the plan  Relevant Orders    CT chest wo contrast               Reason for visit is   Chief Complaint   Patient presents with   • Virtual Regular Visit        Encounter provider Joelle Cortez PA-C    Provider located at [address] [place] [id number]  [phone]      Recent Visits  No visits were found meeting these conditions  Showing recent visits within past 7 days and meeting all other requirements  Today's Visits  Date Type Provider Dept   10/17/22 Telemedicine Joelle Cortez PA-C [facility]   Showing today's visits and meeting all other requirements  Future Appointments  No visits were found meeting these conditions  Showing future appointments within next 150 days and meeting all other requirements       The patient was identified by name and date of birth  Juliethgiuseppe Piper was informed that this is a telemedicine visit and that the visit is being conducted through [address] and patient was informed this is a secure, HIPAA-complaint platform  She agrees to proceed     My office door was closed  No one else was in the room    She acknowledged consent and understanding of privacy and security of the video platform  The patient has agreed to participate and understands they can discontinue the visit at any time  She does complain of right lower abdominal quadrant area since her chest tube was pulled  This is unlikely a sequelae of her surgery, but I do think if she continues to have pain in this area, she should be worked up for it by her pcp  Patient is aware this is a billable service  Subjective  Sravani Thomas is a 51 y o  female ECOG 0     Cancer Staging  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 ([facility] [address] )  Staging form: Lung, AJCC 8th Edition  - Clinical stage from 7/28/2022: Stage IIIA (cT1b, cN2, cM0) - Signed by Shashi Willett PA-C on 8/16/2022  Histopathologic type: Carcinoid tumor, NOS  Histologic grade (G): G1  Histologic grading system: 4 grade system    HPI     Ms Norma Bradley is a 50 yo female who underwent a right thoracoscopic lower lobectomy on 7/28/22 for stage IIIA typical carcinoid tumor  She was last seen in a virtual visit on 9/16/22 at which point her cxr revealed a persistent right moderate pleural effusion, but was not symptomatic  She declined a thoracentesis, so we agreed on a 1 month follow up cxr  Her most recent CXR from 10/17/22 revealed a stable small right pleural effusion  On discussion, she does have an occasional cough with clear sputum production  She does have some shortness of breath with exertion, but is able to carry out her daily activities, such as cleaning the house, etc  She feels her shortness of breath is improving  Past Medical History:   Diagnosis Date   • Acid reflux    • Trejo's esophagus    • GERD (gastroesophageal reflux disease)    • Hiatal hernia    • Kidney stone    • Latent tuberculosis by skin test 1986    Was treated with INH for 6 months   • Obstructive sleep apnea 05/27/2018    Mild TERESITA    AHI 18 1   • Squamous cell skin cancer     Left buttocks       Past Surgical History:   Procedure Laterality Date   • ABDOMINOPLASTY     • AUGMENTATION BREAST Bilateral 2015   • BRONCHOSCOPY N/A 05/03/2022    Procedure: BRONCHOSCOPY NAVIGATIONAL;  Surgeon: Elise Azul MD;  [facility];  Service: Thoracic   • EGD  2018   • ENDOBRONCHIAL ULTRASOUND (EBUS) N/A 05/03/2022    Procedure: ENDOBRONCHIAL ULTRASOUND (EBUS); Surgeon: Elise Azul MD;  [facility];  Service: Thoracic   • HYSTERECTOMY  2014   • IR BIOPSY LYMPH NODE  03/21/2022   • IR CHEST TUBE PLACEMENT  08/01/2022   • LAPAROSCOPIC CHOLECYSTECTOMY  1997   • LIPOSUCTION  2015   • MOHS SURGERY Left 08/18/2022    left buttocks   • NH BRONCHOSCOPY,DIAGNOSTIC N/A 05/03/2022    Procedure: Andressa Campoverde;  Surgeon: Elise Azul MD;  [facility];  Service: Thoracic   • NH BRONCHOSCOPY,DIAGNOSTIC N/A 07/28/2022    Procedure: Andressa Campoverde;  Surgeon: Noah King MD;  [facility];  Service: Thoracic   • NH THORACOSCOPY SURG LOBECTOMY Right 07/28/2022    Procedure: LOBECTOMY LUNG;  Surgeon: Noah King MD;  [facility];  Service: Thoracic   • NH THORACOSCOPY SURG LOBECTOMY Right 07/28/2022    Procedure: THORACOSCOPY VIDEO ASSISTED SURGERY (VATS);   Surgeon: Noah King MD;  [facility];  Service: Thoracic   • REPAIR RECTOCELE  2018   • TONSILLECTOMY  1984       Current Outpatient Medications   Medication Sig Dispense Refill   • acyclovir (ZOVIRAX) 800 mg tablet Take 400 mg by mouth 2 (two) times a day     • ALPRAZolam (XANAX) 0 25 mg tablet Take 1 tablet (0 25 mg total) by mouth daily at bedtime as needed for anxiety 30 tablet 0   • docusate sodium (COLACE) 100 mg capsule Take 1 capsule (100 mg total) by mouth 2 (two) times a day as needed for constipation 20 capsule 0   • gabapentin (Neurontin) 300 mg capsule Take 1 capsule (300 mg total) by mouth 3 (three) times a day 270 capsule 0   • ibuprofen (MOTRIN) 600 mg tablet Take 1 tablet (600 mg total) by mouth every 8 (eight) hours for 7 days (Patient not taking: Reported on 8/16/2022) 21 tablet 0   • levocetirizine (XYZAL) 5 MG tablet Take 1 tablet (5 mg total) by mouth every evening 90 tablet 3   • nicotine (NICOTROL) 10 MG inhaler Inhale 1 puff as needed for smoking cessation 120 each 0   • Omega-3 Fatty Acids (Fish Oil) 1200 MG CAPS Take by mouth in the morning (Patient not taking: No sig reported)     • omeprazole (PriLOSEC) 40 MG capsule daily       • oxyCODONE (Roxicodone) 5 immediate release tablet Take 1 tablet (5 mg total) by mouth every 4 (four) hours as needed for severe pain Max Daily Amount: 30 mg 20 tablet 0     No current facility-administered medications for this visit  Allergies   Allergen Reactions   • Metronidazole Anaphylaxis     "patient states she has been in anaphylaxis due to this medication prior"     • Nitrofurantoin Shortness Of Breath       Review of Systems    Video Exam    There were no vitals filed for this visit  Physical Exam  Pulmonary:      Effort: No respiratory distress  Neurological:      Mental Status: She is alert and oriented to person, place, and time  Mental status is at baseline  Psychiatric:         Mood and Affect: Mood normal          Behavior: Behavior normal          Thought Content:  Thought content normal           I spent 15 minutes directly with the patient during this visit

## 2022-10-17 NOTE — TELEPHONE ENCOUNTER
Pl, call pt and check  -  If still having symptoms of abd pain -  Pl, ask pt to come in for a visit     Dr DURAN       ----- Message from Joelle Cortez PA-C sent at 10/17/2022 10:30 AM EDT -----  Good Morning Dr Keegan Lane,     I had a virtual visit with a mutual patient this morning  She is recovering well from removal of her typical carcinoid lung tumor  She has been complaining of right lower quadrant pain since the removal of her chest tube, which is an unusual symptom  I'm not sure if you want to get any further testing on her regarding this symptom  However, I told her I would let you know about it  Thank you for your time

## 2022-10-18 ENCOUNTER — OFFICE VISIT (OUTPATIENT)
Dept: FAMILY MEDICINE CLINIC | Facility: CLINIC | Age: 47
End: 2022-10-18
Payer: COMMERCIAL

## 2022-10-18 VITALS
HEART RATE: 88 BPM | HEIGHT: 71 IN | RESPIRATION RATE: 18 BRPM | DIASTOLIC BLOOD PRESSURE: 90 MMHG | WEIGHT: 293 LBS | SYSTOLIC BLOOD PRESSURE: 132 MMHG | BODY MASS INDEX: 41.02 KG/M2 | TEMPERATURE: 98 F

## 2022-10-18 DIAGNOSIS — G89.29 CHRONIC RIGHT-SIDED THORACIC BACK PAIN: ICD-10-CM

## 2022-10-18 DIAGNOSIS — R10.11 RIGHT UPPER QUADRANT ABDOMINAL PAIN: Primary | ICD-10-CM

## 2022-10-18 DIAGNOSIS — M54.6 CHRONIC RIGHT-SIDED THORACIC BACK PAIN: ICD-10-CM

## 2022-10-18 DIAGNOSIS — K21.9 GASTROESOPHAGEAL REFLUX DISEASE WITHOUT ESOPHAGITIS: ICD-10-CM

## 2022-10-18 DIAGNOSIS — A60.00 GENITAL HERPES SIMPLEX, UNSPECIFIED SITE: ICD-10-CM

## 2022-10-18 PROBLEM — D04.72: Status: ACTIVE | Noted: 2022-07-07

## 2022-10-18 PROBLEM — D04.72: Status: RESOLVED | Noted: 2022-07-07 | Resolved: 2022-10-18

## 2022-10-18 PROCEDURE — 99214 OFFICE O/P EST MOD 30 MIN: CPT | Performed by: FAMILY MEDICINE

## 2022-10-18 RX ORDER — OMEPRAZOLE 40 MG/1
40 CAPSULE, DELAYED RELEASE ORAL DAILY
Qty: 90 CAPSULE | Refills: 1 | Status: SHIPPED | OUTPATIENT
Start: 2022-10-18

## 2022-10-18 RX ORDER — ACYCLOVIR 800 MG/1
400 TABLET ORAL 2 TIMES DAILY
Qty: 180 TABLET | Refills: 3 | Status: SHIPPED | OUTPATIENT
Start: 2022-10-18 | End: 2023-01-16

## 2022-10-18 RX ORDER — DICLOFENAC SODIUM 75 MG/1
TABLET, DELAYED RELEASE ORAL
COMMUNITY
Start: 2022-09-27

## 2022-10-18 NOTE — PROGRESS NOTES
Chief Complaint   Patient presents with   • Abdominal Pain     Pt c/o abdominal pain since surgery 07/2022        Patient ID: Glenna Gaucher is a 51 y o  female  HPI  Pt is seeing for RUQ pain started 2+ m ago after R sided chest tube was removed ( pt had lung surgery for carcinoid tumor of lung  )  -  No weight loss, no N/V, normal BM  -  also has R sided mid back pain for several years -  On Gabapentin  -  Not helping -  Has L shoulder pain for several months after COVID vaccine-  Did not see ortho yet     The following portions of the patient's history were reviewed and updated as appropriate: allergies, current medications, past family history, past medical history, past social history, past surgical history and problem list     Review of Systems   Constitutional: Negative  Negative for activity change, appetite change, fatigue and fever  Respiratory: Negative  Cardiovascular: Negative  Gastrointestinal: Positive for abdominal pain  Negative for abdominal distention, diarrhea, nausea and vomiting  Has GERD   Genitourinary: Negative  Musculoskeletal: Positive for arthralgias (L shoulder pain ) and back pain  Skin: Negative  Neurological: Negative  Psychiatric/Behavioral: Negative          Current Outpatient Medications   Medication Sig Dispense Refill   • acyclovir (ZOVIRAX) 800 mg tablet Take 0 5 tablets (400 mg total) by mouth 2 (two) times a day 180 tablet 3   • ALPRAZolam (XANAX) 0 25 mg tablet Take 1 tablet (0 25 mg total) by mouth daily at bedtime as needed for anxiety 30 tablet 0   • diclofenac (VOLTAREN) 75 mg EC tablet      • gabapentin (Neurontin) 300 mg capsule Take 1 capsule (300 mg total) by mouth 3 (three) times a day 270 capsule 0   • levocetirizine (XYZAL) 5 MG tablet Take 1 tablet (5 mg total) by mouth every evening 90 tablet 3   • omeprazole (PriLOSEC) 40 MG capsule Take 1 capsule (40 mg total) by mouth daily 90 capsule 1   • ibuprofen (MOTRIN) 600 mg tablet Take 1 tablet (600 mg total) by mouth every 8 (eight) hours for 7 days (Patient not taking: Reported on 8/16/2022) 21 tablet 0   • nicotine (NICOTROL) 10 MG inhaler Inhale 1 puff as needed for smoking cessation (Patient not taking: Reported on 10/18/2022) 120 each 0   • Omega-3 Fatty Acids (Fish Oil) 1200 MG CAPS Take by mouth in the morning (Patient not taking: No sig reported)     • oxyCODONE (Roxicodone) 5 immediate release tablet Take 1 tablet (5 mg total) by mouth every 4 (four) hours as needed for severe pain Max Daily Amount: 30 mg 20 tablet 0     No current facility-administered medications for this visit  Objective:    /90 (BP Location: Left arm, Patient Position: Sitting, Cuff Size: Large)   Pulse 88   Temp 98 °F (36 7 °C)   Resp 18   Ht 5' 11" (1 803 m)   Wt (!) 142 kg (313 lb 6 4 oz)   BMI 43 71 kg/m²        Physical Exam  Constitutional:       Appearance: She is obese  She is not ill-appearing  Cardiovascular:      Rate and Rhythm: Normal rate and regular rhythm  Heart sounds: No murmur heard  Pulmonary:      Effort: Pulmonary effort is normal  No respiratory distress  Abdominal:      Palpations: Abdomen is soft  Tenderness: There is no abdominal tenderness  Skin:     Coloration: Skin is not pale  Findings: No rash  Neurological:      General: No focal deficit present  Mental Status: She is alert and oriented to person, place, and time  Psychiatric:         Mood and Affect: Mood is anxious  Labs in chart were reviewed  Assessment/Plan:         Diagnoses and all orders for this visit:    Right upper quadrant abdominal pain  -     CT abdomen pelvis w contrast; Future    Chronic right-sided thoracic back pain  -     Ambulatory Referral to Pain Management; Future    Gastroesophageal reflux disease without esophagitis  -     omeprazole (PriLOSEC) 40 MG capsule;  Take 1 capsule (40 mg total) by mouth daily    Genital herpes simplex, unspecified site  - acyclovir (ZOVIRAX) 800 mg tablet;  Take 0 5 tablets (400 mg total) by mouth 2 (two) times a day    Other orders  -     diclofenac (VOLTAREN) 75 mg EC tablet        rto in [address]

## 2022-11-01 ENCOUNTER — TELEPHONE (OUTPATIENT)
Dept: FAMILY MEDICINE CLINIC | Facility: CLINIC | Age: 47
End: 2022-11-01

## 2022-11-01 NOTE — TELEPHONE ENCOUNTER
----- Message from Rocky Ortega sent at 11/1/2022 12:27 PM EDT -----  Regarding: CT Abd/Pelvis with Contrast  Hello  My CT Scan was denied by my insurance because of the facility  I have rescheduled it at [address], but [facility] requires a call from the ordering physician to provide/confirm the facility so it will be approved  Their number is [phone]  My apologies for this complication  It's insurance    Thank you,  Tessa

## 2022-11-06 DIAGNOSIS — F41.9 ANXIETY: ICD-10-CM

## 2022-11-07 ENCOUNTER — APPOINTMENT (OUTPATIENT)
Dept: RADIOLOGY | Facility: CLINIC | Age: 47
End: 2022-11-07

## 2022-11-07 ENCOUNTER — OFFICE VISIT (OUTPATIENT)
Dept: OBGYN CLINIC | Facility: CLINIC | Age: 47
End: 2022-11-07

## 2022-11-07 VITALS
BODY MASS INDEX: 41.02 KG/M2 | HEIGHT: 71 IN | HEART RATE: 90 BPM | SYSTOLIC BLOOD PRESSURE: 138 MMHG | WEIGHT: 293 LBS | DIASTOLIC BLOOD PRESSURE: 80 MMHG

## 2022-11-07 DIAGNOSIS — C34.91 MALIGNANT NEOPLASM OF RIGHT LUNG, UNSPECIFIED PART OF LUNG (HCC): ICD-10-CM

## 2022-11-07 DIAGNOSIS — M79.671 PAIN IN RIGHT FOOT: ICD-10-CM

## 2022-11-07 DIAGNOSIS — M48.04 SPINAL STENOSIS, THORACIC REGION: ICD-10-CM

## 2022-11-07 DIAGNOSIS — G89.4 CHRONIC PAIN SYNDROME: ICD-10-CM

## 2022-11-07 DIAGNOSIS — M25.512 ACUTE PAIN OF LEFT SHOULDER: Primary | ICD-10-CM

## 2022-11-07 RX ORDER — DICLOFENAC SODIUM 75 MG/1
75 TABLET, DELAYED RELEASE ORAL 2 TIMES DAILY
Qty: 60 TABLET | Refills: 0 | Status: SHIPPED | OUTPATIENT
Start: 2022-11-07

## 2022-11-07 RX ORDER — ALPRAZOLAM 0.25 MG/1
0.25 TABLET ORAL
Qty: 30 TABLET | Refills: 0 | Status: SHIPPED | OUTPATIENT
Start: 2022-11-07

## 2022-11-07 NOTE — PROGRESS NOTES
Assessment/Plan:  1  Acute pain of left shoulder  MRI shoulder left wo contrast    Ambulatory referral to Pain Management    diclofenac (VOLTAREN) 75 mg EC tablet   2  Pain in right foot  XR foot 3+ vw right   3  Spinal stenosis, thoracic region  MRI thoracic spine wo contrast    Ambulatory referral to Pain Management   4  Malignant neoplasm of right lung, unspecified part of lung ([address] )  MRI thoracic spine wo contrast   5  Chronic pain syndrome  Ambulatory referral to Pain Management       Nataliya Thomas has ongoing issues with her left shoulder and thoracic back  I do think it is pertinent to move forward with MRIs of both the shoulder and the thoracic spine given her recent cancer diagnosis to rule out metastasis due to her ongoing deep bony pain in both areas  Once she has MRI is completed she will follow up in our office for review  In her right foot she appears to have some mild degenerative changes on x-ray but no evidence of acute injury  I recommended conservative measures of ice and anti-inflammatory  She will follow up after MRI  She inquired about long-term pain medication due to her chronic pain with all these issues  I informed her that as a primary care sports physician, I cannot prescribe long-term pain medications more than anti-inflammatories in this case  She requested to see pain management so they could provide her with these medications  I did place a referral in her chart for pain management  Subjective:   Rd Alegre is a 49 y o  female who presents to the office for evaluation multiple issues today  She has a history of thoracic back pain for which she was in the office 7 months ago with a CT scan suggesting thoracic spinal stenosis  She had ongoing pain at that time and we sent her for an MRI which was denied by her insurance  In the subsequent time since that office visit she was diagnosed with lung cancer and has had surgery and further treatment for this    She states she was diagnosed with stage III lung cancer as there was metastasis to lymph nodes  She continues to have some pain associated with her back radiating around to her right chest and abdomen  She denies any new injury  She continues to have numbness and tingling into her legs bilaterally, she denies any significant low back pain  She also has ongoing left-sided shoulder pain  She states the shoulder pain is the most severe discomfort she feels  She has had ongoing pain in the left shoulder dating back to 1 year ago when she had injection in the left shoulder for a vaccination  Earlier this year, she did have left shoulder pain on exam but no evidence of rotator cuff injury  She had an x-ray that was unremarkable  She was given home exercises as physical therapy was quite costly  She has been compliant with home exercises and states that the pain is not improving  She has aching throbbing pain in the left shoulder that worsens with any motion and bothers her with sleeping  She does feel pain referring down her arm  She denies any significant numbness or tingling in her arms  She also has a sudden onset of right-sided foot pain that began earlier this week  She denies any injury or rolling her foot  She states she has a history of foot pain in the past and some degenerative changes but nothing recently  Review of Systems   Constitutional: Negative for chills, fever and unexpected weight change  HENT: Negative for hearing loss, nosebleeds and sore throat  Eyes: Negative for pain, redness and visual disturbance  Respiratory: Negative for cough, shortness of breath and wheezing  Cardiovascular: Negative for chest pain, palpitations and leg swelling  Gastrointestinal: Negative for abdominal pain, nausea and vomiting  Endocrine: Negative for polydipsia and polyuria  Genitourinary: Negative for dysuria and hematuria  Musculoskeletal:        See HPI   Skin: Negative for rash and wound  Neurological: Negative for dizziness, numbness and headaches  Psychiatric/Behavioral: Negative for decreased concentration and suicidal ideas  The patient is not nervous/anxious  Past Medical History:   Diagnosis Date   • Acid reflux    • Trejo's esophagus    • GERD (gastroesophageal reflux disease)    • Hiatal hernia    • Kidney stone    • Latent tuberculosis by skin test 1986    Was treated with INH for 6 months   • Obstructive sleep apnea 05/27/2018    Mild TERESITA  AHI 18 1   • Squamous cell skin cancer     Left buttocks       Past Surgical History:   Procedure Laterality Date   • ABDOMINOPLASTY     • AUGMENTATION BREAST Bilateral 2015   • BRONCHOSCOPY N/A 05/03/2022    Procedure: BRONCHOSCOPY NAVIGATIONAL;  Surgeon: Trista Cid MD;  [facility];  Service: Thoracic   • EGD  2018   • ENDOBRONCHIAL ULTRASOUND (EBUS) N/A 05/03/2022    Procedure: ENDOBRONCHIAL ULTRASOUND (EBUS); Surgeon: Trista Cid MD;  [facility];  Service: Thoracic   • HYSTERECTOMY  2014   • IR BIOPSY LYMPH NODE  03/21/2022   • IR CHEST TUBE PLACEMENT  08/01/2022   • LAPAROSCOPIC CHOLECYSTECTOMY  1997   • LIPOSUCTION  2015   • MOHS SURGERY Left 08/18/2022    left buttocks   • MA BRONCHOSCOPY,DIAGNOSTIC N/A 05/03/2022    Procedure: Lige Ing;  Surgeon: Trista Cid MD;  [facility];  Service: Thoracic   • MA BRONCHOSCOPY,DIAGNOSTIC N/A 07/28/2022    Procedure: Lige Ing;  Surgeon: Missael Marie MD;  [facility];  Service: Thoracic   • MA THORACOSCOPY SURG LOBECTOMY Right 07/28/2022    Procedure: LOBECTOMY LUNG;  Surgeon: Missael Marie MD;  [facility];  Service: Thoracic   • MA THORACOSCOPY SURG LOBECTOMY Right 07/28/2022    Procedure: THORACOSCOPY VIDEO ASSISTED SURGERY (VATS);   Surgeon: Missael Marie MD;  [facility];  Service: Thoracic   • REPAIR RECTOCELE  2018   • TONSILLECTOMY  1984       Family History   Problem Relation Age of Onset   • Cancer Mother         ovarian   • Stroke Father    • Lung cancer Paternal Uncle        Social History     Occupational History   • Not on file   Tobacco Use   • Smoking status: Former Smoker     Packs/day: 0 75     Years: 36 00     Pack years: 27 00     Types: Cigarettes     Quit date: 2022     Years since quittin 4   • Smokeless tobacco: Never Used   Vaping Use   • Vaping Use: Never used   Substance and Sexual Activity   • Alcohol use: Never   • Drug use: Never     Comment: CBD gummies   • Sexual activity: Not Currently         Current Outpatient Medications:   •  acyclovir (ZOVIRAX) 800 mg tablet, Take 0 5 tablets (400 mg total) by mouth 2 (two) times a day, Disp: 180 tablet, Rfl: 3  •  ALPRAZolam (XANAX) 0 25 mg tablet, Take 1 tablet (0 25 mg total) by mouth daily at bedtime as needed for anxiety, Disp: 30 tablet, Rfl: 0  •  diclofenac (VOLTAREN) 75 mg EC tablet, , Disp: , Rfl:   •  gabapentin (Neurontin) 300 mg capsule, Take 1 capsule (300 mg total) by mouth 3 (three) times a day, Disp: 270 capsule, Rfl: 0  •  levocetirizine (XYZAL) 5 MG tablet, Take 1 tablet (5 mg total) by mouth every evening, Disp: 90 tablet, Rfl: 3  •  nicotine (NICOTROL) 10 MG inhaler, Inhale 1 puff as needed for smoking cessation, Disp: 120 each, Rfl: 0  •  Omega-3 Fatty Acids (Fish Oil) 1200 MG CAPS, Take by mouth in the morning, Disp: , Rfl:   •  omeprazole (PriLOSEC) 40 MG capsule, Take 1 capsule (40 mg total) by mouth daily, Disp: 90 capsule, Rfl: 1  •  ibuprofen (MOTRIN) 600 mg tablet, Take 1 tablet (600 mg total) by mouth every 8 (eight) hours for 7 days (Patient not taking: Reported on 2022), Disp: 21 tablet, Rfl: 0  •  oxyCODONE (Roxicodone) 5 immediate release tablet, Take 1 tablet (5 mg total) by mouth every 4 (four) hours as needed for severe pain Max Daily Amount: 30 mg, Disp: 20 tablet, Rfl: 0    Allergies   Allergen Reactions   • Metronidazole Anaphylaxis     "patient states she has been in anaphylaxis due to this medication prior"     • Nitrofurantoin Shortness Of Breath       Objective:  Vitals:    11/07/22 1254   BP: 138/80   Pulse: 90       Ortho Exam    Physical Exam  Vitals and nursing note reviewed  Constitutional:       Appearance: She is well-developed  HENT:      Head: Normocephalic and atraumatic  Eyes:      General: No scleral icterus  Extraocular Movements: Extraocular movements intact  Conjunctiva/sclera: Conjunctivae normal    Cardiovascular:      Rate and Rhythm: Normal rate  Pulmonary:      Effort: Pulmonary effort is normal  No respiratory distress  Musculoskeletal:      Cervical back: Normal range of motion and neck supple  Feet:       Comments: As noted in HPI   Skin:     General: Skin is warm and dry  Neurological:      Mental Status: She is alert and oriented to person, place, and time  Psychiatric:         Behavior: Behavior normal          I have personally reviewed pertinent films in PACS and my interpretation is as follows:  X-rays of the right foot demonstrate no evidence of acute fracture, mild dorsal degenerative changes      This document was created using speech voice recognition software  Grammatical errors, random word insertions, pronoun errors, and incomplete sentences are an occasional consequence of this system due to software limitations, ambient noise, and hardware issues  Any formal questions or concerns about content, text, or information contained within the body of this dictation should be directly addressed to the provider for clarification

## 2022-11-09 ENCOUNTER — TELEPHONE (OUTPATIENT)
Dept: OBGYN CLINIC | Facility: CLINIC | Age: 47
End: 2022-11-09

## 2022-11-09 NOTE — TELEPHONE ENCOUNTER
Patient had sent a message for me to call her at earliest convince  I called patient and she expressed how angry and upset she was due to the fact Dr Holly Ku office had called and said he would not prescribe any opiates   She is upset she received this call as Dr Ashish Bailon has not even seen her   She did not appriciate this call and feels as if Dr Ashish Bailon does not want to help her  She states that they said it was because she was on oxycodone , patient confirmed she is not taking the oxy and was given that in august for a surgery she had  Patient also confirmed she was on Gabapentin but it does not work and is in pain  She does not wish to be seen by Dr Ashish Bailon in the future as this was appalling to be shut down before being seen      Messages from [facility] and phone call sent to Practice Administrator

## 2022-11-22 ENCOUNTER — TELEPHONE (OUTPATIENT)
Dept: FAMILY MEDICINE CLINIC | Facility: CLINIC | Age: 47
End: 2022-11-22

## 2022-11-22 DIAGNOSIS — C34.91 MALIGNANT NEOPLASM OF RIGHT LUNG, UNSPECIFIED PART OF LUNG (HCC): Primary | ICD-10-CM

## 2022-11-22 DIAGNOSIS — N83.201 CYSTS OF BOTH OVARIES: Primary | ICD-10-CM

## 2022-11-22 DIAGNOSIS — M48.04 SPINAL STENOSIS, THORACIC REGION: ICD-10-CM

## 2022-11-22 DIAGNOSIS — N83.202 CYSTS OF BOTH OVARIES: Primary | ICD-10-CM

## 2022-11-22 NOTE — TELEPHONE ENCOUNTER
Dr Familia Bazan    Patient is requesting call back to discuss abdominal pelvic ct scan results  Radiologist is recommending pelvic ultrasound

## 2022-11-22 NOTE — TELEPHONE ENCOUNTER
D/w pt -  Pelvic US and Gyn consults were ordered     ----- Message from Shadia Fish sent at 2022  1:33 PM EST -----  Regarding: FW: CT Abd/Pelvis with Contrast  Contact: [phone]    ----- Message -----  From: Belle Rodriges  Sent: 2022  12:32 PM EST  To: [facility]  Subject: CT Abd/Pelvis with Contrast                      Hi, Dr Anais Moeller,  My apologies for the back-to-back messages  I forgot to mention that I [id number] and 2nd degree relatives with ovarian cancer  Both my mother and her mother had it, and my mother  from it in 0     Thank you,   Tessa

## 2022-11-23 ENCOUNTER — TELEPHONE (OUTPATIENT)
Dept: FAMILY MEDICINE CLINIC | Facility: CLINIC | Age: 47
End: 2022-11-23

## 2022-11-23 ENCOUNTER — TELEPHONE (OUTPATIENT)
Dept: GYNECOLOGIC ONCOLOGY | Facility: CLINIC | Age: 47
End: 2022-11-23

## 2022-11-23 NOTE — TELEPHONE ENCOUNTER
I called and spoke with pt she is aware   She stated that after talking to the gynecologist/oncologist they said that she may not need the USD they might just do surgery

## 2022-11-23 NOTE — TELEPHONE ENCOUNTER
Patient requests an order for a pelvic ultrasound placed in her chart  She was unable to schedule with  Dr Sandra Castillo until April 2023  Ariela Portillo at the GYN office recommended she she Dr Jerald Lagunas who is the gynocologist/oncologist which is who she would ultimately need to see anyway  She will schedule with Dr Jerald Lagunas now

## 2022-11-26 ENCOUNTER — HOSPITAL ENCOUNTER (OUTPATIENT)
Dept: RADIOLOGY | Facility: HOSPITAL | Age: 47
End: 2022-11-26
Attending: ORTHOPAEDIC SURGERY

## 2022-11-26 ENCOUNTER — HOSPITAL ENCOUNTER (OUTPATIENT)
Dept: RADIOLOGY | Facility: HOSPITAL | Age: 47
Discharge: HOME | End: 2022-11-26
Attending: ORTHOPAEDIC SURGERY

## 2022-11-26 DIAGNOSIS — M25.512 ACUTE PAIN OF LEFT SHOULDER: ICD-10-CM

## 2022-11-29 ENCOUNTER — HOSPITAL ENCOUNTER (OUTPATIENT)
Dept: RADIOLOGY | Facility: HOSPITAL | Age: 47
Discharge: HOME | End: 2022-11-29
Attending: ORTHOPAEDIC SURGERY

## 2022-11-29 DIAGNOSIS — C34.91 MALIGNANT NEOPLASM OF RIGHT LUNG, UNSPECIFIED PART OF LUNG (HCC): ICD-10-CM

## 2022-11-29 DIAGNOSIS — M48.04 SPINAL STENOSIS, THORACIC REGION: ICD-10-CM

## 2022-11-30 ENCOUNTER — TELEPHONE (OUTPATIENT)
Dept: OBGYN CLINIC | Facility: CLINIC | Age: 47
End: 2022-11-30

## 2022-11-30 DIAGNOSIS — G89.29 CHRONIC MIDLINE THORACIC BACK PAIN: ICD-10-CM

## 2022-11-30 DIAGNOSIS — M54.6 CHRONIC MIDLINE THORACIC BACK PAIN: ICD-10-CM

## 2022-11-30 DIAGNOSIS — M48.04 SPINAL STENOSIS, THORACIC REGION: Primary | ICD-10-CM

## 2022-11-30 NOTE — TELEPHONE ENCOUNTER
darci patient and advised of results and recommendation  Patient states she was aware of all this and needs an MRI of her back       ----- Message from Edmond Pagan DO sent at 11/30/2022  9:07 AM EST -----  Tessa had an MRI of her left shoulder  This showed tendinosis of her rotator cuff and no evidence of large rotator cuff tear  There was a possible small tear of her posterior labrum just not usually a surgical issue  I would recommend formal physical therapy for her shoulder since she has been feeling discomfort for so long as these injuries should significantly improve with therapy  She also had a bone scan to evaluate her back pain  This scan was completely normal and there was no abnormality found and no sign of metastasis to the spine

## 2022-12-05 DIAGNOSIS — M75.82 ROTATOR CUFF TENDINITIS, LEFT: Primary | ICD-10-CM

## 2022-12-07 ENCOUNTER — CONSULT (OUTPATIENT)
Dept: GYNECOLOGIC ONCOLOGY | Facility: CLINIC | Age: 47
End: 2022-12-07

## 2022-12-07 ENCOUNTER — LAB REQUISITION (OUTPATIENT)
Dept: LAB | Facility: HOSPITAL | Age: 47
End: 2022-12-07

## 2022-12-07 ENCOUNTER — OFFICE VISIT (OUTPATIENT)
Dept: LAB | Facility: HOSPITAL | Age: 47
End: 2022-12-07

## 2022-12-07 ENCOUNTER — HOSPITAL ENCOUNTER (OUTPATIENT)
Dept: RADIOLOGY | Facility: HOSPITAL | Age: 47
Discharge: HOME | End: 2022-12-07

## 2022-12-07 ENCOUNTER — PATIENT MESSAGE (OUTPATIENT)
Dept: GYNECOLOGIC ONCOLOGY | Facility: HOSPITAL | Age: 47
End: 2022-12-07

## 2022-12-07 ENCOUNTER — APPOINTMENT (OUTPATIENT)
Dept: LAB | Facility: HOSPITAL | Age: 47
End: 2022-12-07

## 2022-12-07 VITALS
TEMPERATURE: 97.5 F | WEIGHT: 293 LBS | RESPIRATION RATE: 17 BRPM | DIASTOLIC BLOOD PRESSURE: 84 MMHG | SYSTOLIC BLOOD PRESSURE: 122 MMHG | BODY MASS INDEX: 41.02 KG/M2 | OXYGEN SATURATION: 98 % | HEART RATE: 88 BPM | HEIGHT: 71 IN

## 2022-12-07 DIAGNOSIS — N83.8 BILATERAL TUBO-OVARIAN MASS: ICD-10-CM

## 2022-12-07 DIAGNOSIS — N83.8 OTHER NONINFLAMMATORY DISORDERS OF OVARY, FALLOPIAN TUBE AND BROAD LIGAMENT: ICD-10-CM

## 2022-12-07 DIAGNOSIS — N83.202 CYSTS OF BOTH OVARIES: ICD-10-CM

## 2022-12-07 DIAGNOSIS — Z80.41 FAMILY HISTORY OF OVARIAN CANCER: ICD-10-CM

## 2022-12-07 DIAGNOSIS — N83.201 CYSTS OF BOTH OVARIES: ICD-10-CM

## 2022-12-07 DIAGNOSIS — N83.8 BILATERAL TUBO-OVARIAN MASS: Primary | ICD-10-CM

## 2022-12-07 LAB
ABO GROUP BLD: NORMAL
ALBUMIN SERPL BCP-MCNC: 3.6 G/DL (ref 3.5–5)
ALP SERPL-CCNC: 92 U/L (ref 46–116)
ALT SERPL W P-5'-P-CCNC: 50 U/L (ref 12–78)
ANION GAP SERPL CALCULATED.3IONS-SCNC: 2 MMOL/L (ref 4–13)
AST SERPL W P-5'-P-CCNC: 64 U/L (ref 5–45)
BILIRUB SERPL-MCNC: 0.39 MG/DL (ref 0.2–1)
BLD GP AB SCN SERPL QL: NEGATIVE
BUN SERPL-MCNC: 13 MG/DL (ref 5–25)
CALCIUM SERPL-MCNC: 8.9 MG/DL (ref 8.3–10.1)
CANCER AG125 SERPL-ACNC: 6.9 U/ML (ref 0–30)
CHLORIDE SERPL-SCNC: 106 MMOL/L (ref 96–108)
CO2 SERPL-SCNC: 28 MMOL/L (ref 21–32)
CREAT SERPL-MCNC: 0.82 MG/DL (ref 0.6–1.3)
ERYTHROCYTE [DISTWIDTH] IN BLOOD BY AUTOMATED COUNT: 13.9 % (ref 11.6–15.1)
GFR SERPL CREATININE-BSD FRML MDRD: 84 ML/MIN/1.73SQ M
GLUCOSE P FAST SERPL-MCNC: 103 MG/DL (ref 65–99)
HCT VFR BLD AUTO: 44.1 % (ref 34.8–46.1)
HGB BLD-MCNC: 14 G/DL (ref 11.5–15.4)
INR PPP: 1.06 (ref 0.84–1.19)
MCH RBC QN AUTO: 28.5 PG (ref 26.8–34.3)
MCHC RBC AUTO-ENTMCNC: 31.7 G/DL (ref 31.4–37.4)
MCV RBC AUTO: 90 FL (ref 82–98)
PLATELET # BLD AUTO: 182 THOUSANDS/UL (ref 149–390)
PMV BLD AUTO: 9.3 FL (ref 8.9–12.7)
POTASSIUM SERPL-SCNC: 5 MMOL/L (ref 3.5–5.3)
PROT SERPL-MCNC: 8 G/DL (ref 6.4–8.4)
PROTHROMBIN TIME: 13.9 SECONDS (ref 11.6–14.5)
RBC # BLD AUTO: 4.92 MILLION/UL (ref 3.81–5.12)
RH BLD: POSITIVE
SODIUM SERPL-SCNC: 136 MMOL/L (ref 135–147)
SPECIMEN EXPIRATION DATE: NORMAL
WBC # BLD AUTO: 8.04 THOUSAND/UL (ref 4.31–10.16)

## 2022-12-07 NOTE — ASSESSMENT & PLAN NOTE
49year-old with complex bilateral ovarian masses the right measuring 7 1 cm in diameter and the left measuring 5 4 cm in diameter  She has had prior hysterectomy and has a history of stage IIIA lung carcinoid, family history ovarian cancer  I reviewed her CT of the abdomen pelvis from 11/21/2022  There is no evidence of peritoneal disease, ascites, lymphadenopathy  Her performance status is 0   1  I reviewed the imaging findings in detail  2  Referral to oncology Genetics due to family history of ovarian cancer  3  I discussed the risks and benefits of possible robotic assisted total laparoscopic bilateral salpingo-oophorectomy with intraoperative frozen section analysis, possible exploratory laparotomy, all other indicated procedures including staging lymphadenectomy, peritoneal biopsies, omentectomy  I also discussed performing vaginotomy for specimen removal   She understands the risks and benefits of the procedure and agrees to proceed as outlined  Consent for surgery was obtained by me in the office  4  We discussed perioperative medication management  Thank you for the courtesy of this consultation  All questions were answered by the end of the visit

## 2022-12-07 NOTE — PATIENT INSTRUCTIONS
1  Nothing to eat or drink after midnight prior to the operation  You are allowed clear liquids until 3 hours prior to the scheduled start time of surgery  No milk products or solid food for 8 hours prior to surgery  2   Please avoid ibuprofen, aspirin, fish oil for 7 days prior to surgery  3  Take your omeprazole and gabapentin the morning of surgery with a sip of water  Do not take any other medications the morning of surgery

## 2022-12-07 NOTE — PROGRESS NOTES
Assessment/Plan:    Problem List Items Addressed This Visit        Other    Bilateral tubo-ovarian mass - Primary     79-year-old with complex bilateral ovarian masses the right measuring 7 1 cm in diameter and the left measuring 5 4 cm in diameter  She has had prior hysterectomy and has a history of stage IIIA lung carcinoid, family history ovarian cancer  I reviewed her CT of the abdomen pelvis from 11/21/2022  There is no evidence of peritoneal disease, ascites, lymphadenopathy  Her performance status is 0   1  I reviewed the imaging findings in detail  2  Referral to oncology Genetics due to family history of ovarian cancer  3  I discussed the risks and benefits of possible robotic assisted total laparoscopic bilateral salpingo-oophorectomy with intraoperative frozen section analysis, possible exploratory laparotomy, all other indicated procedures including staging lymphadenectomy, peritoneal biopsies, omentectomy  I also discussed performing vaginotomy for specimen removal   She understands the risks and benefits of the procedure and agrees to proceed as outlined  Consent for surgery was obtained by me in the office  4  We discussed perioperative medication management  Thank you for the courtesy of this consultation  All questions were answered by the end of the visit             Relevant Orders    Ambulatory Referral to Oncology Genetics    Family history of ovarian cancer    Relevant Orders    Ambulatory Referral to Oncology Genetics   Other Visit Diagnoses     Cysts of both ovaries                  CHIEF COMPLAINT:  Bilateral ovarian masses          Problem:  Cancer Staging   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 ([facility] [address] )  Staging form: Lung, AJCC 8th Edition  - Clinical stage from 7/28/2022: Stage IIIA (cT1b, cN2, cM0) - Signed by Rachel Cool PA-C on 8/16/2022      Previous therapy:  Oncology History   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 ([facility] [address] )   7/28/2022 - Cancer Staged    Staging form: Lung, AJCC 8th Edition  - Clinical stage from 7/28/2022: Stage IIIA (cT1b, cN2, cM0) - Signed by Ritesh Murphy PA-C on 8/16/2022  Histopathologic type: Carcinoid tumor, NOS  Histologic grade (G): G1  Histologic grading system: 4 grade system       8/16/2022 Initial Diagnosis    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 [facility])           Patient ID: Jenell Sandhoff is a 49 y o  female  41-year-old with morbid obesity, BMI 44 kilograms/meter squared, personal history of stage IIIA lung carcinoid status post thoracoscopic right lower lobectomy in July of 2022, family history of ovarian cancer  She has thoracic spinal stenosis with chronic right upper quadrant pain  She was noted have pelvic pain and had a CT of the abdomen pelvis on 11/21/2022 which revealed a 7 1 cm septated cyst in the right ovary and a 5 4 cm septated cyst in the left ovary  There were no findings of metastatic disease  No ascites, peritoneal disease, lymphadenopathy  She has a surgical history notable for a prior laparoscopic hysterectomy for abnormal uterine bleeding, laparoscopic cholecystectomy, abdominal plasty and the right lower lobectomy  She is able to perform her activities of daily living despite the chronic pain  She is referred as a consultation from Dr Ame Byers to discuss treatment options  Review of Systems   Constitutional: Negative for activity change and unexpected weight change  HENT: Negative  Eyes: Negative  Respiratory: Negative  Cardiovascular: Negative  Gastrointestinal: Positive for abdominal pain  Negative for abdominal distention  Endocrine: Negative  Genitourinary: Positive for pelvic pain  Negative for vaginal bleeding  Musculoskeletal: Positive for back pain  Skin: Negative  Allergic/Immunologic: Negative  Neurological: Negative  Hematological: Negative  Psychiatric/Behavioral: Negative          Current Outpatient Medications Medication Sig Dispense Refill   • acyclovir (ZOVIRAX) 800 mg tablet Take 0 5 tablets (400 mg total) by mouth 2 (two) times a day 180 tablet 3   • ALPRAZolam (XANAX) 0 25 mg tablet Take 1 tablet (0 25 mg total) by mouth daily at bedtime as needed for anxiety 30 tablet 0   • diclofenac (VOLTAREN) 75 mg EC tablet Take 1 tablet (75 mg total) by mouth 2 (two) times a day 60 tablet 0   • gabapentin (Neurontin) 300 mg capsule Take 1 capsule (300 mg total) by mouth 3 (three) times a day 270 capsule 0   • levocetirizine (XYZAL) 5 MG tablet Take 1 tablet (5 mg total) by mouth every evening 90 tablet 3   • nicotine (NICOTROL) 10 MG inhaler Inhale 1 puff as needed for smoking cessation 120 each 0   • Omega-3 Fatty Acids (Fish Oil) 1200 MG CAPS Take by mouth in the morning     • omeprazole (PriLOSEC) 40 MG capsule Take 1 capsule (40 mg total) by mouth daily 90 capsule 1     No current facility-administered medications for this visit  Allergies   Allergen Reactions   • Metronidazole Anaphylaxis     "patient states she has been in anaphylaxis due to this medication prior"     • Nitrofurantoin Shortness Of Breath       Past Medical History:   Diagnosis Date   • Acid reflux    • Trejo's esophagus    • GERD (gastroesophageal reflux disease)    • Hiatal hernia    • Kidney stone    • Latent tuberculosis by skin test 1986    Was treated with INH for 6 months   • Obstructive sleep apnea 05/27/2018    Mild TERESITA  AHI 18 1   • Squamous cell skin cancer     Left buttocks       Past Surgical History:   Procedure Laterality Date   • ABDOMINOPLASTY     • AUGMENTATION BREAST Bilateral 2015   • BRONCHOSCOPY N/A 05/03/2022    Procedure: BRONCHOSCOPY NAVIGATIONAL;  Surgeon: David Breaux MD;  [facility];  Service: Thoracic   • EGD  2018   • ENDOBRONCHIAL ULTRASOUND (EBUS) N/A 05/03/2022    Procedure: ENDOBRONCHIAL ULTRASOUND (EBUS);   Surgeon: David Breaux MD;  [facility];  Service: Thoracic   • HYSTERECTOMY    • IR BIOPSY LYMPH NODE  2022   • IR CHEST TUBE PLACEMENT  2022   • LAPAROSCOPIC CHOLECYSTECTOMY     • LIPOSUCTION     • MOHS SURGERY Left 2022    left buttocks   • IL BRONCHOSCOPY,DIAGNOSTIC N/A 2022    Procedure: BRONCHOSCOPY FLEXIBLE;  Surgeon: Robyn Angel MD;  [facility];  Service: Thoracic   • IL BRONCHOSCOPY,DIAGNOSTIC N/A 2022    Procedure: Halima Sleek;  Surgeon: Dianne Srinivasan MD;  [facility];  Service: Thoracic   • IL THORACOSCOPY SURG LOBECTOMY Right 2022    Procedure: LOBECTOMY LUNG;  Surgeon: Dianne Srinivasan MD;  [facility];  Service: Thoracic   • IL THORACOSCOPY SURG LOBECTOMY Right 2022    Procedure: THORACOSCOPY VIDEO ASSISTED SURGERY (VATS); Surgeon: Dianne Srinivasan MD;  [facility];  Service: Thoracic   • REPAIR RECTOCELE     • TONSILLECTOMY         OB History        6    Para   4    Term   4            AB   1    Living   4       SAB        IAB        Ectopic        Multiple        Live Births                     Family History   Problem Relation Age of Onset   • Cancer Mother         ovarian   • Stroke Father    • Lung cancer Paternal Uncle        The following portions of the patient's history were reviewed and updated as appropriate: allergies, current medications, past family history, past medical history, past social history, past surgical history and problem list       Objective:    Blood pressure 122/84, pulse 88, temperature 97 5 °F (36 4 °C), temperature source Temporal, resp  rate 17, height 5' 11" (1 803 m), weight (!) 143 kg (316 lb), SpO2 98 %  Body mass index is 44 07 kg/m²  Physical Exam  Vitals reviewed  Constitutional:       General: She is not in acute distress  Appearance: Normal appearance  She is obese  She is not ill-appearing, toxic-appearing or diaphoretic     HENT:      Head: Normocephalic and atraumatic  Mouth/Throat:      Mouth: Mucous membranes are moist    Eyes:      General: No scleral icterus  Right eye: No discharge  Left eye: No discharge  Conjunctiva/sclera: Conjunctivae normal    Cardiovascular:      Rate and Rhythm: Normal rate and regular rhythm  Heart sounds: Normal heart sounds  Pulmonary:      Effort: Pulmonary effort is normal       Comments: Lungs are clear  There is loss of breath sounds at the right base  Abdominal:      Palpations: Abdomen is soft  Musculoskeletal:      Right lower leg: No edema  Left lower leg: No edema  Skin:     General: Skin is warm and dry  Coloration: Skin is not jaundiced  Findings: No rash  Neurological:      General: No focal deficit present  Mental Status: She is alert and oriented to person, place, and time  Cranial Nerves: No cranial nerve deficit  Motor: No weakness  Gait: Gait normal    Psychiatric:         Mood and Affect: Mood normal          Behavior: Behavior normal          Thought Content:  Thought content normal          Judgment: Judgment normal            No results found for:   Lab Results   Component Value Date    WBC 17 85 (H) 07/29/2022    HGB 12 4 07/29/2022    HCT 39 2 07/29/2022    MCV 93 07/29/2022     07/29/2022     Lab Results   Component Value Date    K 4 2 07/29/2022     (H) 07/29/2022    CO2 24 07/29/2022    BUN 15 07/29/2022    CREATININE 0 74 07/29/2022    GLUF 98 06/28/2022    CALCIUM 8 1 (L) 07/29/2022    AST 45 02/03/2022    ALT 54 02/03/2022    ALKPHOS 95 02/03/2022    EGFR 96 07/29/2022

## 2022-12-09 LAB
ATRIAL RATE: 86 BPM
P AXIS: 49 DEGREES
PR INTERVAL: 176 MS
QRS AXIS: 44 DEGREES
QRSD INTERVAL: 72 MS
QT INTERVAL: 366 MS
QTC INTERVAL: 437 MS
T WAVE AXIS: 56 DEGREES
VENTRICULAR RATE: 86 BPM

## 2022-12-12 ENCOUNTER — ANESTHESIA EVENT (OUTPATIENT)
Dept: PERIOP | Facility: HOSPITAL | Age: 47
End: 2022-12-12

## 2022-12-12 ENCOUNTER — TELEPHONE (OUTPATIENT)
Dept: GENETICS | Facility: CLINIC | Age: 47
End: 2022-12-12

## 2022-12-12 NOTE — TELEPHONE ENCOUNTER
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

## 2022-12-14 DIAGNOSIS — F41.9 ANXIETY: ICD-10-CM

## 2022-12-15 NOTE — PRE-PROCEDURE INSTRUCTIONS
Pre-Surgery Instructions:   Medication Instructions   • acyclovir (ZOVIRAX) 800 mg tablet Take day of surgery  • ALPRAZolam (XANAX) 0 25 mg tablet Uses PRN- OK to take day of surgery   • gabapentin (Neurontin) 300 mg capsule Take day of surgery  • levocetirizine (XYZAL) 5 MG tablet Take night before surgery   • omeprazole (PriLOSEC) 40 MG capsule Take day of surgery  Pt denies fever, sob, sore throat and cough  Pt instructed to stop nsaids and supplement one week prior to surgery  Pt verbalized an understanding of shower and med instructions

## 2022-12-16 RX ORDER — ALPRAZOLAM 0.25 MG/1
0.25 TABLET ORAL
Qty: 30 TABLET | Refills: 0 | Status: SHIPPED | OUTPATIENT
Start: 2022-12-16

## 2022-12-19 NOTE — ANESTHESIA PREPROCEDURE EVALUATION
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)   (+) Class 3 severe obesity due to excess calories without serious comorbidity with body mass index (BMI) of 40 0 to 44 9 in adult [facility])   Prev R lower lobectomy for benign carcinoid tumor of lung  Mac3 gr1v    Recent labs personally reviewed:  Lab Results   Component Value Date    WBC 8 04 12/07/2022    HGB 14 0 12/07/2022     12/07/2022     Lab Results   Component Value Date    K 5 0 12/07/2022    BUN 13 12/07/2022    CREATININE 0 82 12/07/2022     Lab Results   Component Value Date    PTT 37 06/28/2022      Lab Results   Component Value Date    INR 1 06 12/07/2022       Lab Results   Component Value Date    HGBA1C 5 4 12/07/2022       Type and Screen:  B      Physical Exam    Airway    Mallampati score: II         Dental   No notable dental hx     Cardiovascular      Pulmonary  Decreased breath sounds,     Other Findings        Anesthesia Plan  ASA Score- 3     Anesthesia Type- general with ASA Monitors  Additional Monitors:   Airway Plan: ETT  Plan Factors-Exercise tolerance (METS): >4 METS  Chart reviewed  Existing labs reviewed  Patient summary reviewed  Patient is a current smoker  Induction- intravenous  Postoperative Plan- Plan for postoperative opioid use  Planned trial extubation    Informed Consent- Anesthetic plan and risks discussed with patient    I personally reviewed this patient with the CRNA  Discussed and agreed on the Anesthesia Plan with the CRNA  Mitali Young

## 2022-12-20 ENCOUNTER — HOSPITAL ENCOUNTER (OUTPATIENT)
Facility: HOSPITAL | Age: 47
Setting detail: OUTPATIENT SURGERY
Discharge: HOME | End: 2022-12-20
Attending: OBSTETRICS & GYNECOLOGY | Admitting: OBSTETRICS & GYNECOLOGY

## 2022-12-20 ENCOUNTER — ANESTHESIA (OUTPATIENT)
Dept: PERIOP | Facility: HOSPITAL | Age: 47
End: 2022-12-20

## 2022-12-20 VITALS
OXYGEN SATURATION: 97 % | DIASTOLIC BLOOD PRESSURE: 70 MMHG | WEIGHT: 293 LBS | HEART RATE: 90 BPM | SYSTOLIC BLOOD PRESSURE: 120 MMHG | RESPIRATION RATE: 16 BRPM | HEIGHT: 71 IN | BODY MASS INDEX: 41.02 KG/M2 | TEMPERATURE: 96.4 F

## 2022-12-20 DIAGNOSIS — N83.8 BILATERAL TUBO-OVARIAN MASS: ICD-10-CM

## 2022-12-20 DIAGNOSIS — Z90.722 S/P BILATERAL OOPHORECTOMY: Primary | ICD-10-CM

## 2022-12-20 LAB — GLUCOSE SERPL-MCNC: 100 MG/DL (ref 65–140)

## 2022-12-20 RX ORDER — MIDAZOLAM HYDROCHLORIDE 2 MG/2ML
INJECTION, SOLUTION INTRAMUSCULAR; INTRAVENOUS AS NEEDED
Status: DISCONTINUED | OUTPATIENT
Start: 2022-12-20 | End: 2022-12-20

## 2022-12-20 RX ORDER — OXYCODONE HYDROCHLORIDE 5 MG/1
5 TABLET ORAL EVERY 4 HOURS PRN
Qty: 10 TABLET | Refills: 0 | Status: SHIPPED | OUTPATIENT
Start: 2022-12-20 | End: 2022-12-22 | Stop reason: SDUPTHER

## 2022-12-20 RX ORDER — IBUPROFEN 600 MG/1
600 TABLET ORAL EVERY 6 HOURS PRN
Qty: 30 TABLET | Refills: 0
Start: 2022-12-20

## 2022-12-20 RX ORDER — LIDOCAINE HYDROCHLORIDE 10 MG/ML
0.5 INJECTION, SOLUTION EPIDURAL; INFILTRATION; INTRACAUDAL; PERINEURAL ONCE AS NEEDED
Status: DISCONTINUED | OUTPATIENT
Start: 2022-12-20 | End: 2022-12-20

## 2022-12-20 RX ORDER — CEFAZOLIN SODIUM 1 G/3ML
INJECTION, POWDER, FOR SOLUTION INTRAMUSCULAR; INTRAVENOUS AS NEEDED
Status: DISCONTINUED | OUTPATIENT
Start: 2022-12-20 | End: 2022-12-20

## 2022-12-20 RX ORDER — HYDROMORPHONE HCL/PF 1 MG/ML
SYRINGE (ML) INJECTION AS NEEDED
Status: DISCONTINUED | OUTPATIENT
Start: 2022-12-20 | End: 2022-12-20

## 2022-12-20 RX ORDER — DEXAMETHASONE SODIUM PHOSPHATE 10 MG/ML
INJECTION, SOLUTION INTRAMUSCULAR; INTRAVENOUS AS NEEDED
Status: DISCONTINUED | OUTPATIENT
Start: 2022-12-20 | End: 2022-12-20

## 2022-12-20 RX ORDER — FENTANYL CITRATE 50 UG/ML
INJECTION, SOLUTION INTRAMUSCULAR; INTRAVENOUS AS NEEDED
Status: DISCONTINUED | OUTPATIENT
Start: 2022-12-20 | End: 2022-12-20

## 2022-12-20 RX ORDER — SODIUM CHLORIDE, SODIUM LACTATE, POTASSIUM CHLORIDE, CALCIUM CHLORIDE 600; 310; 30; 20 MG/100ML; MG/100ML; MG/100ML; MG/100ML
125 INJECTION, SOLUTION INTRAVENOUS CONTINUOUS
Status: DISCONTINUED | OUTPATIENT
Start: 2022-12-20 | End: 2022-12-20

## 2022-12-20 RX ORDER — ONDANSETRON 2 MG/ML
4 INJECTION INTRAMUSCULAR; INTRAVENOUS ONCE AS NEEDED
Status: DISCONTINUED | OUTPATIENT
Start: 2022-12-20 | End: 2022-12-20 | Stop reason: HOSPADM

## 2022-12-20 RX ORDER — BUPIVACAINE HYDROCHLORIDE 2.5 MG/ML
INJECTION, SOLUTION EPIDURAL; INFILTRATION; INTRACAUDAL AS NEEDED
Status: DISCONTINUED | OUTPATIENT
Start: 2022-12-20 | End: 2022-12-20 | Stop reason: HOSPADM

## 2022-12-20 RX ORDER — FENTANYL CITRATE/PF 50 MCG/ML
25 SYRINGE (ML) INJECTION
Status: COMPLETED | OUTPATIENT
Start: 2022-12-20 | End: 2022-12-20

## 2022-12-20 RX ORDER — HEPARIN SODIUM 5000 [USP'U]/ML
5000 INJECTION, SOLUTION INTRAVENOUS; SUBCUTANEOUS
Status: COMPLETED | OUTPATIENT
Start: 2022-12-20 | End: 2022-12-20

## 2022-12-20 RX ORDER — ACETAMINOPHEN 325 MG/1
975 TABLET ORAL ONCE
Status: COMPLETED | OUTPATIENT
Start: 2022-12-20 | End: 2022-12-20

## 2022-12-20 RX ORDER — PROPOFOL 10 MG/ML
INJECTION, EMULSION INTRAVENOUS AS NEEDED
Status: DISCONTINUED | OUTPATIENT
Start: 2022-12-20 | End: 2022-12-20

## 2022-12-20 RX ORDER — OXYCODONE HYDROCHLORIDE 5 MG/1
10 TABLET ORAL EVERY 4 HOURS PRN
Status: DISCONTINUED | OUTPATIENT
Start: 2022-12-20 | End: 2022-12-20 | Stop reason: HOSPADM

## 2022-12-20 RX ORDER — KETOROLAC TROMETHAMINE 30 MG/ML
INJECTION, SOLUTION INTRAMUSCULAR; INTRAVENOUS AS NEEDED
Status: DISCONTINUED | OUTPATIENT
Start: 2022-12-20 | End: 2022-12-20

## 2022-12-20 RX ORDER — SODIUM CHLORIDE, SODIUM LACTATE, POTASSIUM CHLORIDE, CALCIUM CHLORIDE 600; 310; 30; 20 MG/100ML; MG/100ML; MG/100ML; MG/100ML
50 INJECTION, SOLUTION INTRAVENOUS CONTINUOUS
Status: DISCONTINUED | OUTPATIENT
Start: 2022-12-20 | End: 2022-12-20

## 2022-12-20 RX ORDER — SODIUM CHLORIDE 9 MG/ML
INJECTION, SOLUTION INTRAVENOUS CONTINUOUS PRN
Status: DISCONTINUED | OUTPATIENT
Start: 2022-12-20 | End: 2022-12-20

## 2022-12-20 RX ORDER — SENNOSIDES 8.6 MG
650 CAPSULE ORAL EVERY 6 HOURS
Qty: 30 TABLET | Refills: 0
Start: 2022-12-20

## 2022-12-20 RX ORDER — ROCURONIUM BROMIDE 10 MG/ML
INJECTION, SOLUTION INTRAVENOUS AS NEEDED
Status: DISCONTINUED | OUTPATIENT
Start: 2022-12-20 | End: 2022-12-20

## 2022-12-20 RX ORDER — MAGNESIUM HYDROXIDE 1200 MG/15ML
LIQUID ORAL AS NEEDED
Status: DISCONTINUED | OUTPATIENT
Start: 2022-12-20 | End: 2022-12-20 | Stop reason: HOSPADM

## 2022-12-20 RX ORDER — ACETAMINOPHEN 325 MG/1
975 TABLET ORAL EVERY 6 HOURS PRN
Status: DISCONTINUED | OUTPATIENT
Start: 2022-12-20 | End: 2022-12-20 | Stop reason: HOSPADM

## 2022-12-20 RX ORDER — SODIUM CHLORIDE, SODIUM LACTATE, POTASSIUM CHLORIDE, CALCIUM CHLORIDE 600; 310; 30; 20 MG/100ML; MG/100ML; MG/100ML; MG/100ML
INJECTION, SOLUTION INTRAVENOUS CONTINUOUS PRN
Status: DISCONTINUED | OUTPATIENT
Start: 2022-12-20 | End: 2022-12-20

## 2022-12-20 RX ORDER — GLYCOPYRROLATE 0.2 MG/ML
INJECTION INTRAMUSCULAR; INTRAVENOUS AS NEEDED
Status: DISCONTINUED | OUTPATIENT
Start: 2022-12-20 | End: 2022-12-20

## 2022-12-20 RX ORDER — OXYCODONE HYDROCHLORIDE 5 MG/1
5 TABLET ORAL EVERY 4 HOURS PRN
Status: DISCONTINUED | OUTPATIENT
Start: 2022-12-20 | End: 2022-12-20 | Stop reason: HOSPADM

## 2022-12-20 RX ORDER — IBUPROFEN 600 MG/1
600 TABLET ORAL EVERY 6 HOURS PRN
Status: DISCONTINUED | OUTPATIENT
Start: 2022-12-20 | End: 2022-12-20 | Stop reason: HOSPADM

## 2022-12-20 RX ORDER — ONDANSETRON 2 MG/ML
INJECTION INTRAMUSCULAR; INTRAVENOUS AS NEEDED
Status: DISCONTINUED | OUTPATIENT
Start: 2022-12-20 | End: 2022-12-20

## 2022-12-20 RX ORDER — KETAMINE HCL IN NACL, ISO-OSM 100MG/10ML
SYRINGE (ML) INJECTION AS NEEDED
Status: DISCONTINUED | OUTPATIENT
Start: 2022-12-20 | End: 2022-12-20

## 2022-12-20 RX ORDER — CELECOXIB 200 MG/1
200 CAPSULE ORAL ONCE
Status: COMPLETED | OUTPATIENT
Start: 2022-12-20 | End: 2022-12-20

## 2022-12-20 RX ORDER — LIDOCAINE HYDROCHLORIDE 20 MG/ML
INJECTION, SOLUTION EPIDURAL; INFILTRATION; INTRACAUDAL; PERINEURAL AS NEEDED
Status: DISCONTINUED | OUTPATIENT
Start: 2022-12-20 | End: 2022-12-20

## 2022-12-20 RX ORDER — ONDANSETRON 2 MG/ML
4 INJECTION INTRAMUSCULAR; INTRAVENOUS EVERY 6 HOURS PRN
Status: DISCONTINUED | OUTPATIENT
Start: 2022-12-20 | End: 2022-12-20 | Stop reason: HOSPADM

## 2022-12-20 RX ORDER — HYDROMORPHONE HCL/PF 1 MG/ML
0.5 SYRINGE (ML) INJECTION
Status: COMPLETED | OUTPATIENT
Start: 2022-12-20 | End: 2022-12-20

## 2022-12-20 RX ADMIN — Medication 20 MG: at 07:36

## 2022-12-20 RX ADMIN — PHENYLEPHRINE HYDROCHLORIDE 20 MCG/MIN: 10 INJECTION INTRAVENOUS at 07:43

## 2022-12-20 RX ADMIN — PROPOFOL 200 MG: 10 INJECTION, EMULSION INTRAVENOUS at 07:37

## 2022-12-20 RX ADMIN — FENTANYL CITRATE 25 MCG: 50 INJECTION INTRAMUSCULAR; INTRAVENOUS at 09:55

## 2022-12-20 RX ADMIN — ACETAMINOPHEN 975 MG: 325 TABLET, FILM COATED ORAL at 12:20

## 2022-12-20 RX ADMIN — HYDROMORPHONE HYDROCHLORIDE 0.5 MG: 1 INJECTION, SOLUTION INTRAMUSCULAR; INTRAVENOUS; SUBCUTANEOUS at 10:17

## 2022-12-20 RX ADMIN — OXYCODONE HYDROCHLORIDE 10 MG: 5 TABLET ORAL at 12:21

## 2022-12-20 RX ADMIN — LIDOCAINE HYDROCHLORIDE 60 MG: 20 INJECTION, SOLUTION EPIDURAL; INFILTRATION; INTRACAUDAL; PERINEURAL at 07:36

## 2022-12-20 RX ADMIN — SODIUM CHLORIDE: 0.9 INJECTION, SOLUTION INTRAVENOUS at 07:46

## 2022-12-20 RX ADMIN — SODIUM CHLORIDE, SODIUM LACTATE, POTASSIUM CHLORIDE, AND CALCIUM CHLORIDE: .6; .31; .03; .02 INJECTION, SOLUTION INTRAVENOUS at 08:47

## 2022-12-20 RX ADMIN — ONDANSETRON 4 MG: 2 INJECTION INTRAMUSCULAR; INTRAVENOUS at 08:00

## 2022-12-20 RX ADMIN — ROCURONIUM BROMIDE 30 MG: 50 INJECTION INTRAVENOUS at 08:19

## 2022-12-20 RX ADMIN — DEXAMETHASONE SODIUM PHOSPHATE 10 MG: 10 INJECTION, SOLUTION INTRAMUSCULAR; INTRAVENOUS at 07:38

## 2022-12-20 RX ADMIN — CEFAZOLIN 3000 MG: 1 INJECTION, POWDER, FOR SOLUTION INTRAMUSCULAR; INTRAVENOUS at 07:55

## 2022-12-20 RX ADMIN — HEPARIN SODIUM 5000 UNITS: 5000 INJECTION INTRAVENOUS; SUBCUTANEOUS at 07:03

## 2022-12-20 RX ADMIN — HYDROMORPHONE HYDROCHLORIDE 0.5 MG: 1 INJECTION, SOLUTION INTRAMUSCULAR; INTRAVENOUS; SUBCUTANEOUS at 10:34

## 2022-12-20 RX ADMIN — SUGAMMADEX 400 MG: 100 INJECTION, SOLUTION INTRAVENOUS at 09:24

## 2022-12-20 RX ADMIN — FENTANYL CITRATE 25 MCG: 50 INJECTION INTRAMUSCULAR; INTRAVENOUS at 10:01

## 2022-12-20 RX ADMIN — FENTANYL CITRATE 50 MCG: 50 INJECTION, SOLUTION INTRAMUSCULAR; INTRAVENOUS at 08:00

## 2022-12-20 RX ADMIN — FENTANYL CITRATE 25 MCG: 50 INJECTION INTRAMUSCULAR; INTRAVENOUS at 10:14

## 2022-12-20 RX ADMIN — ROCURONIUM BROMIDE 50 MG: 50 INJECTION INTRAVENOUS at 07:36

## 2022-12-20 RX ADMIN — SODIUM CHLORIDE, SODIUM LACTATE, POTASSIUM CHLORIDE, AND CALCIUM CHLORIDE: .6; .31; .03; .02 INJECTION, SOLUTION INTRAVENOUS at 07:27

## 2022-12-20 RX ADMIN — ROCURONIUM BROMIDE 20 MG: 50 INJECTION INTRAVENOUS at 08:32

## 2022-12-20 RX ADMIN — FENTANYL CITRATE 50 MCG: 50 INJECTION, SOLUTION INTRAMUSCULAR; INTRAVENOUS at 07:36

## 2022-12-20 RX ADMIN — KETOROLAC TROMETHAMINE 15 MG: 30 INJECTION, SOLUTION INTRAMUSCULAR; INTRAVENOUS at 09:24

## 2022-12-20 RX ADMIN — ACETAMINOPHEN 975 MG: 325 TABLET, FILM COATED ORAL at 06:17

## 2022-12-20 RX ADMIN — MIDAZOLAM 2 MG: 1 INJECTION INTRAMUSCULAR; INTRAVENOUS at 07:27

## 2022-12-20 RX ADMIN — CELECOXIB 200 MG: 200 CAPSULE ORAL at 06:17

## 2022-12-20 RX ADMIN — FENTANYL CITRATE 25 MCG: 50 INJECTION INTRAMUSCULAR; INTRAVENOUS at 10:08

## 2022-12-20 RX ADMIN — Medication 30 MG: at 07:57

## 2022-12-20 RX ADMIN — HYDROMORPHONE HYDROCHLORIDE 0.5 MG: 1 INJECTION, SOLUTION INTRAMUSCULAR; INTRAVENOUS; SUBCUTANEOUS at 09:31

## 2022-12-20 RX ADMIN — GLYCOPYRROLATE 0.1 MG: 0.2 INJECTION INTRAMUSCULAR; INTRAVENOUS at 08:13

## 2022-12-20 NOTE — OP NOTE
OPERATIVE REPORT  PATIENT NAME: Luis Rodriguez    :  1975  MRN: [id number]  Pt Location: [facility] OR ROOM 14    SURGERY DATE: 2022    Surgeon(s) and Role:     * Ashlee Dawson MD - Primary     * Jeanann Kocher, PA-C - Assisting     * Rema Erazo MD - Mirna Lee MD - Assisting    Preop Diagnosis:  Bilateral tubo-ovarian mass [N83 8]    Post-Op Diagnosis Codes:     * Bilateral tubo-ovarian mass [N83 8]    Procedure(s) (LRB):  OOPHORECTOMY W/ ROBOTICS, VAGINOTOMY (Bilateral)  CYSTOSCOPY (N/A)    Specimen(s):  ID Type Source Tests Collected by Time Destination   1 : bilateral ovaries Tissue Ovaries, Right & Left TISSUE EXAM Ashlee Dawosn MD 2022  8:45 AM        Estimated Blood Loss:   Minimal    Drains:  Chest Tube Right Pleural 10 Fr  (Active)   Number of days: 141       [REMOVED] Urethral Catheter Double-lumen;Non-latex 16 Fr  (Removed)   Number of days: 0       Anesthesia Type:   General    Operative Indications:  Bilateral tubo-ovarian mass [N808]  78-year-old with morbid obesity, BMI 44 kg/m², bilateral complex appearing ovarian cysts, prior hysterectomy, prior abdominoplasty presents for definitive operative management and potential surgical staging  Operative Findings:  1  Examination under anesthesia revealed a gross normal vaginal apex  There are no palpable adnexal masses  2   No evidence of peritoneal disease  No ascites  3   Minimal pelvic adhesions  The right ovary was adherent to the right pelvic sidewall  Both ovaries had multiple simple appearing cystic structures  No surface excrescences  4   On gross evaluation in pathology, the cysts appeared simple  Therefore, frozen section was not obtained  5   Cystoscopy with bilateral jets and no evidence of bladder injury      Complications:   None    Procedure and Technique:  After informed consent was obtained, the patient was taken the operating room where general endotracheal anesthesia was administered without incident  She was then prepped and draped in normal sterile fashion in the low dorsolithotomy position  Examination under anesthesia revealed the above-mentioned findings  A Osorio catheter was inserted  Enters entering the abdomen  0 25% Marcaine was used to infiltrate the skin prior to placement of any trocar  The first insertion site was approximately 2 cm superior to the neoumbilicus  An 8 mm incision was made using an 11 blade scalpel  Through this was passed an 8 mm robotic trocar under direct visualization into the abdominal cavity  The abdomen is then insufflated to 15 mmHg using CO2 gas  Findings on laparoscopy are noted as above  Additional robotic trochars then placed under direct visualization  There were 2 trochars in the left upper quadrant and one in the right upper quadrant  A 5 mm assistant port was also placed in the right upper quadrant  The robot was docked  Peritoneal cytology was obtained  It was later discarded  Attention was first turned to the left side  The ovary was elevated  The ureter was identified coursing normally within the medial leaf of the broad ligament  A window was made in the broad ligament to skeletonize the infundibulopelvic ligament  The infundibulopelvic ligament was then cauterized and transected  Residual peritoneal adhesions were then lysed  The ovary was then freed from adhesions to the bladder serosa and the residual utero-ovarian ligaments were cauterized and transected  The ovary was placed in the left gutter  Attention was then turned to the right side  The retroperitoneal space was opened  The pararectal space was developed  The ureter was identified  The ovary was dense adherent to the peritoneum  A window was made in the peritoneum and the ovary was able be skeletonized free of the right parametria as well as the right ureter    Hemostasis was achieved using monopolar and bipolar cautery  The infundibulopelvic ligament was then skeletonized, cauterized and transected  The right ovary was then able to be completely freed from the right pelvic sidewall  An EEA sizer was then advanced transvaginally  The vesicovaginal space was identified  A 2 cm vaginotomy was then made using monopolar cautery  A 15 mm Endo Catch bag was then advanced into the pelvis through the vagina  Both ovaries were placed in the bag  The bag was then exteriorized  The ovaries were ruptured in the bag  There was no spillage of any contents in the abdominal cavity  There were then removed through the vaginotomy incision  Frozen section results are as above  A 2-0 STRATAFIX suture was then placed in the pelvis transvaginally and used to close the vagina in a running fashion  Hemostasis was excellent  The pelvis was irrigated  The pressure on the pelvis was brought down to less than 5 mmHg  There is no evidence of bleeding identified  The pressure was then increased  The robot undocked  The suture was then removed laparoscopically  The gas was removed from abdominal cavity  The ports were removed under direct visualization  The skin was closed at all sites using 4-0 Monocryl followed by Exofin  The Osorio catheter was removed  The bladder was insufflated with 200 cc of normal saline using the suction irrigation device  The 5 mm laparoscope was then used as a cystoscope with findings noted as above  The Osorio catheter was then used to drain the bladder then removed  The vagina was inspected  There is no evidence of bleeding identified  The patient was then awakened and transferred to the recovery room in stable condition  All instrument counts correct x2 for the procedure  No complications    Estimate blood loss is minimal    I was present for the entire procedure and A physician assistant was required during the procedure for retraction tissue handling,dissection and suturing    Patient Disposition:  PACU         SIGNATURE: James Stratton MD  DATE: December 20, 2022  TIME: 9:31 AM

## 2022-12-20 NOTE — INTERVAL H&P NOTE
H&P reviewed  After examining the patient I find no changes in the patients condition since the H&P had been written      Vitals:    12/20/22 0628   BP: (S) 114/83   Pulse: (S) 79   Resp: 20   Temp: (!) 97 2 °F (36 2 °C)   SpO2: 98%

## 2022-12-20 NOTE — ANESTHESIA POSTPROCEDURE EVALUATION
Post-Op Assessment Note    CV Status:  Stable    Pain management: adequate     Mental Status:  Alert and awake   Hydration Status:  Euvolemic   PONV Controlled:  Controlled   Airway Patency:  Patent      Post Op Vitals Reviewed: Yes            No notable events documented      BP   130/73   Temp     Pulse  80   Resp   16   SpO2   100

## 2022-12-22 DIAGNOSIS — Z90.722 S/P BILATERAL OOPHORECTOMY: ICD-10-CM

## 2022-12-22 RX ORDER — OXYCODONE HYDROCHLORIDE 5 MG/1
5 TABLET ORAL EVERY 6 HOURS PRN
Qty: 10 TABLET | Refills: 0 | Status: SHIPPED | OUTPATIENT
Start: 2022-12-22 | End: 2023-01-01

## 2022-12-28 ENCOUNTER — DOCUMENTATION (OUTPATIENT)
Dept: GENETICS | Facility: CLINIC | Age: 47
End: 2022-12-28

## 2022-12-30 DIAGNOSIS — G89.4 CHRONIC PAIN SYNDROME: ICD-10-CM

## 2022-12-30 RX ORDER — GABAPENTIN 300 MG/1
CAPSULE ORAL
Qty: 270 CAPSULE | Refills: 3 | Status: SHIPPED | OUTPATIENT
Start: 2022-12-30

## 2023-01-04 ENCOUNTER — OFFICE VISIT (OUTPATIENT)
Dept: GYNECOLOGIC ONCOLOGY | Facility: CLINIC | Age: 48
End: 2023-01-04

## 2023-01-04 VITALS
OXYGEN SATURATION: 98 % | WEIGHT: 293 LBS | DIASTOLIC BLOOD PRESSURE: 72 MMHG | HEART RATE: 89 BPM | HEIGHT: 71 IN | TEMPERATURE: 99.1 F | SYSTOLIC BLOOD PRESSURE: 134 MMHG | BODY MASS INDEX: 41.02 KG/M2

## 2023-01-04 DIAGNOSIS — Z90.722 S/P BILATERAL OOPHORECTOMY: Primary | ICD-10-CM

## 2023-01-04 NOTE — PROGRESS NOTES
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)  Staging form: Lung, AJCC 8th Edition  - Clinical stage from 7/28/2022: Stage IIIA (cT1b, cN2, cM0) - Signed by Pema Garcia PA-C on 8/16/2022        Previous therapy:  Oncology History   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 ([facility] [address] )   7/28/2022 -  Cancer Staged    Staging form: Lung, AJCC 8th Edition  - Clinical stage from 7/28/2022: Stage IIIA (cT1b, cN2, cM0) - Signed by Pema Garcia PA-C on 8/16/2022  Histopathologic type: Carcinoid tumor, NOS  Histologic grade (G): G1  Histologic grading system: 4 grade system       8/16/2022 Initial Diagnosis    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 [facility])           Patient ID: Imtiaz Mckeon is a 49 y o  female  Who returns for postoperative evaluation  She has no complaints today  She does not require narcotic pain medication  No vaginal bleeding  She is ambulating, voiding, tolerating regular diet, having bowel movements        The following portions of the patient's history were reviewed and updated as appropriate: allergies, current medications, past family history, past medical history, past social history, past surgical history and problem list     Review of Systems      Current Outpatient Medications:   •  acetaminophen (TYLENOL) 650 mg CR tablet, Take 1 tablet (650 mg total) by mouth every 6 (six) hours, Disp: 30 tablet, Rfl: 0  •  acyclovir (ZOVIRAX) 800 mg tablet, Take 0 5 tablets (400 mg total) by mouth 2 (two) times a day, Disp: 180 tablet, Rfl: 3  •  ALPRAZolam (XANAX) 0 25 mg tablet, Take 1 tablet (0 25 mg total) by mouth daily at bedtime as needed for anxiety, Disp: 30 tablet, Rfl: 0  •  gabapentin (NEURONTIN) 300 mg capsule, TAKE 1 CAPSULE BY MOUTH 3  TIMES DAILY, Disp: 270 capsule, Rfl: 3  •  ibuprofen (MOTRIN) 600 mg tablet, Take 1 tablet (600 mg total) by mouth every 6 (six) hours as needed for mild pain, Disp: 30 tablet, Rfl: 0  •  levocetirizine (XYZAL) 5 MG tablet, Take 1 tablet (5 mg total) by mouth every evening, Disp: 90 tablet, Rfl: 3  •  omeprazole (PriLOSEC) 40 MG capsule, Take 1 capsule (40 mg total) by mouth daily, Disp: 90 capsule, Rfl: 1    Objective:    Blood pressure 134/72, pulse 89, temperature 99 1 °F (37 3 °C), temperature source Tympanic, height 5' 11" (1 803 m), weight (!) 141 kg (310 lb), SpO2 98 %  Body mass index is 43 24 kg/m²  Body surface area is 2 54 meters squared  Physical Exam  Vitals reviewed  Constitutional:       General: She is not in acute distress  Appearance: Normal appearance  HENT:      Head: Normocephalic and atraumatic  Mouth/Throat:      Mouth: Mucous membranes are moist    Pulmonary:      Effort: Pulmonary effort is normal    Abdominal:      Palpations: Abdomen is soft  There is no mass  Tenderness: There is no abdominal tenderness  Skin:     General: Skin is warm and dry  Comments: Surgical trocar sites are intact, clean and dry without induration, erythema or purulent drainage  Neurological:      Mental Status: She is alert and oriented to person, place, and time  Psychiatric:         Mood and Affect: Mood normal          Behavior: Behavior normal          Thought Content:  Thought content normal          Judgment: Judgment normal

## 2023-01-04 NOTE — ASSESSMENT & PLAN NOTE
43-year-old status post robotic assisted bilateral oophorectomy, vaginotomy for specimen removal   She is recovering well from surgery  Her performance status is 0   1   Return in 4 weeks for postoperative pelvic examination  2  We discussed ongoing activity limitations

## 2023-01-04 NOTE — LETTER
January 4, 2023     Adiel Bello, [address] [facility]    Patient: Kiran Lyons   YOB: 1975   Date of Visit: 1/4/2023       Dear Dr Araiza Feeling: Thank you for referring Kiran Lyons to me for evaluation  Below are my notes for this consultation  If you have questions, please do not hesitate to call me  I look forward to following your patient along with you  Sincerely,        Nereyda Roe MD        CC: No Recipients  Nereyda Roe MD  1/4/2023 11:41 AM  Sign when Signing Visit  Assessment/Plan:    Problem List Items Addressed This Visit        Other    S/P bilateral oophorectomy - Primary     26-year-old status post robotic assisted bilateral oophorectomy, vaginotomy for specimen removal   She is recovering well from surgery  Her performance status is 0   1   Return in 4 weeks for postoperative pelvic examination  2  We discussed ongoing activity limitations  CHIEF COMPLAINT: Postoperative evaluation       Problem:  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)           Patient ID: Kiran Lyons is a 49 y o  female  Who returns for postoperative evaluation  She has no complaints today  She does not require narcotic pain medication  No vaginal bleeding    She is ambulating, voiding, tolerating regular diet, having bowel movements  The following portions of the patient's history were reviewed and updated as appropriate: allergies, current medications, past family history, past medical history, past social history, past surgical history and problem list     Review of Systems      Current Outpatient Medications:   •  acetaminophen (TYLENOL) 650 mg CR tablet, Take 1 tablet (650 mg total) by mouth every 6 (six) hours, Disp: 30 tablet, Rfl: 0  •  acyclovir (ZOVIRAX) 800 mg tablet, Take 0 5 tablets (400 mg total) by mouth 2 (two) times a day, Disp: 180 tablet, Rfl: 3  •  ALPRAZolam (XANAX) 0 25 mg tablet, Take 1 tablet (0 25 mg total) by mouth daily at bedtime as needed for anxiety, Disp: 30 tablet, Rfl: 0  •  gabapentin (NEURONTIN) 300 mg capsule, TAKE 1 CAPSULE BY MOUTH 3  TIMES DAILY, Disp: 270 capsule, Rfl: 3  •  ibuprofen (MOTRIN) 600 mg tablet, Take 1 tablet (600 mg total) by mouth every 6 (six) hours as needed for mild pain, Disp: 30 tablet, Rfl: 0  •  levocetirizine (XYZAL) 5 MG tablet, Take 1 tablet (5 mg total) by mouth every evening, Disp: 90 tablet, Rfl: 3  •  omeprazole (PriLOSEC) 40 MG capsule, Take 1 capsule (40 mg total) by mouth daily, Disp: 90 capsule, Rfl: 1    Objective:    Blood pressure 134/72, pulse 89, temperature 99 1 °F (37 3 °C), temperature source Tympanic, height 5' 11" (1 803 m), weight (!) 141 kg (310 lb), SpO2 98 %  Body mass index is 43 24 kg/m²  Body surface area is 2 54 meters squared  Physical Exam  Vitals reviewed  Constitutional:       General: She is not in acute distress  Appearance: Normal appearance  HENT:      Head: Normocephalic and atraumatic  Mouth/Throat:      Mouth: Mucous membranes are moist    Pulmonary:      Effort: Pulmonary effort is normal    Abdominal:      Palpations: Abdomen is soft  There is no mass  Tenderness: There is no abdominal tenderness  Skin:     General: Skin is warm and dry  Comments: Surgical trocar sites are intact, clean and dry without induration, erythema or purulent drainage  Neurological:      Mental Status: She is alert and oriented to person, place, and time  Psychiatric:         Mood and Affect: Mood normal          Behavior: Behavior normal          Thought Content:  Thought content normal          Judgment: Judgment normal

## 2023-01-10 ENCOUNTER — OFFICE VISIT (OUTPATIENT)
Dept: DERMATOLOGY | Age: 48
End: 2023-01-10

## 2023-01-10 VITALS — TEMPERATURE: 97.3 F | WEIGHT: 293 LBS | BODY MASS INDEX: 41.02 KG/M2 | HEIGHT: 71 IN

## 2023-01-10 DIAGNOSIS — L21.9 SEBORRHEIC DERMATITIS: Primary | ICD-10-CM

## 2023-01-10 DIAGNOSIS — D22.5 MULTIPLE BENIGN MELANOCYTIC NEVI OF UPPER EXTREMITY, LOWER EXTREMITY, AND TRUNK: ICD-10-CM

## 2023-01-10 DIAGNOSIS — D22.70 MULTIPLE BENIGN MELANOCYTIC NEVI OF UPPER EXTREMITY, LOWER EXTREMITY, AND TRUNK: ICD-10-CM

## 2023-01-10 DIAGNOSIS — L72.0 EPIDERMAL CYST: ICD-10-CM

## 2023-01-10 DIAGNOSIS — D22.60 MULTIPLE BENIGN MELANOCYTIC NEVI OF UPPER EXTREMITY, LOWER EXTREMITY, AND TRUNK: ICD-10-CM

## 2023-01-10 RX ORDER — KETOCONAZOLE 20 MG/ML
1 SHAMPOO TOPICAL 2 TIMES WEEKLY
Qty: 120 ML | Refills: 3 | Status: SHIPPED | OUTPATIENT
Start: 2023-01-12

## 2023-01-10 RX ORDER — CLOBETASOL PROPIONATE 0.05 G/100ML
1 SHAMPOO TOPICAL 2 TIMES WEEKLY
Qty: 118 ML | Refills: 3 | Status: SHIPPED | OUTPATIENT
Start: 2023-01-12

## 2023-01-13 ENCOUNTER — CLINICAL SUPPORT (OUTPATIENT)
Dept: GENETICS | Facility: CLINIC | Age: 48
End: 2023-01-13

## 2023-01-13 ENCOUNTER — DOCUMENTATION (OUTPATIENT)
Dept: GENETICS | Facility: CLINIC | Age: 48
End: 2023-01-13

## 2023-01-13 DIAGNOSIS — N83.8 BILATERAL TUBO-OVARIAN MASS: ICD-10-CM

## 2023-01-13 DIAGNOSIS — Z80.41 FAMILY HISTORY OF OVARIAN CANCER: Primary | ICD-10-CM

## 2023-01-13 DIAGNOSIS — C7B.09 CARCINOID TUMOR METASTATIC TO INTRATHORACIC LYMPH NODE (HCC): ICD-10-CM

## 2023-01-13 DIAGNOSIS — C7A.00 CARCINOID TUMOR METASTATIC TO INTRATHORACIC LYMPH NODE (HCC): ICD-10-CM

## 2023-01-13 NOTE — LETTER
2023     Willie Babb [address]  [place] [address]    Patient: Julieth Piper  YOB: 1975  Date of Visit: 2023      Dear Dr Mya Hamm: Thank you for referring Julieth Piper to me for evaluation  Below are my notes for this consultation  If you have questions, please do not hesitate to call me  I look forward to following your patient along with you  Sincerely,        Jillian Sexton GC        CC: No Recipients        Pre-Test Genetic Counseling Consult Note    Patient Name: Julieth Piper   /Age: 1975/47 y o  Referring Provider: Willie Babb MD    Date of Service: 2023  Genetic Counselor: Joon Kevin MS, [facility]  Interpretation Services: None  Location: Telephone consult   Length of Visit: 60 minutes      Tessa was referred to the [address] [place] [address] and Genetic Assessment Program due to her personal history of a neuroendocrine tumor of the lung and BCC along with a family history of ovarian cancer  She presents today to discuss the possibility of a hereditary cancer syndrome, options for genetic testing, and implications for her and her family  Cancer History and Treatment:     Personal History: Phx of neuroendocrine tumor of lung and BCC at age 49    21 Lung - Right lower Lobectomy  Final Diagnosis  A  Lymph node, level 7:     - Positive for metastatic carcinoid (1/)     Comment:  Positive immunostaining for pankeratin (AE1/AE3) and synaptophysin confirm the presence of metastatic carcinoid      B  Lymph node, level 11:     - Two lymph nodes identified; both negative for malignancy (0/2)      C  Lymph node, level 11, #2:     - Positive for metastatic carcinoid (1/)      D  Lung, right lower lobe: - Typical carcinoid / neuroendocrine tumor, grade 1, 1 7 cm  - Lymphatic channel invasion by tumor identified       - Metastatic carcinoid present in one of three peribronchial lymph nodes (1/3)      - Bronchial and vascular margins are negative for malignancy  - Atypical adenomatous hyperplasia        E  Lymph node, level 2R:     - Positive for metastatic carcinoid (1/1)  - Extracapsular extension by tumor present      F  Lymph node, level 4R:     - Metastatic carcinoid present in one of three lymph nodes (1/3)  12/20/22 BSO  Final Diagnosis  A  Ovaries, Right & Left, bilateral ovaries:  - Bilateral ovaries with inclusion cysts and follicle cysts  - Portion of adherent tubal fimbria, unremarkable  - Ectopic adrenal, 1 cm, slide A4   - Negative for malignancy  Screening Hx:     Breast:  Breast Imaging: Most recent mammogram in 2017  Breast Biopsy: 2017 bilateral bx that were negative   Breast Density: Unknown    Colon:  Colonoscopy: None     Gynecologic:  2014- NARAYAN  2022- BSO path revealed cysts     Skin:  Sees derm annually    Reproductive History  Age at menarche: 5  Age at first live birth: 24  Menopause: Post Menopausal (periods stopped in 2014 following NARAYAN; now recently had BSO at age 49)  Hormone replacement: None     Medical and Surgical History  Pertinent surgical history:   Past Surgical History:   Procedure Laterality Date   • ABDOMINOPLASTY     • AUGMENTATION BREAST Bilateral 2015   • BRONCHOSCOPY N/A 05/03/2022    Procedure: BRONCHOSCOPY NAVIGATIONAL;  Surgeon: Doug Jloley MD;  [facility];  Service: Thoracic   • CYSTOSCOPY N/A 12/20/2022    Procedure: Jhonny Pruitt;  Surgeon: Sabina Smith MD;  [facility];  Service: Gynecology Oncology   • EGD  2018   • ENDOBRONCHIAL ULTRASOUND (EBUS) N/A 05/03/2022    Procedure: ENDOBRONCHIAL ULTRASOUND (EBUS);   Surgeon: Doug Jolley MD;  [facility];  Service: Thoracic   • HYSTERECTOMY  2014   • IR BIOPSY LYMPH NODE  03/21/2022   • IR CHEST TUBE PLACEMENT  08/01/2022   • LAPAROSCOPIC CHOLECYSTECTOMY  1997   • LIPOSUCTION  2015   • MOHS SURGERY Left 08/18/2022    left buttocks   • [place] [address] INCL FLUOR GDNCE DX W/CELL [address] [facility] ER N/A 05/03/2022    Procedure: BRONCHOSCOPY FLEXIBLE;  Surgeon: David Breaux MD;  [facility];  Service: Thoracic   • [place] [address] INCL FLUOR GDNCE DX [address] [facility] ER N/A 07/28/2022    Procedure: BRONCHOSCOPY FLEXIBLE;  Surgeon: Lizbeth Mathews MD;  [facility];  Service: Thoracic   • [place] LAPAROSCOPY W/RMVL ADNEXAL STRUCTURES Bilateral 12/20/2022    Procedure: OOPHORECTOMY W/ ROBOTICS, VAGINOTOMY;  Surgeon: Raina Gunter MD;  [facility];  Service: Gynecology Oncology   • NJ THORACOSCOPY W/LOBECTOMY SINGLE LOBE Right 07/28/2022    Procedure: LOBECTOMY LUNG;  Surgeon: Lizbeth Mathews MD;  [facility];  Service: Thoracic   • [place] THORACOSCOPY W/LOBECTOMY SINGLE LOBE Right 07/28/2022    Procedure: THORACOSCOPY VIDEO ASSISTED SURGERY (VATS); Surgeon: Lizbeth Mathews MD;  [facility];  Service: Thoracic   • REPAIR RECTOCELE  2018   • TONSILLECTOMY  1984      Pertinent medical history:  Past Medical History:   Diagnosis Date   • Acid reflux    • Trejo's esophagus    • GERD (gastroesophageal reflux disease)    • Hiatal hernia    • Kidney stone    • Latent tuberculosis by skin test 1986    Was treated with INH for 6 months   • Obstructive sleep apnea 05/27/2018    Mild TERESITA  AHI 18 1   • Squamous cell skin cancer     Left buttocks       Other History:  Height:   Ht Readings from Last 1 Encounters:   01/10/23 5' 11" (1 803 m)     Weight:   Wt Readings from Last 1 Encounters:   01/10/23 (!) 140 kg (308 lb)     Relevant Family History   Patient reports no Ashkenazi [employer] ancestry  Please refer to the scanned pedigree in the Media Tab for a complete family history     *All history is reported as provided by the patient; records are not available for review, except where indicated  Assessment:  We discussed sporadic, familial and hereditary cancer    We also discussed the many factors that influence our risk for cancer such as age, environmental exposures, lifestyle choices and family history  We reviewed the indications suggestive of a hereditary predisposition to cancer  Genetic testing is indicated for Tessa based on the following criteria: Meets NCCN V1 2023 Testing Criteria for Ovarian Cancer Susceptibility Genes: Family history of a mother (FDR) and maternal grandmother (SDR) with ovarian cancer    The risks, benefits, and limitations of genetic testing were reviewed with the patient, as well as genetic discrimination laws, and possible test results (positive, negative, variants of uncertain significance) and their clinical implications  If positive for a mutation, options for managing cancer risk including increased surveillance, chemoprevention, and in some cases prophylactic surgery were discussed  Tessa was informed that if a hereditary cancer syndrome was identified in her, first degree relatives (parents, siblings, and children) have a chance of also inheriting the condition  Genetic testing would allow for predictive genetic testing in other relatives, who may also be at risk depending on their degree of relation  Billing:  Most individuals pay <$100 for hereditary cancer genetic testing  If insurance covers the cost of the testing, individuals may still pay out of pocket secondary to co-pays, co-insurance, or deductibles  If the cost of the testing exceeds $100, the lab will reach out to the patient via phone or e-mail  The patient will then have the option to proceed with the testing, cancel the testing, or elect the self-pay option of $250  Nikolay Orozco verbalized understanding  Plan: Patient decided to proceed with testing and provided consent      Summary:     Sample Collection:  A blood kit was mailed home to the patient    Genetic Testing Preformed: [employer]: Cancer® +RNAinsight® (59 genes): APC, CARMELITA, AXIN2, BAP1, BARD1, BMPR1A, BRCA1, BRCA2, BRIP1, CDH1, CDK4, CDKN1B, CDKN2A, CHEK2, CTNNA1, DICER1, EGLN1, EPCAM, FH, FLCN, GREM1, HOXB13, KIF1B, MAX, MEN1, MET, MITF, MLH1, MSH2, MSH3, MSH6, MUTYH, NBN, NF1, NTHL1, PALB2, PMS2, POLD1, POLE, POT1, PTEN, RAD51C, RAD51D, RB1, RECQL, RET, SDHA, SDHAF2, SDHB, SDHC, SDHD, SMAD4, SMARCA4, STK11, MHJV293, TP53, TSC1, TSC2, VHL     Result Call Information:  I confirmed the patient's mobile number on file as the best number to call with results - [phone]  I confirmed with the patient that we can leave a voicemail on all provided numbers    Results take approximately 2-3 weeks to complete once test is started  We will contact Tessa once results are available  Additional recommendations for surveillance/medical management will be made pending genetic test results

## 2023-01-13 NOTE — PROGRESS NOTES
Pre-Test Genetic Counseling Consult Note    Patient Name: Manuel Park   /Age: 1975/47 y o  Referring Provider: Tricia Majano MD    Date of Service: 2023  Genetic Counselor: Hussain Alcantara MS, [facility]  Interpretation Services: None  Location: Telephone consult   Length of Visit: 60 minutes      Tessa was referred to the [address] [place] [address] and Genetic Assessment Program due to her personal history of a neuroendocrine tumor of the lung and BCC along with a family history of ovarian cancer  She presents today to discuss the possibility of a hereditary cancer syndrome, options for genetic testing, and implications for her and her family  Cancer History and Treatment:     Personal History: Phx of neuroendocrine tumor of lung and BCC at age 49    21 Lung - Right lower Lobectomy  Final Diagnosis  A  Lymph node, level 7:     - Positive for metastatic carcinoid (1/)     Comment:  Positive immunostaining for pankeratin (AE1/AE3) and synaptophysin confirm the presence of metastatic carcinoid      B  Lymph node, level 11:     - Two lymph nodes identified; both negative for malignancy (0/2)      C  Lymph node, level 11, #2:     - Positive for metastatic carcinoid (1/1)      D  Lung, right lower lobe: - Typical carcinoid / neuroendocrine tumor, grade 1, 1 7 cm  - Lymphatic channel invasion by tumor identified  - Metastatic carcinoid present in one of three peribronchial lymph nodes (1/3)  - Bronchial and vascular margins are negative for malignancy  - Atypical adenomatous hyperplasia        E  Lymph node, level 2R:     - Positive for metastatic carcinoid (1/1)  - Extracapsular extension by tumor present      F  Lymph node, level 4R:     - Metastatic carcinoid present in one of three lymph nodes (1/3)  22 BSO  Final Diagnosis  A  Ovaries, Right & Left, bilateral ovaries:  - Bilateral ovaries with inclusion cysts and follicle cysts    - Portion of adherent tubal fimbria, unremarkable  - Ectopic adrenal, 1 cm, slide A4   - Negative for malignancy  Screening Hx:     Breast:  Breast Imaging: Most recent mammogram in 2017  Breast Biopsy: 2017 bilateral bx that were negative   Breast Density: Unknown    Colon:  Colonoscopy: None     Gynecologic:  2014- NARAYAN  2022- BSO path revealed cysts     Skin:  Sees derm annually    Reproductive History  Age at menarche: 5  Age at first live birth: 24  Menopause: Post Menopausal (periods stopped in 2014 following NARAYAN; now recently had BSO at age 49)  Hormone replacement: None     Medical and Surgical History  Pertinent surgical history:   Past Surgical History:   Procedure Laterality Date   • ABDOMINOPLASTY     • AUGMENTATION BREAST Bilateral 2015   • BRONCHOSCOPY N/A 05/03/2022    Procedure: BRONCHOSCOPY NAVIGATIONAL;  Surgeon: Robby Chan MD;  [facility];  Service: Thoracic   • CYSTOSCOPY N/A 12/20/2022    Procedure: Andria Olivas;  Surgeon: Deejay Carter MD;  [facility];  Service: Gynecology Oncology   • EGD  2018   • ENDOBRONCHIAL ULTRASOUND (EBUS) N/A 05/03/2022    Procedure: ENDOBRONCHIAL ULTRASOUND (EBUS);   Surgeon: Robby Chan MD;  [facility];  Service: Thoracic   • HYSTERECTOMY  2014   • IR BIOPSY LYMPH NODE  03/21/2022   • IR CHEST TUBE PLACEMENT  08/01/2022   • LAPAROSCOPIC CHOLECYSTECTOMY  1997   • LIPOSUCTION  2015   • MOHS SURGERY Left 08/18/2022    left buttocks   • NM [address] INCL FLUOR GDNCE DX W/CELL WASHG SPX N/A 05/03/2022    Procedure: BRONCHOSCOPY FLEXIBLE;  Surgeon: Robby Chan MD;  [facility];  Service: Thoracic   • NM [address] INCL FLUOR GDNCE DX [address] WASHG [facility] N/A 07/28/2022    Procedure: Mamadou Gregg;  Surgeon: Armando Kovacs MD;  [facility];  Service: Thoracic   • NM LAPAROSCOPY W/RMVL ADNEXAL STRUCTURES Bilateral 12/20/2022    Procedure: OOPHORECTOMY W/ ROBOTICS, VAGINOTOMY;  Surgeon: Colletta Spells Demarco Jeter MD;  [facility];  Service: Gynecology Oncology   • VT THORACOSCOPY W/LOBECTOMY SINGLE LOBE Right 07/28/2022    Procedure: LOBECTOMY LUNG;  Surgeon: Cristopher Lesch, MD;  [facility];  Service: Thoracic   • VT THORACOSCOPY W/LOBECTOMY SINGLE LOBE Right 07/28/2022    Procedure: THORACOSCOPY VIDEO ASSISTED SURGERY (VATS); Surgeon: Cristopher Lesch, MD;  [facility];  Service: Thoracic   • REPAIR RECTOCELE  2018   • TONSILLECTOMY  1984      Pertinent medical history:  Past Medical History:   Diagnosis Date   • Acid reflux    • Trejo's esophagus    • GERD (gastroesophageal reflux disease)    • Hiatal hernia    • Kidney stone    • Latent tuberculosis by skin test 1986    Was treated with INH for 6 months   • Obstructive sleep apnea 05/27/2018    Mild TERESITA  AHI 18 1   • Squamous cell skin cancer     Left buttocks       Other History:  Height:   Ht Readings from Last 1 Encounters:   01/10/23 5' 11" (1 803 m)     Weight:   Wt Readings from Last 1 Encounters:   01/10/23 (!) 140 kg (308 lb)     Relevant Family History   Patient reports no Ashkenazi Taoism ancestry  Please refer to the scanned pedigree in the Media Tab for a complete family history     *All history is reported as provided by the patient; records are not available for review, except where indicated  Assessment:  We discussed sporadic, familial and hereditary cancer  We also discussed the many factors that influence our risk for cancer such as age, environmental exposures, lifestyle choices and family history  We reviewed the indications suggestive of a hereditary predisposition to cancer      Genetic testing is indicated for Tessa based on the following criteria: Meets NCCN V1 2023 Testing Criteria for Ovarian Cancer Susceptibility Genes: Family history of a mother (FDR) and maternal grandmother (SDR) with ovarian cancer    The risks, benefits, and limitations of genetic testing were reviewed with the patient, as well as genetic discrimination laws, and possible test results (positive, negative, variants of uncertain significance) and their clinical implications  If positive for a mutation, options for managing cancer risk including increased surveillance, chemoprevention, and in some cases prophylactic surgery were discussed  Tessa was informed that if a hereditary cancer syndrome was identified in her, first degree relatives (parents, siblings, and children) have a chance of also inheriting the condition  Genetic testing would allow for predictive genetic testing in other relatives, who may also be at risk depending on their degree of relation  Billing:  Most individuals pay <$100 for hereditary cancer genetic testing  If insurance covers the cost of the testing, individuals may still pay out of pocket secondary to co-pays, co-insurance, or deductibles  If the cost of the testing exceeds $100, the lab will reach out to the patient via phone or e-mail  The patient will then have the option to proceed with the testing, cancel the testing, or elect the self-pay option of $250  Iris Yadav verbalized understanding  Plan: Patient decided to proceed with testing and provided consent      Summary:     Sample Collection:  A blood kit was mailed home to the patient    Genetic Testing Preformed: CustomNext: Cancer® +RNAinsight® (59 genes): APC, CARMELITA, AXIN2, BAP1, BARD1, BMPR1A, BRCA1, BRCA2, BRIP1, CDH1, CDK4, CDKN1B, CDKN2A, CHEK2, CTNNA1, DICER1, EGLN1, EPCAM, FH, FLCN, GREM1, HOXB13, KIF1B, MAX, MEN1, MET, MITF, MLH1, MSH2, MSH3, MSH6, MUTYH, NBN, NF1, NTHL1, PALB2, PMS2, POLD1, POLE, POT1, PTEN, RAD51C, RAD51D, RB1, RECQL, RET, SDHA, SDHAF2, SDHB, SDHC, SDHD, SMAD4, SMARCA4, STK11, PNHH191, TP53, TSC1, TSC2, VHL     Result Call Information:  I confirmed the patient's mobile number on file as the best number to call with results - [phone]  I confirmed with the patient that we can leave a voicemail on all provided numbers    Results take approximately 2-3 weeks to complete once test is started  We will contact Tessa once results are available  Additional recommendations for surveillance/medical management will be made pending genetic test results

## 2023-01-16 DIAGNOSIS — K21.9 GASTROESOPHAGEAL REFLUX DISEASE WITHOUT ESOPHAGITIS: ICD-10-CM

## 2023-01-17 RX ORDER — OMEPRAZOLE 40 MG/1
CAPSULE, DELAYED RELEASE ORAL
Qty: 90 CAPSULE | Refills: 3 | Status: SHIPPED | OUTPATIENT
Start: 2023-01-17

## 2023-01-19 ENCOUNTER — APPOINTMENT (OUTPATIENT)
Dept: LAB | Facility: HOSPITAL | Age: 48
End: 2023-01-19

## 2023-01-19 ENCOUNTER — HOSPITAL ENCOUNTER (OUTPATIENT)
Dept: RADIOLOGY | Facility: HOSPITAL | Age: 48
Discharge: HOME | End: 2023-01-19

## 2023-01-19 DIAGNOSIS — Z80.41 FAMILY HISTORY OF MALIGNANT NEOPLASM OF OVARY: ICD-10-CM

## 2023-01-19 DIAGNOSIS — N83.8 BROAD LIGAMENT LACERATION SYNDROME: ICD-10-CM

## 2023-01-19 DIAGNOSIS — C7A.00 MALIGNANT CARCINOID TUMOR, UNSPECIFIED SITE (HCC): ICD-10-CM

## 2023-01-19 DIAGNOSIS — C7A.00 CARCINOID TUMOR METASTATIC TO INTRATHORACIC LYMPH NODE (HCC): ICD-10-CM

## 2023-01-19 DIAGNOSIS — C7B.09 CARCINOID TUMOR METASTATIC TO INTRATHORACIC LYMPH NODE (HCC): ICD-10-CM

## 2023-01-20 LAB
Lab: NORMAL
MISCELLANEOUS LAB TEST RESULT: NORMAL
STONE ANALYSIS-IMP: NORMAL

## 2023-01-23 ENCOUNTER — TELEMEDICINE (OUTPATIENT)
Dept: CARDIAC SURGERY | Facility: CLINIC | Age: 48
End: 2023-01-23

## 2023-01-23 DIAGNOSIS — C7A.00 CARCINOID TUMOR METASTATIC TO INTRATHORACIC LYMPH NODE (HCC): ICD-10-CM

## 2023-01-23 DIAGNOSIS — C7B.09 CARCINOID TUMOR METASTATIC TO INTRATHORACIC LYMPH NODE (HCC): ICD-10-CM

## 2023-01-23 DIAGNOSIS — C7A.090 MALIGNANT CARCINOID TUMOR OF LUNG (HCC): Primary | ICD-10-CM

## 2023-01-23 NOTE — ASSESSMENT & PLAN NOTE
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

## 2023-01-23 NOTE — PROGRESS NOTES
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  She is doing well from a thoracic surgery standpoint, and her recent Chest CT personally reviewed in PACs reveals no evidence of recurrent or metastatic lung cancer  She will return in 6 months with a repeat CT for continued lung cancer surveillance  She is in agreement with this plan  Relevant Orders    CT chest wo contrast       Immune and Lymphatic    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 [facility])            Reason for visit is   Chief Complaint   Patient presents with   • Virtual Regular Visit        Encounter provider Lucía Johansen PA-C    Provider located at [address] [place] [id number] [phone]      Recent Visits  No visits were found meeting these conditions  Showing recent visits within past 7 days and meeting all other requirements  Today's Visits  Date Type Provider Dept   01/23/23 Telemedicine Lucía Johansen PA-C [facility]   Showing today's visits and meeting all other requirements  Future Appointments  No visits were found meeting these conditions  Showing future appointments within next 150 days and meeting all other requirements       The patient was identified by name and date of birth  Shantanu Iniguez was informed that this is a telemedicine visit and that the visit is being conducted through the [employer]  She agrees to proceed     My office door was closed  No one else was in the room  She acknowledged consent and understanding of privacy and security of the video platform   The patient has agreed to participate and understands they can discontinue the visit at any time  Patient is aware this is a billable service  Subjective  uAdie Tee is a 49 y o  female       HPI   Ms Fredrick Tate is a 49year old female with a history of Stage IIIA typical carcinoid tumor who underwent a right thoracoscopic lower lobectomy 7/28/22  Recent chest CT 1/19/2023 demonstrates no new pulmonary nodules or enlarged mediastinal or hilar lymph nodes  On discussion, her breathing is much improved  Her dry cough resolved  She still has chronic back and lateral chest wall pain  She is obtaining an MRI for this  No fevers or chills  She underwent a bilateral oophorectomy this past summer and is recovering  Past Medical History:   Diagnosis Date   • Acid reflux    • Trejo's esophagus    • GERD (gastroesophageal reflux disease)    • Hiatal hernia    • Kidney stone    • Latent tuberculosis by skin test 1986    Was treated with INH for 6 months   • Obstructive sleep apnea 05/27/2018    Mild TERESITA  AHI 18 1   • Squamous cell skin cancer     Left buttocks       Past Surgical History:   Procedure Laterality Date   • ABDOMINOPLASTY     • AUGMENTATION BREAST Bilateral 2015   • BRONCHOSCOPY N/A 05/03/2022    Procedure: BRONCHOSCOPY NAVIGATIONAL;  Surgeon: Matt Conn MD;  [facility];  Service: Thoracic   • CYSTOSCOPY N/A 12/20/2022    Procedure: Maurilio Lugo;  Surgeon: Rossy Stokes MD;  [facility];  Service: Gynecology Oncology   • EGD  2018   • ENDOBRONCHIAL ULTRASOUND (EBUS) N/A 05/03/2022    Procedure: ENDOBRONCHIAL ULTRASOUND (EBUS);   Surgeon: Matt Conn MD;  [facility];  Service: Thoracic   • HYSTERECTOMY  2014   • IR BIOPSY LYMPH NODE  03/21/2022   • IR CHEST TUBE PLACEMENT  08/01/2022   • LAPAROSCOPIC CHOLECYSTECTOMY  1997   • LIPOSUCTION  2015   • MOHS SURGERY Left 08/18/2022    left buttocks   • CA BRNCHSC INCL FLUOR GDNCE DX W/CELL WASHG SPX N/A 05/03/2022    Procedure: BRONCHOSCOPY FLEXIBLE;  Surgeon: Camilo Marsh MD;  [facility];  Service: Thoracic   • [place] [address] INCL FLUOR GDNCE DX [address] N/A 07/28/2022    Procedure: Yesikae Led;  Surgeon: Ricarda Freitas MD;  [facility];  Service: Thoracic   • NC LAPAROSCOPY W/RMVL ADNEXAL STRUCTURES Bilateral 12/20/2022    Procedure: OOPHORECTOMY W/ ROBOTICS, VAGINOTOMY;  Surgeon: Sabra Brandon MD;  [facility];  Service: Gynecology Oncology   • NC THORACOSCOPY W/LOBECTOMY SINGLE LOBE Right 07/28/2022    Procedure: LOBECTOMY LUNG;  Surgeon: Ricarda Freitas MD;  [facility];  Service: Thoracic   • NC THORACOSCOPY W/LOBECTOMY SINGLE LOBE Right 07/28/2022    Procedure: THORACOSCOPY VIDEO ASSISTED SURGERY (VATS);   Surgeon: Ricarda Freitas MD;  [facility];  Service: Thoracic   • REPAIR RECTOCELE  2018   • TONSILLECTOMY  1984       Current Outpatient Medications   Medication Sig Dispense Refill   • acetaminophen (TYLENOL) 650 mg CR tablet Take 1 tablet (650 mg total) by mouth every 6 (six) hours 30 tablet 0   • acyclovir (ZOVIRAX) 800 mg tablet Take 0 5 tablets (400 mg total) by mouth 2 (two) times a day 180 tablet 3   • ALPRAZolam (XANAX) 0 25 mg tablet Take 1 tablet (0 25 mg total) by mouth daily at bedtime as needed for anxiety 30 tablet 0   • clobetasol propionate (CLOBEX) 0 05 % shampoo Apply 1 application topically 2 (two) times a week 118 mL 3   • gabapentin (NEURONTIN) 300 mg capsule TAKE 1 CAPSULE BY MOUTH 3  TIMES DAILY 270 capsule 3   • ibuprofen (MOTRIN) 600 mg tablet Take 1 tablet (600 mg total) by mouth every 6 (six) hours as needed for mild pain 30 tablet 0   • ketoconazole (NIZORAL) 2 % shampoo Apply 1 application topically 2 (two) times a week 120 mL 3   • levocetirizine (XYZAL) 5 MG tablet Take 1 tablet (5 mg total) by mouth every evening 90 tablet 3   • omeprazole (PriLOSEC) 40 MG capsule TAKE 1 CAPSULE BY MOUTH  DAILY 90 capsule 3     No current facility-administered medications for this visit  Allergies   Allergen Reactions   • Metronidazole Anaphylaxis     "patient states she has been in anaphylaxis due to this medication prior"     • Nitrofurantoin Shortness Of Breath       Review of Systems   Constitutional: Negative  Eyes: Negative  Respiratory: Positive for shortness of breath (improving)  Negative for cough  Cardiovascular: Positive for chest pain  Gastrointestinal:        +s/p gyn surgery, healing   Musculoskeletal: Positive for back pain  Skin: Negative  Neurological: Negative  Hematological: Negative  Psychiatric/Behavioral: Negative  Video Exam    There were no vitals filed for this visit  Physical Exam  Constitutional:       General: She is not in acute distress  Appearance: Normal appearance  HENT:      Head: Normocephalic and atraumatic  Eyes:      Comments: glasses   Pulmonary:      Effort: Pulmonary effort is normal    Musculoskeletal:      Cervical back: Normal range of motion  Neurological:      Mental Status: She is alert and oriented to person, place, and time     Psychiatric:         Mood and Affect: Mood normal           I spent 5 minutes directly with the patient during this visit

## 2023-01-26 DIAGNOSIS — F41.9 ANXIETY: ICD-10-CM

## 2023-01-30 RX ORDER — ALPRAZOLAM 0.25 MG/1
0.25 TABLET ORAL
Qty: 30 TABLET | Refills: 0 | Status: SHIPPED | OUTPATIENT
Start: 2023-01-30

## 2023-02-01 ENCOUNTER — OFFICE VISIT (OUTPATIENT)
Dept: GYNECOLOGIC ONCOLOGY | Facility: CLINIC | Age: 48
End: 2023-02-01

## 2023-02-01 VITALS
TEMPERATURE: 98.3 F | SYSTOLIC BLOOD PRESSURE: 118 MMHG | WEIGHT: 293 LBS | DIASTOLIC BLOOD PRESSURE: 76 MMHG | HEIGHT: 71 IN | BODY MASS INDEX: 41.02 KG/M2

## 2023-02-01 DIAGNOSIS — N83.8 BILATERAL TUBO-OVARIAN MASS: Primary | ICD-10-CM

## 2023-02-01 NOTE — PROGRESS NOTES
Assessment/Plan:    Problem List Items Addressed This Visit        Other    Bilateral tubo-ovarian mass - Primary     16-year-old status post robotic assisted bilateral salpingo-oophorectomy, vaginotomy for specimen removal   Final pathology was benign  She has seen genetic counseling as of 1/13/2023  She also has a history of lung carcinoid and is getting follow-up with thoracic surgery  She has recovered well from surgery  Her performance status is 0   1   I discussed ongoing activity limitations  2  She was encouraged to call the office with any additional questions or concerns  CHIEF COMPLAINT: Postoperative evaluation       Problem:  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 [facility])           Patient ID: Jae Sin is a 49 y o  female  Who returns for postoperative evaluation  She has no new complaints  Quality of life is good  She is able to perform her activities of daily living without difficulty  No vaginal bleeding        The following portions of the patient's history were reviewed and updated as appropriate: allergies, current medications, past family history, past medical history, past social history, past surgical history and problem list     Review of Systems      Current Outpatient Medications:   •  acetaminophen (TYLENOL) 650 mg CR tablet, Take 1 tablet (650 mg total) by mouth every 6 (six) hours, Disp: 30 tablet, Rfl: 0  •  acyclovir (ZOVIRAX) 800 mg tablet, Take 0 5 tablets (400 mg total) by mouth 2 (two) times a day, Disp: 180 tablet, Rfl: 3  •  ALPRAZolam (XANAX) 0 25 mg tablet, Take 1 tablet (0 25 mg total) by mouth daily at bedtime as needed for anxiety, Disp: 30 tablet, Rfl: 0  •  clobetasol propionate (CLOBEX) 0 05 % shampoo, Apply 1 application topically 2 (two) times a week, Disp: 118 mL, Rfl: 3  •  gabapentin (NEURONTIN) 300 mg capsule, TAKE 1 CAPSULE BY MOUTH 3  TIMES DAILY, Disp: 270 capsule, Rfl: 3  •  ibuprofen (MOTRIN) 600 mg tablet, Take 1 tablet (600 mg total) by mouth every 6 (six) hours as needed for mild pain, Disp: 30 tablet, Rfl: 0  •  ketoconazole (NIZORAL) 2 % shampoo, Apply 1 application topically 2 (two) times a week, Disp: 120 mL, Rfl: 3  •  levocetirizine (XYZAL) 5 MG tablet, Take 1 tablet (5 mg total) by mouth every evening, Disp: 90 tablet, Rfl: 3  •  omeprazole (PriLOSEC) 40 MG capsule, TAKE 1 CAPSULE BY MOUTH  DAILY, Disp: 90 capsule, Rfl: 3    Objective:    Blood pressure 118/76, temperature 98 3 °F (36 8 °C), temperature source Tympanic, height 5' 11" (1 803 m), weight (!) 142 kg (312 lb)  Body mass index is 43 52 kg/m²  Body surface area is 2 55 meters squared  Physical Exam  Vitals reviewed  Exam conducted with a chaperone present  Constitutional:       General: She is not in acute distress  Appearance: Normal appearance  HENT:      Head: Normocephalic and atraumatic  Mouth/Throat:      Mouth: Mucous membranes are moist    Abdominal:      Palpations: Abdomen is soft  There is no mass  Tenderness: There is no abdominal tenderness  Genitourinary:     Comments: The external female genitalia is normal  The bartholin's, uretheral and skenes glands are normal  The urethral meatus is normal (midline with no lesions)  Anus without fissure or lesion   Speculum exam reveals a grossly normal vagina  Vagina is intact, without dehiscense  No masses, lesions, discharge or bleeding  No significant cystocele or rectocele noted  Bimanual exam notes a surgical absent cervix, uterus and adnexal structures  No masses or fullness  Bladder is without fullness, mass or tenderness  Skin:     General: Skin is warm and dry  Comments: Surgical trocar sites are well healed  Neurological:      Mental Status: She is alert and oriented to person, place, and time  Psychiatric:         Mood and Affect: Mood normal          Behavior: Behavior normal          Thought Content:  Thought content normal          Judgment: Judgment normal

## 2023-02-17 ENCOUNTER — TELEPHONE (OUTPATIENT)
Dept: GENETICS | Facility: CLINIC | Age: 48
End: 2023-02-17

## 2023-02-17 NOTE — TELEPHONE ENCOUNTER
Post-Test Genetic Counseling Consult Note    Today I spoke with Tessa over the phone to review the results of her genetic test for hereditary cancer  Tessa met previously with Brady Acosta on 1/13/23 for pre-test counseling  A copy of this consult note and genetic test result will be shared with the patient  SUMMARY:    Test(s): CustomNext: Cancer® +RNAinsight® (59 genes): APC, CARMELITA, AXIN2, BAP1, BARD1, BMPR1A, BRCA1, BRCA2, BRIP1, CDH1, CDK4, CDKN1B, CDKN2A, CHEK2, CTNNA1, DICER1, EGLN1, EPCAM, FH, FLCN, GREM1, HOXB13, KIF1B, MAX, MEN1, MET, MITF, MLH1, MSH2, MSH3, MSH6, MUTYH, NBN, NF1, NTHL1, PALB2, PMS2, POLD1, POLE, POT1, PTEN, RAD51C, RAD51D, RB1, RECQL, RET, SDHA, SDHAF2, SDHB, SDHC, SDHD, SMAD4, SMARCA4, STK11, YNCK239, TP53, TSC1, TSC2, VHL      Result: Variant of uncertain significance     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); heterozygous; uncertain significance     Assessment:  A variant of uncertain significance (VUS) means that a change was identified in a specific gene but it cannot be determined whether the variant is associated with an increased risk of cancer or is a harmless genetic change  The significance of the MLH1 variant is currently not known and therefore this test result cannot be used to help determine Tessa cancer risks  It is possible that the variant was seen in only a handful of individuals, or there may be conflicting or incomplete information in the medical literature about the variant and its association with hereditary cancer  We discussed that a pathogenic variant was not identified in the MLH1 gene at this time  Individuals with pathogenic variants variants in the MLH1 gene have a condition called Liu syndrome  We discussed that individuals with Liu syndrome have a significantly increased risk for colon and uterine cancer among other cancers  Tessa confirmed that there is not aware of a family history of colon or uterine cancer       Risks and Testing for [facility] Members:  Genetic testing for this variant is not recommended for relatives who wish to determine their cancer risks for purposes of determining medical management  The presence or absence of this variant in a relative is not clinically meaningful unless the variant is reclassified in the future  The laboratory will continue to accumulate information on this variant and will reclassify it as either a positive or negative genetic test result when they are confident that they have adequate information  As updated information is obtained, we will notify Tessa with the updated information  It is important to note that the majority of variants of uncertain significance are reclassified as likely benign or benign as additional information about the variant becomes available  Despite this result, Tessa's first-degree relatives may be at increased risk for the cancers based on the family history  We recommend they discuss screening and management recommendations with their healthcare providers  If Tessa has any affected family members with a cancer diagnosis, especially at a young age, they may still consider genetic testing  We recommended that Tessa's sister consider genetic testing based on the family history of ovarian cancer  Relatives who wish to pursue genetic testing can reach out to the [address] [place] [address] Road ([id number]) to schedule an appointment or visit www [facility] org to identify a local genetic counselor  Risk Based on Family History:  The significance of this variant is currently not known and therefore this test result cannot be used to help determine Tessa cancer risks  Rather her personal medical history and family history of cancer are the most important factors used to estimate her risk for developing certain cancers and to direct her medical management  Alannas risk of ovarian cancer is increased, based on the reported family history   As compared with the general population risk of approximately 1 5%, empiric data suggest that Tessa's risk to develop ovarian cancer is approximately 4%-14% given one first-degree relative with ovarian cancer  The risk appears to decline with age  It is important to note that this may be an overestimate of risk, as the BRCA1/2 status of the families used to generate this risk figure is unknown, [PMID: [id number]      Additional Information:  A healthy lifestyle will improve overall health and reduce risk for illness  Eating a healthy diet and exercising for 4 hours per week is recommended  Both diet and exercise have been shown to help maintain a healthy weight  Postmenopausal women who are overweight are at higher risk for breast cancer  Moderate to heavy alcohol use can increase the risk for some cancers  Smoking cigarettes can also increase risk for breast, lung, prostate, pancreatic and other cancers  Plan:   There are no additional recommendations based on Tessa's result  she should continue cancer screening and medical management as clinically indicated and as determined appropriate by her healthcare providers  VUS Result: Tessa was strongly encouraged to contact us regarding any changes in her personal or family history of cancer as these changes could alter our recommendation regarding genetic testing and/or cancer screening  Tessa was also encouraged to follow up with us on an annual basis as variant classifications are subject to change

## 2023-02-27 ENCOUNTER — CONSULT (OUTPATIENT)
Dept: NEUROSURGERY | Facility: CLINIC | Age: 48
End: 2023-02-27

## 2023-02-27 ENCOUNTER — APPOINTMENT (OUTPATIENT)
Dept: LAB | Facility: CLINIC | Age: 48
End: 2023-02-27

## 2023-02-27 VITALS
DIASTOLIC BLOOD PRESSURE: 80 MMHG | HEIGHT: 71 IN | SYSTOLIC BLOOD PRESSURE: 124 MMHG | WEIGHT: 293 LBS | HEART RATE: 89 BPM | BODY MASS INDEX: 41.02 KG/M2 | TEMPERATURE: 98.1 F | RESPIRATION RATE: 18 BRPM

## 2023-02-27 DIAGNOSIS — M54.50 LOWER BACK PAIN: ICD-10-CM

## 2023-02-27 DIAGNOSIS — M54.12 RADICULOPATHY, CERVICAL: ICD-10-CM

## 2023-02-27 DIAGNOSIS — R20.0 LOWER EXTREMITY NUMBNESS: ICD-10-CM

## 2023-02-27 DIAGNOSIS — M54.12 RADICULOPATHY, CERVICAL: Primary | ICD-10-CM

## 2023-02-27 PROBLEM — M54.6 THORACIC BACK PAIN: Status: ACTIVE | Noted: 2023-02-27

## 2023-02-27 LAB
BUN SERPL-MCNC: 13 MG/DL (ref 5–25)
CREAT SERPL-MCNC: 0.75 MG/DL (ref 0.6–1.3)
GFR SERPL CREATININE-BSD FRML MDRD: 94 ML/MIN/1.73SQ M

## 2023-02-27 NOTE — PROGRESS NOTES
Neurosurgery Office Note  Ayse Lara 49 y o  female MRN: [id number]      Assessment/Plan     Thoracic back pain  Presents as referral for evaluation of back pain with associated leg numbness and left arm pain/numbness  · Has been evaluated in the past by Dr Luke Gibbs (sports medicine) for these issues - diagnosed with shoulder tendonitis and possible labrum tear  · Has been completing home PT  · Takes gabapentin 300 mg in the am and 600 mg at night with minimal relief  · Left shoulder and arm pain ongoing since 2021  Recently developed numbness to left hand  · Right sided chest/thoracic pain and mid-back thoracic pain recently worsening with associated bilateral leg numbness from hips down when supine or extended  Multiple episodes of urinary incontinence with spine extension  · On exam, motor strength is intact  Reflexes normal  Subjective numbness from hips down entirety of legs bilaterally when supine  Plan:  · Reviewed results of prior imaging including MRI shoulder and CT chest   · Concern for central canal stenosis vs radiculopathy in setting of left arm numbness and pain  · Concern for compressive etiology to thoracic spine in setting of urinary incontinence and bilateral leg numbness with extension of spine  · Ordered MRI's of cervical and thoracic spine in setting of recent history of lung cancer to evaluate for metastasis  · Has been referred to pain management in the past but is concerned about invasive procedures given neurologic symptoms  · Follow up once imaging has been completed  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)  Stage IIIA carcinoid tumor of lung  S/p right LL VATS 6/2022  Diagnoses and all orders for this visit:    Radiculopathy, cervical  -     MRI cervical spine with and without contrast; Future  -     MRI thoracic spine with and without contrast; Future  -     BUN/Creatinine Ratio;  Future    Lower extremity numbness  -     MRI cervical spine with and without contrast; Future  -     MRI thoracic spine with and without contrast; Future  -     BUN/Creatinine Ratio; Future          I have spent a total time of 30 minutes on 02/27/23 in caring for this patient including Diagnostic results, Impressions, Counseling / Coordination of care, Documenting in the medical record, Reviewing / ordering tests, medicine, procedures   and Obtaining or reviewing history    CHIEF COMPLAINT    Chief Complaint   Patient presents with   • Consult       HISTORY    History of Present Illness     49y o  year old female     With past medical history of GERD, Trejo's esophagus, kidney stones, lung cancer status post VATS in August 2022, TERESITA, who presents for evaluation of multiple issues  She states that beginning in 2019 she developed right rib pain radiating from her right scapula into her right breast with associated tingling  She has had some degree of this pain on and off since that time  In 2021 she received a second COVID injection into her left shoulder/deltoid area and since that time developed left-sided shoulder pain rating down her arm with associated numbness and tingling to her left hand  She states she was evaluated by Dr Mary Ayala of orthopedic surgery for this and was told that etiology is likely not her shoulder as MRI shoulder was negative  She describes that it feels as if someone is constantly sitting on the upper portion of her left arm  Pain will occasionally radiate all the way down to her hand and she will experience associated numbness to all of her fingers  This makes it difficult to grasp objects  In August 2022 she was diagnosed with 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  She underwent right-sided VATS procedure  She states since that time she has been experiencing worsening pain to her right side shoulder her right breast and her right rib cage    About 2 weeks after her surgery she developed worsening mid back pain to her thoracic area that became severe that radiated into her left shoulder  She has been on gabapentin for some time but states that she does not feel it is offering any relief  She describes that when she lays supine at night she will experience numbness from her hips down to her feet  She has chronic bilateral foot numbness that has been ongoing for years  She also describes that when she arches her back or if the middle of her back rubs against something she will experience episodes of urinary incontinence  She will occasionally take ibuprofen or Tylenol for her pain but states that these usually do not offer much benefit  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  She smokes cigarettes  She lives at home alone  See Discussion    REVIEW OF SYSTEMS    Review of Systems   Respiratory:        H/o Carcinoid tumor of lung    Genitourinary: Positive for enuresis  When she hits her back she pees on herself    Musculoskeletal: Positive for back pain, gait problem and neck pain  NP work up     no previous sx    CC Mid Back pain w B/l  legs and feet & left arm Numbness ( when laying down) w  (L side) Rib Cage pain & difficult walk  & Neck pain radiates L arm -Progressing since 2018-Left arm pain due to covid inj    Bowel & bladder incontience     meds Neurontin    no recent PT/JACKI     no recent imaging-MRI denied by [facility]    last ov w Ortho 11/2022    h/o Carcinoid tumor of lung    Neurological: Positive for numbness  All other systems reviewed and are negative  ROS obtained by MA  Reviewed  See HPI       Meds/Allergies     Current Outpatient Medications   Medication Sig Dispense Refill   • ALPRAZolam (XANAX) 0 25 mg tablet Take 1 tablet (0 25 mg total) by mouth daily at bedtime as needed for anxiety 30 tablet 0   • clobetasol propionate (CLOBEX) 0 05 % shampoo Apply 1 application topically 2 (two) times a week 118 mL 3   • gabapentin (NEURONTIN) 300 mg capsule TAKE 1 CAPSULE BY MOUTH 3  TIMES DAILY (Patient taking differently: 300mg AM - 600 mg in the evening) 270 capsule 3   • ibuprofen (MOTRIN) 600 mg tablet Take 1 tablet (600 mg total) by mouth every 6 (six) hours as needed for mild pain 30 tablet 0   • ketoconazole (NIZORAL) 2 % shampoo Apply 1 application topically 2 (two) times a week 120 mL 3   • levocetirizine (XYZAL) 5 MG tablet Take 1 tablet (5 mg total) by mouth every evening 90 tablet 3   • omeprazole (PriLOSEC) 40 MG capsule TAKE 1 CAPSULE BY MOUTH  DAILY 90 capsule 3   • acetaminophen (TYLENOL) 650 mg CR tablet Take 1 tablet (650 mg total) by mouth every 6 (six) hours 30 tablet 0   • acyclovir (ZOVIRAX) 800 mg tablet Take 0 5 tablets (400 mg total) by mouth 2 (two) times a day 180 tablet 3     No current facility-administered medications for this visit  Allergies   Allergen Reactions   • Metronidazole Anaphylaxis     "patient states she has been in anaphylaxis due to this medication prior"     • Nitrofurantoin Shortness Of Breath       PAST HISTORY    Past Medical History:   Diagnosis Date   • Acid reflux    • Trejo's esophagus    • GERD (gastroesophageal reflux disease)    • Hiatal hernia    • Kidney stone    • Latent tuberculosis by skin test 1986    Was treated with INH for 6 months   • Lung cancer ([facility] [address] ) 05/2022   • Obstructive sleep apnea 05/27/2018    Mild TERESITA    AHI 18 1   • Squamous cell skin cancer     Left buttocks   • STD (sexually transmitted disease) 1990    HPV, HSV       Past Surgical History:   Procedure Laterality Date   • ABDOMINOPLASTY     • AUGMENTATION BREAST Bilateral 2015   • BRONCHOSCOPY N/A 05/03/2022    Procedure: BRONCHOSCOPY NAVIGATIONAL;  Surgeon: Kyree Dickey MD;  [facility];  Service: Thoracic   • COLON SURGERY  2018    Rectocele Repair   • CYSTOSCOPY N/A 12/20/2022    Procedure: CYSTOSCOPY;  Surgeon: Yamileth Boogie MD;  [facility];  Service: Gynecology Oncology   • EGD  2018   • ENDOBRONCHIAL ULTRASOUND (EBUS) N/A 05/03/2022    Procedure: ENDOBRONCHIAL ULTRASOUND (EBUS); Surgeon: Kevin Bah MD;  [facility];  Service: Thoracic   • HYSTERECTOMY  2014   • IR BIOPSY LYMPH NODE  03/21/2022   • IR CHEST TUBE PLACEMENT  08/01/2022   • LAPAROSCOPIC CHOLECYSTECTOMY  1997   • LIPOSUCTION  2015   • LUNG SURGERY  2022    VATS Lobectomy RLL   • LYMPH NODE BIOPSY  03/21/2022   • MOHS SURGERY Left 08/18/2022    left buttocks   • CA [address] INCL FLUOR GDNCE DX W/CELL WASHG SPX N/A 05/03/2022    Procedure: BRONCHOSCOPY FLEXIBLE;  Surgeon: Kevin Bah MD;  [facility];  Service: Thoracic   • CA [address] INCL FLUOR GDNCE DX [address] WASH[address] [facility] N/A 07/28/2022    Procedure: BRONCHOSCOPY FLEXIBLE;  Surgeon: Tang Olivarez MD;  [facility];  Service: Thoracic   • CA LAPAROSCOPY W/RMVL ADNEXAL STRUCTURES Bilateral 12/20/2022    Procedure: OOPHORECTOMY W/ ROBOTICS, VAGINOTOMY;  Surgeon: Jessica Villalba MD;  [facility];  Service: Gynecology Oncology   • CA THORACOSCOPY W/LOBECTOMY SINGLE LOBE Right 07/28/2022    Procedure: LOBECTOMY LUNG;  Surgeon: Tang Olivarez MD;  [facility];  Service: Thoracic   • CA THORACOSCOPY W/LOBECTOMY SINGLE LOBE Right 07/28/2022    Procedure: THORACOSCOPY VIDEO ASSISTED SURGERY (VATS);   Surgeon: Tang Olivarez MD;  [facility];  Service: Thoracic   • REPAIR RECTOCELE  2018   • TONSILLECTOMY  1984   • UPPER GASTROINTESTINAL ENDOSCOPY  2018       Social History     Tobacco Use   • Smoking status: Every Day     Packs/day: 0 50     Years: 36 00     Pack years: 18 00     Types: Cigarettes     Start date: 9/1/2022   • Smokeless tobacco: Never   Vaping Use   • Vaping Use: Never used   Substance Use Topics   • Alcohol use: Not Currently   • Drug use: Never       Family History   Problem Relation Age of Onset   • Cancer Mother         Ovarian   • Ovarian cancer Mother    • Stroke Father    • Lung cancer Paternal Uncle • Cancer Maternal Grandmother         Ovarian   • Diabetes Paternal Grandfather    • Diabetes Paternal Grandmother          Above history personally reviewed  EXAM    Vitals:Blood pressure 124/80, pulse 89, temperature 98 1 °F (36 7 °C), temperature source Temporal, resp  rate 18, height 5' 11" (1 803 m), weight (!) 142 kg (312 lb)  ,Body mass index is 43 52 kg/m²  Physical Exam  Constitutional:       Appearance: She is well-developed  She is obese  HENT:      Head: Normocephalic and atraumatic  Eyes:      Extraocular Movements: EOM normal       Pupils: Pupils are equal, round, and reactive to light  Pulmonary:      Effort: Pulmonary effort is normal    Abdominal:      Palpations: Abdomen is soft  Musculoskeletal:         General: Normal range of motion  Cervical back: Normal range of motion and neck supple  Skin:     General: Skin is warm and dry  Neurological:      General: No focal deficit present  Mental Status: She is alert and oriented to person, place, and time  Mental status is at baseline  Cranial Nerves: No cranial nerve deficit  Sensory: No sensory deficit  Motor: No weakness  Coordination: Coordination normal  Finger-Nose-Finger Test normal       Deep Tendon Reflexes: Reflexes normal       Reflex Scores:       Tricep reflexes are 1+ on the right side and 1+ on the left side  Bicep reflexes are 1+ on the right side and 1+ on the left side  Brachioradialis reflexes are 1+ on the right side and 1+ on the left side  Patellar reflexes are 1+ on the right side and 1+ on the left side  Achilles reflexes are 1+ on the right side and 1+ on the left side  Psychiatric:         Speech: Speech normal          Neurologic Exam     Mental Status   Oriented to person, place, and time  Oriented to person  Oriented to place  Oriented to time  Oriented to year, month and date  Registration: recalls 3 of 3 objects     Attention: normal  Concentration: normal    Speech: speech is normal   Level of consciousness: alert  Knowledge: good and consistent with education  Able to name object  Cranial Nerves     CN III, IV, VI   Pupils are equal, round, and reactive to light  Extraocular motions are normal    Right pupil: Size: 3 mm  Shape: regular  Reactivity: brisk  Consensual response: intact  Accommodation: intact  Left pupil: Size: 3 mm  Shape: regular  Reactivity: brisk  Consensual response: intact  Accommodation: intact  Nystagmus: none   Diplopia: none  Conjugate gaze: present    CN V   Right facial sensation deficit: none  Left facial sensation deficit: none    CN VII   Facial expression full, symmetric       CN VIII   Hearing: intact    CN IX, X   Palate: symmetric    CN XI   Right sternocleidomastoid strength: normal  Left sternocleidomastoid strength: normal  Right trapezius strength: normal  Left trapezius strength: normal    CN XII   Tongue: not atrophic  Fasciculations: absent  Tongue deviation: none    Motor Exam   Muscle bulk: normal  Overall muscle tone: normal  Right arm pronator drift: absent  Left arm pronator drift: absent    Strength   Right deltoid: 5/5  Left deltoid: 5/5  Right biceps: 5/5  Left biceps: 5/5  Right triceps: 5/5  Left triceps: 5/5  Right wrist flexion: 5/5  Left wrist flexion: 5/5  Right wrist extension: 5/5  Left wrist extension: 5/5  Right interossei: 5/5  Left interossei: 5/5  Right iliopsoas: 5/5  Left iliopsoas: 5/5  Right quadriceps: 5/5  Left quadriceps: 5/5  Right hamstrin/5  Left hamstrin/5  Right glutei: 5/5  Left glutei: 5/5  Right anterior tibial: 5/5  Left anterior tibial: 5/5  Right posterior tibial: 5/5  Left posterior tibial: 5/5  Right peroneal: 5/5  Left peroneal: 5/5  Right gastroc: 5/5  Left gastroc: 5/5    Sensory Exam   Bilateral LE numbness with laying supine from hips to toes     Gait, Coordination, and Reflexes     Coordination   Finger to nose coordination: normal    Tremor Resting tremor: absent  Intention tremor: absent  Action tremor: absent    Reflexes   Right brachioradialis: 1+  Left brachioradialis: 1+  Right biceps: 1+  Left biceps: 1+  Right triceps: 1+  Left triceps: 1+  Right patellar: 1+  Left patellar: 1+  Right achilles: 1+  Left achilles: 1+  Right : 1+  Left : 1+  Right Dow: absent  Left Dow: absent  Right ankle clonus: absent  Left ankle clonus: absent        MEDICAL DECISION MAKING    Imaging Studies:     No results found  I have personally reviewed pertinent reports     and I have personally reviewed pertinent films in PACS

## 2023-02-27 NOTE — ASSESSMENT & PLAN NOTE
Presents as referral for evaluation of back pain with associated leg numbness and left arm pain/numbness  · Has been evaluated in the past by Dr Sebastian Castillo (sports medicine) for these issues - diagnosed with shoulder tendonitis and possible labrum tear  · Has been completing home PT  · Takes gabapentin 300 mg in the am and 600 mg at night with minimal relief  · Left shoulder and arm pain ongoing since 2021  Recently developed numbness to left hand  · Right sided chest/thoracic pain and mid-back thoracic pain recently worsening with associated bilateral leg numbness from hips down when supine or extended  Multiple episodes of urinary incontinence with spine extension  · On exam, motor strength is intact  Reflexes normal  Subjective numbness from hips down entirety of legs bilaterally when supine  Plan:  · Reviewed results of prior imaging including MRI shoulder and CT chest   · Concern for central canal stenosis vs radiculopathy in setting of left arm numbness and pain  · Concern for compressive etiology to thoracic spine in setting of urinary incontinence and bilateral leg numbness with extension of spine  · Ordered MRI's of cervical and thoracic spine in setting of recent history of lung cancer to evaluate for metastasis  · Has been referred to pain management in the past but is concerned about invasive procedures given neurologic symptoms  · Follow up once imaging has been completed

## 2023-03-08 DIAGNOSIS — F41.9 ANXIETY: ICD-10-CM

## 2023-03-08 RX ORDER — ALPRAZOLAM 0.25 MG/1
0.25 TABLET ORAL
Qty: 30 TABLET | Refills: 0 | OUTPATIENT
Start: 2023-03-08

## 2023-03-08 RX ORDER — ALPRAZOLAM 0.25 MG/1
0.25 TABLET ORAL
Qty: 30 TABLET | Refills: 0 | Status: SHIPPED | OUTPATIENT
Start: 2023-03-08 | End: 2023-03-13 | Stop reason: SDUPTHER

## 2023-03-13 DIAGNOSIS — F41.9 ANXIETY: ICD-10-CM

## 2023-03-14 RX ORDER — ALPRAZOLAM 0.25 MG/1
0.25 TABLET ORAL
Qty: 30 TABLET | Refills: 0 | Status: SHIPPED | OUTPATIENT
Start: 2023-03-14

## 2023-03-15 ENCOUNTER — PROCEDURE VISIT (OUTPATIENT)
Dept: DERMATOLOGY | Age: 48
End: 2023-03-15

## 2023-03-15 ENCOUNTER — PATIENT MESSAGE (OUTPATIENT)
Dept: NEUROSURGERY | Facility: CLINIC | Age: 48
End: 2023-03-15

## 2023-03-15 VITALS — HEIGHT: 71 IN | WEIGHT: 293 LBS | TEMPERATURE: 96.8 F | BODY MASS INDEX: 41.02 KG/M2

## 2023-03-15 DIAGNOSIS — L72.0 EPIDERMAL CYST: Primary | ICD-10-CM

## 2023-03-15 NOTE — PATIENT INSTRUCTIONS
POSTOP DISCUSSION DISCUSSION AND INSTRUCTIONS FOR PATIENT      Rationale for Procedure  A skin excision allows the dermatologist to remove a lesion  The procedure involves a local numbing medication and removing the entire lesion  Typically, the lesion is being removed because it is atypical, traumatized, or for significant appearance reasons  The area will be open like a brush burn and allowed to heal    There will be no sutures  Tissue is sent to pathologist who will reconfirm diagnosis and verify completeness of lesion removal     Description of Procedure  We would like to perform a skin excision today  A local anesthetic, similar to the kind that a dentist uses when filling a cavity, will be injected with a very small needle into the skin area to be sampled  The injected skin and tissue underneath should “go to sleep” and become numbed so that no further pain should be felt  A scalpel will be used to cut around the lesion and tissue will be submitted to pathology for examination  Depending on the diagnosis the lesion will be excised with a certain amount of normal skin to help assure completeness of lesion removal   The physician will discuss in advance the anticipated size and extent of removal    Bleeding will occur, but it will stopped with direct pressure, sutures, and electrocautery  Surgical “Vaseline-type” ointment will also applied after the procedure to help create a barrier between the wound and the outside world  Risks and Potential Complications  The advantage of a skin excision is that it allows us to remove a problem lesion quickly  Although this usually permits the lesion to heal as soon as possible with the least scarring, there are some risks and potential complications that include but are not limited to the following:  Some bleeding is normal at time of procedure and some bleeding on gauze is normal  the first few days after surgery    Profuse bleeding and bleeding with swelling and pain should be reported as detailed  below  Infection is uncommon in skin surgery  Infection should be reported and is indicated by pain, redness, and discharge of purulent material   Some dull to at time sharp pain could occur initially the day after surgery  Persistent pain or increasing pain days after surgery is not expected and should be reported  Every effort is made to minimize scar, but location, size, and genetics do play a role in scar appearance  A surgical wound does not achieve its optimal appearance until 6 months  There are several treatments available if scarring would be problematic including scar creams, silicone pad, laser and scar revision  Skin discoloration can occur especially in people of color  Its important to avoid sun on wound in first 6 months after surgery  Treatment is available if pigment is problematic  Incomplete removal of the lesion or recurrence of lesion can occur and this would then require further treatment and more surgery  Nerve Damage/Numbness/Loss of Function is very rare, but is most likely to occur if lesion is large or if it is in a high risk location  Allergic Reaction to lidocaine is rare  More commonly,  epinephrine is used  with the lidocaine  Occasionally, epinephrine (aka adrenalin) may cause a brief  feeling of anxiety or jitteriness  The person at the microscope  (pathologist) may provide additional information that was unexpected  This unexpected finding could provoke the need for additional treatment or evaluation  What You Will Need to Do After the Procedure  Keep the area clean and dry the first day  Try NOT to remove the bandage for the first day  Gently clean the area with soap and water and apply Vaseline ointment (this is over the counter and not a prescription) to the excision  site for up to 2 weeks  Apply a clean appropriately sized bandage to area  Gauze and paper tape are recommended for sensitive skin    Return for suture removal as instructed (generally 1 week for the face, 2 weeks for the body)  Take Acetaminophen (Tylenol) for discomfort, if no contraindications  Do NOT take Ibuprofen or aspirin unless specifically told to do so by your Dermatologist because these medications can make bleeding worse  Call our office immediately for signs of infection: fever, chills, increased redness, warmth, tenderness, discomfort/pain, or pus or foul smell coming from the wound  If bleeding is noticed, place a clean cloth over the area and apply firm pressure for thirty minutes  Check the wound ONLY after 30 minutes of direct pressure; do not cheat and sneak a peak, as that does not count  If bleeding persists after 30 minutes of legitimate direct pressure, then try one more round of direct pressure for an additional 10 minutes to the area  Should the bleeding become heavier or not stop after the second attempt, call [facility] directly at [phone] (SKIN) or, if after hours, go to your local Emergency Room/Emergency Department

## 2023-03-15 NOTE — PATIENT COMMUNICATION
Spoke directly to GENESIS GRIGGS who is aware of the reported symptoms  At this time she feels the cervical and thoracic MRI are adequate to further assess her complaints

## 2023-03-15 NOTE — TELEPHONE ENCOUNTER
Hello! Do you have any thoughts about this patients reported incident? I see cervical and Thoracic MRI ordered and she is back [facility] with you and Dr Paula Bueno next month

## 2023-03-15 NOTE — PROGRESS NOTES
PROCEDURE:  EXCISION WITH INTERMEDIATE LAYERED CLOSURE     Attending: Mackenzie Flores  Assistant: Jackie Srinivasan    Pre-Op Diagnosis: Epidermal Inclusion Cyst  Post-Op Diagnosis: Same   Benign versus Malignant Benign      Lesion Anatomic Location: Mid Upper Back   Pre-op size:  1 cm  Size of defect:  1 5 cm (with 0 5 centimeter margins)  Final repaired wound length:  1 5 cm    Written and verbal, witnessed informed consent was obtained  I discussed that excision is a method of removing lesions both benign and malignant lesions  A portion of normal skin is often taken to ensure completeness of removal   I reviewed that procedure will include numbing the area,  cutting around and under defect, undermining tissue, and closing the wound with sutures both inside and out  These sutures are usually removed in 7 to 14 days  Risks (bleeding, pain, infection, scarring, recurrence) and benefits discussed  It was discussed with patient that every effort is made to minimize scar, but scarring is influenced also by extrinsic factor such as location, age and genetics  Time Out: performed:  yes  Correct patient: yes  Correct site per Clinic Report: yes  Correct site per Patient Report: yes    LOCAL ANESTHESIA: 1% xylocaine with epi     DESCRIPTION OF PROCEDURE: The patient was brought back into the procedure room, anesthetized locally, prepped and draped in the usual fashion  Using a #15 blade with a scalpel, the lesion was excised in elliptical fashion  The wound was  undermined in the  fascial plane  Hemostasis was achieved with light electrocoagulation  Purpose of undermining was to decrease wound tension and facilitate closure  The wound was closed with subcutaneous sutures as follows:    Deep suture:3-0 Vicryl      Epidermal edge closure was accomplished with superficial sutures as follows:    Superficial suture: 4-0 Ethilon  Superficial suture type: interrupted    Estimated blood loss less than 3ml      The patient tolerated the procedure well without any complications  The wound was cleaned with sterile saline, dried off, surgical vaseline ointment was applied, and the wound was covered  A pressure dressing was applied for stabilization and light pressure  The patient was given detailed oral and written instructions on postoperative care as detailed in consent  The patient left in good medical condition  POSTOP DISCUSSION DISCUSSION AND INSTRUCTIONS FOR PATIENT      Rationale for Procedure  A skin excision allows the dermatologist to remove a lesion  The procedure involves a local numbing medication and removing the entire lesion  Typically, the lesion is being removed because it is atypical, traumatized, or for significant appearance reasons  The area will be open like a brush burn and allowed to heal    There will be no sutures  Tissue is sent to pathologist who will reconfirm diagnosis and verify completeness of lesion removal     Description of Procedure  We would like to perform a skin excision today  A local anesthetic, similar to the kind that a dentist uses when filling a cavity, will be injected with a very small needle into the skin area to be sampled  The injected skin and tissue underneath should “go to sleep” and become numbed so that no further pain should be felt  A scalpel will be used to cut around the lesion and tissue will be submitted to pathology for examination  Depending on the diagnosis the lesion will be excised with a certain amount of normal skin to help assure completeness of lesion removal   The physician will discuss in advance the anticipated size and extent of removal    Bleeding will occur, but it will stopped with direct pressure, sutures, and electrocautery  Surgical “Vaseline-type” ointment will also applied after the procedure to help create a barrier between the wound and the outside world        Risks and Potential Complications  The advantage of a skin excision is that it allows us to remove a problem lesion quickly  Although this usually permits the lesion to heal as soon as possible with the least scarring, there are some risks and potential complications that include but are not limited to the following:  - Some bleeding is normal at time of procedure and some bleeding on gauze is normal  the first few days after surgery  Profuse bleeding and bleeding with swelling and pain should be reported as detailed  below  - Infection is uncommon in skin surgery  Infection should be reported and is indicated by pain, redness, and discharge of purulent material   - Some dull to at time sharp pain could occur initially the day after surgery  Persistent pain or increasing pain days after surgery is not expected and should be reported  - Every effort is made to minimize scar, but location, size, and genetics do play a role in scar appearance  A surgical wound does not achieve its optimal appearance until 6 months  There are several treatments available if scarring would be problematic including scar creams, silicone pad, laser and scar revision   - Skin discoloration can occur especially in people of color  Its important to avoid sun on wound in first 6 months after surgery  Treatment is available if pigment is problematic   - Incomplete removal of the lesion or recurrence of lesion can occur and this would then require further treatment and more surgery   - Nerve Damage/Numbness/Loss of Function is very rare, but is most likely to occur if lesion is large or if it is in a high risk location  - Allergic Reaction to lidocaine is rare  More commonly,  epinephrine is used  with the lidocaine  Occasionally, epinephrine (aka adrenalin) may cause a brief  feeling of anxiety or jitteriness  - The person at the microscope  (pathologist) may provide additional information that was unexpected  This unexpected finding could provoke the need for additional treatment or evaluation      What You Will Need to Do After the Procedure  1  Keep the area clean and dry the first day  Try NOT to remove the bandage for the first day  2  Gently clean the area with soap and water and apply Vaseline ointment (this is over the counter and not a prescription) to the excision  site for up to 2 weeks  3  Apply a clean appropriately sized bandage to area  Gauze and paper tape are recommended for sensitive skin  4  Return for suture removal as instructed (generally 1 week for the face, 2 weeks for the body)  5  Take Acetaminophen (Tylenol) for discomfort, if no contraindications  Do NOT take Ibuprofen or aspirin unless specifically told to do so by your Dermatologist because these medications can make bleeding worse  6  Call our office immediately for signs of infection: fever, chills, increased redness, warmth, tenderness, discomfort/pain, or pus or foul smell coming from the wound  If bleeding is noticed, place a clean cloth over the area and apply firm pressure for thirty minutes  Check the wound ONLY after 30 minutes of direct pressure; do not cheat and sneak a peak, as that does not count  If bleeding persists after 30 minutes of legitimate direct pressure, then try one more round of direct pressure for an additional 10 minutes to the area  Should the bleeding become heavier or not stop after the second attempt, call [facility] directly at [phone] (SKIN) or, if after hours, go to your local Emergency Room/Emergency Department        Scribe Attestation    I,:  Corinne Kay am acting as a scribe while in the presence of the attending physician :       I,:  Judson Child MD personally performed the services described in this documentation    as scribed in my presence :

## 2023-03-29 ENCOUNTER — OFFICE VISIT (OUTPATIENT)
Dept: DERMATOLOGY | Age: 48
End: 2023-03-29

## 2023-03-29 DIAGNOSIS — Z48.02 VISIT FOR SUTURE REMOVAL: Primary | ICD-10-CM

## 2023-03-29 NOTE — PROGRESS NOTES
Suture removal    Date/Time: 3/29/2023 10:02 AM  Performed by: Jeramie Thomas  Authorized by: Bishop Queen MD   Universal Protocol:  Consent: Verbal consent obtained  Consent given by: patient  Timeout called at: 3/29/2023 10:02 AM   Patient understanding: patient states understanding of the procedure being performed  Patient consent: the patient's understanding of the procedure matches consent given  Procedure consent: procedure consent matches procedure scheduled  Patient identity confirmed: verbally with patient        Patient location:  Clinic  Location:     Anesthesia laterality: mid  Location: upper back  Procedure details: Tools used:  Suture removal kit    Wound appearance:  No sign(s) of infection, nonpurulent, good wound healing, nontender and clean    Sutures removed: 4  Post-procedure details:     Post-removal:  Band-Aid applied    Patient tolerance of procedure:   Tolerated well, no immediate complications

## 2023-04-04 ENCOUNTER — HOSPITAL ENCOUNTER (OUTPATIENT)
Dept: RADIOLOGY | Facility: HOSPITAL | Age: 48
Discharge: HOME | End: 2023-04-04

## 2023-04-04 DIAGNOSIS — M54.12 RADICULOPATHY, CERVICAL: ICD-10-CM

## 2023-04-04 DIAGNOSIS — R20.0 LOWER EXTREMITY NUMBNESS: ICD-10-CM

## 2023-04-04 RX ADMIN — GADOBUTROL 14 ML: 604.72 INJECTION INTRAVENOUS at 15:31

## 2023-04-24 ENCOUNTER — TELEPHONE (OUTPATIENT)
Dept: NEUROSURGERY | Facility: CLINIC | Age: 48
End: 2023-04-24

## 2023-04-24 NOTE — TELEPHONE ENCOUNTER
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

## 2023-04-28 ENCOUNTER — HOSPITAL ENCOUNTER (OUTPATIENT)
Dept: RADIOLOGY | Facility: HOSPITAL | Age: 48
Discharge: HOME | End: 2023-04-28

## 2023-04-28 ENCOUNTER — CONSULT (OUTPATIENT)
Age: 48
End: 2023-04-28

## 2023-04-28 VITALS
HEART RATE: 78 BPM | BODY MASS INDEX: 42.68 KG/M2 | TEMPERATURE: 98.2 F | SYSTOLIC BLOOD PRESSURE: 130 MMHG | WEIGHT: 293 LBS | DIASTOLIC BLOOD PRESSURE: 80 MMHG

## 2023-04-28 DIAGNOSIS — M62.838 MUSCLE SPASM: ICD-10-CM

## 2023-04-28 DIAGNOSIS — M50.222 HERNIATION OF INTERVERTEBRAL DISC AT C5-C6 LEVEL: ICD-10-CM

## 2023-04-28 DIAGNOSIS — M50.120 CERVICAL DISC DISORDER WITH RADICULOPATHY OF MID-CERVICAL REGION: Primary | ICD-10-CM

## 2023-04-28 DIAGNOSIS — M54.6 ACUTE BILATERAL THORACIC BACK PAIN: ICD-10-CM

## 2023-04-28 RX ORDER — COVID-19 ANTIGEN TEST
KIT MISCELLANEOUS
COMMUNITY
End: 2023-04-28

## 2023-04-28 RX ORDER — GABAPENTIN 600 MG/1
600 TABLET ORAL 3 TIMES DAILY
Qty: 270 TABLET | Refills: 1 | Status: SHIPPED | OUTPATIENT
Start: 2023-04-28

## 2023-04-28 RX ORDER — DICLOFENAC SODIUM 75 MG/1
75 TABLET, DELAYED RELEASE ORAL 2 TIMES DAILY
Qty: 180 TABLET | Refills: 0 | Status: SHIPPED | OUTPATIENT
Start: 2023-04-28

## 2023-04-28 RX ORDER — TIZANIDINE 4 MG/1
4 TABLET ORAL EVERY 8 HOURS PRN
Qty: 90 TABLET | Refills: 1 | Status: SHIPPED | OUTPATIENT
Start: 2023-04-28

## 2023-04-28 NOTE — PROGRESS NOTES
Assessment:  1  Cervical disc disorder with radiculopathy of mid-cervical region    2  Acute bilateral thoracic back pain    3  Herniation of intervertebral disc at C5-C6 level    4  Muscle spasm        Plan:  Ms Nicole Grey is a pleasant 25-year-old female who presents to [facility] for initial evaluation    New Medications Ordered This Visit   Medications   • gabapentin (Neurontin) 600 MG tablet     Sig: Take 1 tablet (600 mg total) by mouth 3 (three) times a day     Dispense:  270 tablet     Refill:  1   • tiZANidine (Zanaflex) 4 mg tablet     Sig: Take 1 tablet (4 mg total) by mouth every 8 (eight) hours as needed for muscle spasms     Dispense:  90 tablet     Refill:  1   • diclofenac (VOLTAREN) 75 mg EC tablet     Sig: Take 1 tablet (75 mg total) by mouth 2 (two) times a day     Dispense:  180 tablet     Refill:  0       Ms Nicole Grey is a pleasant 25-year-old female presents as referral from Dr Valdemar Salazar regarding neck, mid back and low back pain and med management  During his evaluation patient has undergone an MRI of the cervical and thoracic spine which demonstrates diffuse thoracic degenerative changes with hypertrophic changes most notable at T9-T10 resulting in severe canal stenosis with mild compression of the lower thoracic cord as well as an MRI of the cervical spine demonstrating a large central and left-sided disc extrusion at C5-C6 resulting in severe left lateral canal stenosis and foraminal narrowing  Decision has been made to undergo surgical intervention for cervical disc disease at C5-C6 as well as decompression at T9-T10 with plans to start with the thoracic surgery first   She was already started on gabapentin and at this time we will titrate her up to a more therapeutic dose 600 mg 3 times daily and also provide her with Zanaflex 4 mg every 8 hours as needed for muscle spasms and pain  We will follow-up as needed and her surgeries will start in June 2023    My impressions and treatment recommendations were discussed in detail with the patient, who verbalized understanding and had no further questions  History of Present Illness:    Dejon Nascimento is a 49 y o  female who presents to [facility] for initial evaluation of the above stated pain complaints  The patient has a past medical and chronic pain history as outlined in the assessment section  She was referred by Israel Hanna MD  [address] [facility] [address]  [place],  [address] [facility]   Patient presents for initial evaluation and referral from Dr Kash Salgado for medication management regarding acute onset neck, mid back and low back pain with intermittent radiating symptoms into bilateral thighs  She has already been seen by neurosurgery with plans to undergo thoracic followed by cervical surgery  Today patient reports moderate to severe pain rated 7 out of 10 and interfering with activities  Pain is constant 100% of the time that is present throughout the day and night  Describes symptoms as a burning, shooting, numbness, sharp, cutting, pins-and-needles, pressure-like, throbbing sensation  Also reports upper and lower extremity weakness  Does not use any durable medical equipment for ambulation  Symptoms are worse with standing, bending, sitting, walking, exercise  Has had no significant relief with heat  Admits to smoking a pack per day for the last 37 years  Currently taking Lidoderm patches, Aspercreme, naproxen with minimal relief  Presents today for initial evaluation  Review of Systems:    Review of Systems   Constitutional: Positive for unexpected weight change  Negative for chills and fatigue  HENT: Negative for ear pain, mouth sores and sinus pressure  Eyes: Negative for pain, redness and visual disturbance  Respiratory: Negative for shortness of breath and wheezing  Cardiovascular: Negative for chest pain and palpitations     Gastrointestinal: Positive for abdominal pain  Negative for nausea  Endocrine: Negative for polyphagia  Musculoskeletal: Positive for back pain, gait problem, neck pain and neck stiffness  Negative for arthralgias  Joint pain, muscle pain in back and upper thighs   Skin: Negative for wound  Neurological: Positive for weakness and numbness  Negative for seizures  Psychiatric/Behavioral: Positive for dysphoric mood and sleep disturbance  The patient is nervous/anxious  Past Medical History:   Diagnosis Date   • Acid reflux    • Anxiety    • Trejo's esophagus    • Cancer (HCC)    • GERD (gastroesophageal reflux disease)    • Hiatal hernia    • Hyperlipidemia    • Kidney stone    • Latent tuberculosis by skin test 1986    Was treated with INH for 6 months   • Lung cancer ([facility] [address] ) 05/2022   • Obstructive sleep apnea 05/27/2018    Mild TERESITA  AHI 18 1   • Squamous cell skin cancer     Left buttocks   • STD (sexually transmitted disease) 1990    HPV, HSV       Past Surgical History:   Procedure Laterality Date   • ABDOMINOPLASTY     • AUGMENTATION BREAST Bilateral 2015   • BRONCHOSCOPY N/A 05/03/2022    Procedure: BRONCHOSCOPY NAVIGATIONAL;  Surgeon: Kevyn Carbajal MD;  [facility];  Service: Thoracic   • COLON SURGERY  2018    Rectocele Repair   • CYSTOSCOPY N/A 12/20/2022    Procedure: CYSTOSCOPY;  Surgeon: Ja Kline MD;  [facility];  Service: Gynecology Oncology   • EGD  2018   • ENDOBRONCHIAL ULTRASOUND (EBUS) N/A 05/03/2022    Procedure: ENDOBRONCHIAL ULTRASOUND (EBUS);   Surgeon: Kevyn Carbajal MD;  [facility];  Service: Thoracic   • HYSTERECTOMY  2014   • IR BIOPSY LYMPH NODE  03/21/2022   • IR CHEST TUBE PLACEMENT  08/01/2022   • LAPAROSCOPIC CHOLECYSTECTOMY  1997   • LIPOSUCTION  2015   • LUNG SURGERY  2022    VATS Lobectomy RLL   • LYMPH NODE BIOPSY  03/21/2022   • MOHS SURGERY Left 08/18/2022    left buttocks   • [place] [address] INCL FLUOR GDNCE DX W/CELL WASHG SPX N/A 05/03/2022 Procedure: BRONCHOSCOPY FLEXIBLE;  Surgeon: Cheng Odom MD;  [facility];  Service: Thoracic   • DC [address] INCL FLUOR GDNCE DX [address] WASHG [address] [facility] N/A 07/28/2022    Procedure: Chidi Rankinman;  Surgeon: Katrin Jensen MD;  [facility];  Service: Thoracic   • DC LAPAROSCOPY W/RMVL ADNEXAL STRUCTURES Bilateral 12/20/2022    Procedure: OOPHORECTOMY W/ ROBOTICS, VAGINOTOMY;  Surgeon: Jennifer Johnson MD;  [facility];  Service: Gynecology Oncology   • DC THORACOSCOPY W/LOBECTOMY SINGLE LOBE Right 07/28/2022    Procedure: LOBECTOMY LUNG;  Surgeon: Katrin Jensen MD;  [facility];  Service: Thoracic   • DC THORACOSCOPY W/LOBECTOMY SINGLE LOBE Right 07/28/2022    Procedure: THORACOSCOPY VIDEO ASSISTED SURGERY (VATS);   Surgeon: Katrin Jensen MD;  [facility];  Service: Thoracic   • REPAIR RECTOCELE  2018   • TONSILLECTOMY  1984   • UPPER GASTROINTESTINAL ENDOSCOPY  2018       Family History   Problem Relation Age of Onset   • Cancer Mother         Ovarian   • Ovarian cancer Mother    • Stroke Father    • Lung cancer Paternal Uncle    • Cancer Maternal Grandmother         Ovarian   • Diabetes Paternal Grandfather    • Diabetes Paternal Grandmother        Social History     Occupational History   • Not on file   Tobacco Use   • Smoking status: Every Day     Packs/day: 0 50     Years: 36 00     Pack years: 18 00     Types: Cigarettes     Start date: 9/1/2022   • Smokeless tobacco: Never   Vaping Use   • Vaping Use: Never used   Substance and Sexual Activity   • Alcohol use: Not Currently   • Drug use: Never   • Sexual activity: Not Currently     Partners: Male     Comment: Hysterectomy         Current Outpatient Medications:   •  ALPRAZolam (XANAX) 0 25 mg tablet, Take 1 tablet (0 25 mg total) by mouth daily at bedtime as needed for anxiety, Disp: 30 tablet, Rfl: 0  •  clobetasol propionate (CLOBEX) 0 05 % shampoo, Apply 1 "application topically 2 (two) times a week, Disp: 118 mL, Rfl: 3  •  diclofenac (VOLTAREN) 75 mg EC tablet, Take 1 tablet (75 mg total) by mouth 2 (two) times a day, Disp: 180 tablet, Rfl: 0  •  gabapentin (Neurontin) 600 MG tablet, Take 1 tablet (600 mg total) by mouth 3 (three) times a day, Disp: 270 tablet, Rfl: 1  •  ketoconazole (NIZORAL) 2 % shampoo, Apply 1 application topically 2 (two) times a week, Disp: 120 mL, Rfl: 3  •  levocetirizine (XYZAL) 5 MG tablet, TAKE 1 TABLET BY MOUTH IN  THE EVENING, Disp: 90 tablet, Rfl: 3  •  omeprazole (PriLOSEC) 40 MG capsule, TAKE 1 CAPSULE BY MOUTH  DAILY, Disp: 90 capsule, Rfl: 3  •  tiZANidine (Zanaflex) 4 mg tablet, Take 1 tablet (4 mg total) by mouth every 8 (eight) hours as needed for muscle spasms, Disp: 90 tablet, Rfl: 1  •  acyclovir (ZOVIRAX) 800 mg tablet, Take 0 5 tablets (400 mg total) by mouth 2 (two) times a day, Disp: 180 tablet, Rfl: 3    Allergies   Allergen Reactions   • Metronidazole Anaphylaxis     \"patient states she has been in anaphylaxis due to this medication prior\"     • Nitrofurantoin Shortness Of Breath       Physical Exam:    /80   Pulse 78   Temp 98 2 °F (36 8 °C)   Wt (!) 139 kg (306 lb)   BMI 42 68 kg/m²     Constitutional: normal, well developed, well nourished, alert, in no distress and non-toxic and no overt pain behavior    Eyes: anicteric  HEENT: grossly intact  Neck: supple, symmetric, trachea midline and no masses   Pulmonary:even and unlabored  Cardiovascular:No edema or pitting edema present  Skin:Normal without rashes or lesions and well hydrated  Psychiatric:Mood and affect appropriate  Neurologic:Cranial Nerves II-XII grossly intact  Musculoskeletal:normal   Neck exam  Tenderness palpation bilateral cervical paraspinals, decreased active and passive range of motion with cervical flexion and extension limited by pain, MMT 5 out of 5 bilateral upper extremities except for elbow extension 4 out of 5, decreased sensation " to light touch in patchy distribution left upper extremity, positive Spurling with radicular symptoms into the left arm  Back exam  Tenderness palpation bilateral lumbar paraspinals, decreased active and passive range of motion with lumbar flexion and extension limited by pain, MMT 5 out of 5 bilateral lower extremities, decree sensation to light touch in patchy distribution bilateral lower extremities anterior thighs, positive straight leg raise in the supine position radicular pain into the bilateral lower extremities    Imaging    MRI cervical spine with and without contrast  Status: Final result     PACS Images     Show images for MRI cervical spine with and without contrast  Study Result    Narrative & Impression   MRI CERVICAL SPINE WITH AND WITHOUT CONTRAST     INDICATION: M54 12: Radiculopathy, cervical region  R20 0: Anesthesia of skin      COMPARISON:  None      TECHNIQUE:  Multiplanar, multisequence imaging of the cervical spine was performed before and after gadolinium administration             IV Contrast:  14 mL of Gadobutrol injection (SINGLE-DOSE)      IMAGE QUALITY:  Diagnostic      FINDINGS:     ALIGNMENT:  Normal alignment of the cervical spine  No compression fracture  No subluxation  No scoliosis      MARROW SIGNAL:  Normal marrow signal is identified within the visualized bony structures  No discrete marrow lesion      CERVICAL AND VISUALIZED UPPER THORACIC CORD:  Advanced canal stenosis within the left lateral aspect of the canal at the level of the C5-6 disc space with mild compression of the cord  No focal cord edema identified      PREVERTEBRAL AND PARASPINAL SOFT TISSUES:  Normal      VISUALIZED POSTERIOR FOSSA:  The visualized posterior fossa demonstrates no abnormal signal      CERVICAL DISC SPACES:     C2-C3:  Normal      C3-C4:  Normal      C4-C5:  Normal      C5-C6:  There is no loss of disc height    However, there is a large central and left paramedian disc extrusion with "associated moderately large uncinate joint hypertrophic degenerative change resulting in advanced left lateral canal stenosis with mild   compression of the lateral aspect of the cord  There is compression of the ventral and dorsal nerve root as well as severe foraminal narrowing  Correlate for corresponding radiculopathy      C6-C7:  Small broad-based central disc protrusion  No canal stenosis  No foraminal narrowing      C7-T1:  Normal      UPPER THORACIC DISC SPACES:  There is facet degenerative change within the upper thoracic spine, primarily right-sided with no discrete disc herniation  Mild right-sided foraminal narrowing      POSTCONTRAST IMAGING:  Normal      OTHER FINDINGS:  None      IMPRESSION:     Large central and left-sided disc extrusion at C5-6 with associated endplate and uncinate joint hypertrophic changes resulting in severe left lateral canal stenosis and foraminal narrowing  There is resulting mild compression of the cervical cord with   no cord edema      Spine surgery consultation recommended      This examination was marked \"immediate notification\" in Epic in order to begin the standard process by which the radiology reading room liaison alerts the referring practitioner                 Workstation performed: [id number]        Imaging    MRI cervical spine with and without contrast (Order: [id number]) - 4/4/2023    Result History    MRI cervical spine with and without contrast (Order #[id number]) on 4/4/2023 - Order Result History Report    Order Report     Order Details  Order Questions    Question Answer   What is the patient's sedation requirement? No Sedation   Pregnant? No   Release to patient through Mychart Immediate   Is order priority selected as STAT?  No   Exam reason cervical radiculopathy   Note: Enter reason for exam            Result Information    Status Priority Source   Final result (4/4/2023  3:48 PM) Routine      Reason for Exam    cervical radiculopathy; cervical " "radiculopathy   Dx: Radiculopathy, cervical Janay Lai (ICD-10-CM)]; Lower extremity numbness [R20 0 (ICD-10-CM)]       All Reviewers List    Sugey 68 on 4/5/2023  7:57 AM         MRI cervical spine with and without contrast: Patient Communication     Add Comments   Seen         Signed by    Signed Date/Time  Phone Pager   Brain Huertas 4/04/2023 15:48 [phone]        Exam Information    Status Exam Begun  Exam Ended  Performing Tech   Final [99] 4/04/2023 14:20 4/04/2023 15:29 [place] Gals       Screening Form Questions     important suggestion  Questionnaires are displayed in the order they were answered  Answer Comment   Has patient changed into the appropriate hospital gown? Yes    What are your symptoms? Pain    Do you have a cardiac pacemaker or internal defibrillator? NA    Please list /make/model:     Have you had any HEART surgery? None    Please list make/model:     Heart surgery: If \"other\", please describe:     Have you had any BRAIN surgery? None    Please list  or describe implant:     Brain surgery: If \"other\", please describe:     Have you had any EYE surgery? None    Eye surgery: If \"other\", please describe:     Have you ever injured your eyes with metal or metal fragments (grinding,metallic slivers)? No    Eye injury: Please describe:     Have you had any EAR surgery? None    Ear surgery: If \"other\", please describe:      Do you have an electronic \"stimulation\" device? None    Please provide  information:     Have you had any abdominal or pelvic surgery? Yes    Have you had any of the following abdominal or pelvic surgeries: Other    Abdominal/pelvic surgery: If \"other\", please describe: Abdominoplasty; Liposuction; Lap Eda; Hysterectomy; Laparoscopic assisted bilateral salpingo-oopherectomy    Do you have any ORTHOPEDIC implants/devices? None    Do you have the following: None    Do you have liver disease?  None    Do you have " "kidney disease? None    Have you had a problem with a previous MRI? No    Does the patient require pre-medication? Do you have a personal history of cancer? Other    If \"other\", please specify:   Carcinoid tumor of lung; squamous cell carcinoma    Are you wearing medication patches?   No    Are you wearing any of the following: None    Date of last menstrual cycle: 2014    Postmenopausal? Yes    Pregnant? Breastfeeding? Breast tissue expander? Do any of the following apply to you:     Please specify:     Did the patient provide this information? Yes    Who provided this information (if not the patient)? Relationship to the patient:     Contact number:     MRI STAFF ONLY- Prior imaging reviewed in PACS (initials): [place]    MRI STAFF ONLY- Epic chart reviewed (initials): [place]    MRI STAFF ONLY: The patient was scheduled to receive MRI contrast and has received the Medication Guide  Yes        External Results Report    Open External Results Report      Encounter    View Encounter                     Patient Care Timeline    No data selected in time range    Pre-op Summary    Pre-op             Recovery Summary    Recovery                 Routing History    None           MRI thoracic spine with and without contrast  Status: Final result     PACS Images     Show images for MRI thoracic spine with and without contrast    MRI thoracic spine with and without contrast: Result Notes   GENESIS Aldrich   4/5/2023  7:57 AM EDT       Reviewed results  Concern for areas of cord compression at cervical and thoracic levels  Will be discussed with patient at next scheduled appointment on 4/17             Study Result    Narrative & Impression   MRI THORACIC SPINE WITH AND WITHOUT CONTRAST     INDICATION: M54 12:  Radiculopathy, cervical region  R20 0: Anesthesia of skin      COMPARISON:     TECHNIQUE:  Multiplanar, multisequence imaging of the thoracic spine was performed before and after gadolinium " administration          IV Contrast:  14 mL of Gadobutrol injection (SINGLE-DOSE)      IMAGE QUALITY:  Diagnostic      FINDINGS:     ALIGNMENT:  Normal alignment of the thoracic spine  No compression fracture  No subluxation  No evidence of scoliosis      MARROW SIGNAL:  Normal marrow signal is identified within the visualized bony structures  No discrete marrow lesion      THORACIC CORD:  There is cord compression at the level of the T9-T10 disc space primarily result of posterior element hypertrophic change  However, no definite cord edema is identified      PREVERTEBRAL AND PARASPINAL SOFT TISSUES:   Normal      THORACIC DEGENERATIVE CHANGE:  Mild posterior element hypertrophic degenerative change at the level of T3-4 involving ligamentum flavum thickening and facet hypertrophic changes  Mild canal stenosis without cord compression  No foraminal nerve   impingement      Mild posterior element hypertrophic change on the right at both the T4-5 and T5-6 levels with mild foraminal narrowing  At T6-7 there is mild facet degenerative change bilaterally with  left greater than right changes  There is a tiny central disc   protrusion  Mild canal stenosis and moderate bilateral foraminal narrowing      At T7-8 there is mild annular bulging and bilateral facet hypertrophic degenerative changes resulting in mild canal stenosis and mild to moderate bilateral foraminal narrowing      At the T9-10 level there is normal disc height and signal   There is however fairly extensive ligamentum flavum thickening and facet hypertrophic degenerative change resulting in severe canal stenosis with mild compression of the thoracic cord, see   series 17 image 37    Mild left and moderate right foraminal narrowing      POSTCONTRAST:  No abnormal enhancement      OTHER FINDINGS:  None      IMPRESSION:     Diffuse thoracic degenerative change primarily a result of posterior element hypertrophic changes scattered throughout the thoracic spine  This is most pronounced at T9-10 where the posterior element degenerative changes are resulting in severe canal   stenosis with mild compression of the lower thoracic cord  Spine surgery consultation recommended            Workstation performed: [id number]        Imaging    MRI thoracic spine with and without contrast (Order: [id number]) - 4/4/2023    Result History    MRI thoracic spine with and without contrast (Order #[id number]) on 4/4/2023 - Order Result History Report    Order Report     Order Details      Order Questions    Question Answer   What is the patient's sedation requirement? No Sedation   Pregnant? No   Release to patient through Mychart Immediate   Is order priority selected as STAT? No   Exam reason bilateral LE numbness   Note: Enter reason for exam              Result Information    Status Priority Source   Final result (4/4/2023  3:56 PM) Routine        MRI thoracic spine with and without contrast: Result Notes     GENESIS Chavez   4/5/2023  7:57 AM EDT         Reviewed results  Concern for areas of cord compression at cervical and thoracic levels  Will be discussed with patient at next scheduled appointment on 4/17             Reason for Exam    bilateral LE numbness; Mid-back pain, neuro deficit; bilateral LE numbness   Dx: Radiculopathy, cervical [M54 12 (ICD-10-CM)]; Lower extremity numbness [R20 0 (ICD-10-CM)]     All Reviewers List    Sugey Peter on 4/5/2023  7:57 AM         MRI thoracic spine with and without contrast: Patient Communication     Add Comments   Seen         Signed by    Signed Date/Time  Phone Pager   Dahlia Reeder 4/04/2023 15:56 [phone]        Exam Information    Status Exam Begun  Exam Ended  Performing Tech   Final [99] 4/04/2023 14:23 4/04/2023 15:34 [facility]       Screening Form Questions     important suggestion  Questionnaires are displayed in the order they were answered        Answer Comment   Has patient "changed into the appropriate hospital gown? Yes    What are your symptoms? Pain    Do you have a cardiac pacemaker or internal defibrillator? NA    Please list /make/model:     Have you had any HEART surgery? None    Please list make/model:     Heart surgery: If \"other\", please describe:     Have you had any BRAIN surgery? None    Please list  or describe implant:     Brain surgery: If \"other\", please describe:     Have you had any EYE surgery? None    Eye surgery: If \"other\", please describe:     Have you ever injured your eyes with metal or metal fragments (grinding,metallic slivers)? No    Eye injury: Please describe:     Have you had any EAR surgery? None    Ear surgery: If \"other\", please describe:      Do you have an electronic \"stimulation\" device? None    Please provide  information:     Have you had any abdominal or pelvic surgery? Yes    Have you had any of the following abdominal or pelvic surgeries: Other    Abdominal/pelvic surgery: If \"other\", please describe: Abdominoplasty; Liposuction; Lap Eda; Hysterectomy; Laparoscopic assisted bilateral salpingo-oopherectomy    Do you have any ORTHOPEDIC implants/devices? None    Do you have the following: None    Do you have liver disease? None    Do you have kidney disease? None    Have you had a problem with a previous MRI? No    Does the patient require pre-medication? Do you have a personal history of cancer? Other    If \"other\", please specify:   Carcinoid tumor of lung; squamous cell carcinoma    Are you wearing medication patches?   No    Are you wearing any of the following: None    Date of last menstrual cycle: 2014    Postmenopausal? Yes    Pregnant? Breastfeeding? Breast tissue expander? Do any of the following apply to you:     Please specify:     Did the patient provide this information? Yes    Who provided this information (if not the patient)?      Relationship to the patient:     Contact " number:     MRI STAFF ONLY- Prior imaging reviewed in PACS (initials): [place]    MRI STAFF ONLY- Epic chart reviewed (initials): [place]    MRI STAFF ONLY: The patient was scheduled to receive MRI contrast and has received the Medication Guide  Yes        External Results Report    Open External Results Report      Encounter    View Encounter                     Patient Care Timeline    No data selected in time range    Pre-op Summary    Pre-op             Recovery Summary    Recovery                 Routing History    None         No orders to display       No orders of the defined types were placed in this encounter

## 2023-05-13 ENCOUNTER — LAB REQUISITION (OUTPATIENT)
Dept: LAB | Facility: HOSPITAL | Age: 48
End: 2023-05-13

## 2023-05-13 ENCOUNTER — APPOINTMENT (OUTPATIENT)
Dept: LAB | Facility: HOSPITAL | Age: 48
End: 2023-05-13

## 2023-05-13 DIAGNOSIS — M47.14 THORACIC SPONDYLOSIS WITH MYELOPATHY: ICD-10-CM

## 2023-05-13 DIAGNOSIS — G95.9 MYELOPATHY (HCC): ICD-10-CM

## 2023-05-13 DIAGNOSIS — M54.6 ACUTE BILATERAL THORACIC BACK PAIN: ICD-10-CM

## 2023-05-13 DIAGNOSIS — G95.89 MYELOMALACIA (HCC): ICD-10-CM

## 2023-05-13 DIAGNOSIS — M47.14 OTHER SPONDYLOSIS WITH MYELOPATHY, THORACIC REGION: ICD-10-CM

## 2023-05-13 LAB
ABO GROUP BLD: NORMAL
ALBUMIN SERPL BCP-MCNC: 3.7 G/DL (ref 3.5–5)
ALP SERPL-CCNC: 86 U/L (ref 34–104)
ALT SERPL W P-5'-P-CCNC: 40 U/L (ref 7–52)
ANION GAP SERPL CALCULATED.3IONS-SCNC: 7 MMOL/L (ref 4–13)
APTT PPP: 36 SECONDS (ref 23–37)
AST SERPL W P-5'-P-CCNC: 40 U/L (ref 13–39)
BASOPHILS # BLD AUTO: 0.09 THOUSANDS/ÂΜL (ref 0–0.1)
BASOPHILS NFR BLD AUTO: 1 % (ref 0–1)
BILIRUB SERPL-MCNC: 0.46 MG/DL (ref 0.2–1)
BLD GP AB SCN SERPL QL: NEGATIVE
BUN SERPL-MCNC: 12 MG/DL (ref 5–25)
CALCIUM SERPL-MCNC: 8.7 MG/DL (ref 8.4–10.2)
CHLORIDE SERPL-SCNC: 108 MMOL/L (ref 96–108)
CO2 SERPL-SCNC: 25 MMOL/L (ref 21–32)
CREAT SERPL-MCNC: 0.74 MG/DL (ref 0.6–1.3)
EOSINOPHIL # BLD AUTO: 0.21 THOUSAND/ÂΜL (ref 0–0.61)
EOSINOPHIL NFR BLD AUTO: 3 % (ref 0–6)
ERYTHROCYTE [DISTWIDTH] IN BLOOD BY AUTOMATED COUNT: 13.2 % (ref 11.6–15.1)
EST. AVERAGE GLUCOSE BLD GHB EST-MCNC: 108 MG/DL
GFR SERPL CREATININE-BSD FRML MDRD: 96 ML/MIN/1.73SQ M
GLUCOSE SERPL-MCNC: 89 MG/DL (ref 65–140)
HBA1C MFR BLD: 5.4 %
HCT VFR BLD AUTO: 42.9 % (ref 34.8–46.1)
HGB BLD-MCNC: 14.1 G/DL (ref 11.5–15.4)
IMM GRANULOCYTES # BLD AUTO: 0.02 THOUSAND/UL (ref 0–0.2)
IMM GRANULOCYTES NFR BLD AUTO: 0 % (ref 0–2)
INR PPP: 1 (ref 0.84–1.19)
LYMPHOCYTES # BLD AUTO: 2.86 THOUSANDS/ÂΜL (ref 0.6–4.47)
LYMPHOCYTES NFR BLD AUTO: 44 % (ref 14–44)
MCH RBC QN AUTO: 29.8 PG (ref 26.8–34.3)
MCHC RBC AUTO-ENTMCNC: 32.9 G/DL (ref 31.4–37.4)
MCV RBC AUTO: 91 FL (ref 82–98)
MONOCYTES # BLD AUTO: 0.37 THOUSAND/ÂΜL (ref 0.17–1.22)
MONOCYTES NFR BLD AUTO: 6 % (ref 4–12)
NEUTROPHILS # BLD AUTO: 3 THOUSANDS/ÂΜL (ref 1.85–7.62)
NEUTS SEG NFR BLD AUTO: 46 % (ref 43–75)
NRBC BLD AUTO-RTO: 0 /100 WBCS
PLATELET # BLD AUTO: 166 THOUSANDS/UL (ref 149–390)
PMV BLD AUTO: 9.8 FL (ref 8.9–12.7)
POTASSIUM SERPL-SCNC: 4.1 MMOL/L (ref 3.5–5.3)
PROT SERPL-MCNC: 7.3 G/DL (ref 6.4–8.4)
PROTHROMBIN TIME: 13.3 SECONDS (ref 11.6–14.5)
RBC # BLD AUTO: 4.73 MILLION/UL (ref 3.81–5.12)
RH BLD: POSITIVE
SODIUM SERPL-SCNC: 140 MMOL/L (ref 135–147)
SPECIMEN EXPIRATION DATE: NORMAL
WBC # BLD AUTO: 6.55 THOUSAND/UL (ref 4.31–10.16)

## 2023-05-15 RX ORDER — MULTIVITAMIN
1 TABLET ORAL DAILY
COMMUNITY
End: 2023-06-09

## 2023-05-15 NOTE — PRE-PROCEDURE INSTRUCTIONS
Pre-Surgery Instructions:   Medication Instructions   • acyclovir (ZOVIRAX) 800 mg tablet Take day of surgery  • ALPRAZolam (XANAX) 0 25 mg tablet Uses PRN- OK to take day of surgery   • diclofenac (VOLTAREN) 75 mg EC tablet Stop taking 7 days prior to surgery  • gabapentin (Neurontin) 600 MG tablet Take day of surgery  • levocetirizine (XYZAL) 5 MG tablet Take night before surgery   • Multiple Vitamin (multivitamin) tablet Stop taking 7 days prior to surgery  • omeprazole (PriLOSEC) 40 MG capsule Take day of surgery  • tiZANidine (Zanaflex) 4 mg tablet Take day of surgery  Medication instructions for day surgery reviewed  Please use only a sip of water to take your instructed medications  Avoid all over the counter vitamins, supplements and NSAIDS for one week prior to surgery per anesthesia guidelines  Tylenol is ok to take as needed  You will receive a call one business day prior to surgery with an arrival time and hospital directions  If your surgery is scheduled on a Monday, the hospital will be calling you on the Friday prior to your surgery  If you have not heard from anyone by 8pm, please call the hospital supervisor through the hospital  at [phone]  Amy Howard [phone])  Do not eat or drink anything after midnight the night before your surgery, including candy, mints, lifesavers, or chewing gum  Do not drink alcohol 24hrs before your surgery  Try not to smoke at least 24hrs before your surgery  Follow the pre surgery showering instructions as listed in the [facility] Surgical Experience Booklet” or otherwise provided by your surgeon's office  Do not shave the surgical area 24 hours before surgery  Do not apply any lotions, creams, including makeup, cologne, deodorant, or perfumes after showering on the day of your surgery  No contact lenses, eye make-up, or artificial eyelashes   Remove nail polish, including gel polish, and any artificial, gel, or acrylic nails if possible  Remove all jewelry including rings and body piercing jewelry  Wear causal clothing that is easy to take on and off  Consider your type of surgery  Keep any valuables, jewelry, piercings at home  Please bring any specially ordered equipment (sling, braces) if indicated  Arrange for a responsible person to drive you to and from the hospital on the day of your surgery  Visitor Guidelines discussed  Call the surgeon's office with any new illnesses, exposures, or additional questions prior to surgery  Please reference your [facility] Surgical Experience Booklet” for additional information to prepare for your upcoming surgery

## 2023-05-19 ENCOUNTER — OFFICE VISIT (OUTPATIENT)
Dept: FAMILY MEDICINE CLINIC | Facility: CLINIC | Age: 48
End: 2023-05-19

## 2023-05-19 ENCOUNTER — APPOINTMENT (OUTPATIENT)
Dept: LAB | Facility: CLINIC | Age: 48
End: 2023-05-19
Payer: COMMERCIAL

## 2023-05-19 VITALS
DIASTOLIC BLOOD PRESSURE: 84 MMHG | BODY MASS INDEX: 41.02 KG/M2 | SYSTOLIC BLOOD PRESSURE: 130 MMHG | HEIGHT: 71 IN | HEART RATE: 88 BPM | TEMPERATURE: 98 F | WEIGHT: 293 LBS | RESPIRATION RATE: 16 BRPM

## 2023-05-19 DIAGNOSIS — Z12.31 ENCOUNTER FOR SCREENING MAMMOGRAM FOR BREAST CANCER: ICD-10-CM

## 2023-05-19 DIAGNOSIS — G89.29 CHRONIC BILATERAL THORACIC BACK PAIN: ICD-10-CM

## 2023-05-19 DIAGNOSIS — Z01.818 PREOP EXAMINATION: Primary | ICD-10-CM

## 2023-05-19 DIAGNOSIS — M54.6 CHRONIC BILATERAL THORACIC BACK PAIN: ICD-10-CM

## 2023-05-19 NOTE — PROGRESS NOTES
Chief Complaint   Patient presents with   • Pre-op Exam     Back surgery- Dr Alcira Gunter        Patient ID: Danyelle Queen is a 49 y o  female  HPI  Pt is seeing for preop exam prior to back surgery ( laminectomy with possible fusion ) on 6/8/23      The following portions of the patient's history were reviewed and updated as appropriate: allergies, current medications, past family history, past medical history, past social history, past surgical history and problem list     Review of Systems   Constitutional: Negative  HENT: Negative  Respiratory: Negative  Cardiovascular: Negative  Gastrointestinal: Negative  Genitourinary: Negative  Musculoskeletal: Positive for back pain, neck pain and neck stiffness  Skin: Negative  Neurological: Negative  Psychiatric/Behavioral: Negative          Current Outpatient Medications   Medication Sig Dispense Refill   • acyclovir (ZOVIRAX) 800 mg tablet Take 0 5 tablets (400 mg total) by mouth 2 (two) times a day 180 tablet 3   • ALPRAZolam (XANAX) 0 25 mg tablet Take 1 tablet (0 25 mg total) by mouth daily at bedtime as needed for anxiety 30 tablet 0   • clobetasol propionate (CLOBEX) 0 05 % shampoo Apply 1 application topically 2 (two) times a week 118 mL 3   • diclofenac (VOLTAREN) 75 mg EC tablet Take 1 tablet (75 mg total) by mouth 2 (two) times a day 180 tablet 0   • gabapentin (Neurontin) 600 MG tablet Take 1 tablet (600 mg total) by mouth 3 (three) times a day 270 tablet 1   • ketoconazole (NIZORAL) 2 % shampoo Apply 1 application topically 2 (two) times a week 120 mL 3   • levocetirizine (XYZAL) 5 MG tablet TAKE 1 TABLET BY MOUTH IN  THE EVENING 90 tablet 3   • Multiple Vitamin (multivitamin) tablet Take 1 tablet by mouth daily     • omeprazole (PriLOSEC) 40 MG capsule TAKE 1 CAPSULE BY MOUTH  DAILY 90 capsule 3   • tiZANidine (Zanaflex) 4 mg tablet Take 1 tablet (4 mg total) by mouth every 8 (eight) hours as needed for muscle spasms 90 tablet 1     No "current facility-administered medications for this visit  Objective:    /84 (BP Location: Left arm, Patient Position: Sitting, Cuff Size: Large)   Pulse 88   Temp 98 °F (36 7 °C)   Resp 16   Ht 5' 11\" (1 803 m)   Wt (!) 143 kg (315 lb)   BMI 43 93 kg/m²        Physical Exam  Constitutional:       General: She is not in acute distress  Appearance: She is obese  She is not ill-appearing  HENT:      Nose: No congestion or rhinorrhea  Mouth/Throat:      Pharynx: No oropharyngeal exudate or posterior oropharyngeal erythema  Cardiovascular:      Rate and Rhythm: Normal rate and regular rhythm  Heart sounds: No murmur heard  Pulmonary:      Effort: Pulmonary effort is normal  No respiratory distress  Breath sounds: No wheezing, rhonchi or rales  Abdominal:      Palpations: Abdomen is soft  Tenderness: There is no abdominal tenderness  Musculoskeletal:      Right lower leg: No edema  Left lower leg: No edema  Skin:     Coloration: Skin is not pale  Neurological:      General: No focal deficit present  Mental Status: She is alert and oriented to person, place, and time  Motor: No weakness  Gait: Gait normal    Psychiatric:         Mood and Affect: Mood normal          Thought Content: Thought content normal          Judgment: Judgment normal            Labs in chart were reviewed    Recent Results (from the past 672 hour(s))   Type and screen    Collection Time: 05/13/23  7:45 AM   Result Value Ref Range    ABO Grouping B     Rh Factor Positive     Antibody Screen Negative     Specimen Expiration Date [id number]    Comprehensive metabolic panel    Collection Time: 05/13/23  7:51 AM   Result Value Ref Range    Sodium 140 135 - 147 mmol/L    Potassium 4 1 3 5 - 5 3 mmol/L    Chloride 108 96 - 108 mmol/L    CO2 25 21 - 32 mmol/L    ANION GAP 7 4 - 13 mmol/L    BUN 12 5 - 25 mg/dL    Creatinine 0 74 0 60 - 1 30 mg/dL    Glucose 89 65 - 140 mg/dL    Calcium " 8 7 8 4 - 10 2 mg/dL    AST 40 (H) 13 - 39 U/L    ALT 40 7 - 52 U/L    Alkaline Phosphatase 86 34 - 104 U/L    Total Protein 7 3 6 4 - 8 4 g/dL    Albumin 3 7 3 5 - 5 0 g/dL    Total Bilirubin 0 46 0 20 - 1 00 mg/dL    eGFR 96 ml/min/1 73sq m   CBC and differential    Collection Time: 05/13/23  7:51 AM   Result Value Ref Range    WBC 6 55 4 31 - 10 16 Thousand/uL    RBC 4 73 3 81 - 5 12 Million/uL    Hemoglobin 14 1 11 5 - 15 4 g/dL    Hematocrit 42 9 34 8 - 46 1 %    MCV 91 82 - 98 fL    MCH 29 8 26 8 - 34 3 pg    MCHC 32 9 31 4 - 37 4 g/dL    RDW 13 2 11 6 - 15 1 %    MPV 9 8 8 9 - 12 7 fL    Platelets 071 678 - 829 Thousands/uL    nRBC 0 /100 WBCs    Neutrophils Relative 46 43 - 75 %    Immat GRANS % 0 0 - 2 %    Lymphocytes Relative 44 14 - 44 %    Monocytes Relative 6 4 - 12 %    Eosinophils Relative 3 0 - 6 %    Basophils Relative 1 0 - 1 %    Neutrophils Absolute 3 00 1 85 - 7 62 Thousands/µL    Immature Grans Absolute 0 02 0 00 - 0 20 Thousand/uL    Lymphocytes Absolute 2 86 0 60 - 4 47 Thousands/µL    Monocytes Absolute 0 37 0 17 - 1 22 Thousand/µL    Eosinophils Absolute 0 21 0 00 - 0 61 Thousand/µL    Basophils Absolute 0 09 0 00 - 0 10 Thousands/µL   APTT    Collection Time: 05/13/23  7:51 AM   Result Value Ref Range    PTT 36 23 - 37 seconds   Protime-INR    Collection Time: 05/13/23  7:51 AM   Result Value Ref Range    Protime 13 3 11 6 - 14 5 seconds    INR 1 00 0 84 - 1 19   HEMOGLOBIN A1C W/ EAG ESTIMATION    Collection Time: 05/13/23  7:51 AM   Result Value Ref Range    Hemoglobin A1C 5 4 Normal 3 8-5 6%; PreDiabetic 5 7-6 4%;  Diabetic >=6 5%; Glycemic control for adults with diabetes <7 0% %     mg/dl       Assessment/Plan:         Diagnoses and all orders for this visit:    Preop examination   pt is cleared for surgery   Chronic bilateral thoracic back pain    Encounter for screening mammogram for breast cancer  -     Mammo screening bilateral w 3d & cad; Future      rto prn Hema Vang MD

## 2023-05-31 DIAGNOSIS — F41.9 ANXIETY: ICD-10-CM

## 2023-05-31 DIAGNOSIS — L21.9 SEBORRHEIC DERMATITIS: ICD-10-CM

## 2023-06-01 RX ORDER — ALPRAZOLAM 0.25 MG/1
0.25 TABLET ORAL
Qty: 30 TABLET | Refills: 0 | Status: ON HOLD | OUTPATIENT
Start: 2023-06-01

## 2023-06-01 RX ORDER — KETOCONAZOLE 20 MG/ML
1 SHAMPOO TOPICAL 2 TIMES WEEKLY
Qty: 120 ML | Refills: 0 | Status: ON HOLD | OUTPATIENT
Start: 2023-06-01

## 2023-06-01 RX ORDER — CLOBETASOL PROPIONATE 0.05 G/100ML
1 SHAMPOO TOPICAL 2 TIMES WEEKLY
Qty: 118 ML | Refills: 0 | Status: ON HOLD | OUTPATIENT
Start: 2023-06-01

## 2023-06-07 ENCOUNTER — ANESTHESIA EVENT (OUTPATIENT)
Dept: PERIOP | Facility: HOSPITAL | Age: 48
DRG: 029 | End: 2023-06-07
Payer: COMMERCIAL

## 2023-06-08 ENCOUNTER — ANESTHESIA (OUTPATIENT)
Dept: PERIOP | Facility: HOSPITAL | Age: 48
DRG: 029 | End: 2023-06-08
Payer: COMMERCIAL

## 2023-06-08 ENCOUNTER — HOSPITAL ENCOUNTER (INPATIENT)
Facility: HOSPITAL | Age: 48
LOS: 1 days | Discharge: HOME | DRG: 029 | End: 2023-06-09
Attending: NEUROLOGICAL SURGERY | Admitting: NEUROLOGICAL SURGERY
Payer: COMMERCIAL

## 2023-06-08 ENCOUNTER — APPOINTMENT (OUTPATIENT)
Dept: RADIOLOGY | Facility: HOSPITAL | Age: 48
DRG: 029 | End: 2023-06-08
Payer: COMMERCIAL

## 2023-06-08 DIAGNOSIS — M47.14 THORACIC SPONDYLOSIS WITH MYELOPATHY: Primary | ICD-10-CM

## 2023-06-08 PROBLEM — F41.9 ANXIETY: Status: ACTIVE | Noted: 2023-06-08

## 2023-06-08 PROBLEM — K21.9 GASTROESOPHAGEAL REFLUX DISEASE: Status: ACTIVE | Noted: 2023-06-08

## 2023-06-08 PROBLEM — K44.9 HIATAL HERNIA: Status: ACTIVE | Noted: 2023-06-08

## 2023-06-08 LAB — GLUCOSE SERPL-MCNC: 104 MG/DL (ref 65–140)

## 2023-06-08 PROCEDURE — 82948 REAGENT STRIP/BLOOD GLUCOSE: CPT

## 2023-06-08 PROCEDURE — 00NX0ZZ RELEASE THORACIC SPINAL CORD, OPEN APPROACH: ICD-10-PCS | Performed by: NEUROLOGICAL SURGERY

## 2023-06-08 PROCEDURE — 72070 X-RAY EXAM THORAC SPINE 2VWS: CPT

## 2023-06-08 RX ORDER — EPHEDRINE SULFATE 50 MG/ML
INJECTION INTRAVENOUS AS NEEDED
Status: DISCONTINUED | OUTPATIENT
Start: 2023-06-08 | End: 2023-06-08

## 2023-06-08 RX ORDER — HYDROMORPHONE HCL/PF 1 MG/ML
SYRINGE (ML) INJECTION AS NEEDED
Status: DISCONTINUED | OUTPATIENT
Start: 2023-06-08 | End: 2023-06-08

## 2023-06-08 RX ORDER — FENTANYL CITRATE 50 UG/ML
INJECTION, SOLUTION INTRAMUSCULAR; INTRAVENOUS AS NEEDED
Status: DISCONTINUED | OUTPATIENT
Start: 2023-06-08 | End: 2023-06-08

## 2023-06-08 RX ORDER — ACETAMINOPHEN 325 MG/1
975 TABLET ORAL EVERY 8 HOURS SCHEDULED
Status: DISCONTINUED | OUTPATIENT
Start: 2023-06-08 | End: 2023-06-09 | Stop reason: HOSPADM

## 2023-06-08 RX ORDER — SODIUM CHLORIDE 9 MG/ML
125 INJECTION, SOLUTION INTRAVENOUS CONTINUOUS
Status: DISCONTINUED | OUTPATIENT
Start: 2023-06-08 | End: 2023-06-09 | Stop reason: HOSPADM

## 2023-06-08 RX ORDER — ONDANSETRON 2 MG/ML
4 INJECTION INTRAMUSCULAR; INTRAVENOUS ONCE AS NEEDED
Status: DISCONTINUED | OUTPATIENT
Start: 2023-06-08 | End: 2023-06-08 | Stop reason: HOSPADM

## 2023-06-08 RX ORDER — LIDOCAINE HYDROCHLORIDE 10 MG/ML
INJECTION, SOLUTION EPIDURAL; INFILTRATION; INTRACAUDAL; PERINEURAL AS NEEDED
Status: DISCONTINUED | OUTPATIENT
Start: 2023-06-08 | End: 2023-06-08

## 2023-06-08 RX ORDER — GABAPENTIN 300 MG/1
600 CAPSULE ORAL 3 TIMES DAILY
Status: DISCONTINUED | OUTPATIENT
Start: 2023-06-08 | End: 2023-06-09 | Stop reason: HOSPADM

## 2023-06-08 RX ORDER — PANTOPRAZOLE SODIUM 40 MG/1
40 TABLET, DELAYED RELEASE ORAL
Status: DISCONTINUED | OUTPATIENT
Start: 2023-06-09 | End: 2023-06-09 | Stop reason: HOSPADM

## 2023-06-08 RX ORDER — SODIUM CHLORIDE, SODIUM LACTATE, POTASSIUM CHLORIDE, CALCIUM CHLORIDE 600; 310; 30; 20 MG/100ML; MG/100ML; MG/100ML; MG/100ML
20 INJECTION, SOLUTION INTRAVENOUS CONTINUOUS
Status: DISCONTINUED | OUTPATIENT
Start: 2023-06-08 | End: 2023-06-09 | Stop reason: HOSPADM

## 2023-06-08 RX ORDER — CALCIUM CARBONATE 500 MG/1
1000 TABLET, CHEWABLE ORAL DAILY PRN
Status: DISCONTINUED | OUTPATIENT
Start: 2023-06-08 | End: 2023-06-09 | Stop reason: HOSPADM

## 2023-06-08 RX ORDER — HYDROMORPHONE HCL/PF 1 MG/ML
0.4 SYRINGE (ML) INJECTION
Status: DISCONTINUED | OUTPATIENT
Start: 2023-06-08 | End: 2023-06-09 | Stop reason: HOSPADM

## 2023-06-08 RX ORDER — OXYCODONE HYDROCHLORIDE AND ACETAMINOPHEN 5; 325 MG/1; MG/1
1 TABLET ORAL ONCE AS NEEDED
Status: COMPLETED | OUTPATIENT
Start: 2023-06-08 | End: 2023-06-08

## 2023-06-08 RX ORDER — DOCUSATE SODIUM 100 MG/1
100 CAPSULE, LIQUID FILLED ORAL 2 TIMES DAILY
Status: DISCONTINUED | OUTPATIENT
Start: 2023-06-08 | End: 2023-06-09 | Stop reason: HOSPADM

## 2023-06-08 RX ORDER — PROPOFOL 10 MG/ML
INJECTION, EMULSION INTRAVENOUS AS NEEDED
Status: DISCONTINUED | OUTPATIENT
Start: 2023-06-08 | End: 2023-06-08

## 2023-06-08 RX ORDER — CHLORHEXIDINE GLUCONATE 0.12 MG/ML
15 RINSE ORAL ONCE
Status: COMPLETED | OUTPATIENT
Start: 2023-06-08 | End: 2023-06-08

## 2023-06-08 RX ORDER — ONDANSETRON 2 MG/ML
4 INJECTION INTRAMUSCULAR; INTRAVENOUS EVERY 6 HOURS PRN
Status: DISCONTINUED | OUTPATIENT
Start: 2023-06-08 | End: 2023-06-09 | Stop reason: HOSPADM

## 2023-06-08 RX ORDER — BUPIVACAINE HYDROCHLORIDE AND EPINEPHRINE 5; 5 MG/ML; UG/ML
INJECTION, SOLUTION EPIDURAL; INTRACAUDAL; PERINEURAL AS NEEDED
Status: DISCONTINUED | OUTPATIENT
Start: 2023-06-08 | End: 2023-06-08 | Stop reason: HOSPADM

## 2023-06-08 RX ORDER — SENNOSIDES 8.6 MG
1 TABLET ORAL DAILY
Status: DISCONTINUED | OUTPATIENT
Start: 2023-06-09 | End: 2023-06-09 | Stop reason: HOSPADM

## 2023-06-08 RX ORDER — SIMETHICONE 80 MG
80 TABLET,CHEWABLE ORAL ONCE
Status: COMPLETED | OUTPATIENT
Start: 2023-06-08 | End: 2023-06-08

## 2023-06-08 RX ORDER — LIDOCAINE HYDROCHLORIDE AND EPINEPHRINE 10; 10 MG/ML; UG/ML
INJECTION, SOLUTION INFILTRATION; PERINEURAL AS NEEDED
Status: DISCONTINUED | OUTPATIENT
Start: 2023-06-08 | End: 2023-06-08 | Stop reason: HOSPADM

## 2023-06-08 RX ORDER — HEPARIN SODIUM 5000 [USP'U]/ML
5000 INJECTION, SOLUTION INTRAVENOUS; SUBCUTANEOUS EVERY 8 HOURS SCHEDULED
Status: DISCONTINUED | OUTPATIENT
Start: 2023-06-09 | End: 2023-06-09 | Stop reason: HOSPADM

## 2023-06-08 RX ORDER — PROPOFOL 10 MG/ML
INJECTION, EMULSION INTRAVENOUS CONTINUOUS PRN
Status: DISCONTINUED | OUTPATIENT
Start: 2023-06-08 | End: 2023-06-08

## 2023-06-08 RX ORDER — DIPHENHYDRAMINE HCL 25 MG
25 TABLET ORAL EVERY 6 HOURS PRN
Status: DISCONTINUED | OUTPATIENT
Start: 2023-06-08 | End: 2023-06-09 | Stop reason: HOSPADM

## 2023-06-08 RX ORDER — KETAMINE HCL IN NACL, ISO-OSM 100MG/10ML
SYRINGE (ML) INJECTION AS NEEDED
Status: DISCONTINUED | OUTPATIENT
Start: 2023-06-08 | End: 2023-06-08

## 2023-06-08 RX ORDER — OXYCODONE HYDROCHLORIDE 5 MG/1
5 TABLET ORAL EVERY 4 HOURS PRN
Status: DISCONTINUED | OUTPATIENT
Start: 2023-06-08 | End: 2023-06-09 | Stop reason: HOSPADM

## 2023-06-08 RX ORDER — SODIUM CHLORIDE, SODIUM LACTATE, POTASSIUM CHLORIDE, CALCIUM CHLORIDE 600; 310; 30; 20 MG/100ML; MG/100ML; MG/100ML; MG/100ML
125 INJECTION, SOLUTION INTRAVENOUS CONTINUOUS
Status: DISCONTINUED | OUTPATIENT
Start: 2023-06-08 | End: 2023-06-08

## 2023-06-08 RX ORDER — ALPRAZOLAM 0.25 MG/1
0.25 TABLET ORAL
Status: DISCONTINUED | OUTPATIENT
Start: 2023-06-08 | End: 2023-06-09 | Stop reason: HOSPADM

## 2023-06-08 RX ORDER — MIDAZOLAM HYDROCHLORIDE 2 MG/2ML
INJECTION, SOLUTION INTRAMUSCULAR; INTRAVENOUS AS NEEDED
Status: DISCONTINUED | OUTPATIENT
Start: 2023-06-08 | End: 2023-06-08

## 2023-06-08 RX ORDER — HYDROMORPHONE HCL/PF 1 MG/ML
0.5 SYRINGE (ML) INJECTION EVERY 2 HOUR PRN
Status: DISCONTINUED | OUTPATIENT
Start: 2023-06-08 | End: 2023-06-09 | Stop reason: HOSPADM

## 2023-06-08 RX ORDER — DEXAMETHASONE SODIUM PHOSPHATE 10 MG/ML
INJECTION, SOLUTION INTRAMUSCULAR; INTRAVENOUS AS NEEDED
Status: DISCONTINUED | OUTPATIENT
Start: 2023-06-08 | End: 2023-06-08

## 2023-06-08 RX ORDER — FENTANYL CITRATE/PF 50 MCG/ML
25 SYRINGE (ML) INJECTION
Status: DISCONTINUED | OUTPATIENT
Start: 2023-06-08 | End: 2023-06-08 | Stop reason: HOSPADM

## 2023-06-08 RX ORDER — TIZANIDINE 4 MG/1
4 TABLET ORAL EVERY 8 HOURS PRN
Status: DISCONTINUED | OUTPATIENT
Start: 2023-06-08 | End: 2023-06-09 | Stop reason: HOSPADM

## 2023-06-08 RX ORDER — CEFAZOLIN SODIUM 2 G/50ML
2000 SOLUTION INTRAVENOUS EVERY 8 HOURS
Status: COMPLETED | OUTPATIENT
Start: 2023-06-08 | End: 2023-06-09

## 2023-06-08 RX ORDER — OXYCODONE HYDROCHLORIDE 10 MG/1
10 TABLET ORAL EVERY 4 HOURS PRN
Status: DISCONTINUED | OUTPATIENT
Start: 2023-06-08 | End: 2023-06-09 | Stop reason: HOSPADM

## 2023-06-08 RX ORDER — SUCCINYLCHOLINE/SOD CL,ISO/PF 100 MG/5ML
SYRINGE (ML) INTRAVENOUS AS NEEDED
Status: DISCONTINUED | OUTPATIENT
Start: 2023-06-08 | End: 2023-06-08

## 2023-06-08 RX ORDER — ONDANSETRON 2 MG/ML
INJECTION INTRAMUSCULAR; INTRAVENOUS AS NEEDED
Status: DISCONTINUED | OUTPATIENT
Start: 2023-06-08 | End: 2023-06-08

## 2023-06-08 RX ORDER — LORATADINE 10 MG/1
10 TABLET ORAL DAILY
Status: DISCONTINUED | OUTPATIENT
Start: 2023-06-09 | End: 2023-06-09 | Stop reason: HOSPADM

## 2023-06-08 RX ORDER — SODIUM CHLORIDE 9 MG/ML
INJECTION, SOLUTION INTRAVENOUS CONTINUOUS PRN
Status: DISCONTINUED | OUTPATIENT
Start: 2023-06-08 | End: 2023-06-08

## 2023-06-08 RX ORDER — ROCURONIUM BROMIDE 10 MG/ML
INJECTION, SOLUTION INTRAVENOUS AS NEEDED
Status: DISCONTINUED | OUTPATIENT
Start: 2023-06-08 | End: 2023-06-08

## 2023-06-08 RX ORDER — HYDROMORPHONE HCL IN WATER/PF 6 MG/30 ML
0.2 PATIENT CONTROLLED ANALGESIA SYRINGE INTRAVENOUS
Status: COMPLETED | OUTPATIENT
Start: 2023-06-08 | End: 2023-06-08

## 2023-06-08 RX ORDER — ACYCLOVIR 200 MG/1
400 CAPSULE ORAL 2 TIMES DAILY
Status: DISCONTINUED | OUTPATIENT
Start: 2023-06-08 | End: 2023-06-09 | Stop reason: HOSPADM

## 2023-06-08 RX ADMIN — ACYCLOVIR 400 MG: 200 CAPSULE ORAL at 20:16

## 2023-06-08 RX ADMIN — MIDAZOLAM 2 MG: 1 INJECTION INTRAMUSCULAR; INTRAVENOUS at 10:44

## 2023-06-08 RX ADMIN — Medication 3000 MG: at 11:30

## 2023-06-08 RX ADMIN — HYDROMORPHONE HYDROCHLORIDE 0.5 MG: 1 INJECTION, SOLUTION INTRAMUSCULAR; INTRAVENOUS; SUBCUTANEOUS at 20:10

## 2023-06-08 RX ADMIN — REMIFENTANIL HYDROCHLORIDE 0.1 MCG/KG/MIN: 1 INJECTION, POWDER, LYOPHILIZED, FOR SOLUTION INTRAVENOUS at 10:55

## 2023-06-08 RX ADMIN — HYDROMORPHONE HYDROCHLORIDE 0.2 MG: 0.2 INJECTION, SOLUTION INTRAMUSCULAR; INTRAVENOUS; SUBCUTANEOUS at 14:26

## 2023-06-08 RX ADMIN — LIDOCAINE HYDROCHLORIDE 100 MG: 10 INJECTION, SOLUTION EPIDURAL; INFILTRATION; INTRACAUDAL; PERINEURAL at 10:51

## 2023-06-08 RX ADMIN — EPHEDRINE SULFATE 20 MG: 50 INJECTION INTRAVENOUS at 11:39

## 2023-06-08 RX ADMIN — SIMETHICONE 80 MG: 80 TABLET, CHEWABLE ORAL at 09:56

## 2023-06-08 RX ADMIN — Medication 20 MG: at 12:11

## 2023-06-08 RX ADMIN — HYDROMORPHONE HYDROCHLORIDE 0.2 MG: 0.2 INJECTION, SOLUTION INTRAMUSCULAR; INTRAVENOUS; SUBCUTANEOUS at 14:49

## 2023-06-08 RX ADMIN — GABAPENTIN 600 MG: 300 CAPSULE ORAL at 17:31

## 2023-06-08 RX ADMIN — DIPHENHYDRAMINE HCL 25 MG: 25 TABLET, COATED ORAL at 20:10

## 2023-06-08 RX ADMIN — HYDROMORPHONE HYDROCHLORIDE 0.4 MG: 1 INJECTION, SOLUTION INTRAMUSCULAR; INTRAVENOUS; SUBCUTANEOUS at 15:48

## 2023-06-08 RX ADMIN — DOCUSATE SODIUM 100 MG: 100 CAPSULE, LIQUID FILLED ORAL at 17:29

## 2023-06-08 RX ADMIN — FENTANYL CITRATE 25 MCG: 50 INJECTION INTRAMUSCULAR; INTRAVENOUS at 14:11

## 2023-06-08 RX ADMIN — ONDANSETRON 4 MG: 2 INJECTION INTRAMUSCULAR; INTRAVENOUS at 13:24

## 2023-06-08 RX ADMIN — Medication 10 MG: at 12:41

## 2023-06-08 RX ADMIN — OXYCODONE HYDROCHLORIDE 10 MG: 10 TABLET ORAL at 21:38

## 2023-06-08 RX ADMIN — HYDROMORPHONE HYDROCHLORIDE 0.4 MG: 1 INJECTION, SOLUTION INTRAMUSCULAR; INTRAVENOUS; SUBCUTANEOUS at 15:21

## 2023-06-08 RX ADMIN — HYDROMORPHONE HYDROCHLORIDE 0.5 MG: 1 INJECTION, SOLUTION INTRAMUSCULAR; INTRAVENOUS; SUBCUTANEOUS at 18:05

## 2023-06-08 RX ADMIN — PROPOFOL 100 MCG/KG/MIN: 10 INJECTION, EMULSION INTRAVENOUS at 10:55

## 2023-06-08 RX ADMIN — SODIUM CHLORIDE, SODIUM LACTATE, POTASSIUM CHLORIDE, AND CALCIUM CHLORIDE 125 ML/HR: .6; .31; .03; .02 INJECTION, SOLUTION INTRAVENOUS at 09:58

## 2023-06-08 RX ADMIN — HYDROMORPHONE HYDROCHLORIDE 0.4 MG: 1 INJECTION, SOLUTION INTRAMUSCULAR; INTRAVENOUS; SUBCUTANEOUS at 15:33

## 2023-06-08 RX ADMIN — ACETAMINOPHEN 975 MG: 325 TABLET, FILM COATED ORAL at 20:15

## 2023-06-08 RX ADMIN — SODIUM CHLORIDE: 0.9 INJECTION, SOLUTION INTRAVENOUS at 11:05

## 2023-06-08 RX ADMIN — HYDROMORPHONE HYDROCHLORIDE 0.2 MG: 0.2 INJECTION, SOLUTION INTRAMUSCULAR; INTRAVENOUS; SUBCUTANEOUS at 14:19

## 2023-06-08 RX ADMIN — HYDROMORPHONE HYDROCHLORIDE 0.2 MG: 0.2 INJECTION, SOLUTION INTRAMUSCULAR; INTRAVENOUS; SUBCUTANEOUS at 15:01

## 2023-06-08 RX ADMIN — FENTANYL CITRATE 50 MCG: 50 INJECTION, SOLUTION INTRAMUSCULAR; INTRAVENOUS at 11:20

## 2023-06-08 RX ADMIN — PHENYLEPHRINE HYDROCHLORIDE 20 MCG/MIN: 10 INJECTION INTRAVENOUS at 12:13

## 2023-06-08 RX ADMIN — EPHEDRINE SULFATE 5 MG: 50 INJECTION INTRAVENOUS at 13:35

## 2023-06-08 RX ADMIN — CEFAZOLIN SODIUM 2000 MG: 2 SOLUTION INTRAVENOUS at 20:10

## 2023-06-08 RX ADMIN — FENTANYL CITRATE 50 MCG: 50 INJECTION, SOLUTION INTRAMUSCULAR; INTRAVENOUS at 10:51

## 2023-06-08 RX ADMIN — HYDROMORPHONE HYDROCHLORIDE 0.5 MG: 1 INJECTION, SOLUTION INTRAMUSCULAR; INTRAVENOUS; SUBCUTANEOUS at 22:29

## 2023-06-08 RX ADMIN — ROCURONIUM BROMIDE 30 MG: 10 INJECTION, SOLUTION INTRAVENOUS at 10:51

## 2023-06-08 RX ADMIN — EPHEDRINE SULFATE 10 MG: 50 INJECTION INTRAVENOUS at 11:03

## 2023-06-08 RX ADMIN — PHENYLEPHRINE HYDROCHLORIDE 50 MCG/MIN: 10 INJECTION INTRAVENOUS at 11:16

## 2023-06-08 RX ADMIN — OXYCODONE HYDROCHLORIDE AND ACETAMINOPHEN 1 TABLET: 5; 325 TABLET ORAL at 15:24

## 2023-06-08 RX ADMIN — CHLORHEXIDINE GLUCONATE 15 ML: 1.2 SOLUTION ORAL at 08:10

## 2023-06-08 RX ADMIN — HYDROMORPHONE HYDROCHLORIDE 0.2 MG: 0.2 INJECTION, SOLUTION INTRAMUSCULAR; INTRAVENOUS; SUBCUTANEOUS at 14:43

## 2023-06-08 RX ADMIN — HYDROMORPHONE HYDROCHLORIDE 0.5 MG: 1 INJECTION, SOLUTION INTRAMUSCULAR; INTRAVENOUS; SUBCUTANEOUS at 13:51

## 2023-06-08 RX ADMIN — EPHEDRINE SULFATE 20 MG: 50 INJECTION INTRAVENOUS at 11:26

## 2023-06-08 RX ADMIN — EPHEDRINE SULFATE 10 MG: 50 INJECTION INTRAVENOUS at 13:31

## 2023-06-08 RX ADMIN — DEXAMETHASONE SODIUM PHOSPHATE 10 MG: 10 INJECTION, SOLUTION INTRAMUSCULAR; INTRAVENOUS at 11:40

## 2023-06-08 RX ADMIN — TIZANIDINE 4 MG: 4 TABLET ORAL at 17:34

## 2023-06-08 RX ADMIN — Medication 100 MG: at 10:53

## 2023-06-08 RX ADMIN — FENTANYL CITRATE 25 MCG: 50 INJECTION INTRAMUSCULAR; INTRAVENOUS at 14:05

## 2023-06-08 RX ADMIN — PROPOFOL 200 MG: 10 INJECTION, EMULSION INTRAVENOUS at 10:51

## 2023-06-08 RX ADMIN — OXYCODONE HYDROCHLORIDE 10 MG: 10 TABLET ORAL at 17:29

## 2023-06-08 NOTE — ANESTHESIA PROCEDURE NOTES
Arterial Line Insertion    Performed by: Kirstie Grove MD  Authorized by: Kirstie Grove MD  Consent: Written consent obtained    Risks and benefits: risks, benefits and alternatives were discussed  Consent given by: patient  Patient identity confirmed: arm band  Indications: hemodynamic monitoring  Orientation:  Right  Location: radial artery  Procedure Details:  Needle gauge: 20  Seldinger technique: Seldinger technique used  Number of attempts: 3

## 2023-06-08 NOTE — OP NOTE
Neurosurgery Operative Room Note    Malena Burton  6/8/2023    Pre-op Diagnosis:   Myelomalacia ([address] ) [G95 89]  Myelopathy ([address] ) [G95 9]  Thoracic spondylosis with myelopathy [M47 14]    Post-op Dignosis:     Post-Op Diagnosis Codes: * Myelomalacia ([address] ) [G95 89]     * Myelopathy ([address] ) [G95 9]     * Thoracic spondylosis with myelopathy [M47 14]    Procedure:  Procedure(s):  Open T8 spinous process, T9 total, superior T10 laminectomy for decompression    Surgeon: Surgeon(s) and Role:     * Mariella Koch MD - Primary     * Dennis Calderon PA-C - Assisting     Anesthesia: General    Staff:   Circulator: Linwood Ross RN  Radiology Technologist: Elva Mcfadden  Relief Circulator: Alfrieda Krabbe, RN; Laina Farah RN  Relief Scrub: Alfrieda Krabbe, RN  Scrub Person: Yajaira Olmstead CST  No qualified Resident was available  Estimated Blood Loss: Minimal    Specimens:                No specimens collected during this procedure  Drains:         Closed/Suction Drain Left;Superior Back Bulb (Active)       Findings:  Heavily calcified ligamentum flavum causing severe spinal cord compression    Complications:  none    OR note:      Details of Procedure    The patient was brought to the OR and induced with GETA  A radial arterial line was placed, and MAPs kept >85  Neuro monitoring leads were placed, and the patient positioned prone on the Keanu table  AP fluoroscopy was to identify 12th rib, and the T9-10 level  Also identifying was the rather left lateralized projection on of the T8 spinous process  A midline incision was marked over this area  The posterior skin was prepped and draped in the usual fashion  A time out was performed, and the skin infiltrated with lidocaine with epinephrine  Skin incision was made and dissection carried down to the Bovie  The T8 spinous process, T9 lamina and spinous process, and superior T10 lamina were exposed in subperiosteal fashion   An Xray was taken and confirmed with radiology for the level exposed  The Andria Mcdaniel was used to remove the T8 spinous process, T9 spinous process, and some of the T9 lamina  The drill was then used to removed some inferior T8 lamina, the entire T9 lamina, and some superior T10 lamina  Under the normal lamina was considerable calcifications bilaterally, compressing the dura ventrally and medially  These were very carefully thinned with the drill, and ultimately resected off the thecal sac  Monitoring remained stable  There was no sign of durotomy  The decompression was confirmed to be 1 75 cm wide, as intended from the pre-op imaging  I was able to palpate superiorly and inferiorly with a nerve hook to confirm there was no residual compression  Epidural hemostasis was achieved  The incision was copiously irrigated with antibiotic saline  A 7 mm flat DANIEL was placed epidural, and tunneled out a separate stab inciion  The fascia was reapproximated with 0 Vicryl plus suture, then the deeper tissue and subdermal layer with 2-0 Vicryl plus suture  The skin was closed with staples, and the drain secured with a drain stitch  The incision was blocked with 30 cc of Marcaine with Epinephrine, and then dressed with a silver Mepilex  All instrument counts, sponge counts, and needle counts were correct prior to closure the skin  The patient was rotated onto her hospital bed, and awoken from general endotracheal anesthesia, extubated, and taken to the PACU in cardiovascularly stable condition  No qualified resident was available  Caty Arguelles PA-C, was present for the entire procedure, and provided essential assistance with with proper exposure, retraction, hemostasis, and wound closure, which was necessary secondary to the complex nature of this case         Eduin Hunt MD     Date: 6/8/2023  Time: 1:31 PM

## 2023-06-08 NOTE — ANESTHESIA POSTPROCEDURE EVALUATION
Post-Op Assessment Note    CV Status:  Stable    Pain management: adequate     Mental Status:  Awake   Hydration Status:  Stable   PONV Controlled:  Controlled   Airway Patency:  Patent      Post Op Vitals Reviewed: Yes      Staff: CRNA         No notable events documented      /76 (06/08/23 1401)    Temp (!) 97 °F (36 1 °C) (06/08/23 1401)    Pulse 88 (06/08/23 1401)   Resp (!) 24 (06/08/23 1401)    SpO2 98 % (06/08/23 1401)

## 2023-06-08 NOTE — PROGRESS NOTES
Neurosurgery Post Op Note    Day of Surgery Procedure(s):  Open T8 spinous process, T9 total, superior T10 laminectomy for decompression (Dr Lonny Holcomb, 6/8/2023)    Subjective:    My back hurts    Objective:  General appearance: alert, appears stated age, cooperative and no distress  Head: Normocephalic, without obvious abnormality  Eyes: conjugate gaze  Neck: supple, symmetrical, trachea midline  Lungs: non labored breathing  Heart: regular heart rate  Neurologic:   Mental status: Alert, speech clear, follows commands, answers questions appropriately  Motor: moving all extremities without focal weakness  Reflexes: 2+ and symmetric    Plan:  • Continue to monitor neurological exam  • Imaging - none  • Drain care - 1 DANIEL to Full suction  • Pain control - continue home Zanaflex, Oxy 5mg and 10mg prn moderate and sever pain, respectively  IV Dilaudid for breakthrough  • Bowel regimen - colace and senna  • Labs / vitals - labs in am, vital per unit  • Post op abx - ancef x 24H  • DVT ppx - SCDs only for now, will reassess pharm dvt ppx tomorrow  SQH ordered for tomorrow 1400  • Mobilize as tolerated with assistance, PT / OT evaluation pending for tomorrow  • Dispo - Medsurg  Pending PT/OT eval may benefit from rehab  Neurosurgery will follow as primary team  Please call with questions or concerns

## 2023-06-08 NOTE — ANESTHESIA PREPROCEDURE EVALUATION
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)   (+) Class 3 severe obesity due to excess calories without serious comorbidity with body mass index (BMI) of 40 0 to 44 9 in adult (HCC)   (+) S/P bilateral oophorectomy        Physical Exam    Airway    Mallampati score: I  TM Distance: >3 FB  Neck ROM: full     Dental   No notable dental hx     Cardiovascular  Cardiovascular exam normal    Pulmonary  Pulmonary exam normal     Other Findings        Anesthesia Plan  ASA Score- 3     Anesthesia Type- general with ASA Monitors  Additional Monitors: arterial line  Airway Plan: ETT  Plan Factors-Exercise tolerance (METS): >4 METS  Chart reviewed  EKG reviewed  Imaging results reviewed  Existing labs reviewed  Patient summary reviewed  Induction- intravenous  Postoperative Plan- Plan for postoperative opioid use  Planned trial extubation    Informed Consent- Anesthetic plan and risks discussed with patient  I personally reviewed this patient with the CRNA  Discussed and agreed on the Anesthesia Plan with the CRNA  Jorden Bethea

## 2023-06-08 NOTE — H&P
H&P Exam - Patricia Diallo 49 y o  female MRN: [id number]    Unit/Bed#: OR POOL Encounter: [id number]    Assessment:    Thoracic myelopathy 2/2 cord compression from calcified ligamentum flavum/facet overgrowth T9-10  Plan:    Patient with 2 foci of compression, namely her C5-6 cervical disc osteophyte complex ectopic to the left, but I feel more significant at present is the T9-10 posteriorly based calcified ligamentous complex causing cord compression and myelomalacia  Expect this is causing her lower extremity symptoms  I recommended addressing that first, and we will likely also need to address her cervical spine in a delayed fashion      I reviewed the patient the goals of surgery, namely to prevent neurologic decline and allow for opportunity for improvement  Risks of surgery were reviewed in detail, personally, including but not limited to infection, bleeding, VTE, spinal fluid leak, need for further surgery secondary instability, transient or permanent neurologic dysfunction  She understands would like to proceed  I have recommended an open decompression only with no fixation  I will need to resect part of the T8 spinous process, the entire T9 lamina and spinous process and the superior aspect of the T10 spinous process to be able to fully access the calcified ligamentum flavum between 9 and 10  We have obtained a lumbar spine CT scan that extends up to that level of her thoracic spine to confirm and plan level verification during surgery  Significant bilateral decompression is required as such I have planned for open rather than MIS  History of Present Illness   With past medical history of GERD, Trejo's esophagus, kidney stones, lung cancer status post VATS in August 2022, TERESITA, who presents for follow up evaluation of thoracic and cervical pain with radicular symptoms   She states that beginning in 2019 she developed right rib pain radiating from her right scapula into her right breast with associated tingling  She has had some degree of this pain on and off since that time  In 2021 she received a second COVID injection into her left shoulder/deltoid area and since that time developed left-sided shoulder pain rating down her arm with associated numbness and tingling to her left hand  She states she was evaluated by Dr Manfred Closs of orthopedic surgery for this and was told that etiology is likely not her shoulder as MRI shoulder was negative  She describes that it feels as if someone is constantly sitting on the upper portion of her left arm  Pain will occasionally radiate all the way down to her hand and she will experience associated numbness to all of her fingers  This makes it difficult to grasp objects      In August 2022 she was diagnosed with 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  She underwent right-sided VATS procedure  She states since that time she has been experiencing worsening pain to her right side shoulder her right breast and her right rib cage  About 2 weeks after her surgery she developed worsening mid back pain to her thoracic area that became severe that radiated into her left shoulder  She has been on gabapentin for some time but states that she does not feel it is offering any relief  She describes that when she lays supine at night she will experience numbness from her hips down to her feet  She has chronic bilateral foot numbness that has been ongoing for years  She also describes that when she arches her back or if the middle of her back rubs against something she will experience episodes of urinary incontinence  She will occasionally take ibuprofen or Tylenol for her pain but states that these usually do not offer much benefit      Since her last appointment, she developed new pain to her anterior thighs that is worse on the right, and states her legs have begun feeling more weak and heavy  Her thoracic radicular pain also now radiates around to the left as well     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  She smokes cigarettes  She lives at home alone  Review of Systems     Constitutional: Positive for fatigue (feels exhausted from fighting pain)  HENT: Negative  Eyes: Negative  Respiratory:        H/o Carcinoid tumor of lung    Cardiovascular: Negative  Gastrointestinal: Negative  Endocrine: Negative  Genitourinary: Positive for enuresis  When she hits her back she pees on herself    Musculoskeletal: Positive for arthralgias (paoin in both legs buttock to thigh area new symptom), back pain (radiates to both left and right side  down to sqacryl area wraps around to right rib cage area and left shoulder blade), gait problem and neck pain (neck pain radiates to both left and right shpoulders more severe on left side radiates down left arm to hand and all finger tips)  NP work up      no previous sx     CC Mid Back pain w B/l  legs and feet & left arm Numbness ( when laying down) w  (L side) Rib Cage pain & difficult walk  & Neck pain radiates L arm -Progressing since 2018-Left arm pain due to covid inj     Bowel & bladder incontience      meds Neurontin     no recent PT/JACKI      no recent imaging-MRI denied by [facility]     last ov w Ortho 11/2022     h/o Carcinoid tumor of lung    Skin: Negative  Allergic/Immunologic: Negative  Neurological: Positive for weakness ( feels more weak in legs,  arms and  are ok) and numbness (in both legs ocassuionally when stretching or archinmg back radiates all the way to aLL TOES FEEL LIKE JELLO)  Hematological: Does not bruise/bleed easily  Psychiatric/Behavioral: Negative  All other systems reviewed and are negative      Historical Information   Past Medical History:   Diagnosis Date   • Acid reflux    • Allergic 2006    Flagyl; anaphylaxis   • Anxiety    • Trejo's esophagus    • Cancer (HCC)    • GERD (gastroesophageal reflux disease)    • Headache(784 0) 2022   • Hiatal hernia • Hyperlipidemia    • Kidney stone    • Latent tuberculosis by skin test     Was treated with INH for 6 months   • Lung cancer ([facility] [address] ) 2022   • Obesity    • Obstructive sleep apnea 2018    Mild TERESITA  AHI 18 1   • Squamous cell skin cancer     Left buttocks   • STD (sexually transmitted disease)     HPV, HSV   • Urinary tract infection      Past Surgical History:   Procedure Laterality Date   • ABDOMINOPLASTY     • AUGMENTATION BREAST Bilateral    • BREAST SURGERY     • BRONCHOSCOPY N/A 2022    Procedure: BRONCHOSCOPY NAVIGATIONAL;  Surgeon: Ginna Fofana MD;  [facility];  Service: Thoracic   •  SECTION     • COLON SURGERY  2018    Rectocele Repair   • CYSTOSCOPY N/A 2022    Procedure: CYSTOSCOPY;  Surgeon: Adrian Cervantes MD;  [facility];  Service: Gynecology Oncology   • EGD     • ENDOBRONCHIAL ULTRASOUND (EBUS) N/A 2022    Procedure: ENDOBRONCHIAL ULTRASOUND (EBUS);   Surgeon: Ginna Fofana MD;  [facility];  Service: Thoracic   • HYSTERECTOMY     • IR BIOPSY LYMPH NODE  2022   • IR CHEST TUBE PLACEMENT  2022   • LAPAROSCOPIC CHOLECYSTECTOMY     • LIPOSUCTION     • LUNG SURGERY      VATS Lobectomy RLL   • LYMPH NODE BIOPSY  2022   • MOHS SURGERY Left 2022    left buttocks   • ND [address] INCL FLUOR GDNCE DX W/CELL WASHG SPX N/A 2022    Procedure: BRONCHOSCOPY FLEXIBLE;  Surgeon: Ginna Fofana MD;  [facility];  Service: Thoracic   • ND [address] INCL FLUOR GDNCE DX [address] WASHG [address] [facility] N/A 2022    Procedure: BRONCHOSCOPY FLEXIBLE;  Surgeon: Viktoria Selby MD;  [facility];  Service: Thoracic   • ND LAPAROSCOPY W/RMVL ADNEXAL STRUCTURES Bilateral 2022    Procedure: OOPHORECTOMY W/ ROBOTICS, VAGINOTOMY;  Surgeon: Adrian Cervantes MD;  [facility];  Service: Gynecology Oncology   • ND THORACOSCOPY W/LOBECTOMY SINGLE LOBE Right 07/28/2022    Procedure: LOBECTOMY LUNG;  Surgeon: Lai Fofana MD;  [facility];  Service: Thoracic   • FL THORACOSCOPY W/LOBECTOMY SINGLE LOBE Right 07/28/2022    Procedure: THORACOSCOPY VIDEO ASSISTED SURGERY (VATS); Surgeon: Lai Fofana MD;  [facility];  Service: Thoracic   • REPAIR RECTOCELE  2018   • TONSILLECTOMY  1984   • TUBAL LIGATION  2002   • UPPER GASTROINTESTINAL ENDOSCOPY  2018     Social History   Social History     Substance and Sexual Activity   Alcohol Use Not Currently     Social History     Substance and Sexual Activity   Drug Use Never     Social History     Tobacco Use   Smoking Status Every Day   • Packs/day: 0 50   • Years: 36 00   • Total pack years: 18 00   • Types: Cigarettes   • Start date: 9/1/2022   Smokeless Tobacco Never     E-Cigarette Use: Never User     E-Cigarette/Vaping Substances   • Nicotine No    • THC No    • CBD No    • Flavoring No    • Other No    • Unknown No        Meds/Allergies      PTA meds:   Prior to Admission Medications   Prescriptions Last Dose Informant Patient Reported? Taking? ALPRAZolam (XANAX) 0 25 mg tablet 6/7/2023  No Yes   Sig: Take 1 tablet (0 25 mg total) by mouth daily at bedtime as needed for anxiety   Multiple Vitamin (multivitamin) tablet 6/1/2023  Yes Yes   Sig: Take 1 tablet by mouth daily   acyclovir (ZOVIRAX) 800 mg tablet 6/8/2023 at 0500  No Yes   Sig: Take 0 5 tablets (400 mg total) by mouth 2 (two) times a day   clobetasol propionate (CLOBEX) 0 05 % shampoo   No No   Sig: Apply 1 application  topically 2 (two) times a week   diclofenac (VOLTAREN) 75 mg EC tablet 6/1/2023  No Yes   Sig: Take 1 tablet (75 mg total) by mouth 2 (two) times a day   gabapentin (Neurontin) 600 MG tablet 6/8/2023 at 0500  No Yes   Sig: Take 1 tablet (600 mg total) by mouth 3 (three) times a day   ketoconazole (NIZORAL) 2 % shampoo   No No   Sig: Apply 1 application   topically 2 (two) times a week "  levocetirizine (XYZAL) 5 MG tablet 6/7/2023  No Yes   Sig: TAKE 1 TABLET BY MOUTH IN  THE EVENING   omeprazole (PriLOSEC) 40 MG capsule 6/8/2023 at 0500  No Yes   Sig: TAKE 1 CAPSULE BY MOUTH  DAILY   tiZANidine (Zanaflex) 4 mg tablet 6/8/2023 at 0500  No Yes   Sig: Take 1 tablet (4 mg total) by mouth every 8 (eight) hours as needed for muscle spasms      Facility-Administered Medications: None        Allergies   Allergen Reactions   • Metronidazole Anaphylaxis     \"patient states she has been in anaphylaxis due to this medication prior\"     • Nitrofurantoin Shortness Of Breath       Objective   First Vitals:   Blood Pressure: 142/89 (06/08/23 0727)  Pulse: 90 (06/08/23 0727)  Temperature: (!) 97 1 °F (36 2 °C) (06/08/23 0727)  Temp Source: Temporal (06/08/23 0727)  SpO2: 98 % (06/08/23 0727)    Current Vitals:   Blood Pressure: 142/89 (06/08/23 0727)  Pulse: 90 (06/08/23 0727)  Temperature: (!) 97 1 °F (36 2 °C) (06/08/23 0727)  Temp Source: Temporal (06/08/23 0727)  SpO2: 98 % (06/08/23 0727)    No intake or output data in the 24 hours ending 06/08/23 0807    Physical Exam  Constitutional:       General: She is not in acute distress  Appearance: She is well-developed  She is morbidly obese  HENT:      Head: Normocephalic  Eyes:      General: No scleral icterus  Cardiovascular:      Rate and Rhythm: Normal rate  Pulmonary:      Effort: Pulmonary effort is normal    Abdominal:      Palpations: Abdomen is soft  Musculoskeletal:         General: Normal range of motion  Cervical back: Normal range of motion  Right lower leg: No edema  Left lower leg: No edema  Skin:     General: Skin is warm and dry  Neurological:      Mental Status: She is alert  Cranial Nerves: No cranial nerve deficit           "

## 2023-06-09 ENCOUNTER — TELEPHONE (OUTPATIENT)
Dept: OTHER | Facility: OTHER | Age: 48
End: 2023-06-09

## 2023-06-09 ENCOUNTER — TELEPHONE (OUTPATIENT)
Dept: NEUROSURGERY | Facility: CLINIC | Age: 48
End: 2023-06-09

## 2023-06-09 VITALS
BODY MASS INDEX: 41.02 KG/M2 | HEIGHT: 71 IN | DIASTOLIC BLOOD PRESSURE: 62 MMHG | HEART RATE: 80 BPM | WEIGHT: 293 LBS | OXYGEN SATURATION: 95 % | RESPIRATION RATE: 16 BRPM | TEMPERATURE: 98.1 F | SYSTOLIC BLOOD PRESSURE: 106 MMHG

## 2023-06-09 DIAGNOSIS — Z98.890 POST-OPERATIVE STATE: Primary | ICD-10-CM

## 2023-06-09 PROBLEM — M24.28 LIGAMENTUM FLAVUM HYPERTROPHY: Status: ACTIVE | Noted: 2023-06-09

## 2023-06-09 PROBLEM — M47.14 THORACIC SPONDYLOSIS WITH MYELOPATHY: Status: ACTIVE | Noted: 2023-06-09

## 2023-06-09 PROBLEM — M51.34 DEGENERATIVE DISC DISEASE, THORACIC: Status: ACTIVE | Noted: 2023-06-09

## 2023-06-09 LAB
ANION GAP SERPL CALCULATED.3IONS-SCNC: 2 MMOL/L (ref 4–13)
APTT PPP: 29 SECONDS (ref 23–37)
BUN SERPL-MCNC: 12 MG/DL (ref 5–25)
CALCIUM SERPL-MCNC: 8.5 MG/DL (ref 8.3–10.1)
CHLORIDE SERPL-SCNC: 114 MMOL/L (ref 96–108)
CO2 SERPL-SCNC: 23 MMOL/L (ref 21–32)
CREAT SERPL-MCNC: 0.86 MG/DL (ref 0.6–1.3)
ERYTHROCYTE [DISTWIDTH] IN BLOOD BY AUTOMATED COUNT: 13.5 % (ref 11.6–15.1)
GFR SERPL CREATININE-BSD FRML MDRD: 80 ML/MIN/1.73SQ M
GLUCOSE SERPL-MCNC: 117 MG/DL (ref 65–140)
HCT VFR BLD AUTO: 38.7 % (ref 34.8–46.1)
HGB BLD-MCNC: 12.1 G/DL (ref 11.5–15.4)
INR PPP: 1.04 (ref 0.84–1.19)
MCH RBC QN AUTO: 29.1 PG (ref 26.8–34.3)
MCHC RBC AUTO-ENTMCNC: 31.3 G/DL (ref 31.4–37.4)
MCV RBC AUTO: 93 FL (ref 82–98)
PLATELET # BLD AUTO: 153 THOUSANDS/UL (ref 149–390)
PMV BLD AUTO: 10 FL (ref 8.9–12.7)
POTASSIUM SERPL-SCNC: 3.8 MMOL/L (ref 3.5–5.3)
PROTHROMBIN TIME: 13.8 SECONDS (ref 11.6–14.5)
RBC # BLD AUTO: 4.16 MILLION/UL (ref 3.81–5.12)
SODIUM SERPL-SCNC: 139 MMOL/L (ref 135–147)
WBC # BLD AUTO: 11.75 THOUSAND/UL (ref 4.31–10.16)

## 2023-06-09 PROCEDURE — 85610 PROTHROMBIN TIME: CPT | Performed by: PHYSICIAN ASSISTANT

## 2023-06-09 PROCEDURE — 85027 COMPLETE CBC AUTOMATED: CPT | Performed by: PHYSICIAN ASSISTANT

## 2023-06-09 PROCEDURE — 80048 BASIC METABOLIC PNL TOTAL CA: CPT | Performed by: PHYSICIAN ASSISTANT

## 2023-06-09 PROCEDURE — 85730 THROMBOPLASTIN TIME PARTIAL: CPT | Performed by: PHYSICIAN ASSISTANT

## 2023-06-09 RX ORDER — OXYCODONE HYDROCHLORIDE 10 MG/1
10 TABLET ORAL ONCE
Status: COMPLETED | OUTPATIENT
Start: 2023-06-09 | End: 2023-06-09

## 2023-06-09 RX ORDER — METHOCARBAMOL 750 MG/1
750 TABLET, FILM COATED ORAL EVERY 6 HOURS SCHEDULED
Qty: 30 TABLET | Refills: 0 | Status: ON HOLD | OUTPATIENT
Start: 2023-06-09 | End: 2023-06-14 | Stop reason: SDUPTHER

## 2023-06-09 RX ORDER — OXYCODONE HYDROCHLORIDE 5 MG/1
5 TABLET ORAL EVERY 4 HOURS PRN
Qty: 40 TABLET | Refills: 0 | Status: SHIPPED | OUTPATIENT
Start: 2023-06-09 | End: 2023-06-14

## 2023-06-09 RX ADMIN — OXYCODONE HYDROCHLORIDE 10 MG: 10 TABLET ORAL at 09:56

## 2023-06-09 RX ADMIN — OXYCODONE HYDROCHLORIDE 10 MG: 10 TABLET ORAL at 05:35

## 2023-06-09 RX ADMIN — OXYCODONE HYDROCHLORIDE 10 MG: 10 TABLET ORAL at 12:07

## 2023-06-09 RX ADMIN — HYDROMORPHONE HYDROCHLORIDE 0.5 MG: 1 INJECTION, SOLUTION INTRAMUSCULAR; INTRAVENOUS; SUBCUTANEOUS at 09:09

## 2023-06-09 RX ADMIN — CEFAZOLIN SODIUM 2000 MG: 2 SOLUTION INTRAVENOUS at 11:20

## 2023-06-09 RX ADMIN — TIZANIDINE 4 MG: 4 TABLET ORAL at 09:56

## 2023-06-09 RX ADMIN — HYDROMORPHONE HYDROCHLORIDE 0.5 MG: 1 INJECTION, SOLUTION INTRAMUSCULAR; INTRAVENOUS; SUBCUTANEOUS at 02:38

## 2023-06-09 RX ADMIN — ACETAMINOPHEN 975 MG: 325 TABLET, FILM COATED ORAL at 05:35

## 2023-06-09 RX ADMIN — CEFAZOLIN SODIUM 2000 MG: 2 SOLUTION INTRAVENOUS at 04:29

## 2023-06-09 RX ADMIN — PANTOPRAZOLE SODIUM 40 MG: 40 TABLET, DELAYED RELEASE ORAL at 05:35

## 2023-06-09 RX ADMIN — SENNOSIDES 8.6 MG: 8.6 TABLET, FILM COATED ORAL at 09:09

## 2023-06-09 RX ADMIN — HYDROMORPHONE HYDROCHLORIDE 0.5 MG: 1 INJECTION, SOLUTION INTRAMUSCULAR; INTRAVENOUS; SUBCUTANEOUS at 00:36

## 2023-06-09 RX ADMIN — DOCUSATE SODIUM 100 MG: 100 CAPSULE, LIQUID FILLED ORAL at 09:09

## 2023-06-09 RX ADMIN — HYDROMORPHONE HYDROCHLORIDE 0.5 MG: 1 INJECTION, SOLUTION INTRAMUSCULAR; INTRAVENOUS; SUBCUTANEOUS at 04:33

## 2023-06-09 RX ADMIN — GABAPENTIN 600 MG: 300 CAPSULE ORAL at 09:09

## 2023-06-09 RX ADMIN — LORATADINE 10 MG: 10 TABLET ORAL at 09:09

## 2023-06-09 RX ADMIN — ACYCLOVIR 400 MG: 200 CAPSULE ORAL at 09:12

## 2023-06-09 RX ADMIN — HYDROMORPHONE HYDROCHLORIDE 0.5 MG: 1 INJECTION, SOLUTION INTRAMUSCULAR; INTRAVENOUS; SUBCUTANEOUS at 06:33

## 2023-06-09 RX ADMIN — TIZANIDINE 4 MG: 4 TABLET ORAL at 01:37

## 2023-06-09 RX ADMIN — OXYCODONE HYDROCHLORIDE 10 MG: 10 TABLET ORAL at 01:36

## 2023-06-09 NOTE — ASSESSMENT & PLAN NOTE
POD 1 T8 spinous process, T9 total, superior T10 laminectomy for decompression (Dr Ezequiel Jenkins, 6/8)  · In setting of multiple areas of spinal cord compression including C5-6, T9-10  Plan:  · Continue to monitor neuro exam closely  · DANIEL drain discontinued  · Pain control:  · Acetaminophen 975 mg q8h scheduled  · Gabapentin 600 mg TID scheduled  · Oxycodone 5-10 mg q4h PRN  · Bowel regimen:  · Senna/colace  · Passing flatus  · Mobilizing well  · DVT ppx: SCDs, heparin SQ  Rounds completed with SARAH Art  Neurosurgery will discharge patient home as primary  Follow up as scheduled in 2 weeks for post-operative visit

## 2023-06-09 NOTE — DISCHARGE SUMMARY
"[address] [facility]  Discharge- Patricia Diallo 1975, 49 y o  female MRN: [id number]  Unit/Bed#: [facility] 892-05 Encounter: [id number]  Primary Care Provider: Jesus Espinoza MD   Date and time admitted to hospital: 6/8/2023  7:11 AM    Thoracic spondylosis with myelopathy  Assessment & Plan  POD 1 T8 spinous process, T9 total, superior T10 laminectomy for decompression (Dr Zackary Méndez, 6/8)  · In setting of multiple areas of spinal cord compression including C5-6, T9-10  Plan:  · Continue to monitor neuro exam closely  · DANIEL drain discontinued  · Pain control:  · Acetaminophen 975 mg q8h scheduled  · Gabapentin 600 mg TID scheduled  · Oxycodone 5-10 mg q4h PRN  · Bowel regimen:  · Senna/colace  · Passing flatus  · Mobilizing well  · DVT ppx: SCDs, heparin SQ  Rounds completed with RN Madison Aviles  Neurosurgery will discharge patient home as primary  Follow up as scheduled in 2 weeks for post-operative visit  Degenerative disc disease, thoracic  Assessment & Plan  See above  Ligamentum flavum hypertrophy  Assessment & Plan  See above  Subjective/Objective   Chief Complaint: \"I am having some back pain  \"    Subjective: States she feels that symptoms of numbness and weakness are significantly improved following surgery  Complaining of difficult night with pain control and inability to sleep  She states pain medications are providing enough relief however  Tolerating p o  without issue, ate entire breakfast   Passing flatus and voiding without issue  Able to ambulate by herself  Feels comfortable and ready to discharge home  Objective: NAD  Thoracic incisional dressing clean dry and intact  I/O       06/07 0701  06/08 0700 06/08 0701  06/09 0700 06/09 0701  06/10 0700    P  O    480    I V  (mL/kg)  2300 (16 1)     Total Intake(mL/kg)  2300 (16 1) 480 (3 4)    Urine (mL/kg/hr)  1200     Drains  90     Blood  50     Total Output  1340     Net  +960 +480     " "      Unmeasured Urine Occurrence   2 x    Unmeasured Stool Occurrence   0 x          Invasive Devices     Peripheral Intravenous Line  Duration           Peripheral IV 06/08/23 Right;Ventral (anterior) Forearm 1 day          Drain  Duration           Closed/Suction Drain Left;Superior Back Bulb <1 day                Physical Exam:  Vitals: Blood pressure 106/62, pulse 80, temperature 98 1 °F (36 7 °C), resp  rate 16, height 5' 11\" (1 803 m), weight (!) 143 kg (315 lb 4 1 oz), SpO2 95 %  ,Body mass index is 43 97 kg/m²  General appearance: alert, appears stated age, cooperative and no distress  Head: Normocephalic, without obvious abnormality, atraumatic  Eyes: EOMI, PERRL  Neck: supple, symmetrical, trachea midline and NT  Back: Thoracic incision dressing clean dry and intact  Lungs: non labored breathing  Heart: regular heart rate  Neurologic:   Mental status: Alert, oriented x 3, thought content appropriate  Cranial nerves: grossly intact (Cranial nerves II-XII)  Sensory: normal to light touch  Motor: moving all extremities without focal weakness, strength grossly 5/5, some mild weakness with left dorsiflexion and plantarflexion +4/5      Lab Results:  Results from last 7 days   Lab Units 06/09/23  0655   HEMATOCRIT % 38 7   HEMOGLOBIN g/dL 12 1   PLATELETS Thousands/uL 153   WBC Thousand/uL 11 75*     Results from last 7 days   Lab Units 06/09/23  0655   BUN mg/dL 12   CALCIUM mg/dL 8 5   CHLORIDE mmol/L 114*   CO2 mmol/L 23   CREATININE mg/dL 0 86   POTASSIUM mmol/L 3 8             Results from last 7 days   Lab Units 06/09/23  0655   INR  1 04   PTT seconds 29     No results found for: \"TROPONINT\"  ABG:No results found for: \"BEART\", \"YWL5HFM\", \"A1DPCRDH\", \"ZNL8QTJ\", \"PHART\", \"PO2ART\", \"SOURCE\"    Imaging Studies: I have personally reviewed pertinent reports     and I have personally reviewed pertinent films in PACS    XR spine thoracic 2 vw    Result Date: 6/8/2023  Impression: Fluoroscopic guidance " provided for procedure guidance  Please refer to the separate procedure notes for additional details  Workstation performed: [id number]       EKG, Pathology, and Other Studies: I have personally reviewed pertinent reports  VTE Pharmacologic Prophylaxis: Sequential compression device (Venodyne)  and Heparin    VTE Mechanical Prophylaxis: sequential compression device     Discharge Summary- Neurosurgery   Candelario Jean Baptiste 49 y o  female MRN: [id number]  Unit/Bed#: [facility] 105-94 Encounter: [id number]      Admission Date:   Admission Orders (From admission, onward)     Ordered        06/08/23 1357  Inpatient Admission  Once                        Discharge Date: 6/9/2023    Admitting Diagnosis: Myelomalacia ([address] ) [G95 89]  Myelopathy ([address] ) [G95 9]  Thoracic spondylosis with myelopathy [M47 14]    Discharge Diagnosis: myelomalacia, myelopathy, thoracic spondylosis with myelopathy  Medical Problems     Resolved Problems  Date Reviewed: 5/19/2023   None         Attending Physician: Kristin Taylor MD    Consulting Physician: None    Procedures Performed: Procedure(s):  Open T8 spinous process, T9 total, superior T10 laminectomy for decompression    Pathology: N/A    Hospital Course: The patient presented on 6/8/2023 for elective decompression of T8 spinous process, T9 and superior T10 laminectomy for decompression with Dr Arturo Smart  This was in the setting of about a year of symptoms of cord compression to that area as well as in her cervical spine  He tolerated the procedure well and was transferred to the PACU and then the floor in good condition  He continued to progress overnight and her pain was well managed  She was able to ambulate without issue and was discharged home on postoperative day 1  Condition at Discharge: good     Discharge Instructions/Information to Patient and Family:   See after visit summary for information provided to patient and family        Provisions for Follow-Up Care:  See after visit summary for information related to follow-up care and any pertinent home health orders  Disposition: Home    Planned Readmission: No    Discharge Statement   I spent 20 minutes discharging the patient  This time was spent on the day of discharge  I had direct contact with the patient on the day of discharge  Additional documentation is required if more than 30 minutes were spent on discharge  Discharge Medications:  See after visit summary for reconciled discharge medications provided to patient and family

## 2023-06-09 NOTE — PLAN OF CARE
Problem: PAIN - ADULT  Goal: Verbalizes/displays adequate comfort level or baseline comfort level  Description: Interventions:  - Encourage patient to monitor pain and request assistance  - Assess pain using appropriate pain scale  - Administer analgesics based on type and severity of pain and evaluate response  - Implement non-pharmacological measures as appropriate and evaluate response  - Consider cultural and social influences on pain and pain management  - Notify physician/advanced practitioner if interventions unsuccessful or patient reports new pain  Outcome: Progressing     Problem: INFECTION - ADULT  Goal: Absence or prevention of progression during hospitalization  Description: INTERVENTIONS:  - Assess and monitor for signs and symptoms of infection  - Monitor lab/diagnostic results  - Monitor all insertion sites, i e  indwelling lines, tubes, and drains  - Monitor endotracheal if appropriate and nasal secretions for changes in amount and color  - [place] appropriate cooling/warming therapies per order  - Administer medications as ordered  - Instruct and encourage patient and family to use good hand hygiene technique  - Identify and instruct in appropriate isolation precautions for identified infection/condition  Outcome: Progressing  Goal: Absence of fever/infection during neutropenic period  Description: INTERVENTIONS:  - Monitor WBC    Outcome: Progressing     Problem: SAFETY ADULT  Goal: Patient will remain free of falls  Description: INTERVENTIONS:  - Educate patient/family on patient safety including physical limitations  - Instruct patient to call for assistance with activity   - Consult OT/PT to assist with strengthening/mobility   - Keep Call bell within reach  - Keep bed low and locked with side rails adjusted as appropriate  - Keep care items and personal belongings within reach  - Initiate and maintain comfort rounds  - Make Fall Risk Sign visible to staff  - Offer Toileting every 1 Hours, in advance of need  - Initiate/Maintain alarm  - Obtain necessary fall risk management equipment  - Apply yellow socks and bracelet for high fall risk patients  - Consider moving patient to room near nurses station  Outcome: Progressing  Goal: Maintain or return to baseline ADL function  Description: INTERVENTIONS:  -  Assess patient's ability to carry out ADLs; assess patient's baseline for ADL function and identify physical deficits which impact ability to perform ADLs (bathing, care of mouth/teeth, toileting, grooming, dressing, etc )  - Assess/evaluate cause of self-care deficits   - Assess range of motion  - Assess patient's mobility; develop plan if impaired  - Assess patient's need for assistive devices and provide as appropriate  - Encourage maximum independence but intervene and supervise when necessary  - Involve family in performance of ADLs  - Assess for home care needs following discharge   - Consider OT consult to assist with ADL evaluation and planning for discharge  - Provide patient education as appropriate  Outcome: Progressing  Goal: Maintains/Returns to pre admission functional level  Description: INTERVENTIONS:  - Perform BMAT or MOVE assessment daily    - Set and communicate daily mobility goal to care team and patient/family/caregiver  - Collaborate with rehabilitation services on mobility goals if consulted  - Perform Range of Motion 3 times a day  - Reposition patient every 2 hours    - Dangle patient 3 times a day  - Stand patient 3 times a day  - Ambulate patient 3 times a day  - Out of bed to chair 3 times a day   - Out of bed for meals 3 times a day  - Out of bed for toileting  - Record patient progress and toleration of activity level   Outcome: Progressing     Problem: DISCHARGE PLANNING  Goal: Discharge to home or other facility with appropriate resources  Description: INTERVENTIONS:  - Identify barriers to discharge w/patient and caregiver  - Arrange for needed discharge resources and transportation as appropriate  - Identify discharge learning needs (meds, wound care, etc )  - Arrange for interpretive services to assist at discharge as needed  - Refer to Case Management Department for coordinating discharge planning if the patient needs post-hospital services based on physician/advanced practitioner order or complex needs related to functional status, cognitive ability, or social support system  Outcome: Progressing     Problem: Knowledge Deficit  Goal: Patient/family/caregiver demonstrates understanding of disease process, treatment plan, medications, and discharge instructions  Description: Complete learning assessment and assess knowledge base    Interventions:  - Provide teaching at level of understanding  - Provide teaching via preferred learning methods  Outcome: Progressing     Problem: NEUROSENSORY - ADULT  Goal: Achieves stable or improved neurological status  Description: INTERVENTIONS  - Monitor and report changes in neurological status  - Monitor vital signs such as temperature, blood pressure, glucose, and any other labs ordered   - Initiate measures to prevent increased intracranial pressure  - Monitor for seizure activity and implement precautions if appropriate      Outcome: Progressing  Goal: Remains free of injury related to seizures activity  Description: INTERVENTIONS  - Maintain airway, patient safety  and administer oxygen as ordered  - Monitor patient for seizure activity, document and report duration and description of seizure to physician/advanced practitioner  - If seizure occurs,  ensure patient safety during seizure  - Reorient patient post seizure  - Seizure pads on all 4 side rails  - Instruct patient/family to notify RN of any seizure activity including if an aura is experienced  - Instruct patient/family to call for assistance with activity based on nursing assessment  - Administer anti-seizure medications if ordered    Outcome: Progressing  Goal: Achieves maximal functionality and self care  Description: INTERVENTIONS  - Monitor swallowing and airway patency with patient fatigue and changes in neurological status  - Encourage and assist patient to increase activity and self care     - Encourage visually impaired, hearing impaired and aphasic patients to use assistive/communication devices  Outcome: Progressing     Problem: HEMATOLOGIC - ADULT  Goal: Maintains hematologic stability  Description: INTERVENTIONS  - Assess for signs and symptoms of bleeding or hemorrhage  - Monitor labs  - Administer supportive blood products/factors as ordered and appropriate  Outcome: Progressing     Problem: MUSCULOSKELETAL - ADULT  Goal: Maintain or return mobility to safest level of function  Description: INTERVENTIONS:  - Assess patient's ability to carry out ADLs; assess patient's baseline for ADL function and identify physical deficits which impact ability to perform ADLs (bathing, care of mouth/teeth, toileting, grooming, dressing, etc )  - Assess/evaluate cause of self-care deficits   - Assess range of motion  - Assess patient's mobility  - Assess patient's need for assistive devices and provide as appropriate  - Encourage maximum independence but intervene and supervise when necessary  - Involve family in performance of ADLs  - Assess for home care needs following discharge   - Consider OT consult to assist with ADL evaluation and planning for discharge  - Provide patient education as appropriate  Outcome: Progressing  Goal: Maintain proper alignment of affected body part  Description: INTERVENTIONS:  - Support, maintain and protect limb and body alignment  - Provide patient/ family with appropriate education  Outcome: Progressing

## 2023-06-09 NOTE — PLAN OF CARE
Problem: PAIN - ADULT  Goal: Verbalizes/displays adequate comfort level or baseline comfort level  Description: Interventions:  - Encourage patient to monitor pain and request assistance  - Assess pain using appropriate pain scale  - Administer analgesics based on type and severity of pain and evaluate response  - Implement non-pharmacological measures as appropriate and evaluate response  - Consider cultural and social influences on pain and pain management  - Notify physician/advanced practitioner if interventions unsuccessful or patient reports new pain  6/9/2023 1154 by Alondra Elizalde RN  Outcome: Adequate for Discharge  6/9/2023 1039 by Alondra Elizalde RN  Outcome: Progressing     Problem: INFECTION - ADULT  Goal: Absence or prevention of progression during hospitalization  Description: INTERVENTIONS:  - Assess and monitor for signs and symptoms of infection  - Monitor lab/diagnostic results  - Monitor all insertion sites, i e  indwelling lines, tubes, and drains  - Monitor endotracheal if appropriate and nasal secretions for changes in amount and color  - [place] appropriate cooling/warming therapies per order  - Administer medications as ordered  - Instruct and encourage patient and family to use good hand hygiene technique  - Identify and instruct in appropriate isolation precautions for identified infection/condition  6/9/2023 1154 by Alondra Elizalde RN  Outcome: Adequate for Discharge  6/9/2023 1039 by Alondra Elizalde RN  Outcome: Progressing  Goal: Absence of fever/infection during neutropenic period  Description: INTERVENTIONS:  - Monitor WBC    6/9/2023 1154 by Alondra Elizalde RN  Outcome: Adequate for Discharge  6/9/2023 1039 by Alondra Elizalde RN  Outcome: Progressing     Problem: SAFETY ADULT  Goal: Patient will remain free of falls  Description: INTERVENTIONS:  - Educate patient/family on patient safety including physical limitations  - Instruct patient to call for assistance with activity   - Consult OT/PT to assist with strengthening/mobility   - Keep Call bell within reach  - Keep bed low and locked with side rails adjusted as appropriate  - Keep care items and personal belongings within reach  - Initiate and maintain comfort rounds  - Make Fall Risk Sign visible to staff  - Offer Toileting every 1 Hours, in advance of need  - Initiate/Maintain alarm  - Obtain necessary fall risk management equipment  - Apply yellow socks and bracelet for high fall risk patients  - Consider moving patient to room near nurses station  6/9/2023 1154 by Dewey Celeste RN  Outcome: Adequate for Discharge  6/9/2023 1039 by Dewey Celeste RN  Outcome: Progressing  Goal: Maintain or return to baseline ADL function  Description: INTERVENTIONS:  -  Assess patient's ability to carry out ADLs; assess patient's baseline for ADL function and identify physical deficits which impact ability to perform ADLs (bathing, care of mouth/teeth, toileting, grooming, dressing, etc )  - Assess/evaluate cause of self-care deficits   - Assess range of motion  - Assess patient's mobility; develop plan if impaired  - Assess patient's need for assistive devices and provide as appropriate  - Encourage maximum independence but intervene and supervise when necessary  - Involve family in performance of ADLs  - Assess for home care needs following discharge   - Consider OT consult to assist with ADL evaluation and planning for discharge  - Provide patient education as appropriate  6/9/2023 1154 by Dewey Celeste RN  Outcome: Adequate for Discharge  6/9/2023 1039 by Dewey Celeste RN  Outcome: Progressing  Goal: Maintains/Returns to pre admission functional level  Description: INTERVENTIONS:  - Perform BMAT or MOVE assessment daily    - Set and communicate daily mobility goal to care team and patient/family/caregiver  - Collaborate with rehabilitation services on mobility goals if consulted  - Perform Range of Motion 3 times a day  - Reposition patient every 2 hours    - Dangle patient 3 times a day  - Stand patient 3 times a day  - Ambulate patient 3 times a day  - Out of bed to chair 3 times a day   - Out of bed for meals 3 times a day  - Out of bed for toileting  - Record patient progress and toleration of activity level   6/9/2023 1154 by Saad Rivers RN  Outcome: Adequate for Discharge  6/9/2023 1039 by Saad Rivers RN  Outcome: Progressing     Problem: DISCHARGE PLANNING  Goal: Discharge to home or other facility with appropriate resources  Description: INTERVENTIONS:  - Identify barriers to discharge w/patient and caregiver  - Arrange for needed discharge resources and transportation as appropriate  - Identify discharge learning needs (meds, wound care, etc )  - Arrange for interpretive services to assist at discharge as needed  - Refer to Case Management Department for coordinating discharge planning if the patient needs post-hospital services based on physician/advanced practitioner order or complex needs related to functional status, cognitive ability, or social support system  6/9/2023 1154 by Saad Rivers RN  Outcome: Adequate for Discharge  6/9/2023 1039 by Saad Rivers RN  Outcome: Progressing     Problem: Knowledge Deficit  Goal: Patient/family/caregiver demonstrates understanding of disease process, treatment plan, medications, and discharge instructions  Description: Complete learning assessment and assess knowledge base    Interventions:  - Provide teaching at level of understanding  - Provide teaching via preferred learning methods  6/9/2023 1154 by Saad Rivers RN  Outcome: Adequate for Discharge  6/9/2023 1039 by Saad Rivers RN  Outcome: Progressing     Problem: NEUROSENSORY - ADULT  Goal: Achieves stable or improved neurological status  Description: INTERVENTIONS  - Monitor and report changes in neurological status  - Monitor vital signs such as temperature, blood pressure, glucose, and any other labs ordered   - Initiate measures to prevent increased intracranial pressure  - Monitor for seizure activity and implement precautions if appropriate      6/9/2023 1154 by Cornell Bush RN  Outcome: Adequate for Discharge  6/9/2023 1039 by Cornell Bush RN  Outcome: Progressing  Goal: Remains free of injury related to seizures activity  Description: INTERVENTIONS  - Maintain airway, patient safety  and administer oxygen as ordered  - Monitor patient for seizure activity, document and report duration and description of seizure to physician/advanced practitioner  - If seizure occurs,  ensure patient safety during seizure  - Reorient patient post seizure  - Seizure pads on all 4 side rails  - Instruct patient/family to notify RN of any seizure activity including if an aura is experienced  - Instruct patient/family to call for assistance with activity based on nursing assessment  - Administer anti-seizure medications if ordered    6/9/2023 1154 by Cornell Bush RN  Outcome: Adequate for Discharge  6/9/2023 1039 by Cornell Bush RN  Outcome: Progressing  Goal: Achieves maximal functionality and self care  Description: INTERVENTIONS  - Monitor swallowing and airway patency with patient fatigue and changes in neurological status  - Encourage and assist patient to increase activity and self care     - Encourage visually impaired, hearing impaired and aphasic patients to use assistive/communication devices  6/9/2023 1154 by Cornell Bush RN  Outcome: Adequate for Discharge  6/9/2023 1039 by Cornell Bush RN  Outcome: Progressing     Problem: HEMATOLOGIC - ADULT  Goal: Maintains hematologic stability  Description: INTERVENTIONS  - Assess for signs and symptoms of bleeding or hemorrhage  - Monitor labs  - Administer supportive blood products/factors as ordered and appropriate  6/9/2023 1154 by Cornell Bush RN  Outcome: Adequate for Discharge  6/9/2023 1039 by Cornell Bush RN  Outcome: Progressing     Problem: MUSCULOSKELETAL - ADULT  Goal: Maintain or return mobility to safest level of function  Description: INTERVENTIONS:  - Assess patient's ability to carry out ADLs; assess patient's baseline for ADL function and identify physical deficits which impact ability to perform ADLs (bathing, care of mouth/teeth, toileting, grooming, dressing, etc )  - Assess/evaluate cause of self-care deficits   - Assess range of motion  - Assess patient's mobility  - Assess patient's need for assistive devices and provide as appropriate  - Encourage maximum independence but intervene and supervise when necessary  - Involve family in performance of ADLs  - Assess for home care needs following discharge   - Consider OT consult to assist with ADL evaluation and planning for discharge  - Provide patient education as appropriate  6/9/2023 1154 by Sallie Wetzel RN  Outcome: Adequate for Discharge  6/9/2023 1039 by Sallie Wetzel RN  Outcome: Progressing  Goal: Maintain proper alignment of affected body part  Description: INTERVENTIONS:  - Support, maintain and protect limb and body alignment  - Provide patient/ family with appropriate education  6/9/2023 1154 by Sallie Wetzel RN  Outcome: Adequate for Discharge  6/9/2023 1039 by Sallie Wetzel RN  Outcome: Progressing

## 2023-06-09 NOTE — DISCHARGE INSTR - AVS FIRST PAGE
Discharge Instructions  Thoracic decompression  (laminectomy/laminotomy/foraminotomy/discectomy)      Surgical incisional care:  Maintain dressing in place for 3 days  On postoperative day 3, dressing may be removed and incision may be left open to air  Keep incision clean and dry  Avoid applying creams, lotion or antiseptic to incision area  Check the wound daily  If the incision becomes red, swollen, tender, warm, or has increased drainage please notify physician immediately  If steri-strips are in place, allow them to fall off  If still in place at two week postoperative visit, we will remove them  May shower 3 days after surgery, but do not soak in a tub and no swimming  Incision may be cleaned with water and a mild antimicrobial soap using a clean washcloth  Incision is to be gently patted dry with a clean towel  Continue to change bed linens and pajamas more frequently  Wear clean clothes daily  Activity Restrictions:  No heavy lifting greater than 10lbs and no strenuous activities until cleared  No bending or twisting  No BLTs (bending, lifting, twisting)  Avoid pushing/pulling movements  May walk as tolerated  Encourage at least 4 short walks per day  No driving for at least 2 weeks or until cleared by physician  Postoperative medication:  Take pain medications to relieve incision pain, and muscle relaxants to prevent spasms as directed  Please see after visit summary (AVS) for details  Do not operate heavy machinery or vehicles while taking sedating medications  Use a bowel regimen while on opioids as they induce constipation  Ie  Senokot-S, Miralax, Colace, etc  Increase fiber and water intake  Do not take ibuprofen, Naproxen/Aleve or any NSAID until cleared by surgeon  May take Tylenol instead  If taking Coumadin, Aspirin, or Plavix, you may resume these medications when cleared by Neurosurgery  Follow-up as scheduled for a 2 week incision check   Follow-up as scheduled for 6 week postoperative visit       **Please notify MD if you experience a fever 101F, chills or have increased pain, numbness, tingling, or weakness in your legs and/or bowel/bladder dysfunction/incontinence**

## 2023-06-09 NOTE — ASSESSMENT & PLAN NOTE
POD 1 T8 spinous process, T9 total, superior T10 laminectomy for decompression (Dr Roberta Hernandez, 6/8)  · In setting of multiple areas of spinal cord compression including C5-6, T9-10  Plan:  · Continue to monitor neuro exam closely  · DANIEL drain discontinued  · Pain control:  · Acetaminophen 975 mg q8h scheduled  · Gabapentin 600 mg TID scheduled  · Oxycodone 5-10 mg q4h PRN  · Bowel regimen:  · Senna/colace  · Passing flatus  · Mobilizing well  · DVT ppx: SCDs, heparin SQ  Rounds completed with SARAH Goel  Neurosurgery will discharge patient home as primary  Follow up as scheduled in 2 weeks for post-operative visit

## 2023-06-09 NOTE — UTILIZATION REVIEW
"Initial Clinical Review    Elective Inpatient surgical procedure  Age/Sex: 49 y o  female  Surgery Date: 6/8/23  Procedure: S/p Open T8 spinous process, T9 total, superior T10 laminectomy for decompression  Anesthesia: General    Admission Orders: Date/Time/Statement:   Admission Orders (From admission, onward)     Ordered        06/08/23 1357  Inpatient Admission  Once                      Orders Placed This Encounter   Procedures   • Inpatient Admission     Standing Status:   Standing     Number of Occurrences:   1     Order Specific Question:   Level of Care     Answer:   Med Surg [16]     Order Specific Question:   Estimated length of stay     Answer:   Inpatient Only Surgery     Vital Signs: /62   Pulse 80   Temp 98 1 °F (36 7 °C)   Resp 16   Ht 5' 11\" (1 803 m)   Wt (!) 143 kg (315 lb 4 1 oz)   SpO2 95%   BMI 43 97 kg/m²     Pertinent Labs/Diagnostic Test Results:   XR spine thoracic 2 vw   Final Result by Colt Hylton MD (06/08 7494)      Fluoroscopic guidance provided for procedure guidance  Please refer to the separate procedure notes for additional details           Workstation performed: [id number]               Results from last 7 days   Lab Units 06/09/23  0655   HEMATOCRIT % 38 7   HEMOGLOBIN g/dL 12 1   PLATELETS Thousands/uL 153   WBC Thousand/uL 11 75*         Results from last 7 days   Lab Units 06/09/23  0655   ANION GAP mmol/L 2*   BUN mg/dL 12   CALCIUM mg/dL 8 5   CHLORIDE mmol/L 114*   CO2 mmol/L 23   CREATININE mg/dL 0 86   EGFR ml/min/1 73sq m 80   POTASSIUM mmol/L 3 8   SODIUM mmol/L 139         Results from last 7 days   Lab Units 06/08/23  1412   POC GLUCOSE mg/dl 104     Results from last 7 days   Lab Units 06/09/23  0655   GLUCOSE RANDOM mg/dL 117         Results from last 7 days   Lab Units 06/09/23  0655   INR  1 04   PROTIME seconds 13 8   PTT seconds 29       Diet: Regular  Mobility: OOB  DVT Prophylaxis: SCD    Medications/Pain Control:   Scheduled " Medications:  acetaminophen, 975 mg, Oral, Q8H ANIKET  acyclovir, 400 mg, Oral, BID  cefazolin, 2,000 mg, Intravenous, Q8H  docusate sodium, 100 mg, Oral, BID  gabapentin, 600 mg, Oral, TID  heparin (porcine), 5,000 Units, Subcutaneous, Q8H ANIKET  loratadine, 10 mg, Oral, Daily  pantoprazole, 40 mg, Oral, Early Morning  senna, 1 tablet, Oral, Daily      Continuous IV Infusions:  lactated ringers, 20 mL/hr, Intravenous, Continuous  sodium chloride, 125 mL/hr, Intravenous, Continuous      PRN Meds:  ALPRAZolam, 0 25 mg, Oral, HS PRN  calcium carbonate, 1,000 mg, Oral, Daily PRN  diphenhydrAMINE, 25 mg, Oral, Q6H PRN  HYDROmorphone, 0 4 mg, Intravenous, Q5 Min PRN  HYDROmorphone, 0 5 mg, Intravenous, Q2H PRN 6/8 x3, 6/9 x5  ondansetron, 4 mg, Intravenous, Q6H PRN  oxyCODONE, 10 mg, Oral, Q4H PRN 6/8 x2, 6/9 x3  oxyCODONE, 5 mg, Oral, Q4H PRN  tiZANidine, 4 mg, Oral, Q8H PRN        Network Utilization Review Department  ATTENTION: Please call with any questions or concerns to [phone] and carefully listen to the prompts so that you are directed to the right person  All voicemails are confidential   Patricia Gaines all requests for admission clinical reviews, approved or denied determinations and any other requests to dedicated fax number below belonging to the campus where the patient is receiving treatment   List of dedicated fax numbers for the Facilities:  [address] DENIALS (Administrative/Medical Necessity) [phone]   [address] [place] [address] (Maternity/NICU/Pediatrics) [phone]   [address] [address] [place] [address] [facility] 310 [phone]   [address] [phone]

## 2023-06-09 NOTE — TELEPHONE ENCOUNTER
"Patient is status post T8 spinous process, T9 total, superior T10 laminectomy for decompression by Dr Rex Spurling on 6/8/2023  She calls tonight complaining of severe postoperative pain  She states she is unable to get her pain under control at home  She wishes that she had stayed an extra day to ensure that her pain is well controlled and that she did not discharge on postoperative day 1  Her pain is located [facility] area     She states it hurts to raise her arms up in the air  She states her back feels a bit spasmy  If she stays still her pain is not that bad but given she is in so much pain this is making her fidgety  She reports ongoing improvement in numbness and tingling from prior to surgery  She is not having any difficulty with walking, no bowel or bladder issues or saddle anesthesia  Medication history:  Oxycodone 10 mg at 10 AM  Oxycodone 10 mg at 12 PM  Oxycodone 10 mg at 3 PM  Oxycodone 5 mg at 5:30 PM    Tylenol 500 mg at 3 PM  Tylenol 500 mg at 5:30 PM    Zanaflex 4 mg at 4 PM    Gabapentin 600 mg at 4 PM    Patient states since being home she is doing better with taking 5 mg of oxycodone around the 2-hour tommy rather than taking 10 mg as she became very sedated  She states while she was in the hospital she was taking 10 mg tablets along with IV Dilaudid and was not as sedated so she is unsure why this is happening while she is at home  She has also been using a heating pad which has been helping a little bit  She is worried that she will not be able to control her pain at home and is wondering if she should seek medical attention in the emergency room  She states that she has nobody at home with her and that she could potentially get a neighbor to drive her to the hospital if necessary  At this time given patient's description of pain I feel she is suffering from severe back spasms  Educated patient on proper medication administration    I am okay with her taking 5 to 10 mg " every 4 hours as needed for moderate and severe pain but I discouraged the use of oxycodone every 2 hours even if taking 5 mg every 2 hours adds up to 10 mg every 4 hours  I instructed her to take Tylenol 1000 mg every 8 hours scheduled  She is okay to continue the Zanaflex 4 mg every 8 hours scheduled for muscle spasms  I did send a prescription for Robaxin-750 milligrams every 6 hours scheduled to see if this might help with her muscle spasms more  She is instructed to discontinue the Zanaflex if she does start the Robaxin  She is to continue taking her gabapentin as prescribed  She is okay to use heat or ice for 20 minutes on and 20 minutes off and she is not to place heat or ice directly on her incision  If she continues to have poor pain control at home she may seek out medical attention in the emergency room  She does not have to necessarily come to the [address] [place] [address] but can go to the nearest emergency room for assistance  I informed her that I am unsure if she will be admitted should she seek medical attention in the emergency room as it is at their discretion whether or not they will admit her  If she does go to the emergency room she may benefit from IV Valium 5 mg to attempt to break the pain cycle as I again feel this is muscle spasm  I stated that given she does not have any red flag signs it is at her discretion whether or not she would like to go to the emergency room for evaluation or try to get her pain under control at home  She states she will start taking the pain medication as prescribed and see if she can get by  She is instructed to call back should she have any issues and go to the hospital immediately should she develop any red flag signs  She is appreciative of the call

## 2023-06-09 NOTE — TELEPHONE ENCOUNTER
The patient called because she reports that she is in a lot of pain    Paged the on call provider via TC

## 2023-06-10 ENCOUNTER — APPOINTMENT (EMERGENCY)
Dept: RADIOLOGY | Facility: HOSPITAL | Age: 48
End: 2023-06-10
Payer: COMMERCIAL

## 2023-06-10 ENCOUNTER — HOSPITAL ENCOUNTER (INPATIENT)
Facility: HOSPITAL | Age: 48
LOS: 2 days | Discharge: HOME | End: 2023-06-14
Attending: EMERGENCY MEDICINE | Admitting: INTERNAL MEDICINE
Payer: COMMERCIAL

## 2023-06-10 DIAGNOSIS — I47.29 NONSUSTAINED VENTRICULAR TACHYCARDIA (HCC): ICD-10-CM

## 2023-06-10 DIAGNOSIS — G89.18 POST-OPERATIVE PAIN: Primary | ICD-10-CM

## 2023-06-10 DIAGNOSIS — M51.34 DEGENERATIVE DISC DISEASE, THORACIC: ICD-10-CM

## 2023-06-10 DIAGNOSIS — Z98.890 POST-OPERATIVE STATE: ICD-10-CM

## 2023-06-10 PROBLEM — A60.00 GENITAL HERPES SIMPLEX: Status: ACTIVE | Noted: 2023-06-10

## 2023-06-10 PROBLEM — Z72.0 NICOTINE ABUSE: Status: ACTIVE | Noted: 2023-06-10

## 2023-06-10 LAB
2HR DELTA HS TROPONIN: -1 NG/L
ALBUMIN SERPL BCP-MCNC: 3.9 G/DL (ref 3.5–5)
ALP SERPL-CCNC: 81 U/L (ref 34–104)
ALT SERPL W P-5'-P-CCNC: 25 U/L (ref 7–52)
ANION GAP SERPL CALCULATED.3IONS-SCNC: 5 MMOL/L (ref 4–13)
AST SERPL W P-5'-P-CCNC: 31 U/L (ref 13–39)
BASOPHILS # BLD AUTO: 0.09 THOUSANDS/ÂΜL (ref 0–0.1)
BASOPHILS NFR BLD AUTO: 1 % (ref 0–1)
BILIRUB SERPL-MCNC: 0.74 MG/DL (ref 0.2–1)
BILIRUB UR QL STRIP: NEGATIVE
BUN SERPL-MCNC: 13 MG/DL (ref 5–25)
CALCIUM SERPL-MCNC: 9 MG/DL (ref 8.4–10.2)
CARDIAC TROPONIN I PNL SERPL HS: 3 NG/L
CARDIAC TROPONIN I PNL SERPL HS: 4 NG/L
CHLORIDE SERPL-SCNC: 103 MMOL/L (ref 96–108)
CLARITY UR: CLEAR
CO2 SERPL-SCNC: 27 MMOL/L (ref 21–32)
COLOR UR: YELLOW
CREAT SERPL-MCNC: 0.8 MG/DL (ref 0.6–1.3)
EOSINOPHIL # BLD AUTO: 0.36 THOUSAND/ÂΜL (ref 0–0.61)
EOSINOPHIL NFR BLD AUTO: 4 % (ref 0–6)
ERYTHROCYTE [DISTWIDTH] IN BLOOD BY AUTOMATED COUNT: 13.5 % (ref 11.6–15.1)
GFR SERPL CREATININE-BSD FRML MDRD: 87 ML/MIN/1.73SQ M
GLUCOSE SERPL-MCNC: 90 MG/DL (ref 65–140)
GLUCOSE UR STRIP-MCNC: NEGATIVE MG/DL
HCT VFR BLD AUTO: 37.9 % (ref 34.8–46.1)
HGB BLD-MCNC: 12.2 G/DL (ref 11.5–15.4)
HGB UR QL STRIP.AUTO: NEGATIVE
IMM GRANULOCYTES # BLD AUTO: 0.04 THOUSAND/UL (ref 0–0.2)
IMM GRANULOCYTES NFR BLD AUTO: 0 % (ref 0–2)
KETONES UR STRIP-MCNC: NEGATIVE MG/DL
LEUKOCYTE ESTERASE UR QL STRIP: NEGATIVE
LYMPHOCYTES # BLD AUTO: 3.96 THOUSANDS/ÂΜL (ref 0.6–4.47)
LYMPHOCYTES NFR BLD AUTO: 38 % (ref 14–44)
MAGNESIUM SERPL-MCNC: 1.9 MG/DL (ref 1.9–2.7)
MCH RBC QN AUTO: 29.1 PG (ref 26.8–34.3)
MCHC RBC AUTO-ENTMCNC: 32.2 G/DL (ref 31.4–37.4)
MCV RBC AUTO: 91 FL (ref 82–98)
MONOCYTES # BLD AUTO: 0.75 THOUSAND/ÂΜL (ref 0.17–1.22)
MONOCYTES NFR BLD AUTO: 7 % (ref 4–12)
NEUTROPHILS # BLD AUTO: 5.22 THOUSANDS/ÂΜL (ref 1.85–7.62)
NEUTS SEG NFR BLD AUTO: 50 % (ref 43–75)
NITRITE UR QL STRIP: NEGATIVE
NRBC BLD AUTO-RTO: 0 /100 WBCS
PH UR STRIP.AUTO: 6 [PH]
PLATELET # BLD AUTO: 155 THOUSANDS/UL (ref 149–390)
PMV BLD AUTO: 9.5 FL (ref 8.9–12.7)
POTASSIUM SERPL-SCNC: 3.9 MMOL/L (ref 3.5–5.3)
PROT SERPL-MCNC: 7.3 G/DL (ref 6.4–8.4)
PROT UR STRIP-MCNC: NEGATIVE MG/DL
RBC # BLD AUTO: 4.19 MILLION/UL (ref 3.81–5.12)
SODIUM SERPL-SCNC: 135 MMOL/L (ref 135–147)
SP GR UR STRIP.AUTO: 1.01 (ref 1–1.03)
TSH SERPL DL<=0.05 MIU/L-ACNC: 3.67 UIU/ML (ref 0.45–4.5)
UROBILINOGEN UR QL STRIP.AUTO: 0.2 E.U./DL
WBC # BLD AUTO: 10.42 THOUSAND/UL (ref 4.31–10.16)

## 2023-06-10 PROCEDURE — 80053 COMPREHEN METABOLIC PANEL: CPT | Performed by: EMERGENCY MEDICINE

## 2023-06-10 PROCEDURE — 96374 THER/PROPH/DIAG INJ IV PUSH: CPT

## 2023-06-10 PROCEDURE — 96361 HYDRATE IV INFUSION ADD-ON: CPT

## 2023-06-10 PROCEDURE — G1004 CDSM NDSC: HCPCS

## 2023-06-10 PROCEDURE — 93005 ELECTROCARDIOGRAM TRACING: CPT

## 2023-06-10 PROCEDURE — 85025 COMPLETE CBC W/AUTO DIFF WBC: CPT | Performed by: EMERGENCY MEDICINE

## 2023-06-10 PROCEDURE — 84484 ASSAY OF TROPONIN QUANT: CPT | Performed by: EMERGENCY MEDICINE

## 2023-06-10 PROCEDURE — 72128 CT CHEST SPINE W/O DYE: CPT

## 2023-06-10 PROCEDURE — 99284 EMERGENCY DEPT VISIT MOD MDM: CPT

## 2023-06-10 PROCEDURE — 81003 URINALYSIS AUTO W/O SCOPE: CPT | Performed by: EMERGENCY MEDICINE

## 2023-06-10 PROCEDURE — 84443 ASSAY THYROID STIM HORMONE: CPT | Performed by: EMERGENCY MEDICINE

## 2023-06-10 PROCEDURE — 83735 ASSAY OF MAGNESIUM: CPT | Performed by: EMERGENCY MEDICINE

## 2023-06-10 PROCEDURE — 99024 POSTOP FOLLOW-UP VISIT: CPT | Performed by: PHYSICIAN ASSISTANT

## 2023-06-10 PROCEDURE — 99222 1ST HOSP IP/OBS MODERATE 55: CPT | Performed by: NURSE PRACTITIONER

## 2023-06-10 PROCEDURE — 36415 COLL VENOUS BLD VENIPUNCTURE: CPT | Performed by: EMERGENCY MEDICINE

## 2023-06-10 PROCEDURE — 96375 TX/PRO/DX INJ NEW DRUG ADDON: CPT

## 2023-06-10 PROCEDURE — 96376 TX/PRO/DX INJ SAME DRUG ADON: CPT

## 2023-06-10 RX ORDER — ACETAMINOPHEN 325 MG/1
975 TABLET ORAL EVERY 8 HOURS SCHEDULED
Status: DISCONTINUED | OUTPATIENT
Start: 2023-06-10 | End: 2023-06-14 | Stop reason: HOSPADM

## 2023-06-10 RX ORDER — HYDROMORPHONE HCL/PF 1 MG/ML
1 SYRINGE (ML) INJECTION ONCE
Status: COMPLETED | OUTPATIENT
Start: 2023-06-10 | End: 2023-06-10

## 2023-06-10 RX ORDER — AMOXICILLIN 250 MG
2 CAPSULE ORAL
Status: DISCONTINUED | OUTPATIENT
Start: 2023-06-10 | End: 2023-06-11

## 2023-06-10 RX ORDER — DIPHENHYDRAMINE HCL 25 MG
25 TABLET ORAL EVERY 6 HOURS PRN
Status: DISCONTINUED | OUTPATIENT
Start: 2023-06-10 | End: 2023-06-14 | Stop reason: HOSPADM

## 2023-06-10 RX ORDER — LORATADINE 10 MG/1
10 TABLET ORAL
Status: DISCONTINUED | OUTPATIENT
Start: 2023-06-10 | End: 2023-06-14 | Stop reason: HOSPADM

## 2023-06-10 RX ORDER — POTASSIUM CHLORIDE 750 MG/1
10 TABLET, EXTENDED RELEASE ORAL ONCE
Status: COMPLETED | OUTPATIENT
Start: 2023-06-10 | End: 2023-06-10

## 2023-06-10 RX ORDER — MAGNESIUM SULFATE 1 G/100ML
1 INJECTION INTRAVENOUS ONCE
Status: COMPLETED | OUTPATIENT
Start: 2023-06-10 | End: 2023-06-11

## 2023-06-10 RX ORDER — GABAPENTIN 300 MG/1
600 CAPSULE ORAL 3 TIMES DAILY
Status: DISCONTINUED | OUTPATIENT
Start: 2023-06-10 | End: 2023-06-14 | Stop reason: HOSPADM

## 2023-06-10 RX ORDER — ACYCLOVIR 800 MG/1
400 TABLET ORAL 2 TIMES DAILY
Status: DISCONTINUED | OUTPATIENT
Start: 2023-06-10 | End: 2023-06-14 | Stop reason: HOSPADM

## 2023-06-10 RX ORDER — METHOCARBAMOL 500 MG/1
750 TABLET, FILM COATED ORAL EVERY 6 HOURS SCHEDULED
Status: DISCONTINUED | OUTPATIENT
Start: 2023-06-10 | End: 2023-06-11

## 2023-06-10 RX ORDER — POLYETHYLENE GLYCOL 3350 17 G/17G
17 POWDER, FOR SOLUTION ORAL DAILY
Status: DISCONTINUED | OUTPATIENT
Start: 2023-06-11 | End: 2023-06-14 | Stop reason: HOSPADM

## 2023-06-10 RX ORDER — OXYCODONE HYDROCHLORIDE 5 MG/1
5 TABLET ORAL EVERY 4 HOURS PRN
Status: DISCONTINUED | OUTPATIENT
Start: 2023-06-10 | End: 2023-06-11

## 2023-06-10 RX ORDER — HYDROMORPHONE HCL/PF 1 MG/ML
0.5 SYRINGE (ML) INJECTION EVERY 2 HOUR PRN
Status: DISCONTINUED | OUTPATIENT
Start: 2023-06-10 | End: 2023-06-11

## 2023-06-10 RX ORDER — ENOXAPARIN SODIUM 100 MG/ML
40 INJECTION SUBCUTANEOUS 2 TIMES DAILY
Status: DISCONTINUED | OUTPATIENT
Start: 2023-06-10 | End: 2023-06-14 | Stop reason: HOSPADM

## 2023-06-10 RX ORDER — PANTOPRAZOLE SODIUM 40 MG/1
40 TABLET, DELAYED RELEASE ORAL
Status: DISCONTINUED | OUTPATIENT
Start: 2023-06-11 | End: 2023-06-14 | Stop reason: HOSPADM

## 2023-06-10 RX ORDER — OXYCODONE HYDROCHLORIDE 10 MG/1
10 TABLET ORAL EVERY 4 HOURS PRN
Status: DISCONTINUED | OUTPATIENT
Start: 2023-06-10 | End: 2023-06-11

## 2023-06-10 RX ORDER — ONDANSETRON 2 MG/ML
4 INJECTION INTRAMUSCULAR; INTRAVENOUS ONCE
Status: COMPLETED | OUTPATIENT
Start: 2023-06-10 | End: 2023-06-10

## 2023-06-10 RX ADMIN — OXYCODONE HYDROCHLORIDE 10 MG: 10 TABLET ORAL at 20:24

## 2023-06-10 RX ADMIN — HYDROMORPHONE HYDROCHLORIDE 1 MG: 1 INJECTION, SOLUTION INTRAMUSCULAR; INTRAVENOUS; SUBCUTANEOUS at 17:08

## 2023-06-10 RX ADMIN — HYDROMORPHONE HYDROCHLORIDE 1 MG: 1 INJECTION, SOLUTION INTRAMUSCULAR; INTRAVENOUS; SUBCUTANEOUS at 18:04

## 2023-06-10 RX ADMIN — SODIUM CHLORIDE 1000 ML: 0.9 INJECTION, SOLUTION INTRAVENOUS at 15:56

## 2023-06-10 RX ADMIN — ONDANSETRON 4 MG: 2 INJECTION INTRAMUSCULAR; INTRAVENOUS at 15:52

## 2023-06-10 RX ADMIN — GABAPENTIN 600 MG: 300 CAPSULE ORAL at 20:59

## 2023-06-10 RX ADMIN — DIPHENHYDRAMINE HYDROCHLORIDE 25 MG: 25 TABLET ORAL at 20:49

## 2023-06-10 RX ADMIN — HYDROMORPHONE HYDROCHLORIDE 1 MG: 1 INJECTION, SOLUTION INTRAMUSCULAR; INTRAVENOUS; SUBCUTANEOUS at 15:52

## 2023-06-10 RX ADMIN — ACYCLOVIR 400 MG: 800 TABLET ORAL at 20:55

## 2023-06-10 RX ADMIN — HYDROMORPHONE HYDROCHLORIDE 1 MG: 1 INJECTION, SOLUTION INTRAMUSCULAR; INTRAVENOUS; SUBCUTANEOUS at 19:41

## 2023-06-10 RX ADMIN — LORATADINE 10 MG: 10 TABLET ORAL at 21:01

## 2023-06-10 RX ADMIN — METHOCARBAMOL TABLETS 750 MG: 500 TABLET, COATED ORAL at 20:58

## 2023-06-10 RX ADMIN — HYDROMORPHONE HYDROCHLORIDE 0.5 MG: 1 INJECTION, SOLUTION INTRAMUSCULAR; INTRAVENOUS; SUBCUTANEOUS at 21:31

## 2023-06-10 RX ADMIN — ACETAMINOPHEN 975 MG: 325 TABLET ORAL at 21:02

## 2023-06-10 RX ADMIN — POTASSIUM CHLORIDE 10 MEQ: 750 TABLET, EXTENDED RELEASE ORAL at 20:23

## 2023-06-10 RX ADMIN — MAGNESIUM SULFATE HEPTAHYDRATE 1 G: 1 INJECTION, SOLUTION INTRAVENOUS at 20:34

## 2023-06-10 RX ADMIN — HYDROMORPHONE HYDROCHLORIDE 0.5 MG: 1 INJECTION, SOLUTION INTRAMUSCULAR; INTRAVENOUS; SUBCUTANEOUS at 23:53

## 2023-06-10 NOTE — ASSESSMENT & PLAN NOTE
Patient presents with uncontrolled thoracic back pain  Reports had spinal surgery on 6/8th in [place], discharged yesterday, reports severe thoracic back pain at home and po oxycodone not helping  · Of note, patient is POD 2 T8 spinous process, T9 total, superior T10 laminectomy for decompression  · CT thoracic spine without contrast in ED showed  -no CT evident complication visualized  · ED provider discussed case with neurosurgery, recommend admit for pain control, neuro checks  · Patient received multiple dose of IV Dilaudid in ED      · Will order ATC Tylenol, continue gabapentin, oxycodone 5-10 milligram p o  every 4 hours as needed, Dilaudid as needed for breakthrough pain  · Neuro checks

## 2023-06-10 NOTE — ED PROVIDER NOTES
History  Chief Complaint   Patient presents with   • Post-op Problem     Spinal surgery on 8th,, c/o radiation across upper abd to ribs jacquie  Had this prior to surgery but extremely severe  Po meds not helping     50-year-old female with past history of hiatal hernia, Trejo's esophagus, acid reflux, TERESITA, GERD, kidney stones, anxiety, herniated thoracic disc, presents to the ED for evaluation of back pain  Patient is status post T8-T10 laminectomy by Dr Kofi Esteves on 6/8/2023 at [facility]  Patient states that she was discharged home yesterday on p o  narcotics  Patient thinks she was discharged prematurely  Patient is saying that p o  narcotics are not helping with her pain at all  Patient called the neurosurgery team and spoke with advanced practitioner who recommended patient come to the ED for further evaluation of her pain  Patient states that she has had increasing bilateral leg pain as she thinks she is putting a lot of pressure on her legs  Patient denies any saddle anesthesia, weakness, sensory deficits to lower extremity or upper extremity  Patient denies any bowel/bladder retention or incontinence  Patient denies any fevers or chills  Patient denies any shortness of breath  History provided by:  Patient      Prior to Admission Medications   Prescriptions Last Dose Informant Patient Reported? Taking? ALPRAZolam (XANAX) 0 25 mg tablet Unknown  No No   Sig: Take 1 tablet (0 25 mg total) by mouth daily at bedtime as needed for anxiety   acyclovir (ZOVIRAX) 800 mg tablet   No No   Sig: Take 0 5 tablets (400 mg total) by mouth 2 (two) times a day   clobetasol propionate (CLOBEX) 0 05 % shampoo Unknown  No No   Sig: Apply 1 application  topically 2 (two) times a week   gabapentin (Neurontin) 600 MG tablet 6/10/2023  No Yes   Sig: Take 1 tablet (600 mg total) by mouth 3 (three) times a day   ketoconazole (NIZORAL) 2 % shampoo Unknown  No No   Sig: Apply 1 application   topically 2 (two) times a week   levocetirizine (XYZAL) 5 MG tablet Unknown  No No   Sig: TAKE 1 TABLET BY MOUTH IN  THE EVENING   methocarbamol (Robaxin-750) 750 mg tablet Not Taking  No No   Sig: Take 1 tablet (750 mg total) by mouth every 6 (six) hours   Patient not taking: Reported on 6/10/2023   omeprazole (PriLOSEC) 40 MG capsule 6/10/2023  No Yes   Sig: TAKE 1 CAPSULE BY MOUTH  DAILY   oxyCODONE (ROXICODONE) 5 immediate release tablet 6/10/2023  No Yes   Sig: Take 1 tablet (5 mg total) by mouth every 4 (four) hours as needed for moderate pain for up to 10 days Max Daily Amount: 30 mg   tiZANidine (Zanaflex) 4 mg tablet 6/10/2023  No Yes   Sig: Take 1 tablet (4 mg total) by mouth every 8 (eight) hours as needed for muscle spasms      Facility-Administered Medications: None       Past Medical History:   Diagnosis Date   • Acid reflux    • Allergic     Flagyl; anaphylaxis   • Anxiety    • Trejo's esophagus    • Cancer (HCC)    • GERD (gastroesophageal reflux disease)    • Headache(784 0)    • Hiatal hernia    • Hyperlipidemia    • Kidney stone    • Latent tuberculosis by skin test     Was treated with INH for 6 months   • Lung cancer ([facility] [address] ) 2022   • Obesity    • Obstructive sleep apnea 2018    Mild TERESITA    AHI 18 1   • Squamous cell skin cancer     Left buttocks   • STD (sexually transmitted disease)     HPV, HSV   • Urinary tract infection        Past Surgical History:   Procedure Laterality Date   • ABDOMINOPLASTY     • AUGMENTATION BREAST Bilateral    • BREAST SURGERY     • BRONCHOSCOPY N/A 2022    Procedure: BRONCHOSCOPY NAVIGATIONAL;  Surgeon: Daisy Green MD;  [facility];  Service: Thoracic   •  SECTION     • COLON SURGERY  2018    Rectocele Repair   • CYSTOSCOPY N/A 2022    Procedure: CYSTOSCOPY;  Surgeon: Brianda Crawford MD;  [facility];  Service: Gynecology Oncology   • EGD     • ENDOBRONCHIAL ULTRASOUND (EBUS) N/A 05/03/2022    Procedure: ENDOBRONCHIAL ULTRASOUND (EBUS); Surgeon: Dorothy Eastman MD;  [facility];  Service: Thoracic   • HYSTERECTOMY  2014   • IR BIOPSY LYMPH NODE  03/21/2022   • IR CHEST TUBE PLACEMENT  08/01/2022   • LAPAROSCOPIC CHOLECYSTECTOMY  1997   • LIPOSUCTION  2015   • LUNG SURGERY  2022    VATS Lobectomy RLL   • LYMPH NODE BIOPSY  03/21/2022   • MOHS SURGERY Left 08/18/2022    left buttocks   • MA [address] INCL FLUOR GDNCE DX W/CELL WASHG SPX N/A 05/03/2022    Procedure: BRONCHOSCOPY FLEXIBLE;  Surgeon: Dorothy Eastman MD;  [facility];  Service: Thoracic   • MA [address] INCL FLUOR GDNCE DX [address] WASHG [address] [facility] N/A 07/28/2022    Procedure: BRONCHOSCOPY FLEXIBLE;  Surgeon: Amirah Stokes MD;  [facility];  Service: Thoracic   • MA MOLINA FACETECTOMY & FORAMOTOMY 1 [facility] THORACIC N/A 6/8/2023    Procedure: Open T8 spinous process, T9 total, superior T10 laminectomy for decompression;  Surgeon: Amy Greenwood MD;  [facility];  Service: Neurosurgery   • MA LAPAROSCOPY W/RMVL ADNEXAL STRUCTURES Bilateral 12/20/2022    Procedure: OOPHORECTOMY W/ ROBOTICS, VAGINOTOMY;  Surgeon: Mariela Quijano MD;  [facility];  Service: Gynecology Oncology   • MA THORACOSCOPY W/LOBECTOMY SINGLE LOBE Right 07/28/2022    Procedure: LOBECTOMY LUNG;  Surgeon: Amirah Stokes MD;  [facility];  Service: Thoracic   • MA THORACOSCOPY W/LOBECTOMY SINGLE LOBE Right 07/28/2022    Procedure: THORACOSCOPY VIDEO ASSISTED SURGERY (VATS);   Surgeon: Amirah Stokes MD;  [facility];  Service: Thoracic   • REPAIR RECTOCELE  2018   • TONSILLECTOMY  1984   • TUBAL LIGATION  2002   • UPPER GASTROINTESTINAL ENDOSCOPY  2018       Family History   Problem Relation Age of Onset   • Cancer Mother         Ovarian   • Ovarian cancer Mother    • Arthritis Mother         RA   • Autoimmune disease Mother         RA   • Stroke Father    • Dementia Father    • Vision loss Father    • Glaucoma Father    • Lung cancer Paternal Uncle    • Cancer Maternal Grandmother         Ovarian   • Diabetes Paternal Grandfather    • Diabetes Paternal Grandmother      I have reviewed and agree with the history as documented  E-Cigarette/Vaping   • E-Cigarette Use Never User      E-Cigarette/Vaping Substances   • Nicotine No    • THC No    • CBD No    • Flavoring No    • Other No    • Unknown No      Social History     Tobacco Use   • Smoking status: Every Day     Packs/day: 0 50     Years: 36 00     Total pack years: 18 00     Types: Cigarettes     Start date: 9/1/2022   • Smokeless tobacco: Never   Vaping Use   • Vaping Use: Never used   Substance Use Topics   • Alcohol use: Not Currently   • Drug use: Never       Review of Systems   Constitutional: Negative for chills and fever  HENT: Negative for ear pain and sore throat  Eyes: Negative for pain and visual disturbance  Respiratory: Negative for cough and shortness of breath  Cardiovascular: Negative for chest pain and palpitations  Gastrointestinal: Negative for abdominal pain and vomiting  Genitourinary: Negative for dysuria and hematuria  Musculoskeletal: Positive for back pain  Negative for arthralgias  Skin: Negative for color change and rash  Neurological: Negative for seizures and syncope  All other systems reviewed and are negative  Physical Exam  Physical Exam  Vitals and nursing note reviewed  Constitutional:       General: She is not in acute distress  Appearance: She is well-developed  HENT:      Head: Normocephalic and atraumatic  Eyes:      Conjunctiva/sclera: Conjunctivae normal    Cardiovascular:      Rate and Rhythm: Normal rate and regular rhythm  Heart sounds: No murmur heard  Pulmonary:      Effort: Pulmonary effort is normal  No respiratory distress  Breath sounds: Normal breath sounds  Abdominal:      Palpations: Abdomen is soft  Tenderness: There is no abdominal tenderness  Musculoskeletal:         General: No swelling  Cervical back: Neck supple  Comments:   Surgical incision site noted to lower thoracic spine with dressing in place  No edema, erythema, warmth to touch noted around the dressing on examination  Dressing is not removed as patient just had surgery 2 days ago  Skin:     General: Skin is warm and dry  Capillary Refill: Capillary refill takes less than 2 seconds  Neurological:      Mental Status: She is alert     Psychiatric:         Mood and Affect: Mood normal          Vital Signs  ED Triage Vitals [06/10/23 1305]   Temperature Pulse Respirations Blood Pressure SpO2   99 4 °F (37 4 °C) 84 22 134/73 100 %      Temp Source Heart Rate Source Patient Position - Orthostatic VS BP Location FiO2 (%)   Tympanic Monitor Sitting Right arm --      Pain Score       10 - Worst Possible Pain           Vitals:    06/10/23 1443 06/10/23 1530 06/10/23 1600 06/10/23 1615   BP: 122/85 107/66     Pulse: 86 82 82 87   Patient Position - Orthostatic VS:             Visual Acuity      ED Medications  Medications   sodium chloride 0 9 % bolus 1,000 mL (1,000 mL Intravenous New Bag 6/10/23 1556)   HYDROmorphone (DILAUDID) injection 1 mg (1 mg Intravenous Given 6/10/23 1552)   ondansetron (ZOFRAN) injection 4 mg (4 mg Intravenous Given 6/10/23 1552)       Diagnostic Studies  Results Reviewed     Procedure Component Value Units Date/Time    HS Troponin 0hr (reflex protocol) [[id number]]  (Normal) Collected: 06/10/23 1617    Lab Status: Final result Specimen: Blood from Arm, Right Updated: 06/10/23 1649     hs TnI 0hr 4 ng/L     HS Troponin I 2hr [[id number]]     Lab Status: No result Specimen: Blood     TSH, 3rd generation with Free T4 reflex [[id number]]  (Normal) Collected: 06/10/23 1548    Lab Status: Final result Specimen: Blood from Arm, Right Updated: 06/10/23 1645     TSH 3RD GENERATON 3 671 uIU/mL     UA w Reflex to Microscopic w Reflex to Culture [[id number]] Collected: 06/10/23 1627    Lab Status: Final result Specimen: Urine, Clean Catch Updated: 06/10/23 1636     Color, UA Yellow     Clarity, UA Clear     Specific Gravity, UA 1 010     pH, UA 6 0     Leukocytes, UA Negative     Nitrite, UA Negative     Protein, UA Negative mg/dl      Glucose, UA Negative mg/dl      Ketones, UA Negative mg/dl      Urobilinogen, UA 0 2 E U /dl      Bilirubin, UA Negative     Occult Blood, UA Negative    Comprehensive metabolic panel [[id number]] Collected: 06/10/23 1548    Lab Status: Final result Specimen: Blood from Arm, Right Updated: 06/10/23 1613     Sodium 135 mmol/L      Potassium 3 9 mmol/L      Chloride 103 mmol/L      CO2 27 mmol/L      ANION GAP 5 mmol/L      BUN 13 mg/dL      Creatinine 0 80 mg/dL      Glucose 90 mg/dL      Calcium 9 0 mg/dL      AST 31 U/L      ALT 25 U/L      Alkaline Phosphatase 81 U/L      Total Protein 7 3 g/dL      Albumin 3 9 g/dL      Total Bilirubin 0 74 mg/dL      eGFR 87 ml/min/1 73sq m     Narrative:      [facility] guidelines for Chronic Kidney Disease (CKD):   •  Stage 1 with normal or high GFR (GFR > 90 mL/min/1 73 square meters)  •  Stage 2 Mild CKD (GFR = 60-89 mL/min/1 73 square meters)  •  Stage 3A Moderate CKD (GFR = 45-59 mL/min/1 73 square meters)  •  Stage 3B Moderate CKD (GFR = 30-44 mL/min/1 73 square meters)  •  Stage 4 Severe CKD (GFR = 15-29 mL/min/1 73 square meters)  •  Stage 5 End Stage CKD (GFR <15 mL/min/1 73 square meters)  Note: GFR calculation is accurate only with a steady state creatinine    Magnesium [[id number]]  (Normal) Collected: 06/10/23 1548    Lab Status: Final result Specimen: Blood from Arm, Right Updated: 06/10/23 1613     Magnesium 1 9 mg/dL     CBC and differential [[id number]]  (Abnormal) Collected: 06/10/23 1548    Lab Status: Final result Specimen: Blood from Arm, Right Updated: 06/10/23 1556     WBC 10 42 Thousand/uL      RBC 4 19 Million/uL      Hemoglobin 12 2 g/dL      Hematocrit 37 9 %      MCV 91 fL      MCH 29 1 pg      MCHC 32 2 g/dL      RDW 13 5 %      MPV 9 5 fL      Platelets 653 Thousands/uL      nRBC 0 /100 WBCs      Neutrophils Relative 50 %      Immat GRANS % 0 %      Lymphocytes Relative 38 %      Monocytes Relative 7 %      Eosinophils Relative 4 %      Basophils Relative 1 %      Neutrophils Absolute 5 22 Thousands/µL      Immature Grans Absolute 0 04 Thousand/uL      Lymphocytes Absolute 3 96 Thousands/µL      Monocytes Absolute 0 75 Thousand/µL      Eosinophils Absolute 0 36 Thousand/µL      Basophils Absolute 0 09 Thousands/µL                  CT spine thoracic without contrast    (Results Pending)              Procedures  ECG 12 Lead Documentation Only    Date/Time: 6/10/2023 4:08 PM    Performed by: Denise Andrews DO  Authorized by: Denise Andrews DO    Indications / Diagnosis:  Back pain  ECG reviewed by me, the ED Provider: yes    Patient location:  ED  Previous ECG:     Previous ECG:  Compared to current    Similarity:  Changes noted    Comparison to cardiac monitor: Yes    Interpretation:     Interpretation: non-specific    Comments:      Sinus rhythm, rate 83, normal axis, normal intervals, no acute ST elevations noted, low amplitude T waves noted throughout suggesting nonspecific T wave abnormality that is new compared to previous EKG  Patient had nonsustained 12 beat run of V  tach earlier that is no longer present in this EKG  ED Course  ED Course as of 06/10/23 1655   Sat Fernie 10, 2023   1514 Case discussed with patient's neurosurgery team, neurosurgery AP, Montrell Montoya, who recommends blood work as well as CT thoracic spine without contrast to look for any postsurgical abnormalities  She agrees with pain control with IV pain medication  1559 Pregnancy test canceled as patient has history of hysterectomy  [id number] Patient had 12 beat run of V  tach on the monitor    Patient is resting comfortably in bed  Patient is in no acute distress  Patient states that she felt a fluttering in her heart when the V  tach happened but currently has no chest pain  Will obtain EKG as well as troponin  1646 Patient reexamined at bedside  Patient's back pain is starting to come back again  Will redose pain medication  Patient is currently going to CAT scan  SBIRT 22yo+    Flowsheet Row Most Recent Value   Initial Alcohol Screen: US AUDIT-C     1  How often do you have a drink containing alcohol? 0 Filed at: 06/10/2023 1448   2  How many drinks containing alcohol do you have on a typical day you are drinking? 0 Filed at: 06/10/2023 1448   3a  Male UNDER 65: How often do you have five or more drinks on one occasion? 0 Filed at: 06/10/2023 1448   3b  FEMALE Any Age, or MALE 65+: How often do you have 4 or more drinks on one occassion? 0 Filed at: 06/10/2023 1448   Audit-C Score 0 Filed at: 06/10/2023 1448   GABRIELA: How many times in the past year have you    Used an illegal drug or used a prescription medication for non-medical reasons? Never Filed at: 06/10/2023 1448                    Medical Decision Making  Obtain blood work  Reach out to neurosurgery team    Give IV fluids, pain medication and continue to monitor patient for worsening symptoms  Reached out to patient's neurosurgery team who recommended obtaining CT thoracic spine without contrast to look for any acute abnormalities  They agreed with pain medication  Patient had a 12 beat run of V  tach while in the emergency department  Patient felt a fluttering sensation during that time however denies any chest pain  EKG shows nonspecific T wave abnormalities without any other acute ST changes  Troponin is pending  Patient is requiring multiple doses of IV narcotics for pain control in the ED    Care patient signed out to the next attending will follow-up with troponin as well as reevaluate patient and follow-up with CT study  Patient will then be dispositioned appropriately  Amount and/or Complexity of Data Reviewed  Labs: ordered  Radiology: ordered  ECG/medicine tests: ordered and independent interpretation performed  Decision-making details documented in ED Course  Risk  Prescription drug management  Disposition  Final diagnoses:   Post-operative pain   Nonsustained ventricular tachycardia ([address] )     Time reflects when diagnosis was documented in both MDM as applicable and the Disposition within this note     Time User Action Codes Description Comment    6/10/2023  4:54 PM Colten Reyes [G89 18] Post-operative pain     6/10/2023  4:54 PM [place], [address] [place] [address] [I47 29] Nonsustained ventricular tachycardia [facility])       ED Disposition     None      Follow-up Information    None         Patient's Medications   Discharge Prescriptions    No medications on file       No discharge procedures on file      PDMP Review       Value Time User    PDMP Reviewed  Yes 6/1/2023  1:47 PM [place] [address], DO          ED Provider  Electronically Signed by           Laura Duckworth DO  06/10/23 0545

## 2023-06-10 NOTE — TELEPHONE ENCOUNTER
Patient reports severe intractable pain and is going to [facility] ED for evaluation; she asks that I let on call provider know

## 2023-06-10 NOTE — TELEMEDICINE
e-Consult (IPC)     Consults     Contacted by Dr Minesh Velarde at [facility]  Billie De 49 y o  female MRN: [id number]  Unit/Bed#: ED 08 Encounter: [id number]    Reason for Consult    Per provider report, patient is status post T8 spinous process, T9 total, superior T10 laminectomy for decompression by Dr Beata Vernon on 6/8/2023  She called last night regarding uncontrolled postoperative pain  See note from 6/9/2023 for further reference regarding this issue  It was recommended at that time with patient continue with postoperative pain that she could not control at home she was to seek out medical attention  She presents today to the emergency room with ongoing uncontrolled pain  Reporting having severe pain in her back with radiation across to her chest and ribs as well as pain in her legs  No red flag signs were documented  Available past medical history,social history, surgical history, medication list, drug allergies and review of systems were reviewed  /60   Pulse 86   Temp 99 4 °F (37 4 °C) (Tympanic)   Resp 18   SpO2 100%      Clinical exam per provider report, subjectively patient is complaining of back pain across her upper abdomen to ribs as well as increasing bilateral leg pain  On exam her dressing is still in place but no obvious signs of erythema, edema, drainage  Imaging personally reviewed  · CT thoracic spine without contrast 6/10/2023: Official read pending however per my interpretation postsurgical changes appreciated without isaak compressive collection or hematoma      Labs:  · White blood cell count slightly elevated however trending down, likely reactional from surgery as patient is currently afebrile    Of note patient was noted to have 12 beats of V  tach on monitor, EKG obtained which was noted to have sinus rhythm, rate 83, normal axis, normal intervals, no acute ST elevations noted, low amplitude T waves noted throughout suggesting nonspecific T wave abnormality that is new compared to previous EKG  12 beats of V  tach are no longer present on EKG  Troponin obtained which is within normal limits, follow-up troponins pending  Assessment and Recommendations    3 23-year-old female status post thoracic decompression here with uncontrolled postoperative pain  2  For now would hold off on transfer  3  Can admit if patient's pain is not well controlled  4  Consider cardiology work-up for 12 beats of V  tach  5  Would recommend multimodal regimen  Consider opioid rotation to oral Dilaudid 2 mg and 4 mg every 4 hours as needed for moderate and severe pain given patient is not getting any relief from oxycodone  Consider muscle relaxer rotation to Robaxin-750 milligrams every 6 hours nodules for muscle spasms  6  Continue to monitor patient's neurological exam, reach out if there is any decline that may warrant transfer    All questions answered  Provider is in agreement with the course of action  11-20 minutes, >50% of the total time devoted to medical consultative verbal/EMR discussion between providers  Written report will be generated in the EMR

## 2023-06-11 LAB
ANION GAP SERPL CALCULATED.3IONS-SCNC: 5 MMOL/L (ref 4–13)
BUN SERPL-MCNC: 10 MG/DL (ref 5–25)
CALCIUM SERPL-MCNC: 8.6 MG/DL (ref 8.4–10.2)
CHLORIDE SERPL-SCNC: 104 MMOL/L (ref 96–108)
CO2 SERPL-SCNC: 28 MMOL/L (ref 21–32)
CREAT SERPL-MCNC: 0.82 MG/DL (ref 0.6–1.3)
ERYTHROCYTE [DISTWIDTH] IN BLOOD BY AUTOMATED COUNT: 13.7 % (ref 11.6–15.1)
GFR SERPL CREATININE-BSD FRML MDRD: 84 ML/MIN/1.73SQ M
GLUCOSE SERPL-MCNC: 85 MG/DL (ref 65–140)
HCT VFR BLD AUTO: 39.1 % (ref 34.8–46.1)
HGB BLD-MCNC: 12.6 G/DL (ref 11.5–15.4)
MAGNESIUM SERPL-MCNC: 2.2 MG/DL (ref 1.9–2.7)
MCH RBC QN AUTO: 29.7 PG (ref 26.8–34.3)
MCHC RBC AUTO-ENTMCNC: 32.2 G/DL (ref 31.4–37.4)
MCV RBC AUTO: 92 FL (ref 82–98)
PLATELET # BLD AUTO: 141 THOUSANDS/UL (ref 149–390)
PMV BLD AUTO: 9.8 FL (ref 8.9–12.7)
POTASSIUM SERPL-SCNC: 3.6 MMOL/L (ref 3.5–5.3)
RBC # BLD AUTO: 4.24 MILLION/UL (ref 3.81–5.12)
SODIUM SERPL-SCNC: 137 MMOL/L (ref 135–147)
WBC # BLD AUTO: 8.08 THOUSAND/UL (ref 4.31–10.16)

## 2023-06-11 PROCEDURE — 99232 SBSQ HOSP IP/OBS MODERATE 35: CPT | Performed by: INTERNAL MEDICINE

## 2023-06-11 PROCEDURE — 83735 ASSAY OF MAGNESIUM: CPT | Performed by: NURSE PRACTITIONER

## 2023-06-11 PROCEDURE — 80048 BASIC METABOLIC PNL TOTAL CA: CPT | Performed by: NURSE PRACTITIONER

## 2023-06-11 PROCEDURE — 99205 OFFICE O/P NEW HI 60 MIN: CPT | Performed by: INTERNAL MEDICINE

## 2023-06-11 PROCEDURE — 85027 COMPLETE CBC AUTOMATED: CPT | Performed by: NURSE PRACTITIONER

## 2023-06-11 RX ORDER — METHOCARBAMOL 500 MG/1
750 TABLET, FILM COATED ORAL EVERY 6 HOURS SCHEDULED
Status: DISCONTINUED | OUTPATIENT
Start: 2023-06-11 | End: 2023-06-14 | Stop reason: HOSPADM

## 2023-06-11 RX ORDER — METHOCARBAMOL 500 MG/1
750 TABLET, FILM COATED ORAL EVERY 6 HOURS SCHEDULED
Status: DISCONTINUED | OUTPATIENT
Start: 2023-06-11 | End: 2023-06-11

## 2023-06-11 RX ORDER — OXYCODONE HYDROCHLORIDE 10 MG/1
10 TABLET ORAL EVERY 4 HOURS PRN
Status: DISCONTINUED | OUTPATIENT
Start: 2023-06-11 | End: 2023-06-12

## 2023-06-11 RX ORDER — SENNOSIDES 8.6 MG
1 TABLET ORAL
Status: DISCONTINUED | OUTPATIENT
Start: 2023-06-11 | End: 2023-06-12

## 2023-06-11 RX ORDER — DIPHENHYDRAMINE HYDROCHLORIDE 50 MG/ML
25 INJECTION INTRAMUSCULAR; INTRAVENOUS ONCE
Status: COMPLETED | OUTPATIENT
Start: 2023-06-11 | End: 2023-06-11

## 2023-06-11 RX ORDER — HYDROMORPHONE HCL/PF 1 MG/ML
1 SYRINGE (ML) INJECTION
Status: DISCONTINUED | OUTPATIENT
Start: 2023-06-11 | End: 2023-06-13

## 2023-06-11 RX ORDER — LIDOCAINE 50 MG/G
1 PATCH TOPICAL DAILY
Status: DISCONTINUED | OUTPATIENT
Start: 2023-06-11 | End: 2023-06-14 | Stop reason: HOSPADM

## 2023-06-11 RX ORDER — DOCUSATE SODIUM 100 MG/1
100 CAPSULE, LIQUID FILLED ORAL 2 TIMES DAILY
Status: DISCONTINUED | OUTPATIENT
Start: 2023-06-11 | End: 2023-06-14 | Stop reason: HOSPADM

## 2023-06-11 RX ADMIN — GABAPENTIN 600 MG: 300 CAPSULE ORAL at 09:47

## 2023-06-11 RX ADMIN — METHOCARBAMOL TABLETS 750 MG: 500 TABLET, COATED ORAL at 23:07

## 2023-06-11 RX ADMIN — ACETAMINOPHEN 975 MG: 325 TABLET ORAL at 05:14

## 2023-06-11 RX ADMIN — DIPHENHYDRAMINE HYDROCHLORIDE 25 MG: 50 INJECTION, SOLUTION INTRAMUSCULAR; INTRAVENOUS at 04:36

## 2023-06-11 RX ADMIN — OXYCODONE HYDROCHLORIDE 15 MG: 5 TABLET ORAL at 12:48

## 2023-06-11 RX ADMIN — ENOXAPARIN SODIUM 40 MG: 40 INJECTION SUBCUTANEOUS at 09:47

## 2023-06-11 RX ADMIN — HYDROMORPHONE HYDROCHLORIDE 0.5 MG: 1 INJECTION, SOLUTION INTRAMUSCULAR; INTRAVENOUS; SUBCUTANEOUS at 06:44

## 2023-06-11 RX ADMIN — ACETAMINOPHEN 975 MG: 325 TABLET ORAL at 21:39

## 2023-06-11 RX ADMIN — METHOCARBAMOL TABLETS 750 MG: 500 TABLET, COATED ORAL at 05:14

## 2023-06-11 RX ADMIN — SENNOSIDES 8.6 MG: 8.6 TABLET, FILM COATED ORAL at 14:31

## 2023-06-11 RX ADMIN — OXYCODONE HYDROCHLORIDE 10 MG: 10 TABLET ORAL at 08:07

## 2023-06-11 RX ADMIN — HYDROMORPHONE HYDROCHLORIDE 1 MG: 1 INJECTION, SOLUTION INTRAMUSCULAR; INTRAVENOUS; SUBCUTANEOUS at 16:40

## 2023-06-11 RX ADMIN — ACYCLOVIR 400 MG: 800 TABLET ORAL at 09:47

## 2023-06-11 RX ADMIN — ACETAMINOPHEN 975 MG: 325 TABLET ORAL at 14:31

## 2023-06-11 RX ADMIN — HYDROMORPHONE HYDROCHLORIDE 1 MG: 1 INJECTION, SOLUTION INTRAMUSCULAR; INTRAVENOUS; SUBCUTANEOUS at 11:53

## 2023-06-11 RX ADMIN — GABAPENTIN 600 MG: 300 CAPSULE ORAL at 16:21

## 2023-06-11 RX ADMIN — HYDROMORPHONE HYDROCHLORIDE 0.5 MG: 1 INJECTION, SOLUTION INTRAMUSCULAR; INTRAVENOUS; SUBCUTANEOUS at 09:45

## 2023-06-11 RX ADMIN — GABAPENTIN 600 MG: 300 CAPSULE ORAL at 20:46

## 2023-06-11 RX ADMIN — DOCUSATE SODIUM 100 MG: 100 CAPSULE, LIQUID FILLED ORAL at 17:46

## 2023-06-11 RX ADMIN — OXYCODONE HYDROCHLORIDE 10 MG: 10 TABLET ORAL at 00:32

## 2023-06-11 RX ADMIN — OXYCODONE HYDROCHLORIDE 10 MG: 10 TABLET ORAL at 14:35

## 2023-06-11 RX ADMIN — HYDROMORPHONE HYDROCHLORIDE 1 MG: 1 INJECTION, SOLUTION INTRAMUSCULAR; INTRAVENOUS; SUBCUTANEOUS at 20:46

## 2023-06-11 RX ADMIN — METHOCARBAMOL TABLETS 750 MG: 500 TABLET, COATED ORAL at 11:52

## 2023-06-11 RX ADMIN — ENOXAPARIN SODIUM 40 MG: 40 INJECTION SUBCUTANEOUS at 17:46

## 2023-06-11 RX ADMIN — LORATADINE 10 MG: 10 TABLET ORAL at 17:46

## 2023-06-11 RX ADMIN — SENNOSIDES 8.6 MG: 8.6 TABLET, FILM COATED ORAL at 21:39

## 2023-06-11 RX ADMIN — METHOCARBAMOL TABLETS 750 MG: 500 TABLET, COATED ORAL at 17:45

## 2023-06-11 RX ADMIN — PANTOPRAZOLE SODIUM 40 MG: 40 TABLET, DELAYED RELEASE ORAL at 05:15

## 2023-06-11 RX ADMIN — OXYCODONE HYDROCHLORIDE 15 MG: 5 TABLET ORAL at 19:56

## 2023-06-11 RX ADMIN — HYDROMORPHONE HYDROCHLORIDE 0.5 MG: 1 INJECTION, SOLUTION INTRAMUSCULAR; INTRAVENOUS; SUBCUTANEOUS at 02:58

## 2023-06-11 RX ADMIN — ACYCLOVIR 400 MG: 800 TABLET ORAL at 17:47

## 2023-06-11 RX ADMIN — LIDOCAINE 1 PATCH: 50 PATCH CUTANEOUS at 11:54

## 2023-06-11 NOTE — CONSULTS
Consultation - Cardiology   [facility]     Karissa Alicia 49 y o  female MRN: [id number]  : 1975  Unit/Bed#: [address] [place] [address] Encounter: [id number]      ASSESSMENT:  NSVT of 12 beats  Apparently occurred while the patient was in the ED  No documentation available  Searched on the floor and in the ED    S/p thoracic decompression surgery  Presented with thoracic postop severe pain  Obesity  TERESITA noncompliant/tolerant to CPAP  History of nicotine abuse  History of carcinoid lung tumor, s/p right lower lobectomy  Skin rash, improving with Benadryl and Zofran    RECOMMENDATIONS:    Maintain adequate electrolyte, potassium and magnesium balance  Continue telemetry monitor  Echocardiogram to evaluate LV function  Outpatient extended ambulatory monitor          Thank you for consulting me in the management and care of this patient  Physician Requesting Consult: Jazmine Kaur MD    Reason for Consult / Principal Problem: ? NSVT    Consults    HPI: Karissa Alicia is a 49y o  year old female who presents with severe postop thoracic pain after thoracic decompression surgery  In the ED she was supposedly noted to have 12 beats of NSVT  However searching on the floor and the ED including looking into the monitors, no documentation was found  Current EKG and telemetry monitor shows normal sinus rhythm  Patient denies any history of chest pain, dyspnea, palpitations, or syncope    Review of Systems   Musculoskeletal: Positive for arthralgias and back pain  All other systems reviewed and are negative        Historical Information   Past Medical History:   Diagnosis Date   • Acid reflux    • Allergic     Flagyl; anaphylaxis   • Anxiety    • Trejo's esophagus    • Cancer (HCC)    • GERD (gastroesophageal reflux disease)    • Headache(784 0)    • Hiatal hernia    • Hyperlipidemia    • Kidney stone    • Latent tuberculosis by skin test     Was treated with [address] [place] [address] for 6 months   • Lung cancer [facility]) diagnosed in 2018   • Obesity    • Obstructive sleep apnea 2018    Mild TERESITA  AHI 18 1   • Squamous cell skin cancer     Left buttocks   • STD (sexually transmitted disease)     HPV, HSV   • Urinary tract infection      Past Surgical History:   Procedure Laterality Date   • ABDOMINOPLASTY     • AUGMENTATION BREAST Bilateral    • BREAST SURGERY     • BRONCHOSCOPY N/A 2022    Procedure: BRONCHOSCOPY NAVIGATIONAL;  Surgeon: Celsa Muniz MD;  [facility];  Service: Thoracic   •  SECTION     • COLON SURGERY  2018    Rectocele Repair   • CYSTOSCOPY N/A 2022    Procedure: CYSTOSCOPY;  Surgeon: Fernando Escamilla MD;  [facility];  Service: Gynecology Oncology   • EGD     • ENDOBRONCHIAL ULTRASOUND (EBUS) N/A 2022    Procedure: ENDOBRONCHIAL ULTRASOUND (EBUS);   Surgeon: Celsa Muniz MD;  [facility];  Service: Thoracic   • HYSTERECTOMY     • IR BIOPSY LYMPH NODE  2022   • IR CHEST TUBE PLACEMENT  2022   • LAPAROSCOPIC CHOLECYSTECTOMY     • LIPOSUCTION     • LUNG SURGERY      VATS Lobectomy RLL   • LYMPH NODE BIOPSY  2022   • MOHS SURGERY Left 2022    left buttocks   • SC [address] INCL FLUOR GDNCE DX W/CELL WASHG SPX N/A 2022    Procedure: BRONCHOSCOPY FLEXIBLE;  Surgeon: Celsa Muniz MD;  [facility];  Service: Thoracic   • SC [address] INCL FLUOR GDNCE DX [address] WASHG 100 [facility] N/A 2022    Procedure: Shyann Tello;  Surgeon: Elinor Gaston MD;  [facility];  Service: Thoracic   • SC MOLINA FACETECTOMY & FORAMOTOMY 1 VRT SGM THORACIC N/A 2023    Procedure: Open T8 spinous process, T9 total, superior T10 laminectomy for decompression;  Surgeon: Mariella Rivero MD;  [facility];  Service: Neurosurgery   • SC LAPAROSCOPY W/RMVL ADNEXAL STRUCTURES Bilateral 2022    Procedure: OOPHORECTOMY W/ ROBOTICS, VAGINOTOMY;  Surgeon: Brianda Crawford MD;  [facility];  Service: Gynecology Oncology   • WI THORACOSCOPY W/LOBECTOMY SINGLE LOBE Right 07/28/2022    Procedure: LOBECTOMY LUNG;  Surgeon: Harish Maldonado MD;  [facility];  Service: Thoracic   • WI THORACOSCOPY W/LOBECTOMY SINGLE LOBE Right 07/28/2022    Procedure: THORACOSCOPY VIDEO ASSISTED SURGERY (VATS);   Surgeon: Harish Maldonado MD;  [facility];  Service: Thoracic   • REPAIR RECTOCELE  2018   • TONSILLECTOMY  1984   • TUBAL LIGATION  2002   • UPPER GASTROINTESTINAL ENDOSCOPY  2018     Social History     Substance and Sexual Activity   Alcohol Use Not Currently     Social History     Substance and Sexual Activity   Drug Use Never     Social History     Tobacco Use   Smoking Status Every Day   • Packs/day: 0 50   • Years: 36 00   • Total pack years: 18 00   • Types: Cigarettes   • Start date: 9/1/2022   Smokeless Tobacco Never     Family History:   Family History   Problem Relation Age of Onset   • Cancer Mother         Ovarian   • Ovarian cancer Mother    • Arthritis Mother         RA   • Autoimmune disease Mother         RA   • Stroke Father    • Dementia Father    • Vision loss Father    • Glaucoma Father    • Lung cancer Paternal Uncle    • Cancer Maternal Grandmother         Ovarian   • Diabetes Paternal Grandfather    • Diabetes Paternal Grandmother        Meds/Allergies    PTA meds:    Medications Prior to Admission   Medication   • ALPRAZolam (XANAX) 0 25 mg tablet   • gabapentin (Neurontin) 600 MG tablet   • levocetirizine (XYZAL) 5 MG tablet   • omeprazole (PriLOSEC) 40 MG capsule   • oxyCODONE (ROXICODONE) 5 immediate release tablet   • tiZANidine (Zanaflex) 4 mg tablet   • acyclovir (ZOVIRAX) 800 mg tablet   • clobetasol propionate (CLOBEX) 0 05 % shampoo   • ketoconazole (NIZORAL) 2 % shampoo   • methocarbamol (Robaxin-750) 750 mg tablet      Allergies   Allergen Reactions   • Metronidazole "Anaphylaxis     \"patient states she has been in anaphylaxis due to this medication prior\"     • Nitrofurantoin Shortness Of Breath       Current Facility-Administered Medications:   •  acetaminophen (TYLENOL) tablet 975 mg, 975 mg, Oral, Q8H [address], Cuiyin Yurik, CRNP, 975 mg at 06/11/23 0514  •  acyclovir (ZOVIRAX) tablet 400 mg, 400 mg, Oral, BID, Cuiyin Yurik, CRNP, 400 mg at 06/11/23 0947  •  diphenhydrAMINE (BENADRYL) tablet 25 mg, 25 mg, Oral, Q6H PRN, Cuiyin Yurik, CRNP, 25 mg at 06/10/23 2049  •  enoxaparin (LOVENOX) subcutaneous injection 40 mg, 40 mg, Subcutaneous, BID, Cuiyin Yurik, CRNP, 40 mg at 06/11/23 0947  •  gabapentin (NEURONTIN) capsule 600 mg, 600 mg, Oral, TID, Cuiyin Yurik, CRNP, 600 mg at 06/11/23 0947  •  HYDROmorphone (DILAUDID) injection 0 5 mg, 0 5 mg, Intravenous, Q2H PRN, Cuitaylern Denicerik, CRNP, 0 5 mg at 06/11/23 0945  •  loratadine (CLARITIN) tablet 10 mg, 10 mg, Oral, After Dinner, Cuiyin Yurik, CRNP, 10 mg at 06/10/23 2101  •  methocarbamol (ROBAXIN) tablet 750 mg, 750 mg, Oral, Q6H [address], Cuiyin Yurik, CRNP, 750 mg at 06/11/23 0514  •  oxyCODONE (ROXICODONE) immediate release tablet 10 mg, 10 mg, Oral, Q4H PRN, Cuiyin Denicerik, CRNP, 10 mg at 06/11/23 0807  •  oxyCODONE (ROXICODONE) IR tablet 5 mg, 5 mg, Oral, Q4H PRN, Cuiyin Yurik, CRNP  •  pantoprazole (PROTONIX) EC tablet 40 mg, 40 mg, Oral, Early Morning, Cuiyin Yurik, CRNP, 40 mg at 06/11/23 0515  •  polyethylene glycol (MIRALAX) packet 17 g, 17 g, Oral, Daily, GENESIS Vieyra  •  senna-docusate sodium (SENOKOT S) 8 6-50 mg per tablet 2 tablet, 2 tablet, Oral, HS, GENESIS Vieyra        Objective:   Vitals: Blood pressure 115/68, pulse 82, temperature 98 6 °F (37 °C), resp  rate 18, SpO2 96 %  There is no height or weight on file to calculate BMI    BP Readings from Last 3 Encounters:   06/11/23 115/68   06/09/23 106/62   05/19/23 130/84     Orthostatic Blood Pressures    Flowsheet Row Most Recent Value   Blood Pressure 115/68 filed " at 06/11/2023 0259   Patient Position - Orthostatic VS Lying filed at 06/10/2023 1930          Intake/Output Summary (Last 24 hours) at 6/11/2023 1057  Last data filed at 6/10/2023 1855  Gross per 24 hour   Intake 1000 ml   Output --   Net 1000 ml       Invasive Devices     Peripheral Intravenous Line  Duration           Peripheral IV 06/10/23 Right Antecubital <1 day          Drain  Duration           Closed/Suction Drain Left;Superior Back Bulb 2 days                  Physical Exam:   Physical Exam    Neurologic:  Alert & oriented x 3, no new focal deficits, Not in any acute distress,  Constitutional: Morbidly obese  Eyes:  Pupil equal and reacting to light, conjunctiva normal   HENT:  Atraumatic, oropharynx moist, Neck- normal range of motion, no tenderness, supple   Respiratory:  Bilateral air entry, mostly clear to auscultation  Cardiovascular: S1-S2 regular rhythm  GI:  Soft, nondistended, normal bowel sounds, nontender, no hepatosplenomegaly appreciated    Musculoskeletal: Back tenderness  Lymphatic:  No lymphadenopathy noted   Extremities: No lower extremity edema    Labs:   Troponins:       CBC with diff:   Results from last 7 days   Lab Units 06/11/23  0456 06/10/23  1548 06/09/23  0655   HEMATOCRIT % 39 1 37 9 38 7   HEMOGLOBIN g/dL 12 6 12 2 12 1   MCH pg 29 7 29 1 29 1   MCHC g/dL 32 2 32 2 31 3*   MCV fL 92 91 93   MPV fL 9 8 9 5 10 0   NRBC AUTO /100 WBCs  --  0  --    PLATELETS Thousands/uL 141* 155 153   RBC Million/uL 4 24 4 19 4 16   RDW % 13 7 13 5 13 5   WBC Thousand/uL 8 08 10 42* 11 75*       CMP:   Results from last 7 days   Lab Units 06/11/23  0456 06/10/23  1548 06/09/23  0655   ANION GAP mmol/L 5 5 2*   ALBUMIN g/dL  --  3 9  --    ALK PHOS U/L  --  81  --    ALT U/L  --  25  --    AST U/L  --  31  --    BUN mg/dL 10 13 12   CALCIUM mg/dL 8 6 9 0 8 5   CHLORIDE mmol/L 104 103 114*   CO2 mmol/L 28 27 23   CREATININE mg/dL 0 82 0 80 0 86   EGFR ml/min/1 73sq m 84 87 80   GLUCOSE RANDOM mg/dL "85 90 117   POTASSIUM mmol/L 3 6 3 9 3 8   SODIUM mmol/L 137 135 139   TOTAL BILIRUBIN mg/dL  --  0 74  --    TOTAL PROTEIN g/dL  --  7 3  --        Magnesium:   Results from last 7 days   Lab Units 06/11/23  0456 06/10/23  1548   MAGNESIUM mg/dL 2 2 1 9     Coags:   Results from last 7 days   Lab Units 06/09/23  0655   INR  1 04   PTT seconds 29     TSH:    Results from last 7 days   Lab Units 06/10/23  1548   TSH 3RD GENERATON uIU/mL 3 671     No components found for: \"TSH3\"  Lipid Profile:     Lipid Profile:   Lab Results   Component Value Date    CHOLESTEROL 238 (H) 02/03/2022    HDL 49 (L) 02/03/2022    LDLCALC 161 (H) 02/03/2022    TRIG 142 02/03/2022     Hgb A1c:     NT-proBNP: No results for input(s): \"NTBNP\" in the last 72 hours  Imaging & Testing   Cardiac testing:   No results found for this or any previous visit  Imaging:  CT spine thoracic without contrast    Result Date: 6/10/2023  Narrative: CT THORACIC SPINE INDICATION:   back pain, status post T8-T10 laminectomy on 6/8/2023  Anaid Yan COMPARISON:  None  TECHNIQUE:  Contiguous axial images were obtained  Sagittal and coronal reconstructions were performed  Radiation dose length product (DLP) for this visit:  [id number] 84 12 mGy-cm   This examination, like all CT scans performed in the [facility], was performed utilizing techniques to minimize radiation dose exposure, including the use of iterative reconstruction and automated exposure control  IMAGE QUALITY:  Diagnostic  FINDINGS: ALIGNMENT:  Normal alignment of the thoracic spine  No subluxation  VERTEBRAE:  No fracture  Patient is post T9-10 laminectomy  DEGENERATIVE CHANGES: PARASPINAL SOFT TISSUES: Postsurgical changes are seen in the surgical bed overlying the laminectomy        Impression: Lack of contrast limits evaluation  Patient is post T9-10 laminectomy  No CT (without contrast) evident complication visualized  Postsurgical changes are seen in the overlying soft tissues   " Otherwise unremarkable computed tomography of the thoracic spine  Consider MRI thoracic spine without and with contrast for further evaluation in the appropriate clinical setting  Workstation performed: [id number]     XR spine thoracic 2 vw    Result Date: 6/8/2023  Narrative: C-ARM -thoracic spine surgery  INDICATION: Myelomalacia  Procedure guidance  COMPARISON: 4/4/2023; 4/28/2023 TECHNIQUE: FLUOROSCOPY TIME:   14 8 seconds 1 FLUOROSCOPIC IMAGES FINDINGS: Fluoroscopic guidance provided for procedure guidance  Level verification was called to the OR at the time of image acquisition, as per Department protocol  Radiodense probe noted at the T10 level    A few clips in the right upper quadrant noted likely related to cholecystectomy, present on prior CT of the lumbar spine  Osseous and soft tissue detail limited by technique  Impression: Fluoroscopic guidance provided for procedure guidance  Please refer to the separate procedure notes for additional details  Workstation performed: [id number]     EKG/ Monitor: Personally reviewed     Normal sinus rhythm, nonspecific T wave abnormality     Medications/ Allergies:     Current Facility-Administered Medications   Medication Dose Route Frequency Provider Last Rate   • acetaminophen  975 mg Oral Q8H [facility] GENESIS Vieyra     • acyclovir  400 mg Oral BID GENESIS Vieyra     • diphenhydrAMINE  25 mg Oral Q6H PRN GENESIS Vieyra     • enoxaparin  40 mg Subcutaneous BID GENESIS Vieyra     • gabapentin  600 mg Oral TID GENESIS Vieyra     • HYDROmorphone  0 5 mg Intravenous Q2H PRN GENESIS Vieyra     • loratadine  10 mg Oral After GENESIS Alvarez     • methocarbamol  750 mg Oral Q6H [facility] GENESIS Vieyra     • oxyCODONE  10 mg Oral Q4H PRN GENESIS Vieyra     • oxyCODONE  5 mg Oral Q4H PRN GENESIS Vieyra     • pantoprazole  40 mg Oral Early Morning GENESIS Vieyra     • polyethylene glycol  17 g Oral Daily GENESIS Vieyra     • "senna-docusate sodium  2 tablet Oral HS GENESIS Vieyra       diphenhydrAMINE, 25 mg, Q6H PRN  HYDROmorphone, 0 5 mg, Q2H PRN  oxyCODONE, 10 mg, Q4H PRN  oxyCODONE, 5 mg, Q4H PRN      Allergies   Allergen Reactions   • Metronidazole Anaphylaxis     \"patient states she has been in anaphylaxis due to this medication prior\"     • Nitrofurantoin Shortness Of Breath       Code Status: Level 1 - Full Code  Advance Directive and Living Will:      POLST:        Jn Banerjee MD, [facility] [place]      \"This note has been constructed using a voice recognition system  Therefore there may be syntax, spelling, and/or grammatical errors  Please call if you have any questions   \"  "

## 2023-06-11 NOTE — H&P
Johnathanbenniesolange 44  H&P  Name: Doristine Duane 49 y o  female I MRN: [id number]  Unit/Bed#: 9[address]44 [facility] [address] I Date of Admission: 6/10/2023   Date of Service: 6/10/2023 I Hospital Day: 0      Assessment/Plan   * Thoracic back pain  Assessment & Plan  Patient presents with uncontrolled thoracic back pain  Reports had spinal surgery on 6/8th in [facility], discharged yesterday, reports severe thoracic back pain at home and po oxycodone not helping  · Of note, patient is POD 2 T8 spinous process, T9 total, superior T10 laminectomy for decompression  · CT thoracic spine without contrast in ED showed  -no CT evident complication visualized  · ED provider discussed case with neurosurgery, recommend admit for pain control, neuro checks  · Patient received multiple dose of IV Dilaudid in ED  · Will order ATC Tylenol, continue gabapentin, oxycodone 5-10 milligram p o  every 4 hours as needed, Dilaudid as needed for breakthrough pain  · Neuro checks    NSVT (nonsustained ventricular tachycardia) (HCC)  Assessment & Plan  · Patient had a 12 beat run of V tach on ED monitor  · EKG NSR  · Troponins negative  · Place on telemetry  · Optimize electrolytes  · Consult cardiology    Thoracic spondylosis with myelopathy  Assessment & Plan  · Status post surgery as above  · Follow-up neurosurgery as outpatient    Nicotine abuse  Assessment & Plan  · Patient declined nicotine patch, smoking cessation    Genital herpes simplex  Assessment & Plan  · Continue acyclovir prophylactically    Anxiety  Assessment & Plan  · Was on Xanax as needed at home  Will hold for now as patient is on narcotics  Gastroesophageal reflux disease  Assessment & Plan  · Continue PPI    Carcinoid tumor of lung  Assessment & Plan  · Noted history    · Status post right thoracoscopic lower lobectomy mediastinal lymph node dissection in 7/2022    Class 3 severe obesity due to excess calories without serious comorbidity with body mass index (BMI) of 40 0 to 44 9 in adult [facility])  Assessment & Plan  · Diet and lifestyle modification      Obstructive sleep apnea  Assessment & Plan  · Intolerant to CPAP        VTE Prophylaxis: Enoxaparin (Lovenox)  / reason for no mechanical VTE prophylaxis on Lovenox   Code Status: Full code  POLST: POLST form is not discussed and not completed at this time  Anticipated Length of Stay:  Patient will be admitted on an Observation basis with an anticipated length of stay of  < 2 midnights  Justification for Hospital Stay: Uncontrolled thoracic back pain    Total Time for Visit, including Counseling / Coordination of Care: 45 minutes  Greater than 50% of this total time spent on direct patient counseling and coordination of care  Chief Complaint: Uncontrolled thoracic back pain after surgery    History of Present Illness:    Jeannette Burrows is a 49 y o  female with PMH of thoracic back pain, thoracic spondylosis with myelopathy, GERD, lung cancer, genital herpes simplex, nicotine abuse, anxiety, obesity who presents with uncontrolled thoracic back pain  Reports had spinal surgery on 6/8th in [facility], discharged yesterday, reports severe thoracic back pain at home and po oxycodone not helping  Patient reports did not sleep at all since discharge yesterday  Reports mid back pain radiates to ribs in the front on both side  Reports constant sharp pain  Denies numbness tingling to extremities  Denies chest pain, headache, dizziness, SOB, nausea vomiting diarrhea constipation, fever, chills  Reports taking oxycodone 5 mg p o  every 2 hours at home without effect  Patient reports ambulating with cane at home without difficulty  Review of Systems:    Review of Systems   Musculoskeletal: Positive for back pain  All other systems reviewed and are negative        Past Medical and Surgical History:     Past Medical History:   Diagnosis Date   • Acid reflux    • Allergic 2006    Flagyl; anaphylaxis   • Anxiety    • Trejo's esophagus    • Cancer (HCC)    • GERD (gastroesophageal reflux disease)    • Headache(784 0)    • Hiatal hernia    • Hyperlipidemia    • Kidney stone    • Latent tuberculosis by skin test     Was treated with INH for 6 months   • Lung cancer ([address] ) diagnosed in    • Obesity    • Obstructive sleep apnea 2018    Mild TERESITA  AHI 18 1   • Squamous cell skin cancer     Left buttocks   • STD (sexually transmitted disease)     HPV, HSV   • Urinary tract infection        Past Surgical History:   Procedure Laterality Date   • ABDOMINOPLASTY     • AUGMENTATION BREAST Bilateral    • BREAST SURGERY     • BRONCHOSCOPY N/A 2022    Procedure: BRONCHOSCOPY NAVIGATIONAL;  Surgeon: Adalberto Gray MD;  [facility];  Service: Thoracic   •  SECTION     • COLON SURGERY  2018    Rectocele Repair   • CYSTOSCOPY N/A 2022    Procedure: CYSTOSCOPY;  Surgeon: Pina Stout MD;  [facility];  Service: Gynecology Oncology   • EGD     • ENDOBRONCHIAL ULTRASOUND (EBUS) N/A 2022    Procedure: ENDOBRONCHIAL ULTRASOUND (EBUS);   Surgeon: Adalberto Gray MD;  [facility];  Service: Thoracic   • HYSTERECTOMY     • IR BIOPSY LYMPH NODE  2022   • IR CHEST TUBE PLACEMENT  2022   • LAPAROSCOPIC CHOLECYSTECTOMY     • LIPOSUCTION     • LUNG SURGERY      VATS Lobectomy RLL   • LYMPH NODE BIOPSY  2022   • MOHS SURGERY Left 2022    left buttocks   • AL [address] INCL FLUOR GDNCE DX W/CELL WASHG SPX N/A 2022    Procedure: BRONCHOSCOPY FLEXIBLE;  Surgeon: Adalberto Gray MD;  [facility];  Service: Thoracic   • AL [address] INCL FLUOR GDNCE DX [address] WASHG 100 [facility] N/A 2022    Procedure: Shantell Sims;  Surgeon: Pablo Rock MD;  [facility];  Service: Thoracic   • AL MOLINA FACETECTOMY & FORAMOTOMY 1 VRT SGM THORACIC N/A 2023    Procedure: Open T8 spinous process, T9 total, superior T10 laminectomy for decompression;  Surgeon: Etienne Mejia MD;  [facility];  Service: Neurosurgery   • AL LAPAROSCOPY W/RMVL ADNEXAL STRUCTURES Bilateral 12/20/2022    Procedure: OOPHORECTOMY W/ ROBOTICS, VAGINOTOMY;  Surgeon: Madeline Thornton MD;  [facility];  Service: Gynecology Oncology   • AL THORACOSCOPY W/LOBECTOMY SINGLE LOBE Right 07/28/2022    Procedure: LOBECTOMY LUNG;  Surgeon: Krystina Fermin MD;  [facility];  Service: Thoracic   • AL THORACOSCOPY W/LOBECTOMY SINGLE LOBE Right 07/28/2022    Procedure: THORACOSCOPY VIDEO ASSISTED SURGERY (VATS); Surgeon: Krystina Fermin MD;  [facility];  Service: Thoracic   • REPAIR RECTOCELE  2018   • TONSILLECTOMY  1984   • TUBAL LIGATION  2002   • UPPER GASTROINTESTINAL ENDOSCOPY  2018       Meds/Allergies:    Prior to Admission medications    Medication Sig Start Date End Date Taking?  Authorizing Provider   gabapentin (Neurontin) 600 MG tablet Take 1 tablet (600 mg total) by mouth 3 (three) times a day 4/28/23  Yes Nati Tee DO   levocetirizine (XYZAL) 5 MG tablet TAKE 1 TABLET BY MOUTH IN  THE EVENING 4/21/23  Yes Sherrie Ngo MD   omeprazole (PriLOSEC) 40 MG capsule TAKE 1 CAPSULE BY MOUTH  DAILY 1/17/23  Yes Sherrie Ngo MD   oxyCODONE (ROXICODONE) 5 immediate release tablet Take 1 tablet (5 mg total) by mouth every 4 (four) hours as needed for moderate pain for up to 10 days Max Daily Amount: 30 mg 6/9/23 6/19/23 Yes GENESIS Blancas   tiZANidine (Zanaflex) 4 mg tablet Take 1 tablet (4 mg total) by mouth every 8 (eight) hours as needed for muscle spasms 4/28/23  Yes Nati Tee DO   acyclovir (ZOVIRAX) 800 mg tablet Take 0 5 tablets (400 mg total) by mouth 2 (two) times a day 10/18/22 6/8/23  Sherrie Ngo MD   ALPRAZolam Gennett Nickel) 0 25 mg tablet Take 1 tablet (0 25 mg total) by mouth daily at bedtime as needed for anxiety 6/1/23 "Thompson Sanford,    clobetasol propionate (CLOBEX) 0 05 % shampoo Apply 1 application  topically 2 (two) times a week 6/1/23   Yaima Moralez MD   ketoconazole (NIZORAL) 2 % shampoo Apply 1 application  topically 2 (two) times a week 6/1/23   Yaima Moralez MD   methocarbamol (Robaxin-750) 750 mg tablet Take 1 tablet (750 mg total) by mouth every 6 (six) hours  Patient not taking: Reported on 6/10/2023 6/9/23   Sharlett Goltz Ireifej, PA-C     I have reviewed home medications with patient personally  Allergies: Allergies   Allergen Reactions   • Metronidazole Anaphylaxis     \"patient states she has been in anaphylaxis due to this medication prior\"     • Nitrofurantoin Shortness Of Breath       Social History:     Marital Status:    Occupation: Unclear  Patient Pre-hospital Living Situation: Family  Patient Pre-hospital Level of Mobility: Cane  Patient Pre-hospital Diet Restrictions: Regular diet  Substance Use History:   Social History     Substance and Sexual Activity   Alcohol Use Not Currently     Social History     Tobacco Use   Smoking Status Every Day   • Packs/day: 0 50   • Years: 36 00   • Total pack years: 18 00   • Types: Cigarettes   • Start date: 9/1/2022   Smokeless Tobacco Never     Social History     Substance and Sexual Activity   Drug Use Never       Family History:    non-contributory    Physical Exam:     Vitals:   Blood Pressure: 169/77 (06/10/23 1930)  Pulse: 86 (06/10/23 1930)  Temperature: 99 4 °F (37 4 °C) (06/10/23 1305)  Temp Source: Tympanic (06/10/23 1305)  Respirations: 16 (06/10/23 1930)  SpO2: 99 % (06/10/23 1930)    Physical Exam  Vitals and nursing note reviewed  Constitutional:       Appearance: She is well-developed  She is obese  HENT:      Head: Normocephalic and atraumatic  Neck:      Thyroid: No thyromegaly  Vascular: No JVD  Trachea: No tracheal deviation  Cardiovascular:      Rate and Rhythm: Normal rate and regular rhythm        " Heart sounds: Normal heart sounds  Pulmonary:      Effort: Pulmonary effort is normal  No respiratory distress  Breath sounds: Normal breath sounds  No wheezing or rales  Comments: Diminished breath sounds bilateral,on  room air, respiration easy  Abdominal:      General: Bowel sounds are normal  There is no distension  Palpations: Abdomen is soft  Tenderness: There is no abdominal tenderness  There is no guarding  Musculoskeletal:         General: No swelling  Cervical back: Neck supple  Left lower leg: No edema  Comments: Back dressing intact  Skin:     General: Skin is warm and dry  Neurological:      General: No focal deficit present  Mental Status: She is alert and oriented to person, place, and time  Psychiatric:         Mood and Affect: Mood normal          Judgment: Judgment normal          Additional Data:     Lab Results: I have personally reviewed pertinent reports  Results from last 7 days   Lab Units 06/10/23  1548   EOS PCT % 4   HEMATOCRIT % 37 9   HEMOGLOBIN g/dL 12 2   LYMPHS PCT % 38   MONOS PCT % 7   NEUTROS PCT % 50   PLATELETS Thousands/uL 155   WBC Thousand/uL 10 42*     Results from last 7 days   Lab Units 06/10/23  1548   ALK PHOS U/L 81   ALT U/L 25   AST U/L 31   BUN mg/dL 13   CALCIUM mg/dL 9 0   CHLORIDE mmol/L 103   CO2 mmol/L 27   CREATININE mg/dL 0 80   POTASSIUM mmol/L 3 9     Results from last 7 days   Lab Units 06/09/23  0655   INR  1 04       Imaging: I have personally reviewed pertinent reports  CT spine thoracic without contrast    Result Date: 6/10/2023  Narrative: CT THORACIC SPINE INDICATION:   back pain, status post T8-T10 laminectomy on 6/8/2023  Cecykeonalyce Selby COMPARISON:  None  TECHNIQUE:  Contiguous axial images were obtained  Sagittal and coronal reconstructions were performed  Radiation dose length product (DLP) for this visit:  [id number] 84 12 mGy-cm     This examination, like all CT scans performed in the [facility], was performed utilizing techniques to minimize radiation dose exposure, including the use of iterative reconstruction and automated exposure control  IMAGE QUALITY:  Diagnostic  FINDINGS: ALIGNMENT:  Normal alignment of the thoracic spine  No subluxation  VERTEBRAE:  No fracture  Patient is post T9-10 laminectomy  DEGENERATIVE CHANGES: PARASPINAL SOFT TISSUES: Postsurgical changes are seen in the surgical bed overlying the laminectomy        Impression: Lack of contrast limits evaluation  Patient is post T9-10 laminectomy  No CT (without contrast) evident complication visualized  Postsurgical changes are seen in the overlying soft tissues  Otherwise unremarkable computed tomography of the thoracic spine  Consider MRI thoracic spine without and with contrast for further evaluation in the appropriate clinical setting  Workstation performed: [id number]     XR spine thoracic 2 vw    Result Date: 6/8/2023  Narrative: C-ARM -thoracic spine surgery  INDICATION: Myelomalacia  Procedure guidance  COMPARISON: 4/4/2023; 4/28/2023 TECHNIQUE: FLUOROSCOPY TIME:   14 8 seconds 1 FLUOROSCOPIC IMAGES FINDINGS: Fluoroscopic guidance provided for procedure guidance  Level verification was called to the OR at the time of image acquisition, as per Department protocol  Radiodense probe noted at the T10 level    A few clips in the right upper quadrant noted likely related to cholecystectomy, present on prior CT of the lumbar spine  Osseous and soft tissue detail limited by technique  Impression: Fluoroscopic guidance provided for procedure guidance  Please refer to the separate procedure notes for additional details  Workstation performed: [id number]       EKG, Pathology, and Other Studies Reviewed on Admission:   · EKG: nsr    Allscripts Records Reviewed: Yes     ** Please Note: Dragon 360 Dictation voice to text software may have been used in the creation of this document   **

## 2023-06-11 NOTE — PLAN OF CARE
Problem: PAIN - ADULT  Goal: Verbalizes/displays adequate comfort level or baseline comfort level  Description: Interventions:  - Encourage patient to monitor pain and request assistance  - Assess pain using appropriate pain scale  - Administer analgesics based on type and severity of pain and evaluate response  - Implement non-pharmacological measures as appropriate and evaluate response  - Consider cultural and social influences on pain and pain management  - Notify physician/advanced practitioner if interventions unsuccessful or patient reports new pain  Outcome: Progressing     Problem: INFECTION - ADULT  Goal: Absence or prevention of progression during hospitalization  Description: INTERVENTIONS:  - Assess and monitor for signs and symptoms of infection  - Monitor lab/diagnostic results  - Monitor all insertion sites, i e  indwelling lines, tubes, and drains  - Monitor endotracheal if appropriate and nasal secretions for changes in amount and color  - [place] appropriate cooling/warming therapies per order  - Administer medications as ordered  - Instruct and encourage patient and family to use good hand hygiene technique  - Identify and instruct in appropriate isolation precautions for identified infection/condition  Outcome: Progressing  Goal: Absence of fever/infection during neutropenic period  Description: INTERVENTIONS:  - Monitor WBC    Outcome: Progressing     Problem: SAFETY ADULT  Goal: Patient will remain free of falls  Description: INTERVENTIONS:  - Educate patient/family on patient safety including physical limitations  - Instruct patient to call for assistance with activity   - Consult OT/PT to assist with strengthening/mobility   - Keep Call bell within reach  - Keep bed low and locked with side rails adjusted as appropriate  - Keep care items and personal belongings within reach  - Initiate and maintain comfort rounds  - Make Fall Risk Sign visible to staff  - Apply yellow socks and bracelet for high fall risk patients  - Consider moving patient to room near nurses station  Outcome: Progressing  Goal: Maintain or return to baseline ADL function  Description: INTERVENTIONS:  -  Assess patient's ability to carry out ADLs; assess patient's baseline for ADL function and identify physical deficits which impact ability to perform ADLs (bathing, care of mouth/teeth, toileting, grooming, dressing, etc )  - Assess/evaluate cause of self-care deficits   - Assess range of motion  - Assess patient's mobility; develop plan if impaired  - Assess patient's need for assistive devices and provide as appropriate  - Encourage maximum independence but intervene and supervise when necessary  - Involve family in performance of ADLs  - Assess for home care needs following discharge   - Consider OT consult to assist with ADL evaluation and planning for discharge  - Provide patient education as appropriate  Outcome: Progressing  Goal: Maintains/Returns to pre admission functional level  Description: INTERVENTIONS:  - Perform BMAT or MOVE assessment daily    - Set and communicate daily mobility goal to care team and patient/family/caregiver     - Collaborate with rehabilitation services on mobility goals if consulted  Problem: DISCHARGE PLANNING  Goal: Discharge to home or other facility with appropriate resources  Description: INTERVENTIONS:  - Identify barriers to discharge w/patient and caregiver  - Arrange for needed discharge resources and transportation as appropriate  - Identify discharge learning needs (meds, wound care, etc )  - Arrange for interpretive services to assist at discharge as needed  - Refer to Case Management Department for coordinating discharge planning if the patient needs post-hospital services based on physician/advanced practitioner order or complex needs related to functional status, cognitive ability, or social support system  Outcome: Progressing     Problem: Knowledge Deficit  Goal: Patient/family/caregiver demonstrates understanding of disease process, treatment plan, medications, and discharge instructions  Description: Complete learning assessment and assess knowledge base  Interventions:  - Provide teaching at level of understanding  - Provide teaching via preferred learning methods  Outcome: Progressing     - Reposition patient every 2 hours    - Out of bed for toileting  - Record patient progress and toleration of activity level   Outcome: Progressing

## 2023-06-11 NOTE — ASSESSMENT & PLAN NOTE
· Noted history    · Status post right thoracoscopic lower lobectomy mediastinal lymph node dissection in 7/2022

## 2023-06-11 NOTE — ASSESSMENT & PLAN NOTE
· Patient had a 12 beat run of V tach on ED monitor-rhythm strip not available  · Telemetry showed no further V  tach or any arrhythmias  · Cardiology input appreciated  · 2D echo to assess LV function  · Maintain potassium level over 4 and magnesium level over 2

## 2023-06-11 NOTE — UTILIZATION REVIEW
Initial Clinical Review    Admission: Date/Time/Statement:     OBSERVATION 6-10-23 CHANGED TO INPATIENT 6- 11-23 FOR PERSISTENT THORACIC BACK PAIN S/P T8-9-10 LAMINECTOMY FOR DECOMPRESSION     06/12/23   1350  Inpatient Admission  Once        Transfer Service: Hospitalist    Question Answer   Level of Care Med Surg   Estimated length of stay More than 2 Midnights               Admission Orders (From admission, onward)     Ordered        06/10/23 1923  Place in Observation  Once                        ED Arrival Information     Expected   -    Arrival   6/10/2023 12:49    Acuity   Urgent            Means of arrival   Walk-In    Escorted by   Self    Service   Hospitalist    Admission type   Emergency            Arrival complaint   Pain post op           Chief Complaint   Patient presents with   • Post-op Problem     Spinal surgery on 8th,, c/o radiation across upper abd to ribs bilat  Had this prior to surgery but extremely severe  Po meds not helping       Initial Presentation: 49 y o  female presents to ed from home for evaluation and treatment of post op pain  Patient is pod 2 from T 8 spinous process  T9 total , superior T10 laminectomy for decompression  Imaging without acute finding  Ed monitor shows 12 beat run of v tach  Trop negative  MAG 1 9, k 3 9  Initially treated with iv  9% ns bolus, iv dilaudid x3, iv zofran x1  Admit to observation for pos top pain, NSVT   Plan includes telemetry, optimize electrolytes, consult cardiology , consult neurosurgery  Date:  6-11-23  Day 2: observation  Patient developed rash which may be from [facility] monitor strap  Treated with iv benadryl  Cardiology consult completed  Plan to maintain electrolyte, mag and K balance, monitor on telemetry, obtain echo  Outpatient ambulatory monitor  Back pain 8-9/10  Received iv dilaudid 0 5 at [address] [facility],[address], 1154, 0945  Dilaudid  dosage increased to 1 mg received at 1153    Oxycodone 10 mg received at 0032, 0807 and 15 mg received at 1248  Pain improved from 9/10 to 7/10  Neuro check q4 hr     6-12-23 observation changed to inpatient  patient reports persistent severe pain taking analgesia q4 hr with breakthrough pain medication  Tylenol 975 mg q8 hr, lidocaine patches, robaxin 750 mg q6 hr, oxycodone prn 5-10 mg and iv dilaudid prn q3 hr breakthrough pain  Plan to increase oxycodone to q3 hr  Iv dilaudid must be weaned to lower doses  Patient unable to tolerate Echo due to pain,  Continue telemetry  to monitor for NSVT  Today received iv dilaudi 1 mg at [facility], 0530, 0935, 1250, 1601  Roxicodone 5mg  0001, 1459, 0841  and 15 mg 1143, 1459     ED Triage Vitals [06/10/23 1305]   99 4 °F (37 4 °C) 84 22 134/73 100 %      Tympanic Monitor         10 - Worst Possible Pain          Wt Readings from Last 1 Encounters:   06/08/23 (!) 143 kg (315 lb 4 1 oz)     Additional Vital Signs:     Patient Vitals for the past 24 hrs:   BP Temp Pulse Resp SpO2   06/11/23 0259 115/68 98 6 °F (37 °C) 82 18 96 %   06/10/23 2313 116/62 98 2 °F (36 8 °C) 91 -- 96 %   06/10/23 2015 154/91 98 4 °F (36 9 °C) 105 18 95 %   06/10/23 1930 169/77 -- 86 16 99 %   06/10/23 1800 129/60 -- 84 14 95 %   06/10/23 1730 121/60 -- 83 13 --   06/10/23 1710 123/60 -- 86 18 100 %   06/10/23 1615 -- -- 87 19 100 %   06/10/23 1600 -- -- 82 20 99 %   06/10/23 1530 107/66 -- 82 -- 100 %   06/10/23 1500 106/61 -- 80 -- 99 %   06/10/23 1443 122/85 -- 86 -- 100 %             Pertinent Labs/Diagnostic Test Results:     CT spine thoracic without contrast   Final  (06/10 1837)   Lack of contrast limits evaluation  Patient is post T9-10 laminectomy  No CT (without contrast) evident complication visualized  Postsurgical changes are seen in the overlying soft tissues  Otherwise unremarkable computed tomography of the thoracic spine  Consider MRI thoracic spine without and with contrast for further evaluation in the appropriate clinical setting              Results from last 7 days   Lab Units 06/11/23  0456 06/10/23  1548 06/09/23  0655   HEMATOCRIT % 39 1 37 9 38 7   HEMOGLOBIN g/dL 12 6 12 2 12 1   NEUTROS ABS Thousands/µL  --  5 22  --    PLATELETS Thousands/uL 141* 155 153   WBC Thousand/uL 8 08 10 42* 11 75*         Results from last 7 days   Lab Units 06/11/23  0456 06/10/23  1548 06/09/23  0655   ANION GAP mmol/L 5 5 2*   BUN mg/dL 10 13 12   CALCIUM mg/dL 8 6 9 0 8 5   CHLORIDE mmol/L 104 103 114*   CO2 mmol/L 28 27 23   CREATININE mg/dL 0 82 0 80 0 86   EGFR ml/min/1 73sq m 84 87 80   POTASSIUM mmol/L 3 6 3 9 3 8   MAGNESIUM mg/dL 2 2 1 9  --    SODIUM mmol/L 137 135 139     Results from last 7 days   Lab Units 06/10/23  1548   ALBUMIN g/dL 3 9   ALK PHOS U/L 81   ALT U/L 25   AST U/L 31   TOTAL BILIRUBIN mg/dL 0 74   TOTAL PROTEIN g/dL 7 3     Results from last 7 days   Lab Units 06/08/23  1412   POC GLUCOSE mg/dl 104     Results from last 7 days   Lab Units 06/11/23  0456 06/10/23  1548 06/09/23  0655   GLUCOSE RANDOM mg/dL 85 90 117       Results from last 7 days   Lab Units 06/10/23  1900 06/10/23  1617   HS TNI 0HR ng/L  --  4   HS TNI 2HR ng/L 3  --    HSTNI D2 ng/L -1  --          Results from last 7 days   Lab Units 06/09/23  0655   INR  1 04   PROTIME seconds 13 8   PTT seconds 29     Results from last 7 days   Lab Units 06/10/23  1548   TSH 3RD GENERATON uIU/mL 3 671             Results from last 7 days   Lab Units 06/10/23  1627   BILIRUBIN UA  Negative   BLOOD UA  Negative   CLARITY UA  Clear   COLOR UA  Yellow   GLUCOSE UA mg/dl Negative   KETONES UA mg/dl Negative   LEUKOCYTES UA  Negative   NITRITE UA  Negative   PH UA  6 0   PROTEIN UA mg/dl Negative   SPEC GRAV UA  1 010   UROBILINOGEN UA E U /dl 0 2           ED Treatment:   Medication Administration from 06/10/2023 1248 to 06/10/2023 2002       Date/Time Order Dose Route Action     06/10/2023 1556 EDT sodium chloride 0 9 % bolus 1,000 mL 1,000 mL Intravenous New Bag     06/10/2023 1552 EDT HYDROmorphone (DILAUDID) injection 1 mg 1 mg Intravenous Given     06/10/2023 1552 EDT ondansetron (ZOFRAN) injection 4 mg 4 mg Intravenous Given     06/10/2023 1708 EDT HYDROmorphone (DILAUDID) injection 1 mg 1 mg Intravenous Given     06/10/2023 1804 EDT HYDROmorphone (DILAUDID) injection 1 mg 1 mg Intravenous Given     06/10/2023 1941 EDT HYDROmorphone (DILAUDID) injection 1 mg 1 mg Intravenous Given        Past Medical History:   Diagnosis Date   • Acid reflux    • Allergic 2006    Flagyl; anaphylaxis   • Anxiety    • Trejo's esophagus    • Cancer (HCC)    • GERD (gastroesophageal reflux disease)    • Headache(784 0) 2022   • Hiatal hernia    • Hyperlipidemia    • Kidney stone    • Latent tuberculosis by skin test 1986    Was treated with INH for 6 months   • Lung cancer ([facility] [address] ) diagnosed in 2018   • Obesity 2018   • Obstructive sleep apnea 05/27/2018    Mild TERESITA  AHI 18 1   • Squamous cell skin cancer     Left buttocks   • STD (sexually transmitted disease) 1990    HPV, HSV   • Urinary tract infection      Present on Admission:  • Anxiety  • Carcinoid tumor of lung  • Gastroesophageal reflux disease  • Obstructive sleep apnea  • Thoracic spondylosis with myelopathy  • Thoracic back pain      Admitting Diagnosis:     Post-operative pain [G89 18]  Nonsustained ventricular tachycardia (HCC) [I47 29]  Post-operative complication [K59  9XXA]    Age/Sex: 49 y o  female    Scheduled Medications:    acetaminophen, 975 mg, Oral, Q8H ANIKET  acyclovir, 400 mg, Oral, BID  docusate sodium, 100 mg, Oral, BID  enoxaparin, 40 mg, Subcutaneous, BID  gabapentin, 600 mg, Oral, TID  lidocaine, 1 patch, Topical, Daily  loratadine, 10 mg, Oral, After Dinner  methocarbamol, 750 mg, Oral, Q6H ANIKET  pantoprazole, 40 mg, Oral, Early Morning  polyethylene glycol, 17 g, Oral, Daily  senna, 1 tablet, Oral, HS      Continuous IV Infusions:     PRN Meds:  diphenhydrAMINE, 25 mg, Oral, Q6H PRN  HYDROmorphone, 1 mg, Intravenous, Q3H PRN  oxyCODONE, 10 mg, Oral, Q4H PRN  oxyCODONE, 15 mg, Oral, Q4H PRN        IP CONSULT TO CARDIOLOGY    Network Utilization Review Department  ATTENTION: Please call with any questions or concerns to [phone] and carefully listen to the prompts so that you are directed to the right person  All voicemails are confidential   Orest Gut all requests for admission clinical reviews, approved or denied determinations and any other requests to dedicated fax number below belonging to the campus where the patient is receiving treatment   List of dedicated fax numbers for the Facilities:  [address] DENIALS (Administrative/Medical Necessity) [phone]   [address] (Maternity/NICU/Pediatrics) [phone]   [address] Osiris Oswald [phone]   Lorena Velazco [address] [phone]   [address] [address] Donya Matias 28 [phone]   [address] [facility] Jaret Duque [address] [facility] [phone]

## 2023-06-11 NOTE — PROGRESS NOTES
[facility]  Progress Note  Name: Dionna Delgado  MRN: [id number]  Unit/Bed#: [address] [place] [address] I Date of Admission: 6/10/2023   Date of Service: 6/11/2023 I Hospital Day: 0    Assessment/Plan   * Thoracic back pain  Assessment & Plan  Patient presents with uncontrolled thoracic back pain  Reports had spinal surgery on 6/8th in [facility], discharged on the day before admission, reports severe thoracic back pain at home and po oxycodone not helping  · Of note, patient is POD 3 T8 spinous process, T9 total, superior T10 laminectomy for decompression  · CT thoracic spine without contrast in ED showed  -no CT evident complication visualized  · ED provider discussed case with neurosurgery, recommend admit for pain control, neuro checks  · Patient received multiple dose of IV Dilaudid in ED  · Patient currently on scheduled Tylenol, gabapentin, Robaxin and oxycodone  · Dilaudid dose increased to 1 mg IV every 4 hours as needed for breakthrough pain  Oxycodone dose was suggested  Patient also ordered for Lidoderm patch    NSVT (nonsustained ventricular tachycardia) ([facility] [address] )  Assessment & Plan  · Patient had a 12 beat run of V tach on ED monitor-rhythm strip not available  · Telemetry showed no further V  tach or any arrhythmias  · Cardiology input appreciated  · 2D echo to assess LV function  · Maintain potassium level over 4 and magnesium level over 2    Nicotine abuse  Assessment & Plan  · Patient declined nicotine patch, smoking cessation    Genital herpes simplex  Assessment & Plan  · Continue acyclovir prophylactically    Thoracic spondylosis with myelopathy  Assessment & Plan  · Status post surgery as above  · Follow-up neurosurgery as outpatient    Anxiety  Assessment & Plan  · Was on Xanax as needed at home  Will hold for now as patient is on narcotics      Gastroesophageal reflux disease  Assessment & Plan  · Continue PPI    Carcinoid tumor of lung  Assessment & Plan  · Noted history  · Status post right thoracoscopic lower lobectomy mediastinal lymph node dissection in 2022    Class 3 severe obesity due to excess calories without serious comorbidity with body mass index (BMI) of 40 0 to 44 9 in adult [facility])  Assessment & Plan  · Diet and lifestyle modification      Obstructive sleep apnea  Assessment & Plan  · Intolerant to CPAP               VTE Pharmacologic Prophylaxis:   Moderate Risk (Score 3-4) - Pharmacological DVT Prophylaxis Ordered: enoxaparin (Lovenox)  Patient Centered Rounds: I performed bedside rounds with nursing staff today  Discussions with Specialists or Other Care Team Provider: Cardiology    Education and Discussions with Family / Patient: yes    Total Time Spent on Date of Encounter in care of patient: 45 minutes This time was spent on one or more of the following: performing physical exam; counseling and coordination of care; obtaining or reviewing history; documenting in the medical record; reviewing/ordering tests, medications or procedures; communicating with other healthcare professionals and discussing with patient's family/caregivers  Current Length of Stay: 0 day(s)  Current Patient Status: Observation   Certification Statement: The patient will continue to require additional inpatient hospital stay due to Intractable back pain, NSVT  Discharge Plan: Anticipate discharge in 24-48 hrs to home  Code Status: Level 1 - Full Code    Subjective:   Patient continues to complain of significant back pain  Pain medication is helping but is not lasting long  Patient also reported constipation  Denies any chest pain or shortness of breath    Objective:     Vitals:   Temp (24hrs), Av 4 °F (36 9 °C), Min:98 2 °F (36 8 °C), Max:98 6 °F (37 °C)    Temp:  [98 2 °F (36 8 °C)-98 6 °F (37 °C)] 98 6 °F (37 °C)  HR:  [] 82  Resp:  [13-19] 18  BP: (115-169)/(60-91) 115/68  SpO2:  [95 %-100 %] 96 %  There is no height or weight on file to calculate BMI  Input and Output Summary (last 24 hours): Intake/Output Summary (Last 24 hours) at 6/11/2023 1607  Last data filed at 6/10/2023 1855  Gross per 24 hour   Intake 1000 ml   Output --   Net 1000 ml       Physical Exam:   Physical Exam  Constitutional:       Appearance: Normal appearance  HENT:      Head: Normocephalic and atraumatic  Nose: Nose normal       Mouth/Throat:      Mouth: Mucous membranes are moist       Pharynx: Oropharynx is clear  Eyes:      Extraocular Movements: Extraocular movements intact  Pupils: Pupils are equal, round, and reactive to light  Cardiovascular:      Rate and Rhythm: Normal rate and regular rhythm  Pulmonary:      Effort: Pulmonary effort is normal       Breath sounds: Normal breath sounds  Abdominal:      General: Bowel sounds are normal  There is no distension  Palpations: Abdomen is soft  Tenderness: There is no abdominal tenderness  Musculoskeletal:         General: No swelling  Cervical back: Normal range of motion and neck supple  Skin:     General: Skin is warm and dry  Neurological:      General: No focal deficit present  Mental Status: She is alert           Additional Data:     Labs:  Results from last 7 days   Lab Units 06/11/23  0456 06/10/23  1548   EOS PCT %  --  4   HEMATOCRIT % 39 1 37 9   HEMOGLOBIN g/dL 12 6 12 2   LYMPHS PCT %  --  38   MONOS PCT %  --  7   NEUTROS PCT %  --  50   PLATELETS Thousands/uL 141* 155   WBC Thousand/uL 8 08 10 42*     Results from last 7 days   Lab Units 06/11/23  0456 06/10/23  1548   ANION GAP mmol/L 5 5   ALBUMIN g/dL  --  3 9   ALK PHOS U/L  --  81   ALT U/L  --  25   AST U/L  --  31   BUN mg/dL 10 13   CALCIUM mg/dL 8 6 9 0   CHLORIDE mmol/L 104 103   CO2 mmol/L 28 27   CREATININE mg/dL 0 82 0 80   GLUCOSE RANDOM mg/dL 85 90   POTASSIUM mmol/L 3 6 3 9   SODIUM mmol/L 137 135   TOTAL BILIRUBIN mg/dL  --  0 74     Results from last 7 days   Lab Units 06/09/23  0655   INR  1 04 Results from last 7 days   Lab Units 06/08/23  1412   POC GLUCOSE mg/dl 104               Lines/Drains:  Invasive Devices     Peripheral Intravenous Line  Duration           Peripheral IV 06/10/23 Right Antecubital 1 day          Drain  Duration           Closed/Suction Drain Left;Superior Back Bulb 3 days                  Telemetry:  Telemetry Orders (From admission, onward)             24 Hour Telemetry Monitoring  (ED Bridging Orders Panel)  Continuous x 24 Hours (Telem)        Expiring   Question:  Reason for 24 Hour Telemetry  Answer:  Arrhythmias requiring acute medical intervention / PPM or ICD malfunction                 Telemetry Reviewed: Normal Sinus Rhythm  Indication for Continued Telemetry Use: Arrthymias requiring medical therapy             Imaging: Reviewed radiology reports from this admission including: CAT scan of the spine    Recent Cultures (last 7 days):         Last 24 Hours Medication List:   Current Facility-Administered Medications   Medication Dose Route Frequency Provider Last Rate   • acetaminophen  975 mg Oral Q8H [address] GENESIS Vieyra     • acyclovir  400 mg Oral BID GENESIS Vieyra     • diphenhydrAMINE  25 mg Oral Q6H PRN GENESIS Vieyra     • docusate sodium  100 mg Oral BID Delores Plata MD     • enoxaparin  40 mg Subcutaneous BID GENESIS Vieyra     • gabapentin  600 mg Oral TID GENESIS Vieyra     • HYDROmorphone  1 mg Intravenous Q3H PRN Karla Christensen MD     • lidocaine  1 patch Topical Daily Karla Christensen MD     • loratadine  10 mg Oral After Dinner GENESIS Cruz     • methocarbamol  750 mg Oral Q6H [address] GENESIS Vieyra     • oxyCODONE  10 mg Oral Q4H PRN Karla Christensen MD     • oxyCODONE  15 mg Oral Q4H PRN Karla Christensen MD     • pantoprazole  40 mg Oral Early Morning GENESIS Vieyra     • polyethylene glycol  17 g Oral Daily GENESIS Vieyra     • senna  1 tablet Oral HS Karla Christensen MD          Today, Patient Was Seen By: Becki Shin MD    **Please Note: This note may have been constructed using a voice recognition system  **

## 2023-06-11 NOTE — ASSESSMENT & PLAN NOTE
· Patient had a 12 beat run of V tach on ED monitor  · EKG NSR  · Troponins negative  · Place on telemetry  · Optimize electrolytes  · Consult cardiology

## 2023-06-11 NOTE — QUICK NOTE
Patient developed rash on BL anterior thighs and left arm overnight  Only new medications patient took was K dur and magnesium sulfate IV since admission  Patient thinks was from [facility] for [facility] monitor  Patient given Benadryl IV and p o , will continue to monitor rash

## 2023-06-11 NOTE — ASSESSMENT & PLAN NOTE
Patient presents with uncontrolled thoracic back pain  Reports had spinal surgery on 6/8th in [facility], discharged on the day before admission, reports severe thoracic back pain at home and po oxycodone not helping  · Of note, patient is POD 3 T8 spinous process, T9 total, superior T10 laminectomy for decompression  · CT thoracic spine without contrast in ED showed  -no CT evident complication visualized  · ED provider discussed case with neurosurgery, recommend admit for pain control, neuro checks  · Patient received multiple dose of IV Dilaudid in ED  · Patient currently on scheduled Tylenol, gabapentin, Robaxin and oxycodone  · Dilaudid dose increased to 1 mg IV every 4 hours as needed for breakthrough pain  Oxycodone dose was suggested    Patient also ordered for Lidoderm patch

## 2023-06-11 NOTE — PLAN OF CARE
Problem: PAIN - ADULT  Goal: Verbalizes/displays adequate comfort level or baseline comfort level  Description: Interventions:  - Encourage patient to monitor pain and request assistance  - Assess pain using appropriate pain scale  - Administer analgesics based on type and severity of pain and evaluate response  - Implement non-pharmacological measures as appropriate and evaluate response  - Consider cultural and social influences on pain and pain management  - Notify physician/advanced practitioner if interventions unsuccessful or patient reports new pain  Outcome: Progressing     Problem: INFECTION - ADULT  Goal: Absence or prevention of progression during hospitalization  Description: INTERVENTIONS:  - Assess and monitor for signs and symptoms of infection  - Monitor lab/diagnostic results  - Monitor all insertion sites, i e  indwelling lines, tubes, and drains  - Monitor endotracheal if appropriate and nasal secretions for changes in amount and color  - Manito appropriate cooling/warming therapies per order  - Administer medications as ordered  - Instruct and encourage patient and family to use good hand hygiene technique  - Identify and instruct in appropriate isolation precautions for identified infection/condition  Outcome: Progressing  Goal: Absence of fever/infection during neutropenic period  Description: INTERVENTIONS:  - Monitor WBC    Outcome: Progressing

## 2023-06-12 ENCOUNTER — TRANSITIONAL CARE MANAGEMENT (OUTPATIENT)
Dept: FAMILY MEDICINE CLINIC | Facility: CLINIC | Age: 48
End: 2023-06-12

## 2023-06-12 ENCOUNTER — APPOINTMENT (OUTPATIENT)
Dept: NON INVASIVE DIAGNOSTICS | Facility: HOSPITAL | Age: 48
End: 2023-06-12
Payer: COMMERCIAL

## 2023-06-12 PROCEDURE — 99232 SBSQ HOSP IP/OBS MODERATE 35: CPT | Performed by: INTERNAL MEDICINE

## 2023-06-12 RX ORDER — ALPRAZOLAM 0.25 MG/1
0.25 TABLET ORAL
Status: DISCONTINUED | OUTPATIENT
Start: 2023-06-12 | End: 2023-06-14 | Stop reason: HOSPADM

## 2023-06-12 RX ORDER — SENNOSIDES 8.6 MG
2 TABLET ORAL
Status: DISCONTINUED | OUTPATIENT
Start: 2023-06-12 | End: 2023-06-13

## 2023-06-12 RX ORDER — OXYCODONE HYDROCHLORIDE 10 MG/1
10 TABLET ORAL
Status: DISCONTINUED | OUTPATIENT
Start: 2023-06-12 | End: 2023-06-13

## 2023-06-12 RX ADMIN — OXYCODONE HYDROCHLORIDE 15 MG: 5 TABLET ORAL at 04:14

## 2023-06-12 RX ADMIN — HYDROMORPHONE HYDROCHLORIDE 1 MG: 1 INJECTION, SOLUTION INTRAMUSCULAR; INTRAVENOUS; SUBCUTANEOUS at 16:01

## 2023-06-12 RX ADMIN — ACYCLOVIR 400 MG: 800 TABLET ORAL at 18:08

## 2023-06-12 RX ADMIN — DOCUSATE SODIUM 100 MG: 100 CAPSULE, LIQUID FILLED ORAL at 09:36

## 2023-06-12 RX ADMIN — HYDROMORPHONE HYDROCHLORIDE 1 MG: 1 INJECTION, SOLUTION INTRAMUSCULAR; INTRAVENOUS; SUBCUTANEOUS at 09:35

## 2023-06-12 RX ADMIN — OXYCODONE HYDROCHLORIDE 15 MG: 5 TABLET ORAL at 11:43

## 2023-06-12 RX ADMIN — GABAPENTIN 600 MG: 300 CAPSULE ORAL at 21:23

## 2023-06-12 RX ADMIN — OXYCODONE HYDROCHLORIDE 15 MG: 5 TABLET ORAL at 18:07

## 2023-06-12 RX ADMIN — METHOCARBAMOL TABLETS 750 MG: 500 TABLET, COATED ORAL at 18:08

## 2023-06-12 RX ADMIN — SENNOSIDES 17.2 MG: 8.6 TABLET, FILM COATED ORAL at 21:23

## 2023-06-12 RX ADMIN — ACYCLOVIR 400 MG: 800 TABLET ORAL at 09:37

## 2023-06-12 RX ADMIN — OXYCODONE HYDROCHLORIDE 15 MG: 5 TABLET ORAL at 21:29

## 2023-06-12 RX ADMIN — HYDROMORPHONE HYDROCHLORIDE 1 MG: 1 INJECTION, SOLUTION INTRAMUSCULAR; INTRAVENOUS; SUBCUTANEOUS at 23:05

## 2023-06-12 RX ADMIN — GABAPENTIN 600 MG: 300 CAPSULE ORAL at 16:02

## 2023-06-12 RX ADMIN — HYDROMORPHONE HYDROCHLORIDE 1 MG: 1 INJECTION, SOLUTION INTRAMUSCULAR; INTRAVENOUS; SUBCUTANEOUS at 01:16

## 2023-06-12 RX ADMIN — PANTOPRAZOLE SODIUM 40 MG: 40 TABLET, DELAYED RELEASE ORAL at 05:29

## 2023-06-12 RX ADMIN — OXYCODONE HYDROCHLORIDE 15 MG: 5 TABLET ORAL at 14:59

## 2023-06-12 RX ADMIN — HYDROMORPHONE HYDROCHLORIDE 1 MG: 1 INJECTION, SOLUTION INTRAMUSCULAR; INTRAVENOUS; SUBCUTANEOUS at 12:50

## 2023-06-12 RX ADMIN — ENOXAPARIN SODIUM 40 MG: 40 INJECTION SUBCUTANEOUS at 09:37

## 2023-06-12 RX ADMIN — HYDROMORPHONE HYDROCHLORIDE 1 MG: 1 INJECTION, SOLUTION INTRAMUSCULAR; INTRAVENOUS; SUBCUTANEOUS at 19:53

## 2023-06-12 RX ADMIN — DIPHENHYDRAMINE HYDROCHLORIDE 25 MG: 25 TABLET ORAL at 12:58

## 2023-06-12 RX ADMIN — HYDROMORPHONE HYDROCHLORIDE 1 MG: 1 INJECTION, SOLUTION INTRAMUSCULAR; INTRAVENOUS; SUBCUTANEOUS at 05:30

## 2023-06-12 RX ADMIN — ACETAMINOPHEN 975 MG: 325 TABLET ORAL at 05:29

## 2023-06-12 RX ADMIN — OXYCODONE HYDROCHLORIDE 15 MG: 5 TABLET ORAL at 08:41

## 2023-06-12 RX ADMIN — ACETAMINOPHEN 975 MG: 325 TABLET ORAL at 14:56

## 2023-06-12 RX ADMIN — GABAPENTIN 600 MG: 300 CAPSULE ORAL at 09:36

## 2023-06-12 RX ADMIN — METHOCARBAMOL TABLETS 750 MG: 500 TABLET, COATED ORAL at 23:06

## 2023-06-12 RX ADMIN — LIDOCAINE 1 PATCH: 50 PATCH CUTANEOUS at 09:38

## 2023-06-12 RX ADMIN — ENOXAPARIN SODIUM 40 MG: 40 INJECTION SUBCUTANEOUS at 18:08

## 2023-06-12 RX ADMIN — DOCUSATE SODIUM 100 MG: 100 CAPSULE, LIQUID FILLED ORAL at 18:08

## 2023-06-12 RX ADMIN — METHOCARBAMOL TABLETS 750 MG: 500 TABLET, COATED ORAL at 05:29

## 2023-06-12 RX ADMIN — LORATADINE 10 MG: 10 TABLET ORAL at 18:08

## 2023-06-12 RX ADMIN — METHOCARBAMOL TABLETS 750 MG: 500 TABLET, COATED ORAL at 11:41

## 2023-06-12 RX ADMIN — OXYCODONE HYDROCHLORIDE 15 MG: 5 TABLET ORAL at 00:01

## 2023-06-12 NOTE — ASSESSMENT & PLAN NOTE
· Patient had a 12 beat run of V tach on ED monitor-rhythm strip not available  · Telemetry showed no further V  tach or any arrhythmias  · Cardiology input appreciated  · 2D echo to assess LV function was attempted twice and patient could not tolerated  · Maintain potassium level over 4 and magnesium level over 2  · Outpatient follow-up with cardiology for 2D echo, possible nuclear stress test and extended ambulatory cardiac monitoring

## 2023-06-12 NOTE — PLAN OF CARE
Problem: PAIN - ADULT  Goal: Verbalizes/displays adequate comfort level or baseline comfort level  Description: Interventions:  - Encourage patient to monitor pain and request assistance  - Assess pain using appropriate pain scale  - Administer analgesics based on type and severity of pain and evaluate response  - Implement non-pharmacological measures as appropriate and evaluate response  - Consider cultural and social influences on pain and pain management  - Notify physician/advanced practitioner if interventions unsuccessful or patient reports new pain  Outcome: Progressing     Problem: INFECTION - ADULT  Goal: Absence or prevention of progression during hospitalization  Description: INTERVENTIONS:  - Assess and monitor for signs and symptoms of infection  - Monitor lab/diagnostic results  - Monitor all insertion sites, i e  indwelling lines, tubes, and drains  - Monitor endotracheal if appropriate and nasal secretions for changes in amount and color  - [place] appropriate cooling/warming therapies per order  - Administer medications as ordered  - Instruct and encourage patient and family to use good hand hygiene technique  - Identify and instruct in appropriate isolation precautions for identified infection/condition  Outcome: Progressing  Goal: Absence of fever/infection during neutropenic period  Description: INTERVENTIONS:  - Monitor WBC    Outcome: Progressing     Problem: SAFETY ADULT  Goal: Patient will remain free of falls  Description: INTERVENTIONS:  - Educate patient/family on patient safety including physical limitations  - Instruct patient to call for assistance with activity   - Consult OT/PT to assist with strengthening/mobility   - Keep Call bell within reach  - Keep bed low and locked with side rails adjusted as appropriate  - Keep care items and personal belongings within reach  - Initiate and maintain comfort rounds  - Make Fall Risk Sign visible to staff  - Apply yellow socks and bracelet for high fall risk patients  - Consider moving patient to room near nurses station  Outcome: Progressing  Goal: Maintain or return to baseline ADL function  Description: INTERVENTIONS:  -  Assess patient's ability to carry out ADLs; assess patient's baseline for ADL function and identify physical deficits which impact ability to perform ADLs (bathing, care of mouth/teeth, toileting, grooming, dressing, etc )  - Assess/evaluate cause of self-care deficits   - Assess range of motion  - Assess patient's mobility; develop plan if impaired  - Assess patient's need for assistive devices and provide as appropriate  - Encourage maximum independence but intervene and supervise when necessary  - Involve family in performance of ADLs  - Assess for home care needs following discharge   - Consider OT consult to assist with ADL evaluation and planning for discharge  - Provide patient education as appropriate  Outcome: Progressing  Goal: Maintains/Returns to pre admission functional level  Description: INTERVENTIONS:  - Perform BMAT or MOVE assessment daily    - Set and communicate daily mobility goal to care team and patient/family/caregiver     - Collaborate with rehabilitation services on mobility goals if consulted  Problem: DISCHARGE PLANNING  Goal: Discharge to home or other facility with appropriate resources  Description: INTERVENTIONS:  - Identify barriers to discharge w/patient and caregiver  - Arrange for needed discharge resources and transportation as appropriate  - Identify discharge learning needs (meds, wound care, etc )  - Arrange for interpretive services to assist at discharge as needed  - Refer to Case Management Department for coordinating discharge planning if the patient needs post-hospital services based on physician/advanced practitioner order or complex needs related to functional status, cognitive ability, or social support system  Outcome: Progressing     Problem: Knowledge Deficit  Goal: Patient/family/caregiver demonstrates understanding of disease process, treatment plan, medications, and discharge instructions  Description: Complete learning assessment and assess knowledge base    Interventions:  - Provide teaching at level of understanding  - Provide teaching via preferred learning methods  Outcome: Progressing     - Out of bed for toileting  - Record patient progress and toleration of activity level   Outcome: Progressing

## 2023-06-12 NOTE — PROGRESS NOTES
[facility]  Progress Note  Name: Marii Ro  MRN: [id number]  Unit/Bed#: [address] [address] I Date of Admission: 6/10/2023   Date of Service: 6/12/2023 I Hospital Day: 0    Assessment/Plan   * Thoracic back pain  Assessment & Plan  Patient presents with uncontrolled thoracic back pain  Reports had spinal surgery on 6/8th in [facility], discharged on the day before admission, reports severe thoracic back pain at home and po oxycodone not helping  · Of note, patient is POD 3 T8 spinous process, T9 total, superior T10 laminectomy for decompression  · CT thoracic spine without contrast in ED showed  -no CT evident complication visualized  · ED provider discussed case with neurosurgery, recommend admit for pain control, neuro checks  · Patient received multiple dose of IV Dilaudid in ED  · Patient currently on scheduled Tylenol, gabapentin, Robaxin and oxycodone  · Dilaudid dose increased to 1 mg IV every 4 hours as needed for breakthrough pain  Oxycodone dose was suggested  Patient also ordered for Lidoderm patch  · Patient continues to complain of significant pain  Discussed with neurosurgery PA on-call  Patient is no neurological deficits  · Continue scheduled Tylenol 975 mg p o  every 8 hours, Lidoderm patch, Robaxin-750 milligrams every 6 hours  · We will increase oxycodone to every 3 hours with similar dosing of 10 mg for moderate pain and 15 mg for severe pain  Based on further course might need opioid rotation with p o  Dilaudid 2 mg or 4 mg as needed  Titrate off IV Dilaudid as tolerated      NSVT (nonsustained ventricular tachycardia) ([facility] [address] )  Assessment & Plan  · Patient had a 12 beat run of V tach on ED monitor-rhythm strip not available  · Telemetry showed no further V  tach or any arrhythmias  · Cardiology input appreciated  · 2D echo to assess LV function was attempted twice and patient could not tolerated  · Maintain potassium level over 4 and magnesium level over 2  · Outpatient follow-up with cardiology for 2D echo, possible nuclear stress test and extended ambulatory cardiac monitoring    Thoracic spondylosis with myelopathy  Assessment & Plan  · Status post surgery as above  · Follow-up neurosurgery as outpatient    Anxiety  Assessment & Plan  · Was on Xanax as needed at home  Resume Xanax    Gastroesophageal reflux disease  Assessment & Plan  · Continue PPI    Carcinoid tumor of lung  Assessment & Plan  · Noted history  · Status post right thoracoscopic lower lobectomy mediastinal lymph node dissection in 7/2022    Class 3 severe obesity due to excess calories without serious comorbidity with body mass index (BMI) of 40 0 to 44 9 in adult [facility])  Assessment & Plan  · Diet and lifestyle modification      Obstructive sleep apnea  Assessment & Plan  · Intolerant to CPAP            VTE Pharmacologic Prophylaxis:   Moderate Risk (Score 3-4) - Pharmacological DVT Prophylaxis Ordered: enoxaparin (Lovenox)  Patient Centered Rounds: I performed bedside rounds with nursing staff today  Discussions with Specialists or Other Care Team Provider: Neurosurgery and cardiology    Education and Discussions with Family / Patient: yes    Total Time Spent on Date of Encounter in care of patient: 45 minutes This time was spent on one or more of the following: performing physical exam; counseling and coordination of care; obtaining or reviewing history; documenting in the medical record; reviewing/ordering tests, medications or procedures; communicating with other healthcare professionals and discussing with patient's family/caregivers  Current Length of Stay: 0 day(s)  Current Patient Status: Inpatient   Certification Statement: The patient will continue to require additional inpatient hospital stay due to Intractable back pain  Discharge Plan: Anticipate discharge in 24-48 hrs to home      Code Status: Level 1 - Full Code    Subjective:   Patient continues to complain of significant back pain  Patient reports the pain is worse when the medication wears off  Patient also did not have a bowel movement  Patient could not lay on the bed for the 2D echocardiogram with    Objective:     Vitals:   Temp (24hrs), Av 4 °F (36 9 °C), Min:98 °F (36 7 °C), Max:98 9 °F (37 2 °C)    Temp:  [98 °F (36 7 °C)-98 9 °F (37 2 °C)] 98 °F (36 7 °C)  HR:  [] 78  Resp:  [16-18] 16  BP: (113-141)/(62-91) 117/82  SpO2:  [90 %-96 %] 96 %  Body mass index is 43 93 kg/m²  Input and Output Summary (last 24 hours):   No intake or output data in the 24 hours ending 23 1518    Physical Exam:   Physical Exam  Constitutional:       Appearance: Normal appearance  HENT:      Head: Normocephalic and atraumatic  Nose: Nose normal       Mouth/Throat:      Mouth: Mucous membranes are moist       Pharynx: Oropharynx is clear  Eyes:      Extraocular Movements: Extraocular movements intact  Pupils: Pupils are equal, round, and reactive to light  Cardiovascular:      Rate and Rhythm: Normal rate and regular rhythm  Pulmonary:      Effort: Pulmonary effort is normal       Breath sounds: Normal breath sounds  Abdominal:      General: Bowel sounds are normal  There is no distension  Palpations: Abdomen is soft  Tenderness: There is no abdominal tenderness  Musculoskeletal:         General: No swelling  Cervical back: Normal range of motion and neck supple  Comments: Back dressing is clean dry and intact   Skin:     General: Skin is warm and dry  Neurological:      General: No focal deficit present  Mental Status: She is alert            Additional Data:     Labs:  Results from last 7 days   Lab Units 23  0456 06/10/23  1548   EOS PCT %  --  4   HEMATOCRIT % 39 1 37 9   HEMOGLOBIN g/dL 12 6 12 2   LYMPHS PCT %  --  38   MONOS PCT %  --  7   NEUTROS PCT %  --  50   PLATELETS Thousands/uL 141* 155   WBC Thousand/uL 8 08 10 42*     Results from last 7 days   Lab Units 06/11/23  0456 06/10/23  1548   ANION GAP mmol/L 5 5   ALBUMIN g/dL  --  3 9   ALK PHOS U/L  --  81   ALT U/L  --  25   AST U/L  --  31   BUN mg/dL 10 13   CALCIUM mg/dL 8 6 9 0   CHLORIDE mmol/L 104 103   CO2 mmol/L 28 27   CREATININE mg/dL 0 82 0 80   GLUCOSE RANDOM mg/dL 85 90   POTASSIUM mmol/L 3 6 3 9   SODIUM mmol/L 137 135   TOTAL BILIRUBIN mg/dL  --  0 74     Results from last 7 days   Lab Units 06/09/23  0655   INR  1 04     Results from last 7 days   Lab Units 06/08/23  1412   POC GLUCOSE mg/dl 104               Lines/Drains:  Invasive Devices     Peripheral Intravenous Line  Duration           Peripheral IV 06/10/23 Right Antecubital 1 day          Drain  Duration           Closed/Suction Drain Left;Superior Back Bulb 4 days                  Telemetry:  Telemetry Orders (From admission, onward)             24 Hour Telemetry Monitoring  Continuous x 24 Hours (Telem)        Expiring   Question:  Reason for 24 Hour Telemetry  Answer:  Arrhythmias requiring acute medical intervention / PPM or ICD malfunction                 Telemetry Reviewed: Normal Sinus Rhythm  Indication for Continued Telemetry Use: Not indicated               Imaging: Reviewed radiology reports from this admission including: abdominal/pelvic CT    Recent Cultures (last 7 days):         Last 24 Hours Medication List:   Current Facility-Administered Medications   Medication Dose Route Frequency Provider Last Rate   • acetaminophen  975 mg Oral Q8H [address] GENESIS Vieyra     • acyclovir  400 mg Oral BID GENESIS Vieyra     • ALPRAZolam  0 25 mg Oral HS PRN Shmuel Mijares MD     • diphenhydrAMINE  25 mg Oral Q6H PRN GENESIS Vieyra     • docusate sodium  100 mg Oral BID Shmuel Mijares MD     • enoxaparin  40 mg Subcutaneous BID GENESIS Vieyra     • gabapentin  600 mg Oral TID GENESIS Vieyra     • HYDROmorphone  1 mg Intravenous Q3H PRN Shmuel Mijares MD     • lidocaine  1 patch Topical Daily Shmuel Mijares MD • loratadine  10 mg Oral After GENESIS Alvarez     • methocarbamol  750 mg Oral Q6H [address] GENESIS Vieyra     • oxyCODONE  10 mg Oral Q3H PRN Ann-Marie Betancourt MD     • oxyCODONE  15 mg Oral Q3H PRN Ann-Marie Betancourt MD     • pantoprazole  40 mg Oral Early Morning GENESIS Vieyra     • polyethylene glycol  17 g Oral Daily GENESIS Vieyra     • senna  2 tablet Oral HS Ann-Marie Betancourt MD          Today, Patient Was Seen By: Ann-Marie Betancourt MD    **Please Note: This note may have been constructed using a voice recognition system  **

## 2023-06-12 NOTE — ASSESSMENT & PLAN NOTE
Patient presents with uncontrolled thoracic back pain  Reports had spinal surgery on 6/8th in [facility], discharged on the day before admission, reports severe thoracic back pain at home and po oxycodone not helping  · Of note, patient is POD 3 T8 spinous process, T9 total, superior T10 laminectomy for decompression  · CT thoracic spine without contrast in ED showed  -no CT evident complication visualized  · ED provider discussed case with neurosurgery, recommend admit for pain control, neuro checks  · Patient received multiple dose of IV Dilaudid in ED  · Patient currently on scheduled Tylenol, gabapentin, Robaxin and oxycodone  · Dilaudid dose increased to 1 mg IV every 4 hours as needed for breakthrough pain  Oxycodone dose was suggested  Patient also ordered for Lidoderm patch  · Patient continues to complain of significant pain  Discussed with neurosurgery PA on-call  Patient is no neurological deficits  · Continue scheduled Tylenol 975 mg p o  every 8 hours, Lidoderm patch, Robaxin-750 milligrams every 6 hours  · We will increase oxycodone to every 3 hours with similar dosing of 10 mg for moderate pain and 15 mg for severe pain  Based on further course might need opioid rotation with p o  Dilaudid 2 mg or 4 mg as needed  Titrate off IV Dilaudid as tolerated

## 2023-06-12 NOTE — PLAN OF CARE
Problem: PAIN - ADULT  Goal: Verbalizes/displays adequate comfort level or baseline comfort level  Description: Interventions:  - Encourage patient to monitor pain and request assistance  - Assess pain using appropriate pain scale  - Administer analgesics based on type and severity of pain and evaluate response  - Implement non-pharmacological measures as appropriate and evaluate response  - Consider cultural and social influences on pain and pain management  - Notify physician/advanced practitioner if interventions unsuccessful or patient reports new pain  Outcome: Progressing     Goal: Maintain or return to baseline ADL function  Description: INTERVENTIONS:  -  Assess patient's ability to carry out ADLs; assess patient's baseline for ADL function and identify physical deficits which impact ability to perform ADLs (bathing, care of mouth/teeth, toileting, grooming, dressing, etc )  - Assess/evaluate cause of self-care deficits   - Assess range of motion  - Assess patient's mobility; develop plan if impaired  - Assess patient's need for assistive devices and provide as appropriate  - Encourage maximum independence but intervene and supervise when necessary  - Involve family in performance of ADLs  - Assess for home care needs following discharge   - Consider OT consult to assist with ADL evaluation and planning for discharge  - Provide patient education as appropriate  Outcome: Progressing  Goal: Maintains/Returns to pre admission functional level  Description: INTERVENTIONS:  - Perform BMAT or MOVE assessment daily    - Set and communicate daily mobility goal to care team and patient/family/caregiver  - Collaborate with rehabilitation services on mobility goals if consulted  - Perform Range of Motion 4 times a day  - Reposition patient every 2 hours    - Dangle patient 3 times a day  - Stand patient 3 times a day  - Ambulate patient 3 times a day  - Out of bed to chair 3 times a day   - Out of bed for meals 3 times a day  - Out of bed for toileting  - Record patient progress and toleration of activity level   Outcome: Progressing

## 2023-06-12 NOTE — TREATMENT PLAN
Spoke to primary team about how patient is doing  She continues to complain of high levels of back pain and does not seem to feel that the current pain regimen is working well for her  It appears that patient is consistently taking pain medication every 4 hours as ordered and taking breakthrough pain medication consistently  Her CT thoracic spine is without acute findings  She is not reported to have any red flag signs  Her regimen is as follows:  · Tylenol 975 mg every 8 hours scheduled  · Lidocaine patches x1  · Robaxin-750 milligrams every 6 hours scheduled  · Oxycodone 10 mg and 15 mg every 4 hours as needed for moderate and severe pain  · IV Dilaudid 1 mg every 3 hours as needed for breakthrough pain    Discussed different options with primary team including increasing frequency of medication to every 3 hours as patient reports pain medication does not last long enough  They can also consider opioid rotation to oral Dilaudid 2 mg and 4 mg every 4 hours as needed for moderate severe pain  The primary team has opted for increasing frequency of oxycodone to every 3 hours  Patient is on high doses of IV Dilaudid for pain control  She will need to wean off of this in order to be cleared for home  If patient's pain is better on altered pain regimen, would recommend decreasing IV Dilaudid as tolerated until off  We will continue to follow peripherally for now  Please call with questions or concerns

## 2023-06-13 PROCEDURE — 99232 SBSQ HOSP IP/OBS MODERATE 35: CPT | Performed by: STUDENT IN AN ORGANIZED HEALTH CARE EDUCATION/TRAINING PROGRAM

## 2023-06-13 RX ORDER — SENNOSIDES 8.6 MG
2 TABLET ORAL 2 TIMES DAILY
Status: DISCONTINUED | OUTPATIENT
Start: 2023-06-13 | End: 2023-06-14 | Stop reason: HOSPADM

## 2023-06-13 RX ORDER — HYDROMORPHONE HYDROCHLORIDE 2 MG/1
2 TABLET ORAL EVERY 4 HOURS PRN
Status: DISCONTINUED | OUTPATIENT
Start: 2023-06-13 | End: 2023-06-14

## 2023-06-13 RX ORDER — HYDROMORPHONE HCL/PF 1 MG/ML
1 SYRINGE (ML) INJECTION EVERY 4 HOURS PRN
Status: DISCONTINUED | OUTPATIENT
Start: 2023-06-13 | End: 2023-06-14

## 2023-06-13 RX ORDER — HYDROMORPHONE HYDROCHLORIDE 2 MG/1
4 TABLET ORAL EVERY 4 HOURS PRN
Status: DISCONTINUED | OUTPATIENT
Start: 2023-06-13 | End: 2023-06-13

## 2023-06-13 RX ADMIN — DOCUSATE SODIUM 100 MG: 100 CAPSULE, LIQUID FILLED ORAL at 10:35

## 2023-06-13 RX ADMIN — ACETAMINOPHEN 975 MG: 325 TABLET ORAL at 13:38

## 2023-06-13 RX ADMIN — OXYCODONE HYDROCHLORIDE 15 MG: 5 TABLET ORAL at 05:08

## 2023-06-13 RX ADMIN — GABAPENTIN 600 MG: 300 CAPSULE ORAL at 16:00

## 2023-06-13 RX ADMIN — LORATADINE 10 MG: 10 TABLET ORAL at 18:20

## 2023-06-13 RX ADMIN — ACYCLOVIR 400 MG: 800 TABLET ORAL at 10:37

## 2023-06-13 RX ADMIN — OXYCODONE HYDROCHLORIDE 15 MG: 5 TABLET ORAL at 01:25

## 2023-06-13 RX ADMIN — ENOXAPARIN SODIUM 40 MG: 40 INJECTION SUBCUTANEOUS at 18:20

## 2023-06-13 RX ADMIN — HYDROMORPHONE HYDROCHLORIDE 1 MG: 1 INJECTION, SOLUTION INTRAMUSCULAR; INTRAVENOUS; SUBCUTANEOUS at 06:05

## 2023-06-13 RX ADMIN — METHOCARBAMOL TABLETS 750 MG: 500 TABLET, COATED ORAL at 18:20

## 2023-06-13 RX ADMIN — HYDROMORPHONE HYDROCHLORIDE 4 MG: 2 TABLET ORAL at 12:16

## 2023-06-13 RX ADMIN — METHOCARBAMOL TABLETS 750 MG: 500 TABLET, COATED ORAL at 23:44

## 2023-06-13 RX ADMIN — OXYCODONE HYDROCHLORIDE 15 MG: 5 TABLET ORAL at 21:01

## 2023-06-13 RX ADMIN — DOCUSATE SODIUM 100 MG: 100 CAPSULE, LIQUID FILLED ORAL at 18:20

## 2023-06-13 RX ADMIN — ACETAMINOPHEN 975 MG: 325 TABLET ORAL at 21:01

## 2023-06-13 RX ADMIN — HYDROMORPHONE HYDROCHLORIDE 1 MG: 1 INJECTION, SOLUTION INTRAMUSCULAR; INTRAVENOUS; SUBCUTANEOUS at 10:12

## 2023-06-13 RX ADMIN — HYDROMORPHONE HYDROCHLORIDE 1 MG: 1 INJECTION, SOLUTION INTRAMUSCULAR; INTRAVENOUS; SUBCUTANEOUS at 19:35

## 2023-06-13 RX ADMIN — GABAPENTIN 600 MG: 300 CAPSULE ORAL at 10:35

## 2023-06-13 RX ADMIN — HYDROMORPHONE HYDROCHLORIDE 1 MG: 1 INJECTION, SOLUTION INTRAMUSCULAR; INTRAVENOUS; SUBCUTANEOUS at 15:08

## 2023-06-13 RX ADMIN — SENNOSIDES 17.2 MG: 8.6 TABLET, FILM COATED ORAL at 18:23

## 2023-06-13 RX ADMIN — HYDROMORPHONE HYDROCHLORIDE 1 MG: 1 INJECTION, SOLUTION INTRAMUSCULAR; INTRAVENOUS; SUBCUTANEOUS at 02:20

## 2023-06-13 RX ADMIN — ENOXAPARIN SODIUM 40 MG: 40 INJECTION SUBCUTANEOUS at 10:37

## 2023-06-13 RX ADMIN — LIDOCAINE 1 PATCH: 50 PATCH CUTANEOUS at 10:34

## 2023-06-13 RX ADMIN — METHOCARBAMOL TABLETS 750 MG: 500 TABLET, COATED ORAL at 12:17

## 2023-06-13 RX ADMIN — METHOCARBAMOL TABLETS 750 MG: 500 TABLET, COATED ORAL at 05:08

## 2023-06-13 RX ADMIN — HYDROMORPHONE HYDROCHLORIDE 1 MG: 1 INJECTION, SOLUTION INTRAMUSCULAR; INTRAVENOUS; SUBCUTANEOUS at 23:40

## 2023-06-13 RX ADMIN — GABAPENTIN 600 MG: 300 CAPSULE ORAL at 21:02

## 2023-06-13 RX ADMIN — PANTOPRAZOLE SODIUM 40 MG: 40 TABLET, DELAYED RELEASE ORAL at 05:09

## 2023-06-13 RX ADMIN — MAGNESIUM HYDROXIDE 30 ML: 400 SUSPENSION ORAL at 07:37

## 2023-06-13 RX ADMIN — SENNOSIDES 17.2 MG: 8.6 TABLET, FILM COATED ORAL at 10:42

## 2023-06-13 RX ADMIN — OXYCODONE HYDROCHLORIDE 15 MG: 5 TABLET ORAL at 16:00

## 2023-06-13 RX ADMIN — OXYCODONE HYDROCHLORIDE 15 MG: 5 TABLET ORAL at 08:17

## 2023-06-13 RX ADMIN — HYDROMORPHONE HYDROCHLORIDE 2 MG: 2 TABLET ORAL at 22:36

## 2023-06-13 NOTE — CASE MANAGEMENT
Case Management Assessment & Discharge Planning Note    Patient name Mely Olmstead  [facility] 852[facility] MRN [id number]  : 1975 Date 2023       Current Admission Date: 6/10/2023  Current Admission Diagnosis:Thoracic back pain   Patient Active Problem List    Diagnosis Date Noted   • Genital herpes simplex 06/10/2023   • Nicotine abuse 06/10/2023   • NSVT (nonsustained ventricular tachycardia) ([facility] [address] ) 06/10/2023   • Ligamentum flavum hypertrophy 2023   • Degenerative disc disease, thoracic 2023   • Thoracic spondylosis with myelopathy 2023   • Hiatal hernia 2023   • Gastroesophageal reflux disease 2023   • Anxiety 2023   • Thoracic back pain 2023   • S/P bilateral oophorectomy 2022   • Bilateral tubo-ovarian mass 2022   • Family history of ovarian cancer 2022   • 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 [facility] 2022   • Carcinoid tumor of lung 2022   • Kidney stone 2022   • Class 3 severe obesity due to excess calories without serious comorbidity with body mass index (BMI) of 40 0 to 44 9 in [facility]) 2022   • Tobacco smoker, 1 pack of cigarettes or less per day 2022   • Obstructive sleep apnea 2018   • Latent tuberculosis by skin test [facility]      LOS (days): 1  Geometric Mean LOS (GMLOS) (days):   Days to GMLOS:     OBJECTIVE:  PATIENT READMITTED TO HOSPITAL  Risk of Unplanned Readmission Score: 11 13       Current admission status: Inpatient  Referral Reason: Other (Discharge planning)    Preferred Pharmacy:   Elise Allen [address]  [facility] [id number]  Phone: [phone] Fax: [phone]    [facility] ([address] [facility]) - Jesus Chin  [address] [place] [id number]  Phone: [phone] Fax: [phone] - [address] [facility], [address]  Phone: [phone] Fax: [address] [place] [address] [place] [address] [address]  Phone: [phone] Fax: [phone]    [facility] ([facility] ) - Mish[address]vin  [address] [place],[facility]  [address]  Phone: [phone] Fax: [phone]    Primary Care Provider: Dora Hernandez MD    Primary Insurance: [employer]  Secondary Insurance:     ASSESSMENT:  Nisa Sanchez Proxies    There are no active Health Care Proxies on file  Readmission Root Cause  30 Day Readmission: Yes  Who directed you to return to the hospital?: Self  Did you understand whom to contact if you had questions or problems?: Yes  Did you get your prescriptions before you left the hospital?: Yes  Did you take your medications as prescribed?: Yes  Were you able to get to your follow-up appointments?: No  Reason[de-identified] Readmitted prior to appointment  During previous admission, was a post-acute recommendation made?: No  Patient was readmitted due to: Thoracic back pain s/p laminectomy at \[facility]\""  Action Plan: Pain control, no rehabilitation needs anticipated, SW will follow    Patient Information  Admitted from[de-identified] Home  Mental Status: Alert  During Assessment patient was accompanied by: Not accompanied during assessment  Assessment information provided by[de-identified] Patient  Primary Caregiver: Self  Support Systems: [facility] [address] [place] [address] do you live in?: [address] entry access options   Select all that apply : Stairs  Number of steps to enter home : 1  Type of Current Residence: Apartment  Floor Level: 1  Upon entering residence, is there a bedroom on the main floor (no further steps)?: Yes  Upon entering residence, is there a bathroom on the main floor (no further steps)?: Yes  In the last 12 months, was there a time when you were not able to pay the mortgage or rent on time?: No  In the last 12 months, was there a time when you did not have a steady place to sleep or slept in a shelter (including now)?: No  Homeless/housing insecurity resource given?: N/A  Living Arrangements: Lives Alone    Activities of Daily Living Prior to Admission  Functional Status: Independent  Completes ADLs independently?: Yes  Ambulates independently?: Yes  Does patient use assisted devices?: No  Does patient currently own DME?: Yes  What DME does the patient currently own?: Straight Cane  Does patient currently have [address]?: No     Patient Information Continued  Income Source: Employed (Works remotely)  Does patient have prescription coverage?: Yes  Within the past 12 months, you worried that your food would run out before you got the money to buy more : Never true  Within the past 12 months, the food you bought just didn't last and you didn't have money to get more : Never true  Food insecurity resource given?: N/A  Does patient receive dialysis treatments?: No  Does patient have a history of substance abuse?: No  Does patient have a history of Mental Health Diagnosis?: No     Means of Transportation  Means of Transport to Appts[de-identified] Drives Self  In the past 12 months, has lack of transportation kept you from medical appointments or from getting medications?: No  In the past 12 months, has lack of transportation kept you from meetings, work, or from getting things needed for daily living?: No  Was application for public transport provided?: N/A      DISCHARGE DETAILS:    Discharge planning discussed with[de-identified] Patient  Freedom of Choice: Yes     Comments - Freedom of Choice: SW spoke with patient at bedside to introduce role of CM, conduct assessment and discuss discharge planning  Patient lives alone in a 1st floor apartment and uses no DME  She does have a cane that she purchased in case she needed it post-laminectomy    Her preference is to return home at discharge  Patient will need a Lyft transport home  SW will continue to follow for discharge planning needs       CM contacted family/caregiver?: No- see comments (Patient declined call to emergency contact)  Were Treatment Team discharge recommendations reviewed with patient/caregiver?: Yes  Did patient/caregiver verbalize understanding of patient care needs?: Yes  Were patient/caregiver advised of the risks associated with not following Treatment Team discharge recommendations?: Yes    Contacts  Patient Contacts: Dr Govind Posadas (friend)  Relationship to Patient[de-identified] Friend  Contact Method: Phone  Phone Number: [phone]    [facility]         Is the patient interested in [facility] at discharge?: No    DME Referral Provided  Referral made for DME?: No    Other Referral/Resources/Interventions Provided:  Interventions: None Indicated    Would you like to participate in our [address] service program?  : No - Declined    Treatment Team Recommendation: Home  Discharge Destination Plan[de-identified] Home  Transport at Discharge : [address] [place] [address]

## 2023-06-13 NOTE — ASSESSMENT & PLAN NOTE
Patient presents with uncontrolled thoracic back pain  Reports had spinal surgery on 6/8th in [facility], discharged on the day before admission, reports severe thoracic back pain at home and po oxycodone not helping  · Of note, patient is POD 3 T8 spinous process, T9 total, superior T10 laminectomy for decompression  · CT thoracic spine without contrast in ED showed  -no CT evident complication visualized  · ED provider discussed case with neurosurgery, recommend admit for pain control, neuro checks  · Patient received multiple dose of IV Dilaudid in ED  · Patient currently on scheduled Tylenol, gabapentin, Robaxin and oxycodone  · Dilaudid dose increased to 1 mg IV every 4 hours as needed for breakthrough pain  · Oxycodone dose was suggested  Patient also ordered for Lidoderm patch  · Patient continues to complain of significant pain  Discussed with neurosurgery PA on-call  Patient is no neurological deficits  · Continue scheduled Tylenol 975 mg p o  every 8 hours, Lidoderm patch, Robaxin-750 milligrams every 6 hours  · Attempted dilaudid 2 mg and 4 mg prn for moderate and severe pain instead of oxycodone IR to see if opoid rotation would help   Received TT later in day by RN regarding patient asking to have Oxy IR 15 mg placed back and stopping PO dilaudid 4 mg for severe pain

## 2023-06-13 NOTE — PLAN OF CARE
Problem: PAIN - ADULT  Goal: Verbalizes/displays adequate comfort level or baseline comfort level  Description: Interventions:  - Encourage patient to monitor pain and request assistance  - Assess pain using appropriate pain scale  - Administer analgesics based on type and severity of pain and evaluate response  - Implement non-pharmacological measures as appropriate and evaluate response  - Consider cultural and social influences on pain and pain management  - Notify physician/advanced practitioner if interventions unsuccessful or patient reports new pain  Outcome: Progressing     Problem: INFECTION - ADULT  Goal: Absence of fever/infection during neutropenic period  Description: INTERVENTIONS:  - Monitor WBC    Outcome: Progressing     Problem: INFECTION - ADULT  Goal: Absence or prevention of progression during hospitalization  Description: INTERVENTIONS:  - Assess and monitor for signs and symptoms of infection  - Monitor lab/diagnostic results  - Monitor all insertion sites, i e  indwelling lines, tubes, and drains  - Monitor endotracheal if appropriate and nasal secretions for changes in amount and color  - Mora appropriate cooling/warming therapies per order  - Administer medications as ordered  - Instruct and encourage patient and family to use good hand hygiene technique  - Identify and instruct in appropriate isolation precautions for identified infection/condition  Outcome: Progressing     Problem: SAFETY ADULT  Goal: Maintains/Returns to pre admission functional level  Description: INTERVENTIONS:  - Perform BMAT or MOVE assessment daily    - Set and communicate daily mobility goal to care team and patient/family/caregiver  - Collaborate with rehabilitation services on mobility goals if consulted  - Perform Range of Motion 3 times a day  - Actively turns self     - Dangle patient 3 times a day  - Stand patient 3 times a day  - Ambulate patient 3 times a day  - Out of bed to chair 3 times a day - Out of bed for meals 3 times a day  - Out of bed for toileting  - Record patient progress and toleration of activity level   Outcome: Progressing     Problem: DISCHARGE PLANNING  Goal: Discharge to home or other facility with appropriate resources  Description: INTERVENTIONS:  - Identify barriers to discharge w/patient and caregiver  - Arrange for needed discharge resources and transportation as appropriate  - Identify discharge learning needs (meds, wound care, etc )  - Arrange for interpretive services to assist at discharge as needed  - Refer to Case Management Department for coordinating discharge planning if the patient needs post-hospital services based on physician/advanced practitioner order or complex needs related to functional status, cognitive ability, or social support system  Outcome: Progressing     Problem: Knowledge Deficit  Goal: Patient/family/caregiver demonstrates understanding of disease process, treatment plan, medications, and discharge instructions  Description: Complete learning assessment and assess knowledge base    Interventions:  - Provide teaching at level of understanding  - Provide teaching via preferred learning methods  Outcome: Progressing

## 2023-06-13 NOTE — TREATMENT PLAN
Contact Dr Karla Hitchcock, primary [facility] attending caring for this patient, in regard to progress made with pain control  Unfortunately, the patient continues to report severe uncontrolled back pain  She remains at her baseline neuro exam without new focal deficits or red flag signs  Patient continues to take the IV Dilaudid every 3 hours for relief  Discussed with Dr Karla Hitchcock plan to opioid rotate her from p o  oxy to p o  Dilaudid  We will trial this through today to see if any improvement for the patient in regard to her pain  We will reassess tomorrow  Please reach out with any further questions or concerns or should the patient decline/experience red flag signs/symptoms

## 2023-06-13 NOTE — PROGRESS NOTES
"Progress Note - Cardiology   [facility]     Donald Jackman 49 y o  female MRN: [id number]  : 1975  Unit/Bed#: [address] [place] [address] Encounter: [id number]    ASSESSMENT:  Undocumented NSVT of 12 beats  Apparently occurred while the patient was in the ED  No documentation available  Searched on the floor and in the ED telemetry monitor     S/p thoracic decompression surgery  Presented with thoracic postop severe pain  Obesity  TERESITA noncompliant/tolerant to CPAP  History of nicotine abuse  History of carcinoid lung tumor, s/p right lower lobectomy  Skin rash, improving with Benadryl and Zofran     RECOMMENDATIONS:    Maintain adequate electrolyte, potassium and magnesium balance  Continue telemetry monitor while inpatient  Outpatient extended ambulatory monitor on discharge  2 attempts at doing echocardiogram even after giving analgesics were unsuccessful as patient was unable to tolerate any positioning  This can be done as an outpatient            Subjective / Objective:     Review of Systems  Patient is still complaining of severe back pain and finds most relief with standing  Denies any symptoms suggestive of angina, unusual dyspnea, palpitations or syncope  Vitals: Blood pressure 117/82, pulse 78, temperature 98 °F (36 7 °C), temperature source Oral, resp  rate 16, height 5' 11\" (1 803 m), weight (!) 143 kg (315 lb), SpO2 96 %  Vitals:    23   Weight: (!) 143 kg (315 lb)     Body mass index is 43 93 kg/m²    BP Readings from Last 3 Encounters:   23 117/82   23 106/62   23 130/84     Orthostatic Blood Pressures    Flowsheet Row Most Recent Value   Blood Pressure 117/82 filed at 2023 0846   Patient Position - Orthostatic VS Sitting filed at 2023 0846        I/O       06/10 0701   0700  0701   07 07 0700    IV Piggyback 1000      Total Intake(mL/kg) 1000      Net +1000                 Invasive Devices     Peripheral " Intravenous Line  Duration           Peripheral IV 06/10/23 Right Antecubital 1 day          Drain  Duration           Closed/Suction Drain Left;Superior Back Bulb 4 days                No intake or output data in the 24 hours ending 06/12/23 1319      Physical Exam:          Neurologic:  Alert & oriented x 3, no new focal deficits, complaining of severe back pain   Constitutional: Morbidly obese  Eyes:  Pupil equal and reacting to light, conjunctiva normal   HENT:  Atraumatic, oropharynx moist, Neck- normal range of motion, no tenderness, supple   Respiratory:  Bilateral air entry, mostly clear to auscultation  Cardiovascular: S1-S2 regular rhythm  GI:  Soft, nondistended, normal bowel sounds, nontender, no hepatosplenomegaly appreciated    Musculoskeletal: Back tenderness  Lymphatic:  No lymphadenopathy noted   Extremities: No lower extremity edema             Medications/ Allergies:     Current Facility-Administered Medications   Medication Dose Route Frequency Provider Last Rate   • acetaminophen  975 mg Oral Q8H [address] GENESIS Vieyra     • acyclovir  400 mg Oral BID GENESIS Vieyra     • ALPRAZolam  0 25 mg Oral HS PRN Karla Christensen MD     • diphenhydrAMINE  25 mg Oral Q6H PRN GENESIS Vieyra     • docusate sodium  100 mg Oral BID Delores Plata MD     • enoxaparin  40 mg Subcutaneous BID GENESIS Vieyra     • gabapentin  600 mg Oral TID GENESIS Vieyra     • HYDROmorphone  1 mg Intravenous Q3H PRN Delores Plata MD     • lidocaine  1 patch Topical Daily Delores Plata MD     • loratadine  10 mg Oral After Dinner GENESIS Cruz     • methocarbamol  750 mg Oral Q6H [address] GENESIS Vieyra     • oxyCODONE  10 mg Oral Q3H PRN Karla Christensen MD     • oxyCODONE  15 mg Oral Q3H PRN Karla Christensen MD     • pantoprazole  40 mg Oral Early Morning GENESIS Vieyra     • polyethylene glycol  17 g Oral Daily GENESIS Vieyra     • senna  2 tablet Oral HS Karla Christensen MD "    ALPRAZolam, 0 25 mg, HS PRN  diphenhydrAMINE, 25 mg, Q6H PRN  HYDROmorphone, 1 mg, Q3H PRN  oxyCODONE, 10 mg, Q3H PRN  oxyCODONE, 15 mg, Q3H PRN      Allergies   Allergen Reactions   • Metronidazole Anaphylaxis     \"patient states she has been in anaphylaxis due to this medication prior\"     • Nitrofurantoin Shortness Of Breath           Labs:   Troponins:      CBC with diff:  Results from last 7 days   Lab Units 06/11/23  0456 06/10/23  1548 06/09/23  0655   HEMATOCRIT % 39 1 37 9 38 7   HEMOGLOBIN g/dL 12 6 12 2 12 1   MCH pg 29 7 29 1 29 1   MCHC g/dL 32 2 32 2 31 3*   MCV fL 92 91 93   MPV fL 9 8 9 5 10 0   NRBC AUTO /100 WBCs  --  0  --    PLATELETS Thousands/uL 141* 155 153   RBC Million/uL 4 24 4 19 4 16   RDW % 13 7 13 5 13 5   WBC Thousand/uL 8 08 10 42* 11 75*       CMP:  Results from last 7 days   Lab Units 06/11/23  0456 06/10/23  1548 06/09/23  0655   ANION GAP mmol/L 5 5 2*   ALBUMIN g/dL  --  3 9  --    ALK PHOS U/L  --  81  --    ALT U/L  --  25  --    AST U/L  --  31  --    BUN mg/dL 10 13 12   CALCIUM mg/dL 8 6 9 0 8 5   CHLORIDE mmol/L 104 103 114*   CO2 mmol/L 28 27 23   CREATININE mg/dL 0 82 0 80 0 86   EGFR ml/min/1 73sq m 84 87 80   POTASSIUM mmol/L 3 6 3 9 3 8   SODIUM mmol/L 137 135 139   TOTAL BILIRUBIN mg/dL  --  0 74  --    TOTAL PROTEIN g/dL  --  7 3  --        Magnesium:  Results from last 7 days   Lab Units 06/11/23  0456 06/10/23  1548   MAGNESIUM mg/dL 2 2 1 9     Coags:  Results from last 7 days   Lab Units 06/09/23  0655   INR  1 04   PTT seconds 29     TSH:  Results from last 7 days   Lab Units 06/10/23  1548   TSH 3RD GENERATON uIU/mL 3 671     No components found for: \"TSH3\"  Lipid Profile:    Hgb A1c:    NT-proBNP: No results for input(s): \"NTBNP\" in the last 72 hours  Imaging & Testing   I have personally reviewed pertinent reports      CT spine thoracic without contrast    Result Date: 6/10/2023  Narrative: CT THORACIC SPINE INDICATION:   back pain, status post T8-T10 " laminectomy on 6/8/2023  Aurelio Jennings COMPARISON:  None  TECHNIQUE:  Contiguous axial images were obtained  Sagittal and coronal reconstructions were performed  Radiation dose length product (DLP) for this visit:  [id number] 84 12 mGy-cm   This examination, like all CT scans performed in the [facility], was performed utilizing techniques to minimize radiation dose exposure, including the use of iterative reconstruction and automated exposure control  IMAGE QUALITY:  Diagnostic  FINDINGS: ALIGNMENT:  Normal alignment of the thoracic spine  No subluxation  VERTEBRAE:  No fracture  Patient is post T9-10 laminectomy  DEGENERATIVE CHANGES: PARASPINAL SOFT TISSUES: Postsurgical changes are seen in the surgical bed overlying the laminectomy        Impression: Lack of contrast limits evaluation  Patient is post T9-10 laminectomy  No CT (without contrast) evident complication visualized  Postsurgical changes are seen in the overlying soft tissues  Otherwise unremarkable computed tomography of the thoracic spine  Consider MRI thoracic spine without and with contrast for further evaluation in the appropriate clinical setting  Workstation performed: [id number]     XR spine thoracic 2 vw    Result Date: 6/8/2023  Narrative: C-ARM -thoracic spine surgery  INDICATION: Myelomalacia  Procedure guidance  COMPARISON: 4/4/2023; 4/28/2023 TECHNIQUE: FLUOROSCOPY TIME:   14 8 seconds 1 FLUOROSCOPIC IMAGES FINDINGS: Fluoroscopic guidance provided for procedure guidance  Level verification was called to the OR at the time of image acquisition, as per Department protocol  Radiodense probe noted at the T10 level    A few clips in the right upper quadrant noted likely related to cholecystectomy, present on prior CT of the lumbar spine  Osseous and soft tissue detail limited by technique  Impression: Fluoroscopic guidance provided for procedure guidance  Please refer to the separate procedure notes for additional details   Workstation performed: "[id number]        EKG / Monitor: Personally reviewed  Sinus rhythm        Dr Minal Krishnamurthy MD, [facility] [place]      \"This note has been constructed using a voice recognition system  Therefore there may be syntax, spelling, and/or grammatical errors  Please call if you have any questions   \"  " Azithromycin Pregnancy And Lactation Text: This medication is considered safe during pregnancy and is also secreted in breast milk.

## 2023-06-13 NOTE — UTILIZATION REVIEW
"Continued Stay Review    Date: 6/13/23   DAY 2                          Current Patient Class: inpatient  Current Level of Care: med surg  HPI:47 y o  female initially admitted on 6/12/23     Assessment/Plan:   POD#5: T8 spinous process, T9 total, superior T10 laminectomy for decompression  Continues to require multiple doses IV Dilaudid  No focal deficits or red flags  Trial alternating po Dilaudid & po Oxycodone today per neurosurgery recommendation        Vital Signs: /83   Pulse 100   Temp 98 1 °F (36 7 °C)   Resp 18   Ht 5' 11\" (1 803 m)   Wt (!) 143 kg (315 lb)   SpO2 95%   BMI 43 93 kg/m²     Pertinent Labs/Diagnostic Results:     Results from last 7 days   Lab Units 06/11/23  0456 06/10/23  1548 06/09/23  0655   HEMATOCRIT % 39 1 37 9 38 7   HEMOGLOBIN g/dL 12 6 12 2 12 1   NEUTROS ABS Thousands/µL  --  5 22  --    PLATELETS Thousands/uL 141* 155 153   WBC Thousand/uL 8 08 10 42* 11 75*     Results from last 7 days   Lab Units 06/11/23  0456 06/10/23  1548 06/09/23  0655   ANION GAP mmol/L 5 5 2*   BUN mg/dL 10 13 12   CALCIUM mg/dL 8 6 9 0 8 5   CHLORIDE mmol/L 104 103 114*   CO2 mmol/L 28 27 23   CREATININE mg/dL 0 82 0 80 0 86   EGFR ml/min/1 73sq m 84 87 80   POTASSIUM mmol/L 3 6 3 9 3 8   MAGNESIUM mg/dL 2 2 1 9  --    SODIUM mmol/L 137 135 139     Results from last 7 days   Lab Units 06/10/23  1548   ALBUMIN g/dL 3 9   ALK PHOS U/L 81   ALT U/L 25   AST U/L 31   TOTAL BILIRUBIN mg/dL 0 74   TOTAL PROTEIN g/dL 7 3     Results from last 7 days   Lab Units 06/08/23  1412   POC GLUCOSE mg/dl 104     Results from last 7 days   Lab Units 06/11/23  0456 06/10/23  1548 06/09/23  0655   GLUCOSE RANDOM mg/dL 85 90 117     Results from last 7 days   Lab Units 06/10/23  1900 06/10/23  1617   HS TNI 0HR ng/L  --  4   HS TNI 2HR ng/L 3  --    HSTNI D2 ng/L -1  --      Results from last 7 days   Lab Units 06/09/23  0655   INR  1 04   PROTIME seconds 13 8   PTT seconds 29     Results from last 7 days " Lab Units 06/10/23  1548   TSH 3RD GENERATON uIU/mL 3 671     Results from last 7 days   Lab Units 06/10/23  1627   BILIRUBIN UA  Negative   BLOOD UA  Negative   CLARITY UA  Clear   COLOR UA  Yellow   GLUCOSE UA mg/dl Negative   KETONES UA mg/dl Negative   LEUKOCYTES UA  Negative   NITRITE UA  Negative   PH UA  6 0   PROTEIN UA mg/dl Negative   SPEC GRAV UA  1 010   UROBILINOGEN UA E U /dl 0 2     Medications:   Scheduled Medications:  acetaminophen, 975 mg, Oral, Q8H ANIKET  acyclovir, 400 mg, Oral, BID  docusate sodium, 100 mg, Oral, BID  enoxaparin, 40 mg, Subcutaneous, BID  gabapentin, 600 mg, Oral, TID  lidocaine, 1 patch, Topical, Daily  loratadine, 10 mg, Oral, After Dinner  methocarbamol, 750 mg, Oral, Q6H ANIKET  pantoprazole, 40 mg, Oral, Early Morning  polyethylene glycol, 17 g, Oral, Daily  senna, 2 tablet, Oral, BID    PRN Meds:  Medications 06/04 06/05 06/06 06/07 06/08 06/09 06/10 06/11 06/12 06/13   ALPRAZolam Ralene Score) tablet 0 25 mg  Dose: 0 25 mg  Freq: Daily at bedtime PRN Route: PO  PRN Reason: anxiety  Start: 06/12/23 1120   Admin Instructions:   High alert medication  [facility]                ALPRAZolam (XANAX) tablet 0 25 mg  Dose: 0 25 mg  Freq: Daily at bedtime PRN Route: PO  PRN Reason: anxiety  Start: 06/08/23 1652 End: 06/09/23 1525   Admin Instructions:   High alert medication   [facility]         1525-D/C'd          bupivacaine-epinephrine (PF) (MARCAINE/EPINEPHRINE PF) 0 5 %-1:[id number] injection  Freq: As needed  Start: 06/08/23 1327 End: 06/08/23 1346        1327     1346-D/C'd           calcium carbonate (TUMS) chewable tablet 1,000 mg  Dose: 1,000 mg  Freq: Daily PRN Route: PO  PRN Reasons: indigestion,heartburn  Start: 06/08/23 1652 End: 06/09/23 1525   Admin Instructions:   [facility]         1525-D/C'd          dexamethasone (PF) (DECADRON) injection  Freq: As needed Route: IV  Start: 06/08/23 1140 End: 06/08/23 1347        1145 1347-D/C'd           diphenhydrAMINE (BENADRYL) tablet 25 mg  Dose: 25 mg  Freq: Every 6 hours PRN Route: PO  PRN Reasons: itching,allergies  Start: 06/10/23 2024          2049       1258         diphenhydrAMINE (BENADRYL) tablet 25 mg  Dose: 25 mg  Freq: Every 6 hours PRN Route: PO  PRN Reason: itching  Start: 06/08/23 1949 End: 06/09/23 1525 2010      1525-D/C'd          ePHEDrine injection  Freq: As needed Route: IV  Start: 06/08/23 1103 End: 06/08/23 1347        1103     1126     1139     1331     1335     1347-D/C'd           fentaNYL (SUBLIMAZE) injection 25 mcg  Dose: 25 mcg  Freq: Every 5 minutes PRN Route: IV  PRN Reason: Pain - PACU  PRN Comment: Breakthrough - first line  Start: 06/08/23 1401 End: 06/08/23 1636   Admin Instructions:   ANALGESICS Select a single agent  If more than one agent selected, indicate sequence to follow  NOTE: If pain score greater than 3 after at least 2 doses of medication, the nurse may administer the next sequenced medication  Moving to the next sequenced medication discontinues the previous medication in the sequence  High Alert medication  [facility]        1405     1411     1636-D/C'd           fentanyl citrate (PF) 100 MCG/2ML  Freq: As needed Route: IV  Start: 06/08/23 1051 End: 06/08/23 1347        1051     1120     1347-D/C'd           HYDROmorphone (DILAUDID) injection  Freq: As needed Route: IV  Start: 06/08/23 1351 End: 06/08/23 1358        1351     1358-D/C'd           HYDROmorphone (DILAUDID) injection 0 4 mg  Dose: 0 4 mg  Freq: Every 5 minutes PRN Route: IV  PRN Reason: Pain - PACU  PRN Comment: Breakthrough Pain  Second Line  Start: 06/08/23 1516 End: 06/09/23 1525   Admin Instructions:   ANALGESICS Select a single agent  If more than one agent selected, indicate sequence to follow  NOTE: If pain score greater than 3 after at least 2 doses of medication, the nurse may administer the next sequenced medication   Moving to the next sequenced medication discontinues the previous medication in the sequence  High alert medication  [facility]        1666     8438     4262      1525-D/C'd          HYDROmorphone (DILAUDID) injection 0 5 mg  Dose: 0 5 mg  Freq: Every 2 hour PRN Route: IV  PRN Reason: breakthrough pain  Start: 06/10/23 1945 End: 06/11/23 1142   Admin Instructions:   Contact the provider if the patient has received 6 or more doses in 24 hours  High alert medication  [facility]          2131     2353      0258     0644     0945     1142-D/C'd        HYDROmorphone (DILAUDID) injection 0 5 mg  Dose: 0 5 mg  Freq: Every 2 hour PRN Route: IV  PRN Reason: breakthrough pain  Start: 06/08/23 1652 End: 06/09/23 1525   Admin Instructions:   Contact the provider if the patient has received 6 or more doses in 24 hours  High alert medication  [facility]        8522     2010     2229      0036     0238     1595     5636     0909     1525-D/C'd          HYDROmorphone (DILAUDID) injection 1 mg  Dose: 1 mg  Freq: Every 3 hours PRN Route: IV  PRN Reason: breakthrough pain  Start: 06/11/23 1145   Admin Instructions:   Contact the provider if the patient has received 6 or more doses in 24 hours  High alert medication  [facility]           1153     1640     2046      0116     0530     0935     1250     1601     1953     2305      0220     0605     1012        HYDROmorphone (DILAUDID) tablet 2 mg  Dose: 2 mg  Freq: Every 4 hours PRN Route: PO  PRN Reason: moderate pain  Start: 06/13/23 1156   Admin Instructions:   High Alert Medication  [facility]                HYDROmorphone (DILAUDID) tablet 4 mg  Dose: 4 mg  Freq: Every 4 hours PRN Route: PO  PRN Reason: severe pain  Start: 06/13/23 1147   Admin Instructions:   High Alert Medication   [facility]             1216        HYDROmorphone HCl (DILAUDID) injection 0 2 mg  Dose: 0 2 mg  Freq: Every 5 minutes PRN Route: IV  PRN Reason: Pain - PACU  PRN Comment: Breakthrough Pain  Second Line  Start: 06/08/23 1401 End: 06/08/23 1501   Admin Instructions:   ANALGESICS Select a single agent  If more than one agent selected, indicate sequence to follow  NOTE: If pain score greater than 3 after at least 2 doses of medication, the nurse may administer the next sequenced medication  Moving to the next sequenced medication discontinues the previous medication in the sequence  High alert medication   [facility]     9186     0370     6958     5555             Ketamine HCl  Freq: As needed Route: IV  Start: 06/08/23 1211 End: 06/08/23 1347        1211     1241     1347-D/C'd           lidocaine (PF) (XYLOCAINE-MPF) 1 % injection  Freq: As needed Route: IV  Start: 06/08/23 1051 End: 06/08/23 1347        1051     1347-D/C'd           lidocaine-epinephrine (XYLOCAINE/EPINEPHRINE) 1 %-1:100,000 injection  Freq: As needed  Start: 06/08/23 1211 End: 06/08/23 1346        1211     1346-D/C'd           magnesium hydroxide (MILK OF MAGNESIA) oral suspension 30 mL  Dose: 30 mL  Freq: Daily PRN Route: PO  PRN Reason: constipation  Start: 06/13/23 0550             0737 [C]        midazolam (VERSED) injection  Freq: As needed Route: IV  Start: 06/08/23 1044 End: 06/08/23 1347        1044     1347-D/C'd           ondansetron (ZOFRAN) injection  Freq: As needed Route: IV  Start: 06/08/23 1324 End: 06/08/23 1347        1324     1347-D/C'd           ondansetron (ZOFRAN) injection 4 mg  Dose: 4 mg  Freq: Once as needed Route: IV  PRN Reason: nausea  PRN Comment: First Line For Nausea  Start: 06/08/23 1401 End: 06/08/23 1636   Admin Instructions:   Push over 2 minutes  1636-D/C'd           ondansetron (ZOFRAN) injection 4 mg  Dose: 4 mg  Freq: Every 6 hours PRN Route: IV  PRN Reasons: nausea,vomiting  Start: 06/08/23 1652 End: 06/09/23 1525   Admin Instructions:   Push over 2 minutes           1525-D/C'd oxyCODONE (ROXICODONE) immediate release tablet 10 mg  Dose: 10 mg  Freq: Every 3 hours PRN Route: PO  PRN Reason: moderate pain  Start: 06/12/23 1130 End: 06/13/23 1156   Admin Instructions:   [facility]             1156-D/C'd      oxyCODONE (ROXICODONE) immediate release tablet 10 mg  Dose: 10 mg  Freq: Every 4 hours PRN Route: PO  PRN Reason: moderate pain  Start: 06/11/23 1141 End: 06/12/23 1120   Admin Instructions:   [facility]           9775      1120-D/C'd       oxyCODONE (ROXICODONE) immediate release tablet 10 mg  Dose: 10 mg  Freq: Every 4 hours PRN Route: PO  PRN Reason: severe pain  Start: 06/10/23 1945 End: 06/11/23 1142   Admin Instructions:   [facility]          2024      0032     7421     1142-D/C'd        oxyCODONE (ROXICODONE) immediate release tablet 10 mg  Dose: 10 mg  Freq: Every 4 hours PRN Route: PO  PRN Reason: severe pain  Start: 06/08/23 1652 End: 06/09/23 1525   Admin Instructions:   [facility]        1729     2138      0136     0535     0956     1525-D/C'd          oxyCODONE (ROXICODONE) IR tablet 15 mg  Dose: 15 mg  Freq: Every 3 hours PRN Route: PO  PRN Reason: severe pain  Start: 06/12/23 1130 End: 06/13/23 1156   Admin Instructions:   High alert medication  [facility]            4633     5748     7036     2129      0125     0508     0817     1156-D/C'd      oxyCODONE (ROXICODONE) IR tablet 15 mg  Dose: 15 mg  Freq: Every 4 hours PRN Route: PO  PRN Reason: severe pain  Start: 06/11/23 1141 End: 06/12/23 1120   Admin Instructions:   High alert medication  [facility]           0117     5836      2593     9071     3566     1120-D/C'd       oxyCODONE (ROXICODONE) IR tablet 5 mg  Dose: 5 mg  Freq: Every 4 hours PRN Route: PO  PRN Reason: moderate pain  Start: 06/10/23 1945 End: 06/11/23 1142   Admin Instructions:   High alert medication   [facility]           1142-D/C'd Discharge Plan: d    Network Utilization Review Department  ATTENTION: Please call with any questions or concerns to [phone] and carefully listen to the prompts so that you are directed to the right person  All voicemails are confidential   Herbie Arboleda all requests for admission clinical reviews, approved or denied determinations and any other requests to dedicated fax number below belonging to the campus where the patient is receiving treatment   List of dedicated fax numbers for the Facilities:  [address] DENIALS (Administrative/Medical Necessity) [phone]   [address] (Maternity/NICU/Pediatrics) [phone]   [address] [phone]   [place] [address] [phone]   [address] [place] [address] [facility] [phone]   [facility] 815 [facility] [phone]

## 2023-06-13 NOTE — PLAN OF CARE
Problem: PAIN - ADULT  Goal: Verbalizes/displays adequate comfort level or baseline comfort level  Description: Interventions:  - Encourage patient to monitor pain and request assistance  - Assess pain using appropriate pain scale  - Administer analgesics based on type and severity of pain and evaluate response  - Implement non-pharmacological measures as appropriate and evaluate response  - Consider cultural and social influences on pain and pain management  - Notify physician/advanced practitioner if interventions unsuccessful or patient reports new pain  Outcome: Progressing     Problem: INFECTION - ADULT  Goal: Absence or prevention of progression during hospitalization  Description: INTERVENTIONS:  - Assess and monitor for signs and symptoms of infection  - Monitor lab/diagnostic results  - Monitor all insertion sites, i e  indwelling lines, tubes, and drains  - Monitor endotracheal if appropriate and nasal secretions for changes in amount and color  - [place] appropriate cooling/warming therapies per order  - Administer medications as ordered  - Instruct and encourage patient and family to use good hand hygiene technique  - Identify and instruct in appropriate isolation precautions for identified infection/condition  Outcome: Progressing     Problem: SAFETY ADULT  Goal: Patient will remain free of falls  Description: INTERVENTIONS:  - Educate patient/family on patient safety including physical limitations  - Instruct patient to call for assistance with activity   - Consult OT/PT to assist with strengthening/mobility   - Keep Call bell within reach  - Keep bed low and locked with side rails adjusted as appropriate  - Keep care items and personal belongings within reach  - Initiate and maintain comfort rounds  - Make Fall Risk Sign visible to staff  Problem: DISCHARGE PLANNING  Goal: Discharge to home or other facility with appropriate resources  Description: INTERVENTIONS:  - Identify barriers to discharge w/patient and caregiver  - Arrange for needed discharge resources and transportation as appropriate  - Identify discharge learning needs (meds, wound care, etc )  - Arrange for interpretive services to assist at discharge as needed  - Refer to Case Management Department for coordinating discharge planning if the patient needs post-hospital services based on physician/advanced practitioner order or complex needs related to functional status, cognitive ability, or social support system  Outcome: Progressing     Problem: Knowledge Deficit  Goal: Patient/family/caregiver demonstrates understanding of disease process, treatment plan, medications, and discharge instructions  Description: Complete learning assessment and assess knowledge base    Interventions:  - Provide teaching at level of understanding  - Provide teaching via preferred learning methods  Outcome: Progressing     Problem: SKIN/TISSUE INTEGRITY - ADULT  Goal: Incision(s), wounds(s) or drain site(s) healing without S/S of infection  Description: INTERVENTIONS  - Assess and document dressing, incision, wound bed, drain sites and surrounding tissue  - Provide patient and family education    Problem: MUSCULOSKELETAL - ADULT  Goal: Maintain or return mobility to safest level of function  Description: INTERVENTIONS:  - Assess patient's ability to carry out ADLs; assess patient's baseline for ADL function and identify physical deficits which impact ability to perform ADLs (bathing, care of mouth/teeth, toileting, grooming, dressing, etc )  - Assess/evaluate cause of self-care deficits   - Assess range of motion  - Assess patient's mobility  - Assess patient's need for assistive devices and provide as appropriate  - Encourage maximum independence but intervene and supervise when necessary  - Involve family in performance of ADLs  - Assess for home care needs following discharge   - Consider OT consult to assist with ADL evaluation and planning for discharge  - Provide patient education as appropriate  Outcome: Progressing  Goal: Maintain proper alignment of affected body part  Description: INTERVENTIONS:  - Support, maintain and protect limb and body alignment  - Provide patient/ family with appropriate education  Outcome: Progressing     Outcome: Progressing     - Apply yellow socks and bracelet for high fall risk patients  - Consider moving patient to room near nurses station  Outcome: Progressing  Goal: Maintain or return to baseline ADL function  Description: INTERVENTIONS:  -  Assess patient's ability to carry out ADLs; assess patient's baseline for ADL function and identify physical deficits which impact ability to perform ADLs (bathing, care of mouth/teeth, toileting, grooming, dressing, etc )  - Assess/evaluate cause of self-care deficits   - Assess range of motion  - Assess patient's mobility; develop plan if impaired  - Assess patient's need for assistive devices and provide as appropriate  - Encourage maximum independence but intervene and supervise when necessary  - Involve family in performance of ADLs  - Assess for home care needs following discharge   - Consider OT consult to assist with ADL evaluation and planning for discharge  - Provide patient education as appropriate  Outcome: Progressing  Goal: Maintains/Returns to pre admission functional level  Description: INTERVENTIONS:  - Perform BMAT or MOVE assessment daily    - Set and communicate daily mobility goal to care team and patient/family/caregiver     - Collaborate with rehabilitation services on mobility goals if consulted  - Out of bed for meals 3 times a day  - Out of bed for toileting  - Record patient progress and toleration of activity level   Outcome: Progressing

## 2023-06-13 NOTE — PLAN OF CARE
Problem: SKIN/TISSUE INTEGRITY - ADULT  Goal: Incision(s), wounds(s) or drain site(s) healing without S/S of infection  Description: INTERVENTIONS  - Assess and document dressing, incision, wound bed, drain sites and surrounding tissue  - Provide patient and family education  - Perform skin care/dressing changes every shift    Outcome: Progressing     Problem: MUSCULOSKELETAL - ADULT  Goal: Maintain or return mobility to safest level of function  Description: INTERVENTIONS:  - Assess patient's ability to carry out ADLs; assess patient's baseline for ADL function and identify physical deficits which impact ability to perform ADLs (bathing, care of mouth/teeth, toileting, grooming, dressing, etc )  - Assess/evaluate cause of self-care deficits   - Assess range of motion  - Assess patient's mobility  - Assess patient's need for assistive devices and provide as appropriate  - Encourage maximum independence but intervene and supervise when necessary  - Involve family in performance of ADLs  - Assess for home care needs following discharge   - Consider OT consult to assist with ADL evaluation and planning for discharge  - Provide patient education as appropriate  Outcome: Progressing     Problem: MUSCULOSKELETAL - ADULT  Goal: Maintain proper alignment of affected body part  Description: INTERVENTIONS:  - Support, maintain and protect limb and body alignment  - Provide patient/ family with appropriate education  Outcome: Progressing

## 2023-06-13 NOTE — PROGRESS NOTES
[facility] 44  Progress Note  Name: Bette Hobson  MRN: [id number]  Unit/Bed#: [address] [address] I Date of Admission: 6/10/2023   Date of Service: 6/13/2023 I Hospital Day: 1    Assessment/Plan   * Thoracic back pain  Assessment & Plan  Patient presents with uncontrolled thoracic back pain  Reports had spinal surgery on 6/8th in [facility]", discharged on the day before admission, reports severe thoracic back pain at home and po oxycodone not helping  · Of note, patient is POD 3 T8 spinous process, T9 total, superior T10 laminectomy for decompression  · CT thoracic spine without contrast in ED showed  -no CT evident complication visualized  · ED provider discussed case with neurosurgery, recommend admit for pain control, neuro checks  · Patient received multiple dose of IV Dilaudid in ED  · Patient currently on scheduled Tylenol, gabapentin, Robaxin and oxycodone  · Dilaudid dose increased to 1 mg IV every 4 hours as needed for breakthrough pain  · Oxycodone dose was suggested  Patient also ordered for Lidoderm patch  · Patient continues to complain of significant pain  Discussed with neurosurgery PA on-call  Patient is no neurological deficits  · Continue scheduled Tylenol 975 mg p o  every 8 hours, Lidoderm patch, Robaxin-750 milligrams every 6 hours  · Attempted dilaudid 2 mg and 4 mg prn for moderate and severe pain instead of oxycodone IR to see if opoid rotation would help   Received TT later in day by RN regarding patient asking to have Oxy IR 15 mg placed back and stopping PO dilaudid 4 mg for severe pain    NSVT (nonsustained ventricular tachycardia) (HCC)  Assessment & Plan  · Patient had a 12 beat run of V tach on ED monitor-rhythm strip not available  · Telemetry showed no further V  tach or any arrhythmias  · Cardiology input appreciated  · 2D echo to assess LV function was attempted twice and patient could not tolerated  · Maintain potassium level over 4 and magnesium level over 2  · Outpatient follow-up with cardiology for 2D echo, possible nuclear stress test and extended ambulatory cardiac monitoring    Nicotine abuse  Assessment & Plan  · Patient declined nicotine patch, smoking cessation    Genital herpes simplex  Assessment & Plan  · Continue acyclovir prophylactically    Thoracic spondylosis with myelopathy  Assessment & Plan  · Status post surgery as above  · Follow-up neurosurgery as outpatient    Anxiety  Assessment & Plan  · Was on Xanax as needed at home  Resume Xanax    Carcinoid tumor of lung  Assessment & Plan  · Noted history  · Status post right thoracoscopic lower lobectomy mediastinal lymph node dissection in 7/2022    Class 3 severe obesity due to excess calories without serious comorbidity with body mass index (BMI) of 40 0 to 44 9 in [facility])  Assessment & Plan  · Diet and lifestyle modification      Obstructive sleep apnea  Assessment & Plan  · Intolerant to CPAP               VTE Pharmacologic Prophylaxis:   Moderate Risk (Score 3-4) - Pharmacological DVT Prophylaxis Ordered: enoxaparin (Lovenox)  Patient Centered Rounds: I performed bedside rounds with nursing staff today  Discussions with Specialists or Other Care Team Provider: neurosurgery    Education and Discussions with Family / Patient: Patient declined call to   Total Time Spent on Date of Encounter in care of patient: 45 minutes This time was spent on one or more of the following: performing physical exam; counseling and coordination of care; obtaining or reviewing history; documenting in the medical record; reviewing/ordering tests, medications or procedures; communicating with other healthcare professionals and discussing with patient's family/caregivers      Current Length of Stay: 1 day(s)  Current Patient Status: Inpatient   Certification Statement: The patient will continue to require additional inpatient hospital stay due to uncontrolled pain  Discharge Plan: Anticipate discharge in 24-48 hrs to home  Code Status: Level 1 - Full Code    Subjective:   Patient admits to still having a significant amount of back pain  Denies any incontinence or extremity weakness    Objective:     Vitals:   Temp (24hrs), Av 4 °F (36 9 °C), Min:98 1 °F (36 7 °C), Max:98 6 °F (37 °C)    Temp:  [98 1 °F (36 7 °C)-98 6 °F (37 °C)] 98 1 °F (36 7 °C)  HR:  [] 78  Resp:  [17-18] 18  BP: (122-154)/(83-91) 127/83  SpO2:  [95 %] 95 %  Body mass index is 43 93 kg/m²  Input and Output Summary (last 24 hours): Intake/Output Summary (Last 24 hours) at 2023 1750  Last data filed at 2023 2300  Gross per 24 hour   Intake 1010 ml   Output --   Net 1010 ml       Physical Exam:   Physical Exam  Vitals and nursing note reviewed  Constitutional:       General: She is not in acute distress  Appearance: She is well-developed  HENT:      Head: Normocephalic and atraumatic  Eyes:      Conjunctiva/sclera: Conjunctivae normal    Cardiovascular:      Rate and Rhythm: Normal rate and regular rhythm  Heart sounds: No murmur heard  Pulmonary:      Effort: Pulmonary effort is normal  No respiratory distress  Breath sounds: Normal breath sounds  Abdominal:      Palpations: Abdomen is soft  Tenderness: There is no abdominal tenderness  Musculoskeletal:         General: No swelling  Cervical back: Neck supple  Skin:     General: Skin is warm and dry  Capillary Refill: Capillary refill takes less than 2 seconds  Neurological:      Mental Status: She is alert  Mental status is at baseline     Psychiatric:         Mood and Affect: Mood normal           Additional Data:     Labs:  Results from last 7 days   Lab Units 23  0456 06/10/23  1548   EOS PCT %  --  4   HEMATOCRIT % 39 1 37 9   HEMOGLOBIN g/dL 12 6 12 2   LYMPHS PCT %  --  38   MONOS PCT %  --  7   NEUTROS PCT %  --  50   PLATELETS Thousands/uL 141* 155   WBC Thousand/uL 8 08 10 42* Results from last 7 days   Lab Units 06/11/23  0456 06/10/23  1548   ANION GAP mmol/L 5 5   ALBUMIN g/dL  --  3 9   ALK PHOS U/L  --  81   ALT U/L  --  25   AST U/L  --  31   BUN mg/dL 10 13   CALCIUM mg/dL 8 6 9 0   CHLORIDE mmol/L 104 103   CO2 mmol/L 28 27   CREATININE mg/dL 0 82 0 80   GLUCOSE RANDOM mg/dL 85 90   POTASSIUM mmol/L 3 6 3 9   SODIUM mmol/L 137 135   TOTAL BILIRUBIN mg/dL  --  0 74     Results from last 7 days   Lab Units 06/09/23  0655   INR  1 04     Results from last 7 days   Lab Units 06/08/23  1412   POC GLUCOSE mg/dl 104               Lines/Drains:  Invasive Devices     Peripheral Intravenous Line  Duration           Peripheral IV 06/10/23 Right Antecubital 3 days          Drain  Duration           Closed/Suction Drain Left;Superior Back Bulb 5 days                      Imaging: Reviewed radiology reports from this admission including: procedure reports    Recent Cultures (last 7 days):         Last 24 Hours Medication List:   Current Facility-Administered Medications   Medication Dose Route Frequency Provider Last Rate   • acetaminophen  975 mg Oral Q8H [address] GENESIS Vieyra     • acyclovir  400 mg Oral BID GENESIS Vieyra     • ALPRAZolam  0 25 mg Oral HS PRN Vlad Daniel MD     • diphenhydrAMINE  25 mg Oral Q6H PRN GENESIS Vieyra     • docusate sodium  100 mg Oral BID Delores Plata MD     • enoxaparin  40 mg Subcutaneous BID GENESIS Vieyra     • gabapentin  600 mg Oral TID GENESIS Vieyra     • HYDROmorphone  1 mg Intravenous Q4H PRN Mery Garrett DO     • HYDROmorphone  2 mg Oral Q4H PRN Mery Garrett DO     • lidocaine  1 patch Topical Daily Vlad Daniel MD     • loratadine  10 mg Oral After Dinner GENESIS Cruz     • magnesium hydroxide  30 mL Oral Daily PRN Mia Essex, MD     • methocarbamol  750 mg Oral Q6H [address] GENESIS Vieyra     • oxyCODONE  15 mg Oral Q4H PRN Mery Garrett DO     • pantoprazole  40 mg Oral Early Morning Nancy, GENESIS     • polyethylene glycol  17 g Oral Daily GENESIS iVeyra     • senna  2 tablet Oral BID Mery Garrett DO          Today, Patient Was Seen By: Binta Miller DO    **Please Note: This note may have been constructed using a voice recognition system  **

## 2023-06-14 VITALS
BODY MASS INDEX: 41.02 KG/M2 | RESPIRATION RATE: 18 BRPM | TEMPERATURE: 98.7 F | SYSTOLIC BLOOD PRESSURE: 130 MMHG | WEIGHT: 293 LBS | HEIGHT: 71 IN | OXYGEN SATURATION: 96 % | DIASTOLIC BLOOD PRESSURE: 86 MMHG | HEART RATE: 82 BPM

## 2023-06-14 PROCEDURE — 99239 HOSP IP/OBS DSCHRG MGMT >30: CPT | Performed by: STUDENT IN AN ORGANIZED HEALTH CARE EDUCATION/TRAINING PROGRAM

## 2023-06-14 RX ORDER — OXYCODONE HYDROCHLORIDE 15 MG/1
15 TABLET ORAL EVERY 4 HOURS PRN
Qty: 12 TABLET | Refills: 0 | Status: SHIPPED | OUTPATIENT
Start: 2023-06-14 | End: 2023-06-20 | Stop reason: CLARIF

## 2023-06-14 RX ORDER — ACETAMINOPHEN 325 MG/1
975 TABLET ORAL EVERY 8 HOURS SCHEDULED
Qty: 270 TABLET | Refills: 0 | Status: SHIPPED | OUTPATIENT
Start: 2023-06-14 | End: 2023-07-14

## 2023-06-14 RX ORDER — METHOCARBAMOL 750 MG/1
750 TABLET, FILM COATED ORAL EVERY 6 HOURS SCHEDULED
Qty: 30 TABLET | Refills: 0 | Status: SHIPPED | OUTPATIENT
Start: 2023-06-14 | End: 2023-06-22 | Stop reason: ALTCHOICE

## 2023-06-14 RX ORDER — LIDOCAINE 50 MG/G
1 PATCH TOPICAL DAILY
Qty: 10 PATCH | Refills: 0 | Status: SHIPPED | OUTPATIENT
Start: 2023-06-15 | End: 2023-06-15 | Stop reason: SDUPTHER

## 2023-06-14 RX ORDER — SENNOSIDES 8.6 MG
17.2 TABLET ORAL 2 TIMES DAILY
Qty: 120 TABLET | Refills: 0 | Status: SHIPPED | OUTPATIENT
Start: 2023-06-14 | End: 2023-07-14

## 2023-06-14 RX ORDER — HYDROMORPHONE HYDROCHLORIDE 2 MG/1
2 TABLET ORAL EVERY 4 HOURS PRN
Status: DISCONTINUED | OUTPATIENT
Start: 2023-06-14 | End: 2023-06-14

## 2023-06-14 RX ORDER — OXYCODONE HYDROCHLORIDE 10 MG/1
10 TABLET ORAL EVERY 4 HOURS PRN
Qty: 12 TABLET | Refills: 0 | Status: SHIPPED | OUTPATIENT
Start: 2023-06-14 | End: 2023-06-20 | Stop reason: CLARIF

## 2023-06-14 RX ORDER — OXYCODONE HYDROCHLORIDE 10 MG/1
10 TABLET ORAL EVERY 4 HOURS PRN
Status: DISCONTINUED | OUTPATIENT
Start: 2023-06-14 | End: 2023-06-14 | Stop reason: HOSPADM

## 2023-06-14 RX ADMIN — HYDROMORPHONE HYDROCHLORIDE 2 MG: 2 TABLET ORAL at 02:41

## 2023-06-14 RX ADMIN — METHOCARBAMOL TABLETS 750 MG: 500 TABLET, COATED ORAL at 05:52

## 2023-06-14 RX ADMIN — PANTOPRAZOLE SODIUM 40 MG: 40 TABLET, DELAYED RELEASE ORAL at 05:51

## 2023-06-14 RX ADMIN — GABAPENTIN 600 MG: 300 CAPSULE ORAL at 09:57

## 2023-06-14 RX ADMIN — OXYCODONE HYDROCHLORIDE 15 MG: 5 TABLET ORAL at 10:06

## 2023-06-14 RX ADMIN — SENNOSIDES 17.2 MG: 8.6 TABLET, FILM COATED ORAL at 09:58

## 2023-06-14 RX ADMIN — METHOCARBAMOL TABLETS 750 MG: 500 TABLET, COATED ORAL at 11:20

## 2023-06-14 RX ADMIN — OXYCODONE HYDROCHLORIDE 10 MG: 10 TABLET ORAL at 11:54

## 2023-06-14 RX ADMIN — LIDOCAINE 1 PATCH: 50 PATCH CUTANEOUS at 09:57

## 2023-06-14 RX ADMIN — HYDROMORPHONE HYDROCHLORIDE 1 MG: 1 INJECTION, SOLUTION INTRAMUSCULAR; INTRAVENOUS; SUBCUTANEOUS at 08:34

## 2023-06-14 RX ADMIN — OXYCODONE HYDROCHLORIDE 15 MG: 5 TABLET ORAL at 01:23

## 2023-06-14 RX ADMIN — DOCUSATE SODIUM 100 MG: 100 CAPSULE, LIQUID FILLED ORAL at 09:57

## 2023-06-14 RX ADMIN — ACYCLOVIR 400 MG: 800 TABLET ORAL at 09:59

## 2023-06-14 RX ADMIN — OXYCODONE HYDROCHLORIDE 15 MG: 5 TABLET ORAL at 05:51

## 2023-06-14 RX ADMIN — HYDROMORPHONE HYDROCHLORIDE 1 MG: 1 INJECTION, SOLUTION INTRAMUSCULAR; INTRAVENOUS; SUBCUTANEOUS at 04:24

## 2023-06-14 RX ADMIN — ENOXAPARIN SODIUM 40 MG: 40 INJECTION SUBCUTANEOUS at 09:58

## 2023-06-14 RX ADMIN — ACETAMINOPHEN 975 MG: 325 TABLET ORAL at 05:51

## 2023-06-14 NOTE — DISCHARGE SUMMARY
[facility] 44  Discharge- Mely Olmstead 1975, 49 y o  female MRN: [id number]  Unit/Bed#: [address] [address] Encounter: [id number]  Primary Care Provider: Diana Garvey MD   Date and time admitted to hospital: 6/10/2023  2:37 PM    * Thoracic back pain  Assessment & Plan  Patient presents with uncontrolled thoracic back pain  Reports had spinal surgery on 6/8th in [facility], discharged on the day before admission, reports severe thoracic back pain at home and po oxycodone not helping  · Of note, patient is POD 3 T8 spinous process, T9 total, superior T10 laminectomy for decompression  · CT thoracic spine without contrast in ED showed  -no CT evident complication visualized  · ED provider discussed case with neurosurgery, recommend admit for pain control, neuro checks  · Patient received multiple dose of IV Dilaudid in ED  · Patient currently on scheduled Tylenol, gabapentin, Robaxin and oxycodone  · Dilaudid dose increased to 1 mg IV every 4 hours as needed for breakthrough pain  · Oxycodone dose was suggested  Patient also ordered for Lidoderm patch  · Patient continues to complain of significant pain  Discussed with neurosurgery PA on-call  Patient is no neurological deficits  · Continue scheduled Tylenol 975 mg p o  every 8 hours, Lidoderm patch, Robaxin-750 milligrams every 6 hours  · Attempted dilaudid 2 mg and 4 mg prn for moderate and severe pain instead of oxycodone IR to see if opoid rotation would help  Received TT later in day by RN regarding patient asking to have Oxy IR 15 mg placed back and stopping PO dilaudid 4 mg for severe pain  · 6/14 Discussed with neurosurgery regarding pain regiment  Recommend Oxy IR 10 mg q4h for moderate pain and Oxy IR 15 mg q4h prn for severe pain  Patient agreeable with current regiment for discharge    · Continue standing tylenol, gabapentin, robaxin, lidocain patches  · Outpatient follow up with neuro surgery    NSVT (nonsustained ventricular tachycardia) ([facility] [address] )  Assessment & Plan  · Patient had a 12 beat run of V tach on ED monitor-rhythm strip not available  · Telemetry showed no further V  tach or any arrhythmias  · Cardiology input appreciated  · 2D echo to assess LV function was attempted twice and patient could not tolerated  · Maintain potassium level over 4 and magnesium level over 2  · Outpatient follow-up with cardiology for 2D echo, possible nuclear stress test and extended ambulatory cardiac monitoring    Nicotine abuse  Assessment & Plan  · Patient declined nicotine patch, smoking cessation    Genital herpes simplex  Assessment & Plan  · Continue acyclovir prophylactically    Thoracic spondylosis with myelopathy  Assessment & Plan  · Status post surgery as above  · Follow-up neurosurgery as outpatient    Anxiety  Assessment & Plan  · Was on Xanax as needed at home  Resume Xanax    Carcinoid tumor of lung  Assessment & Plan  · Noted history    · Status post right thoracoscopic lower lobectomy mediastinal lymph node dissection in 7/2022    Class 3 severe obesity due to excess calories without serious comorbidity with body mass index (BMI) of 40 0 to 44 9 in adult [facility])  Assessment & Plan  · Diet and lifestyle modification      Obstructive sleep apnea  Assessment & Plan  · Intolerant to CPAP        Medical Problems     Resolved Problems  Date Reviewed: 6/14/2023   None       Discharging Physician / Practitioner: Camilo Albright DO  PCP: Lindsey Johnson MD  Admission Date:   Admission Orders (From admission, onward)     Ordered        06/12/23 1350  Inpatient Admission  Once            06/10/23 1923  Place in Observation  Once                      Discharge Date: 06/14/23    Consultations During Hospital Stay:  · Neurosurgery  · Cardiology    Procedures Performed:   · none    Significant Findings / Test Results:   · Run of NSVT    Incidental Findings:   · See above   · I reviewed the above mentioned incidental findings with the "patient and/or family and they expressed understanding  Test Results Pending at Discharge (will require follow up):   · none     Outpatient Tests Requested:  · n/a    Complications:  none    Reason for Admission: Back pain    Hospital Course:   Ruby Gracia is a 49 y o  female patient who originally presented to the hospital on 6/10/2023 due to trolled thoracic back pain  Patient underwent spinal surgery on on 6/8 at [facility]  She was discharged on 6/9 and came to the ER on 6/10 due to uncontrolled pain  Neurosurgery was consulted who recommended admitting patient for pain control  Patient was treated with multimodal pain management regiment including standing Tylenol, Robaxin, gabapentin, along with as needed opioid medications  Patient's back pain slowly improved to a level where it is tolerable for patient to be discharged home  Patient being discharged on the above mentioned regiment along with as needed oxycodone 10 mg every 4 hours for moderate pain and Oxy IR 15 mg every 4 hours for severe pain  Patient has an outpatient neurosurgery appointment scheduled for 6/22/23  During patient's hospitalization she was noted to have a 12 beat run of V  tach on ED monitor  Cardiology was consulted  Recommend outpatient follow-up with outpatient echo and possible nuclear stress test         Please see above list of diagnoses and related plan for additional information       Condition at Discharge: fair    Discharge Day Visit / Exam:   Subjective: Patient states that back pain is still present but able to tolerate on current regiment  Vitals: Blood Pressure: 130/86 (06/14/23 0739)  Pulse: 82 (06/14/23 0739)  Temperature: 98 7 °F (37 1 °C) (06/14/23 0011)  Temp Source: Oral (06/13/23 0720)  Respirations: 18 (06/13/23 1903)  Height: 5' 11\" (180 3 cm) (06/11/23 2049)  Weight - Scale: (!) 143 kg (315 lb) (06/11/23 2049)  SpO2: 96 % (06/14/23 0739)  Exam:   Physical Exam  Vitals and nursing note " reviewed  Constitutional:       General: She is not in acute distress  Appearance: She is well-developed  She is obese  HENT:      Head: Normocephalic and atraumatic  Eyes:      Conjunctiva/sclera: Conjunctivae normal    Cardiovascular:      Rate and Rhythm: Normal rate and regular rhythm  Heart sounds: No murmur heard  Pulmonary:      Effort: Pulmonary effort is normal  No respiratory distress  Breath sounds: Normal breath sounds  Abdominal:      Palpations: Abdomen is soft  Tenderness: There is no abdominal tenderness  Musculoskeletal:         General: No swelling  Cervical back: Neck supple  Skin:     General: Skin is warm and dry  Capillary Refill: Capillary refill takes less than 2 seconds  Neurological:      Mental Status: She is alert  Mental status is at baseline  Psychiatric:         Mood and Affect: Mood normal           Discussion with Family: Patient declined call to   Discharge instructions/Information to patient and family:   See after visit summary for information provided to patient and family  Provisions for Follow-Up Care:  See after visit summary for information related to follow-up care and any pertinent home health orders  Disposition:   Home    Planned Readmission: no     Discharge Statement:  I spent 45 minutes discharging the patient  This time was spent on the day of discharge  I had direct contact with the patient on the day of discharge  Greater than 50% of the total time was spent examining patient, answering all patient questions, arranging and discussing plan of care with patient as well as directly providing post-discharge instructions  Additional time then spent on discharge activities  Discharge Medications:  See after visit summary for reconciled discharge medications provided to patient and/or family        **Please Note: This note may have been constructed using a voice recognition system**

## 2023-06-14 NOTE — CASE MANAGEMENT
Case Management Discharge Planning Note    Patient name Billie De  Location 4 [facility] 402/4 Ella [place] 852-* MRN [id number]  : 1975 Date 2023       Current Admission Date: 6/10/2023  Current Admission Diagnosis:Thoracic back pain   Patient Active Problem List    Diagnosis Date Noted   • Genital herpes simplex 06/10/2023   • Nicotine abuse 06/10/2023   • NSVT (nonsustained ventricular tachycardia) ([facility] [address] ) 06/10/2023   • Ligamentum flavum hypertrophy 2023   • Degenerative disc disease, thoracic 2023   • Thoracic spondylosis with myelopathy 2023   • Hiatal hernia 2023   • Gastroesophageal reflux disease 2023   • Anxiety 2023   • Thoracic back pain 2023   • S/P bilateral oophorectomy 2022   • Bilateral tubo-ovarian mass 2022   • Family history of ovarian cancer 2022   • 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 ([facility] ) 2022   • Carcinoid tumor of lung 2022   • Kidney stone 2022   • Class 3 severe obesity due to excess calories without serious comorbidity with body mass index (BMI) of 40 0 to 44 9 in adult [facility]) 2022   • Tobacco smoker, 1 pack of cigarettes or less per day 2022   • Obstructive sleep apnea 2018   • Latent tuberculosis by skin test [facility]      LOS (days): 2  Geometric Mean LOS (GMLOS) (days):   Days to GMLOS:     OBJECTIVE:  Risk of Unplanned Readmission Score: 11 25       Current admission status: Inpatient   Preferred Pharmacy:   [address] [place] [address], [address] [place], [place] [id number]  Phone: [phone] Fax: [phone]    Primary Care Provider: Luis Tello MD    Primary Insurance: [facility]  Secondary Insurance:     DISCHARGE DETAILS:     Treatment Team Recommendation: Home  Discharge Destination Plan[de-identified] Home  Transport at Discharge : Ride Share (Per nursing, patient is requesting [employer] vehicle due to recent back surgery )  Dispatcher Contacted: Yes  Number/Name of Dispatcher: [employer] via [employer]  Transported by [employer] and Unit #): Charlene  ETA of Transport (Date): 06/14/23  ETA of Transport (Time): 1215      Lyft waiver signed by patient and placed in scan bin for chart

## 2023-06-14 NOTE — ASSESSMENT & PLAN NOTE
Patient presents with uncontrolled thoracic back pain  Reports had spinal surgery on 6/8th in [facility], discharged on the day before admission, reports severe thoracic back pain at home and po oxycodone not helping  · Of note, patient is POD 3 T8 spinous process, T9 total, superior T10 laminectomy for decompression  · CT thoracic spine without contrast in ED showed  -no CT evident complication visualized  · ED provider discussed case with neurosurgery, recommend admit for pain control, neuro checks  · Patient received multiple dose of IV Dilaudid in ED  · Patient currently on scheduled Tylenol, gabapentin, Robaxin and oxycodone  · Dilaudid dose increased to 1 mg IV every 4 hours as needed for breakthrough pain  · Oxycodone dose was suggested  Patient also ordered for Lidoderm patch  · Patient continues to complain of significant pain  Discussed with neurosurgery PA on-call  Patient is no neurological deficits  · Continue scheduled Tylenol 975 mg p o  every 8 hours, Lidoderm patch, Robaxin-750 milligrams every 6 hours  · Attempted dilaudid 2 mg and 4 mg prn for moderate and severe pain instead of oxycodone IR to see if opoid rotation would help  Received TT later in day by RN regarding patient asking to have Oxy IR 15 mg placed back and stopping PO dilaudid 4 mg for severe pain  · 6/14 Discussed with neurosurgery regarding pain regiment  Recommend Oxy IR 10 mg q4h for moderate pain and Oxy IR 15 mg q4h prn for severe pain  Patient agreeable with current regiment for discharge    · Outpatient follow up with neuro surgery

## 2023-06-14 NOTE — PLAN OF CARE
Problem: PAIN - ADULT  Goal: Verbalizes/displays adequate comfort level or baseline comfort level  Description: Interventions:  - Encourage patient to monitor pain and request assistance  - Assess pain using appropriate pain scale  - Administer analgesics based on type and severity of pain and evaluate response  - Implement non-pharmacological measures as appropriate and evaluate response  - Consider cultural and social influences on pain and pain management  - Notify physician/advanced practitioner if interventions unsuccessful or patient reports new pain  Outcome: Progressing     Problem: INFECTION - ADULT  Goal: Absence or prevention of progression during hospitalization  Description: INTERVENTIONS:  - Assess and monitor for signs and symptoms of infection  - Monitor lab/diagnostic results  - Monitor all insertion sites, i e  indwelling lines, tubes, and drains  - Monitor endotracheal if appropriate and nasal secretions for changes in amount and color  - [place] appropriate cooling/warming therapies per order  - Administer medications as ordered  - Instruct and encourage patient and family to use good hand hygiene technique  - Identify and instruct in appropriate isolation precautions for identified infection/condition  Outcome: Progressing  Goal: Absence of fever/infection during neutropenic period  Description: INTERVENTIONS:  - Monitor WBC    Outcome: Progressing     Problem: SAFETY ADULT  Goal: Patient will remain free of falls  Description: INTERVENTIONS:  - Educate patient/family on patient safety including physical limitations  - Instruct patient to call for assistance with activity   - Consult OT/PT to assist with strengthening/mobility   - Keep Call bell within reach  - Keep bed low and locked with side rails adjusted as appropriate  - Keep care items and personal belongings within reach  - Initiate and maintain comfort rounds  - Make Fall Risk Sign visible to staff  - Offer Toileting every 2 Hours, in advance of need  - Initiate/Maintain bed alarm  - Obtain necessary fall risk management equipment: yellow socks   - Apply yellow socks and bracelet for high fall risk patients  - Consider moving patient to room near nurses station  Outcome: Progressing  Goal: Maintain or return to baseline ADL function  Description: INTERVENTIONS:  -  Assess patient's ability to carry out ADLs; assess patient's baseline for ADL function and identify physical deficits which impact ability to perform ADLs (bathing, care of mouth/teeth, toileting, grooming, dressing, etc )  - Assess/evaluate cause of self-care deficits   - Assess range of motion  - Assess patient's mobility; develop plan if impaired  - Assess patient's need for assistive devices and provide as appropriate  - Encourage maximum independence but intervene and supervise when necessary  - Involve family in performance of ADLs  - Assess for home care needs following discharge   - Consider OT consult to assist with ADL evaluation and planning for discharge  - Provide patient education as appropriate  Outcome: Progressing

## 2023-06-14 NOTE — TREATMENT PLAN
Was notified by attending with primary team Dr Sheridan Arias, who stated the pt continues to have pain but would like to be discharged home on an oral regimen today  She remains neurologically intact without red flag signs per his report  Patient was opioid rotated yesterday from PO oxycodone to PO dilaudid  However, primary team reports that overnight they were contacted as the p o  Dilaudid was reportedly not working and the patient wanted to be placed back on the oxycodone which worked better for her  Therefore, the patient is currently receiving oxycodone 15mg q 4 hrs prn with PO dilaudid 2mg q 4hrs prn breakthrough  The pt no longer has IV Dilaudid ordered however she has been receiving doses intermittently  Primary team reaching out in regard to a home regimen to discharge the pt on given she would like to be discharged today  Given patient remains stable neurologically without red flag signs or symptoms agree with discharge to home with patient feels her pain is well controlled with an oral regimen  However, would not recommend discharging home on both oxycodone and Dilaudid p o  Given that the oxycodone seems to improve the patient's pain more would recommend discharge on just the oxycodone  Patient would not be discharged home on breakthrough pain medications      Will recommend the following upon discharge:   - continue tylenol, gabapentin, robaxin, and lidocaine patches as part of multimodal pain regimen and ATC control   - oxycodone 10mg q 4hr prn for moderate pain  - oxycodone 15mg q 4hrs prn for severe pain     (this regimen was discussed with the APS team here at [facility] who were in agreement)    - will follow-up with the pt as scheduled in the nsgy office for her post-op visits on 6/22/2023 and again on 7/24/2023   - pt encouraged to call the office sooner with any further questions or concerns

## 2023-06-15 ENCOUNTER — TELEPHONE (OUTPATIENT)
Dept: FAMILY MEDICINE CLINIC | Facility: CLINIC | Age: 48
End: 2023-06-15

## 2023-06-15 DIAGNOSIS — G89.18 POST-OPERATIVE PAIN: ICD-10-CM

## 2023-06-15 LAB
ATRIAL RATE: 83 BPM
P AXIS: 58 DEGREES
PR INTERVAL: 194 MS
QRS AXIS: 38 DEGREES
QRSD INTERVAL: 78 MS
QT INTERVAL: 360 MS
QTC INTERVAL: 423 MS
T WAVE AXIS: 38 DEGREES
VENTRICULAR RATE: 83 BPM

## 2023-06-15 PROCEDURE — 93010 ELECTROCARDIOGRAM REPORT: CPT | Performed by: INTERNAL MEDICINE

## 2023-06-15 NOTE — TELEPHONE ENCOUNTER
Called patient to see how she is doing after surgery  Patient reports she is doing well overall and denies any incisional issues or fevers  Patient is able to ambulate around the house and complete ADLs  Educated the patient about the importance of preventing blood clots and provided measures how to prevent them  Reviewed incision care with the patient  Advised that at this time she should be showering and gently washing the surgical site with soap and water  Use clean wash cloth, towels, and clothing  Do not submerge in water until cleared by the surgeon  Do not apply any creams, ointments, or lotions to the site  Patient is aware to call the office if any redness, swelling, drainage, dehiscence of incision, or fever >100 F occurs  Patient is aware to call the office if any concerns or questions may arise  Reminded patient of her upcoming appointments with the date/time/location  Patient was appreciative for the call

## 2023-06-16 RX ORDER — LIDOCAINE 50 MG/G
1 PATCH TOPICAL DAILY
Qty: 10 PATCH | Refills: 0 | Status: SHIPPED | OUTPATIENT
Start: 2023-06-16 | End: 2023-06-26

## 2023-06-20 DIAGNOSIS — Z98.890 POST-OPERATIVE STATE: Primary | ICD-10-CM

## 2023-06-20 RX ORDER — OXYCODONE HYDROCHLORIDE 5 MG/1
5 TABLET ORAL EVERY 4 HOURS PRN
Qty: 42 TABLET | Refills: 0 | Status: SHIPPED | OUTPATIENT
Start: 2023-06-20 | End: 2023-06-27

## 2023-06-20 NOTE — TELEPHONE ENCOUNTER
Received a call from patient requesting for a refill of her oxycodone  Returned call to patient who stated after she presented to the ER for pain after her surgery she was prescribed 10 mg of oxy Q4 hours for moderate pain and 15 mg Q4 for severe pain  Advised this RN discussed with BLANE ZIMMERMAN who stated a refill can be provided however will be ordered as 5-10 mg Q4 PRN for her pain  She stated an understanding  Confirmed her preferred pharmacy  She was appreciative of the call back

## 2023-06-22 ENCOUNTER — CLINICAL SUPPORT (OUTPATIENT)
Dept: NEUROSURGERY | Facility: CLINIC | Age: 48
End: 2023-06-22

## 2023-06-22 VITALS — DIASTOLIC BLOOD PRESSURE: 80 MMHG | SYSTOLIC BLOOD PRESSURE: 114 MMHG | TEMPERATURE: 97.5 F

## 2023-06-22 DIAGNOSIS — Z98.890 POST-OPERATIVE STATE: Primary | ICD-10-CM

## 2023-06-22 PROCEDURE — 99024 POSTOP FOLLOW-UP VISIT: CPT

## 2023-06-22 NOTE — PROGRESS NOTES
Post-Op Visit- Neurosurgery    Brittany Viera 49 y o  female MRN: [id number]    Chief Complaint:  Patient presents post: Open T8 spinous process, T9 total, superior T10 laminectomy for decompression    History of Present Illness:  Patient presents for 2 week POV for incision check alone and ambulating without an assistive device  Patient reports she is doing well overall and denies any incisional issues or fevers  she denies any new weakness, numbness or tingling since the surgery  Patient admits to surgical pain at this time and rates their pain as a Pain Score:   7/10  Patient is currently taking oxycodone, tylenol and tizanidine with some relief of pain symptoms  Patient reports a lot of her pain being to the left of her incision wrapping around to her ribs  Assessment:   Vitals:    06/22/23 0948   BP: 114/80   Temp: 97 5 °F (36 4 °C)       Wound Exam: Incision well approximated  No erythema, edema or drainage present  Location: thoracic spine  See image below               Procedure:  Staple/suture removal    Procedure Note: staples and drain suture were removed  Cleansed area with hydrogen peroxide  Patient Status: the patient tolerated the procedure well  Complications: None  Discussion/Summary:  Doing well postoperatively  Reviewed incision care with patient including daily observation for s/s infection including: increased erythema, edema, drainage, dehiscence of incision or fever >101  Should these be observed, she understands that she is to call and/or return immediately for reassessment  Advised patient to continue cleansing area with mild soap and water and pat dry  Not to apply any lotions, creams, or ointments, & not to submerge in any water for 4 more weeks  She is to maintain activity restrictions until cleared by the surgeon   Activity levels were also reviewed with the patient in detail, she is to lift no greater than 10 pounds and ambulation is encouraged as tolerated  Verified date/time/location of upcoming POV  She is to call the office with any further questions or concerns, or if any incisional issues or fevers would arise

## 2023-06-30 DIAGNOSIS — Z98.890 POST-OPERATIVE STATE: Primary | ICD-10-CM

## 2023-06-30 DIAGNOSIS — M54.50 LOWER BACK PAIN: ICD-10-CM

## 2023-06-30 RX ORDER — OXYCODONE HYDROCHLORIDE 5 MG/1
5 TABLET ORAL EVERY 6 HOURS PRN
Qty: 28 TABLET | Refills: 0 | Status: SHIPPED | OUTPATIENT
Start: 2023-06-30 | End: 2023-07-07

## 2023-06-30 NOTE — TELEPHONE ENCOUNTER
Received a call from patient requesting a refill of her oxycodone  Returned call to patient who stated she is doing well and has been taking about 4-5 tablets per day  Discussed with BLANE ZIMMERMAN who agreed to decrease patient from 5-10 mg Q4 to 5 mg Q6  Informed patient this RN will send the refill to the provider and she will be able to  the medication today  She was in agreement and was appreciative of the call back

## 2023-07-05 ENCOUNTER — TELEPHONE (OUTPATIENT)
Dept: NEUROSURGERY | Facility: CLINIC | Age: 48
End: 2023-07-05

## 2023-07-05 DIAGNOSIS — Z98.890 POST-OPERATIVE STATE: Primary | ICD-10-CM

## 2023-07-05 NOTE — TELEPHONE ENCOUNTER
Received a call from patient requesting a call back. Returned call to patient who stated she is still having significant pain on the left side of her back that wraps around to her ribs. She mainly is complaining of the pain in her ribs. Patient reports the pain as a constant throbbing pain and occasional shooting pain. Patient stated she has been struggling with this pain since the surgery and is struggling to get relief. She reports taking all of her medications:  -Oxycodone 5 mg Q6  -Lidocaine patches   -Tylenol 1000 mg Q8  -Gabapentin 600 mg TID   -Tizanidine 2 mg Q4 (patient unable to take the fill 4 mg at one time as it is too sedating)    Patient reports also using heating pads and ice packs. She now questions if she can use a tens unit to help with the pain. Advised this RN will route a message to Dr. Norberto Bloom questioning if he has any further input regarding her pain.

## 2023-07-08 ENCOUNTER — TELEPHONE (OUTPATIENT)
Dept: OTHER | Facility: OTHER | Age: 48
End: 2023-07-08

## 2023-07-08 DIAGNOSIS — M54.50 LOWER BACK PAIN: ICD-10-CM

## 2023-07-08 DIAGNOSIS — Z98.890 POST-OPERATIVE STATE: ICD-10-CM

## 2023-07-10 ENCOUNTER — DOCUMENTATION (OUTPATIENT)
Dept: NEUROSURGERY | Facility: CLINIC | Age: 48
End: 2023-07-10

## 2023-07-10 RX ORDER — OXYCODONE HYDROCHLORIDE 5 MG/1
5 TABLET ORAL EVERY 6 HOURS PRN
Qty: 28 TABLET | Refills: 0 | Status: SHIPPED | OUTPATIENT
Start: 2023-07-10 | End: 2023-07-17

## 2023-07-10 RX ORDER — NALOXONE HYDROCHLORIDE 4 MG/.1ML
SPRAY NASAL
Qty: 1 EACH | Refills: 1 | Status: SHIPPED | OUTPATIENT
Start: 2023-07-10 | End: 2024-07-09

## 2023-07-10 NOTE — TELEPHONE ENCOUNTER
Reached out to patient to check in on her at her request.     Patient stated she was hoping to receive a response from Dr Nathan Kim last week. Advised I have not yet heard back however there should not be any contraindications to using a tens unit for her rib pain. Patient then stated she is still having significant pain and is requesting a refill of her oxycodone 5 mg Q6. She stated she has been taking all of her prescribed medications however is not getting much relief. She admitted to taking 7.5 mg of oxycodone the other day d/t her pain being so bad. Advised this RN will route a refill request to a provider. Confirmed her preferred pharmacy. Advised this RN will also be in contact should Dr. Nathan Kim have further input from a separate message. She stated an understanding and was appreciative of the call back.

## 2023-07-10 NOTE — TELEPHONE ENCOUNTER
Subha Smith MD  to Me  7/10/23  4:08 PM  TENS unit absolutely fine. Please make sure she has upright TSpine Xrays before her POV with me, if we hadn't previously ordered that . Thanks         Reached out to patient and informed her of the above. Advised this RN will place the order now. She stated an understanding and was appreciative of the call back.

## 2023-07-11 DIAGNOSIS — M50.222 HERNIATION OF INTERVERTEBRAL DISC AT C5-C6 LEVEL: ICD-10-CM

## 2023-07-11 DIAGNOSIS — M54.6 ACUTE BILATERAL THORACIC BACK PAIN: ICD-10-CM

## 2023-07-11 DIAGNOSIS — M62.838 MUSCLE SPASM: ICD-10-CM

## 2023-07-11 DIAGNOSIS — M50.120 CERVICAL DISC DISORDER WITH RADICULOPATHY OF MID-CERVICAL REGION: ICD-10-CM

## 2023-07-11 RX ORDER — TIZANIDINE 4 MG/1
TABLET ORAL
Qty: 90 TABLET | Refills: 1 | Status: SHIPPED | OUTPATIENT
Start: 2023-07-11 | End: 2023-07-14

## 2023-07-13 ENCOUNTER — TELEPHONE (OUTPATIENT)
Dept: HEMATOLOGY ONCOLOGY | Facility: CLINIC | Age: 48
End: 2023-07-13

## 2023-07-13 ENCOUNTER — TELEPHONE (OUTPATIENT)
Dept: SURGICAL ONCOLOGY | Facility: CLINIC | Age: 48
End: 2023-07-13

## 2023-07-13 DIAGNOSIS — M62.838 MUSCLE SPASM: ICD-10-CM

## 2023-07-13 DIAGNOSIS — M54.6 ACUTE BILATERAL THORACIC BACK PAIN: ICD-10-CM

## 2023-07-13 DIAGNOSIS — G89.18 POST-OPERATIVE PAIN: ICD-10-CM

## 2023-07-13 DIAGNOSIS — M50.120 CERVICAL DISC DISORDER WITH RADICULOPATHY OF MID-CERVICAL REGION: ICD-10-CM

## 2023-07-13 DIAGNOSIS — M50.222 HERNIATION OF INTERVERTEBRAL DISC AT C5-C6 LEVEL: ICD-10-CM

## 2023-07-13 NOTE — TELEPHONE ENCOUNTER
Patient Call    Who are you speaking with? Patient    If it is not the patient, are they listed on an active communication consent form? N/A   What is the reason for this call? Patient calling in returning Toña's call in regards to moving up her appointment. I reached out to Ana Montalvo via teams, who will be changing the appointment for the patient to 11:00AM as the patient states she is able to do a sooner appointment. Does this require a call back? No   If a call back is required, please list best call back number N/A   If a call back is required, advise that a message will be forwarded to their care team and someone will return their call as soon as possible. Did you relay this information to the patient?  No

## 2023-07-13 NOTE — TELEPHONE ENCOUNTER
I called and left a message for patient to call back to see if she is able to move her apt up to an earlier time. Patient can be transferred to me when calls back.

## 2023-07-14 DIAGNOSIS — M62.838 MUSCLE SPASM: Primary | ICD-10-CM

## 2023-07-14 RX ORDER — METHOCARBAMOL 500 MG/1
500 TABLET, FILM COATED ORAL 2 TIMES DAILY PRN
Qty: 60 TABLET | Refills: 0 | Status: SHIPPED | OUTPATIENT
Start: 2023-07-14 | End: 2023-08-25 | Stop reason: SDUPTHER

## 2023-07-14 RX ORDER — TIZANIDINE 4 MG/1
TABLET ORAL
Qty: 90 TABLET | Refills: 0 | OUTPATIENT
Start: 2023-07-14

## 2023-07-14 RX ORDER — LIDOCAINE 50 MG/G
1 PATCH TOPICAL DAILY
Qty: 10 PATCH | Refills: 0 | Status: SHIPPED | OUTPATIENT
Start: 2023-07-14 | End: 2023-07-24

## 2023-07-14 NOTE — TELEPHONE ENCOUNTER
Unable to close task due to message: You must address all unsigned medications to complete this message.

## 2023-07-14 NOTE — TELEPHONE ENCOUNTER
S/W [facility] pharmacy tech. Advised her of the same. She transferred RN to S/W [facility] pharmacist.  Advised her of the same. She verbalized understanding.

## 2023-07-14 NOTE — TELEPHONE ENCOUNTER
S/W [facility] pharmacist.  She stated the tizanidine interacts with acyclovir. It increases the side effects of tizanidine when taken together. Reference # [id number]. Please advise.

## 2023-07-17 DIAGNOSIS — M62.838 MUSCLE SPASM: ICD-10-CM

## 2023-07-18 RX ORDER — METHOCARBAMOL 500 MG/1
TABLET, FILM COATED ORAL
Qty: 60 TABLET | Refills: 0 | OUTPATIENT
Start: 2023-07-18

## 2023-07-19 ENCOUNTER — DOCUMENTATION (OUTPATIENT)
Dept: NEUROSURGERY | Facility: CLINIC | Age: 48
End: 2023-07-19

## 2023-07-19 DIAGNOSIS — Z98.890 POST-OPERATIVE STATE: ICD-10-CM

## 2023-07-19 DIAGNOSIS — M54.50 LOWER BACK PAIN: ICD-10-CM

## 2023-07-19 RX ORDER — OXYCODONE HYDROCHLORIDE 5 MG/1
5 TABLET ORAL EVERY 8 HOURS PRN
Qty: 21 TABLET | Refills: 0 | Status: SHIPPED | OUTPATIENT
Start: 2023-07-19 | End: 2023-07-26

## 2023-07-19 NOTE — TELEPHONE ENCOUNTER
Received a call from [facility] requesting refill of oxycodone. She states she still continues with pain in her left rib and back that at times is pretty severe. She has been using a tens unit that seems to help but otherwise the pain persists. She is scheduled to return for her 6 week post op 7/24. Will send refill request to provider but advise they will likely titrate her and it may be the last fill as we generally discontinue prescribing after 6 weeks. She stated an understanding and was appreciative.

## 2023-07-21 ENCOUNTER — HOSPITAL ENCOUNTER (OUTPATIENT)
Dept: RADIOLOGY | Facility: HOSPITAL | Age: 48
Discharge: HOME | End: 2023-07-21
Payer: COMMERCIAL

## 2023-07-21 DIAGNOSIS — C7A.090 MALIGNANT CARCINOID TUMOR OF LUNG (HCC): ICD-10-CM

## 2023-07-21 DIAGNOSIS — Z98.890 POST-OPERATIVE STATE: ICD-10-CM

## 2023-07-21 DIAGNOSIS — M54.6 ACUTE BILATERAL THORACIC BACK PAIN: ICD-10-CM

## 2023-07-21 PROCEDURE — 72052 X-RAY EXAM NECK SPINE 6/>VWS: CPT

## 2023-07-21 PROCEDURE — 71250 CT THORAX DX C-: CPT

## 2023-07-21 PROCEDURE — G1004 CDSM NDSC: HCPCS

## 2023-07-21 PROCEDURE — 72072 X-RAY EXAM THORAC SPINE 3VWS: CPT

## 2023-07-24 ENCOUNTER — OFFICE VISIT (OUTPATIENT)
Dept: CARDIOLOGY CLINIC | Facility: CLINIC | Age: 48
End: 2023-07-24
Payer: COMMERCIAL

## 2023-07-24 ENCOUNTER — OFFICE VISIT (OUTPATIENT)
Dept: NEUROSURGERY | Facility: CLINIC | Age: 48
End: 2023-07-24

## 2023-07-24 VITALS
SYSTOLIC BLOOD PRESSURE: 127 MMHG | TEMPERATURE: 98.2 F | DIASTOLIC BLOOD PRESSURE: 92 MMHG | WEIGHT: 293 LBS | HEART RATE: 81 BPM | BODY MASS INDEX: 41.02 KG/M2 | RESPIRATION RATE: 18 BRPM | HEIGHT: 71 IN

## 2023-07-24 VITALS
OXYGEN SATURATION: 98 % | SYSTOLIC BLOOD PRESSURE: 122 MMHG | DIASTOLIC BLOOD PRESSURE: 78 MMHG | BODY MASS INDEX: 41.02 KG/M2 | WEIGHT: 293 LBS | HEIGHT: 71 IN | HEART RATE: 93 BPM

## 2023-07-24 DIAGNOSIS — G95.9 MYELOPATHY (HCC): Primary | ICD-10-CM

## 2023-07-24 DIAGNOSIS — I47.29 NSVT (NONSUSTAINED VENTRICULAR TACHYCARDIA) (HCC): Primary | ICD-10-CM

## 2023-07-24 DIAGNOSIS — G89.4 CHRONIC PAIN SYNDROME: ICD-10-CM

## 2023-07-24 DIAGNOSIS — M54.12 RADICULOPATHY, CERVICAL: ICD-10-CM

## 2023-07-24 PROCEDURE — 93246 EXT ECG>7D<15D RECORDING: CPT | Performed by: NURSE PRACTITIONER

## 2023-07-24 PROCEDURE — 93000 ELECTROCARDIOGRAM COMPLETE: CPT | Performed by: NURSE PRACTITIONER

## 2023-07-24 PROCEDURE — 99213 OFFICE O/P EST LOW 20 MIN: CPT | Performed by: NURSE PRACTITIONER

## 2023-07-24 PROCEDURE — 99024 POSTOP FOLLOW-UP VISIT: CPT | Performed by: NEUROLOGICAL SURGERY

## 2023-07-24 RX ORDER — DICLOFENAC SODIUM 75 MG/1
75 TABLET, DELAYED RELEASE ORAL 2 TIMES DAILY
COMMUNITY
Start: 2023-04-30

## 2023-07-24 NOTE — PROGRESS NOTES
Neurosurgery Office Note  Red Cleary 49 y.o. 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 Tolerated the procedure relatively well, had immediate improvement in her inducible leg numbness when she would extend her back. States today that her legs feel more solid, she has no giveaway weakness. Denies bowel bladder issues. Upright thoracic spine films show no peripheral announced progressive kyphosis etc.    Her incision has healed well and I have relaxed all incision, medication, activity restrictions. That said, the patient does struggle with pain management. She was readmitted to [facility] 6/10/2023 with severe back pain. She was there for few days, and was noted to have a run of ventricular tach. She is have follow-up later today with cardiology, and presumably they will schedule her for a Holter monitor, or echo, both. She has follow-up with Dr. Eitan East this week for pain management. She expects he will refer her out for a narcotic contract, which she finds to be the medication that helps her most.    With regard to her cervical spine issues, she does states she has pain into her left arm, but at present it is less problematic than her low back or mid back pain or radiating thoracic pain. As such, we agreed this is not her most pressing issue and we will continue to follow that. She may well not ultimately need surgery for that should not become intractable. We did have her go for flexion-extension cervical spine x-rays to assess her suitability for TDR versus fusion, she has good motion and therefore would be a candidate for TDR.     We agreed to follow-up in 6-12 weeks to reassess her clinically. 07/24/23 Metrics: [id number] 00206=3.616; VAS 79      CHIEF COMPLAINT    Chief Complaint   Patient presents with   • Post-op     6 WK POV, OPEN T8 SPINOUS PROCESS, T9 TOTAL, SUPERIOR T10 LAMI FOR DECOMPRESSION       HISTORY    History of Present Illness     49y.o. year old female     HPI    See Discussion    REVIEW OF SYSTEMS    Review of Systems   Constitutional: Negative for fatigue (denies today, feels exhausted from fighting pain). HENT: Negative. Eyes: Negative. Respiratory: Negative. H/o Carcinoid tumor of lung    Cardiovascular: Negative. Gastrointestinal: Negative. Endocrine: Negative. Genitourinary: Negative for enuresis (denies today, when she hits her back she pees on herself ). Musculoskeletal: Positive for neck pain (neck pain radiates to both left and right shoulders more severe on left side radiates down left arm to hand and all finger tips). Negative for back pain (Currently bilateral knees down to bilateral feet, right upper quad) and gait problem. Currently a lot of left side rib pain as well as right side rib pain, rates 6/10 today but pain is usually 8-9/10    Prior to surgery noted/stated back pain radiates to both left and right side down to sacryl area wraps around to right rib cage area and left shoulder blade    pain in both legs buttock to thigh area new symptom   Skin: Negative. Allergic/Immunologic: Negative. Neurological: Positive for weakness ( unchanged since surgery, feels more weak in legs,  arms and  are ok) and numbness (currently right foot, outside of right foot numb since surgery, numbness in bilateral legs improved since surgery). Negative for dizziness, seizures, syncope and headaches. Hematological: Negative. Psychiatric/Behavioral: Negative.           Meds/Allergies     Current Outpatient Medications   Medication Sig Dispense Refill   • acyclovir (ZOVIRAX) 800 mg tablet Take 0.5 tablets (400 mg total) by mouth 2 (two) times a day 180 tablet 3   • clobetasol propionate (CLOBEX) 0.05 % shampoo Apply 1 application. topically 2 (two) times a week 118 mL 0   • diclofenac (VOLTAREN) 75 mg EC tablet Take 75 mg by mouth 2 (two) times a day     • gabapentin (Neurontin) 600 MG tablet Take 1 tablet (600 mg total) by mouth 3 (three) times a day 270 tablet 1   • ketoconazole (NIZORAL) 2 % shampoo Apply 1 application. topically 2 (two) times a week 120 mL 0   • levocetirizine (XYZAL) 5 MG tablet TAKE 1 TABLET BY MOUTH IN  THE EVENING 90 tablet 3   • lidocaine (LIDODERM) 5 % Apply 1 patch topically over 12 hours daily for 10 days Remove & Discard patch within 12 hours or as directed by MD 10 patch 0   • methocarbamol (ROBAXIN) 500 mg tablet Take 1 tablet (500 mg total) by mouth 2 (two) times a day as needed for muscle spasms 60 tablet 0   • naloxone (NARCAN) 4 mg/0.1 mL nasal spray Administer 1 spray into a nostril. If no response after 2-3 minutes, give another dose in the other nostril using a new spray. 1 each 1   • omeprazole (PriLOSEC) 40 MG capsule TAKE 1 CAPSULE BY MOUTH  DAILY 90 capsule 3   • oxyCODONE (Roxicodone) 5 immediate release tablet Take 1 tablet (5 mg total) by mouth every 8 (eight) hours as needed for moderate pain for up to 7 days Max Daily Amount: 15 mg 21 tablet 0   • senna (SENOKOT) 8.6 mg Take 2 tablets (17.2 mg total) by mouth 2 (two) times a day 120 tablet 0   • ALPRAZolam (XANAX) 0.25 mg tablet Take 1 tablet (0.25 mg total) by mouth daily at bedtime as needed for anxiety (Patient not taking: Reported on 6/22/2023) 30 tablet 0     No current facility-administered medications for this visit.        Allergies   Allergen Reactions   • Metronidazole Anaphylaxis     "patient states she has been in anaphylaxis due to this medication prior"     • Nitrofurantoin Shortness Of Breath       PAST HISTORY    Past Medical History:   Diagnosis Date   • Acid reflux    • Allergic 2006    Flagyl; anaphylaxis   • Anxiety • Trejo's esophagus    • Cancer (HCC)    • GERD (gastroesophageal reflux disease)    • Headache(784.0)    • Hiatal hernia    • Hyperlipidemia    • Kidney stone    • Latent tuberculosis by skin test     Was treated with INH for 6 months   • Lung cancer ([address]) diagnosed in    • Obesity    • Obstructive sleep apnea 2018    Mild TERESITA. AHI 18.1   • Squamous cell skin cancer     Left buttocks   • STD (sexually transmitted disease)     HPV, HSV   • Urinary tract infection        Past Surgical History:   Procedure Laterality Date   • ABDOMINOPLASTY     • AUGMENTATION BREAST Bilateral    • BREAST SURGERY     • BRONCHOSCOPY N/A 2022    Procedure: BRONCHOSCOPY NAVIGATIONAL;  Surgeon: Nicki Mosley MD;  [facility];  Service: Thoracic   •  SECTION     • COLON SURGERY      Rectocele Repair   • CYSTOSCOPY N/A 2022    Procedure: CYSTOSCOPY;  Surgeon: Hilary Benton MD;  [facility];  Service: Gynecology Oncology   • EGD     • ENDOBRONCHIAL ULTRASOUND (EBUS) N/A 2022    Procedure: ENDOBRONCHIAL ULTRASOUND (EBUS);   Surgeon: Nicki Mosley MD;  [facility];  Service: Thoracic   • HYSTERECTOMY     • IR BIOPSY LYMPH NODE  2022   • IR CHEST TUBE PLACEMENT  2022   • LAPAROSCOPIC CHOLECYSTECTOMY     • LIPOSUCTION     • LUNG SURGERY      VATS Lobectomy RLL   • LYMPH NODE BIOPSY  2022   • MOHS SURGERY Left 2022    left buttocks   •  [address] INCL FLUOR GDNCE DX W/CELL WASHG SPX N/A 2022    Procedure: BRONCHOSCOPY FLEXIBLE;  Surgeon: Nicki Mosley MD;  [facility];  Service: Thoracic   •  [address] INCL FLUOR GDNCE DX W/CELL WASHG [address] N/A 2022    Procedure: Adri Perea;  Surgeon: Etta Luevano MD;  [facility];  Service: Thoracic   • MI MOLINA FACETECTOMY & FORAMOTOMY 1 VRT SGM THORACIC N/A 2023    Procedure: Open T8 spinous process, T9 total, superior T10 laminectomy for decompression;  Surgeon: Izabella Gomez MD;  [facility];  Service: Neurosurgery   • OR LAPAROSCOPY W/RMVL ADNEXAL STRUCTURES Bilateral 12/20/2022    Procedure: OOPHORECTOMY W/ ROBOTICS, VAGINOTOMY;  Surgeon: Adri Coffman MD;  [facility];  Service: Gynecology Oncology   • OR THORACOSCOPY W/LOBECTOMY SINGLE LOBE Right 07/28/2022    Procedure: LOBECTOMY LUNG;  Surgeon: Vicenta Ayers MD;  [facility];  Service: Thoracic   • OR THORACOSCOPY W/LOBECTOMY SINGLE LOBE Right 07/28/2022    Procedure: THORACOSCOPY VIDEO ASSISTED SURGERY (VATS); Surgeon: Vicenta Ayers MD;  [facility];  Service: Thoracic   • REPAIR RECTOCELE  2018   • TONSILLECTOMY  1984   • TUBAL LIGATION  2002   • UPPER GASTROINTESTINAL ENDOSCOPY  2018       Social History     Tobacco Use   • Smoking status: Every Day     Packs/day: 0.50     Years: 36.00     Total pack years: 18.00     Types: Cigarettes     Start date: 9/1/2022   • Smokeless tobacco: Never   Vaping Use   • Vaping Use: Never used   Substance Use Topics   • Alcohol use: Not Currently   • Drug use: Never       Family History   Problem Relation Age of Onset   • Cancer Mother         Ovarian   • Ovarian cancer Mother    • Arthritis Mother         RA   • Autoimmune disease Mother         RA   • Stroke Father    • Dementia Father    • Vision loss Father    • Glaucoma Father    • Lung cancer Paternal Uncle    • Cancer Maternal Grandmother         Ovarian   • Diabetes Paternal Grandfather    • Diabetes Paternal Grandmother          The following portions of the patient's history were reviewed in this encounter and updated as appropriate: Past medical, surgical, family, and social history, as well as medications, allergies, and review of systems. EXAM    Vitals:Blood pressure 127/92, pulse 81, temperature 98.2 °F (36.8 °C), resp.  rate 18, height 5' 11" (1.803 m), weight (!) 143 kg (315 lb). ,Body mass index is 43.93 kg/m². Physical Exam    Neurologic Exam      MEDICAL DECISION MAKING    Imaging Studies:     XR spine cervical complete 6+ vw flex/ext/obl    Result Date: 7/24/2023  Narrative: CERVICAL SPINE INDICATION:   M54.6: Pain in thoracic spine. Postop thoracic surgery. Numbness in both hands. COMPARISON: 4/4/2023 MR. VIEWS:  XR SPINE CERVICAL COMPLETE 6+ VW FLEX /EXT /OBL FINDINGS: Bones are intact. Normal vertebral body heights. Preserved alignment. No evidence of instability with flexion and extension. No lytic or blastic bone lesions. Normal disc heights. No significant degenerative changes or evidence of osseous foraminal encroachment. No prevertebral soft tissue swelling. Visualized lungs are clear. Impression: No acute osseous pathology. Workstation performed: [id number]     XR spine thoracic 3 vw    Result Date: 7/24/2023  Narrative: THORACIC SPINE INDICATION:   Z98.890: Other specified postprocedural states. 6 weeks postop T8-T10 laminectomy and facetectomy COMPARISON: Chest CT from the same date and 4/4/2023 thoracic MR VIEWS:  XR SPINE THORACIC 3 VW FINDINGS: No acute posterior element surgical change is difficult to see on radiographs. No acute fracture or malalignment. Normal vertebral body heights. Mild upper thoracic degenerative disc disease. No suspicious lytic or blastic bone lesion. Visualized soft tissues are within normal limits. Lungs are clear. Impression: No acute osseous abnormality. 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 Lung cancer surveillance. History of right lower lobectomy. Thoracic laminectomy 6 weeks ago. Current smoker, 1/2 pack/day for 36 years. COMPARISON: 1/19/2023 and 7/13/2022 TECHNIQUE: CT examination of the chest was performed.  Axial, sagittal, and coronal 2D reformatted images were created from the source data and submitted for interpretation. Radiation dose length product (DLP) for this visit:  1377.74 mGy-cm . This examination, like all CT scans performed in the [facility], was performed utilizing techniques to minimize radiation dose exposure, including the use of iterative reconstruction and automated exposure control. IV Contrast: Not administered. FINDINGS: LUNGS: Stable 0.4 cm solid left upper lobe nodules (4/17 and 33). Additional, numerous smaller groundglass predominantly groundglass nodules (e.g 4/19 left upper lobe, 13 and 24 right upper lobe) are most likely secondary to smoking-related bronchiolitis. Not seen previously which is likely due to thinner axial sections on the current exam. No endobronchial mass. Right lower lobectomy. PLEURA: Similar small right pleural effusion. HEART/GREAT VESSELS: Similar atherosclerosis. MEDIASTINUM AND ABHI:  Within normal limits. CHEST WALL AND LOWER NECK:   Subpectoral silicone breast implants. VISUALIZED STRUCTURES IN THE UPPER ABDOMEN: Cholecystectomy. OSSEOUS STRUCTURES:  Degenerative changes     Impression: No findings for recurrent malignancy in the chest. Stable and nonemergent findings above. Workstation performed: [id number]       I have personally reviewed pertinent reports. and I have personally reviewed pertinent films in PACS      PLEASE NOTE:  This encounter may have been completed utilizing the [employer] Direct Speech Voice Recognition Software. Grammatical errors, random word insertions, pronoun errors and incomplete sentences are occasional consequences of the system due to software limitations, ambient noise and hardware issues. These may be missed by proof reading prior to affixing electronic signature.  Any questions or concerns about the content, text or information contained within the body of this dictation should be directly addressed to the advanced practitioner or physician for clarification.

## 2023-07-24 NOTE — PROGRESS NOTES
Progress Note - Cardiology Office  [facility]    Bri West 49 y.o. female MRN: [id number]  : 1975  Encounter: [id number]      Assessment:  1. Nonsustained ventricular tachycardia/palpitations: Patient with 1 episode of palpitations since discharge    -   We will hold initiating AV trena blocking medication at this time and complete cardiac evaluation    -   We will obtain 2D echocardiogram to evaluate cardiac function, structure wall motion    -   We will place patient on ambulatory 2-week live cardiac monitor to evaluate complaint of palpitations and recurrence of NSVT    -   Follow-up with Dr. Lia Ferraro post testing for results    1. NSVT (nonsustained ventricular tachycardia) ([facility]ly [facility])        Discussion Summary and Plan:      Patient / Piedad Lugo was advised and educated to call our office  immediately if  patient has any new symptoms of chest pain/shortness of breath, near-syncope, syncope, light headedness sustained palpitations  or any other cardiovascular symptoms before their scheduled follow-up appointment. Office number was provided #[phone]. Please call [phone] if any questions. Counseling :  A description of the counseling. Goals and Barriers. Patient's ability to self care: Yes  Medication side effect reviewed with patient in detail and all their questions answered to their satisfaction. HPI :     Bri West is a 49y.o. year old female who came for follow up. Patient was recently readmitted to the hospital with intractable back pain status post thoracic laminectomy for decompression which was performed at [address] [place] [address] on 2023. During hospitalization patient was noted to have palpitations described by the emergency room staff as nonsustained ventricular tachycardia of 12 beats. Unfortunately, event was not documented with telemetry strip. During hospitalization she did not have any further episodes.   Since discharge she notes 1 episode of palpitations which occurred while she was lying down. Attempts to obtain 2D echocardiogram while in hospital was unsuccessful due to patient pain and positioning. Since discharge she notes improvement in her operative pain management. Patient has a history for nonobstructive sleep apnea for which she is noncompliant/intolerant to CPAP, nicotine abuse, history of carcinoid lung tumor and is noted to have persistent small right pleural effusion. Twelve-lead EKG is unremarkable at this time. Review of Systems   Constitutional: Negative. Negative for activity change, fatigue and fever. HENT: Negative. Negative for congestion, facial swelling, sore throat and tinnitus. Eyes: Negative. Negative for photophobia and visual disturbance. Respiratory: Negative. Negative for chest tightness and shortness of breath. Cardiovascular: Positive for palpitations. Negative for chest pain and leg swelling. Gastrointestinal: Negative for abdominal distention, diarrhea, nausea and vomiting. Endocrine: Negative. Negative for polydipsia, polyphagia and polyuria. Genitourinary: Negative. Negative for difficulty urinating. Musculoskeletal: Positive for back pain. Skin: Negative. Neurological: Positive for dizziness (Intermittent) and light-headedness (Intermittent, occurs when lying down). Hematological: Negative. Psychiatric/Behavioral: Negative. Historical Information   Past Medical History:   Diagnosis Date   • Acid reflux    • Allergic 2006    Flagyl; anaphylaxis   • Anxiety    • Trejo's esophagus    • Cancer (HCC)    • GERD (gastroesophageal reflux disease)    • Headache(784.0) 2022   • Hiatal hernia    • Hyperlipidemia    • Kidney stone    • Latent tuberculosis by skin test 1986    Was treated with INH for 6 months   • Lung cancer ([address]) diagnosed in 2018   • Obesity 2018   • Obstructive sleep apnea 05/27/2018    Mild TERESITA.   AHI 18.1   • Squamous cell skin cancer     Left buttocks   • STD (sexually transmitted disease)     HPV, HSV   • Urinary tract infection      Past Surgical History:   Procedure Laterality Date   • ABDOMINOPLASTY     • AUGMENTATION BREAST Bilateral    • BREAST SURGERY     • BRONCHOSCOPY N/A 2022    Procedure: BRONCHOSCOPY NAVIGATIONAL;  Surgeon: Reji Poole MD;  [facility];  Service: Thoracic   •  SECTION     • COLON SURGERY  2018    Rectocele Repair   • CYSTOSCOPY N/A 2022    Procedure: CYSTOSCOPY;  Surgeon: Chantal Russell MD;  [facility];  Service: Gynecology Oncology   • EGD  2018   • ENDOBRONCHIAL ULTRASOUND (EBUS) N/A 2022    Procedure: ENDOBRONCHIAL ULTRASOUND (EBUS);   Surgeon: Reji Poole MD;  [facility];  Service: Thoracic   • HYSTERECTOMY     • IR BIOPSY LYMPH NODE  2022   • IR CHEST TUBE PLACEMENT  2022   • LAPAROSCOPIC CHOLECYSTECTOMY     • LIPOSUCTION     • LUNG SURGERY      VATS Lobectomy RLL   • LYMPH NODE BIOPSY  2022   • MOHS SURGERY Left 2022    left buttocks   •  [address] INCL FLUOR GDNCE DX W/CELL WASHG SPX N/A 2022    Procedure: BRONCHOSCOPY FLEXIBLE;  Surgeon: Reji Poole MD;  [facility];  Service: Thoracic   •  [address] INCL FLUOR GDNCE DX W/CELL WASHG [facility] N/A 2022    Procedure: BRONCHOSCOPY FLEXIBLE;  Surgeon: Porfirio Aviles MD;  [facility];  Service: Thoracic   • UT MOLINA FACETECTOMY & FORAMOTOMY 1 VRT SGM THORACIC N/A 2023    Procedure: Open T8 spinous process, T9 total, superior T10 laminectomy for decompression;  Surgeon: Subha Smith MD;  [facility];  Service: Neurosurgery   • UT LAPAROSCOPY W/RMVL ADNEXAL STRUCTURES Bilateral 2022    Procedure: OOPHORECTOMY W/ ROBOTICS, VAGINOTOMY;  Surgeon: Chantal Russell MD;  [facility];  Service: Gynecology Oncology   • UT THORACOSCOPY W/LOBECTOMY SINGLE LOBE Right 07/28/2022    Procedure: LOBECTOMY LUNG;  Surgeon: Akira Giles MD;  [facility];  Service: Thoracic   • UT THORACOSCOPY W/LOBECTOMY SINGLE LOBE Right 07/28/2022    Procedure: THORACOSCOPY VIDEO ASSISTED SURGERY (VATS); Surgeon: Akira Giles MD;  [facility];  Service: Thoracic   • REPAIR RECTOCELE  2018   • TONSILLECTOMY  1984   • TUBAL LIGATION  2002   • UPPER GASTROINTESTINAL ENDOSCOPY  2018     Social History     Substance and Sexual Activity   Alcohol Use Not Currently     Social History     Substance and Sexual Activity   Drug Use Never     Social History     Tobacco Use   Smoking Status Every Day   • Packs/day: 0.50   • Years: 36.00   • Total pack years: 18.00   • Types: Cigarettes   • Start date: 9/1/2022   Smokeless Tobacco Never     Family History:   Family History   Problem Relation Age of Onset   • Cancer Mother         Ovarian   • Ovarian cancer Mother    • Arthritis Mother         RA   • Autoimmune disease Mother         RA   • Stroke Father    • Dementia Father    • Vision loss Father    • Glaucoma Father    • Lung cancer Paternal Uncle    • Cancer Maternal Grandmother         Ovarian   • Diabetes Paternal Grandfather    • Diabetes Paternal Grandmother        Meds/Allergies     Allergies   Allergen Reactions   • Metronidazole Anaphylaxis     "patient states she has been in anaphylaxis due to this medication prior"     • Nitrofurantoin Shortness Of Breath       Current Outpatient Medications:   •  acyclovir (ZOVIRAX) 800 mg tablet, Take 0.5 tablets (400 mg total) by mouth 2 (two) times a day, Disp: 180 tablet, Rfl: 3  •  clobetasol propionate (CLOBEX) 0.05 % shampoo, Apply 1 application.  topically 2 (two) times a week, Disp: 118 mL, Rfl: 0  •  diclofenac (VOLTAREN) 75 mg EC tablet, Take 75 mg by mouth 2 (two) times a day, Disp: , Rfl:   •  gabapentin (Neurontin) 600 MG tablet, Take 1 tablet (600 mg total) by mouth 3 (three) times a day, Disp: 270 tablet, Rfl: 1  •  ketoconazole (NIZORAL) 2 % shampoo, Apply 1 application. topically 2 (two) times a week, Disp: 120 mL, Rfl: 0  •  levocetirizine (XYZAL) 5 MG tablet, TAKE 1 TABLET BY MOUTH IN  THE EVENING, Disp: 90 tablet, Rfl: 3  •  lidocaine (LIDODERM) 5 %, Apply 1 patch topically over 12 hours daily for 10 days Remove & Discard patch within 12 hours or as directed by MD, Disp: 10 patch, Rfl: 0  •  methocarbamol (ROBAXIN) 500 mg tablet, Take 1 tablet (500 mg total) by mouth 2 (two) times a day as needed for muscle spasms, Disp: 60 tablet, Rfl: 0  •  naloxone (NARCAN) 4 mg/0.1 mL nasal spray, Administer 1 spray into a nostril. If no response after 2-3 minutes, give another dose in the other nostril using a new spray., Disp: 1 each, Rfl: 1  •  omeprazole (PriLOSEC) 40 MG capsule, TAKE 1 CAPSULE BY MOUTH  DAILY, Disp: 90 capsule, Rfl: 3  •  oxyCODONE (Roxicodone) 5 immediate release tablet, Take 1 tablet (5 mg total) by mouth every 8 (eight) hours as needed for moderate pain for up to 7 days Max Daily Amount: 15 mg, Disp: 21 tablet, Rfl: 0  •  senna (SENOKOT) 8.6 mg, Take 2 tablets (17.2 mg total) by mouth 2 (two) times a day, Disp: 120 tablet, Rfl: 0  •  ALPRAZolam (XANAX) 0.25 mg tablet, Take 1 tablet (0.25 mg total) by mouth daily at bedtime as needed for anxiety (Patient not taking: Reported on 6/22/2023), Disp: 30 tablet, Rfl: 0    Vitals: Height 5' 11" (1.803 m), weight (!) 143 kg (315 lb). Body mass index is 43.93 kg/m². Wt Readings from Last 3 Encounters:   07/24/23 (!) 143 kg (315 lb)   07/24/23 (!) 143 kg (315 lb)   06/11/23 (!) 143 kg (315 lb)     Vitals:    07/24/23 1334   Weight: (!) 143 kg (315 lb)     BP Readings from Last 3 Encounters:   07/24/23 127/92   06/22/23 114/80   06/14/23 130/86       Physical Exam:  Physical Exam  Vitals and nursing note reviewed. Constitutional:       General: She is not in acute distress. Appearance: She is well-developed. She is obese.    HENT:      Head: Normocephalic. Neck:      Thyroid: No thyromegaly. Cardiovascular:      Rate and Rhythm: Normal rate and regular rhythm. Pulmonary:      Effort: Pulmonary effort is normal.      Breath sounds: Normal breath sounds. No decreased breath sounds, wheezing, rhonchi or rales. Musculoskeletal:      Cervical back: Neck supple. Right lower leg: No edema. Left lower leg: No edema. Skin:     Capillary Refill: Capillary refill takes less than 2 seconds. Neurological:      General: No focal deficit present. Mental Status: She is alert and oriented to person, place, and time. Psychiatric:         Mood and Affect: Mood normal.         Diagnostic Studies Review Cardio:    EKG:   Sinus rhythm        [address] [place] [address]  Cardiology        "This note was completed in part utilizing [employer] direct voice recognition software. Grammatical errors, random word insertion, spelling mistakes, and incomplete sentences may be an occasional consequence of the system secondary to software limitations, ambient noise and hardware issues. Please read the chart carefully and recognize, using context, where substitutions have occurred.   If you have any questions or concerns about the context, text or information contained within the body of this dictation, please contact myself, the provider, for further clarification."

## 2023-07-26 ENCOUNTER — TELEPHONE (OUTPATIENT)
Dept: PAIN MEDICINE | Facility: CLINIC | Age: 48
End: 2023-07-26

## 2023-07-26 NOTE — TELEPHONE ENCOUNTER
S/w pt about previous. Pt would like to know plan moving fwd with KW. Pt expressed she had a thoracic laminectomy roughly 6 weeks ago and will need pain management follow up. Pt informed nurse that [facility] would not be prescribing opioid medication and pt would like to know if she should keep 7/28 [facility] to discuss possible entering pain contract with [facility] or if pt should be referred out. If no pain contract 7/28/23 pt would like to know where KW recommends her to go. Nurse informed pt nurse to discuss with KW and CB with recommendations.      Please advise, TY.

## 2023-07-26 NOTE — TELEPHONE ENCOUNTER
S/W pt and advised of the same  Cx appt for Friday  Sent out list of [place] pain centers to confirmed address

## 2023-07-26 NOTE — TELEPHONE ENCOUNTER
Caller: patient    Doctor: Oleksandr Salmeron    Reason for call: patient wants to know who dr Oleksandr Salmeron is going to refer her to so she can go to them instead of paying 2 co pays.     Call back#: [phone]

## 2023-07-28 ENCOUNTER — TELEPHONE (OUTPATIENT)
Dept: CARDIAC SURGERY | Facility: CLINIC | Age: 48
End: 2023-07-28

## 2023-07-28 DIAGNOSIS — D3A.090 CARCINOID TUMOR OF RIGHT LUNG: Primary | ICD-10-CM

## 2023-07-28 NOTE — TELEPHONE ENCOUNTER
Spoke to pt in regards to scheduling 6 month appointments for CT scan of chest and follow up appointment with Shaye Andrade. Pt is scheduled for CT 01/15/24 and follow up is on 01/26/24.

## 2023-07-28 NOTE — TELEPHONE ENCOUNTER
Ms Li Selby virtual visit could not be completed today as her scheduled visit is virtual, she is currently located in [place] and our available providers do not maintain a [facility] [facility]. I did call her to review CT chest and discuss plan moving forward. She has a history of Stage IIIA typical carcinoid and is s/p a right thoracoscopic lower lobectomy on 7/27/2022. Her most recent CT chest shows no evidence of recurrent disease. She is feeling well. We will continue surveillance with a CT chest in 6 months. Order was placed and our office will call to schedule. All questions were answered and she is in agreement with this plan.

## 2023-08-01 ENCOUNTER — TELEPHONE (OUTPATIENT)
Dept: FAMILY MEDICINE CLINIC | Facility: CLINIC | Age: 48
End: 2023-08-01

## 2023-08-02 NOTE — TELEPHONE ENCOUNTER
Dr. José Pathak from [facility] called asking if you received forms for clearance for patient?   Please fax completed form to [phone]

## 2023-08-03 ENCOUNTER — TELEPHONE (OUTPATIENT)
Dept: CARDIOLOGY CLINIC | Facility: CLINIC | Age: 48
End: 2023-08-03

## 2023-08-04 ENCOUNTER — TELEPHONE (OUTPATIENT)
Dept: CARDIOLOGY CLINIC | Facility: CLINIC | Age: 48
End: 2023-08-04

## 2023-08-04 ENCOUNTER — HOSPITAL ENCOUNTER (OUTPATIENT)
Dept: NON INVASIVE DIAGNOSTICS | Facility: HOSPITAL | Age: 48
Discharge: HOME | End: 2023-08-04
Payer: COMMERCIAL

## 2023-08-04 VITALS
SYSTOLIC BLOOD PRESSURE: 146 MMHG | WEIGHT: 293 LBS | HEART RATE: 93 BPM | HEIGHT: 71 IN | BODY MASS INDEX: 41.02 KG/M2 | DIASTOLIC BLOOD PRESSURE: 96 MMHG

## 2023-08-04 DIAGNOSIS — I47.29 NSVT (NONSUSTAINED VENTRICULAR TACHYCARDIA) (HCC): ICD-10-CM

## 2023-08-04 LAB
E WAVE DECELERATION TIME: 123 MS
MV E'TISSUE VEL-LAT: 11 CM/S
MV E'TISSUE VEL-SEP: 12 CM/S
MV PEAK A VEL: 0.61 M/S
MV PEAK E VEL: 43 CM/S
MV STENOSIS PRESSURE HALF TIME: 36 MS
MV VALVE AREA P 1/2 METHOD: 6.11 CM2
SL CV LV EF: 55
TRICUSPID ANNULAR PLANE SYSTOLIC EXCURSION: 1.8 CM

## 2023-08-04 PROCEDURE — 93306 TTE W/DOPPLER COMPLETE: CPT

## 2023-08-04 PROCEDURE — 93306 TTE W/DOPPLER COMPLETE: CPT | Performed by: INTERNAL MEDICINE

## 2023-08-04 RX ADMIN — PERFLUTREN 0.4 ML/MIN: 6.52 INJECTION, SUSPENSION INTRAVENOUS at 08:30

## 2023-08-04 NOTE — TELEPHONE ENCOUNTER
----- Message from Nanine Barthel, LPN sent at 3/1/4992  2:15 PM EDT -----    ----- Message -----  From: GENESIS Arroyo  Sent: 8/4/2023   2:03 PM EDT  To: Nanine Barthel, LPN    Echo is within normal limits. Looks very good.

## 2023-08-11 ENCOUNTER — CLINICAL SUPPORT (OUTPATIENT)
Dept: CARDIOLOGY CLINIC | Facility: CLINIC | Age: 48
End: 2023-08-11
Payer: COMMERCIAL

## 2023-08-11 DIAGNOSIS — I47.29 NSVT (NONSUSTAINED VENTRICULAR TACHYCARDIA) (HCC): ICD-10-CM

## 2023-08-11 PROCEDURE — 93244 EXT ECG>48HR<7D REV&INTERPJ: CPT | Performed by: NURSE PRACTITIONER

## 2023-08-18 ENCOUNTER — TELEPHONE (OUTPATIENT)
Dept: CARDIOLOGY CLINIC | Facility: CLINIC | Age: 48
End: 2023-08-18

## 2023-08-18 NOTE — TELEPHONE ENCOUNTER
----- Message from Ed Marlon, [facility] sent at 8/18/2023  3:34 PM EDT -----  Please let the patient the above.   THanks

## 2023-08-25 ENCOUNTER — OFFICE VISIT (OUTPATIENT)
Dept: FAMILY MEDICINE CLINIC | Facility: CLINIC | Age: 48
End: 2023-08-25
Payer: COMMERCIAL

## 2023-08-25 VITALS
SYSTOLIC BLOOD PRESSURE: 124 MMHG | DIASTOLIC BLOOD PRESSURE: 84 MMHG | TEMPERATURE: 97.5 F | BODY MASS INDEX: 41.02 KG/M2 | RESPIRATION RATE: 18 BRPM | HEART RATE: 98 BPM | HEIGHT: 71 IN | WEIGHT: 293 LBS

## 2023-08-25 DIAGNOSIS — M62.838 MUSCLE SPASM: ICD-10-CM

## 2023-08-25 DIAGNOSIS — F41.9 ANXIETY: ICD-10-CM

## 2023-08-25 DIAGNOSIS — E66.01 CLASS 3 SEVERE OBESITY DUE TO EXCESS CALORIES WITHOUT SERIOUS COMORBIDITY WITH BODY MASS INDEX (BMI) OF 40.0 TO 44.9 IN ADULT (HCC): ICD-10-CM

## 2023-08-25 DIAGNOSIS — M51.34 DEGENERATIVE DISC DISEASE, THORACIC: Primary | ICD-10-CM

## 2023-08-25 DIAGNOSIS — M54.16 LUMBAR RADICULOPATHY, CHRONIC: ICD-10-CM

## 2023-08-25 PROCEDURE — 99214 OFFICE O/P EST MOD 30 MIN: CPT | Performed by: FAMILY MEDICINE

## 2023-08-25 RX ORDER — DICLOFENAC SODIUM 75 MG/1
75 TABLET, DELAYED RELEASE ORAL 2 TIMES DAILY
Qty: 180 TABLET | Refills: 1 | Status: SHIPPED | OUTPATIENT
Start: 2023-08-25

## 2023-08-25 RX ORDER — GABAPENTIN 600 MG/1
600 TABLET ORAL 3 TIMES DAILY
Qty: 270 TABLET | Refills: 1 | Status: SHIPPED | OUTPATIENT
Start: 2023-08-25

## 2023-08-25 RX ORDER — METHOCARBAMOL 500 MG/1
500 TABLET, FILM COATED ORAL 2 TIMES DAILY PRN
Qty: 180 TABLET | Refills: 0 | Status: SHIPPED | OUTPATIENT
Start: 2023-08-25

## 2023-08-25 RX ORDER — HYDROXYZINE PAMOATE 50 MG/1
50 CAPSULE ORAL DAILY PRN
Qty: 90 CAPSULE | Refills: 0 | Status: SHIPPED | OUTPATIENT
Start: 2023-08-25

## 2023-08-25 NOTE — PROGRESS NOTES
Chief Complaint   Patient presents with   • Follow-up     Patient complains of wanting medications prescribed        Patient ID: Matthew Carpenter is a 49 y.o. female. HPI  Pt is seeing for f/u chronic pain -  Under pain specialist care and neurosurgeon care  -  Cannot exercise due to pain -  Wants to transfer pain meds to us  -  On Gabapentin, Voltaren and Robaxin    The following portions of the patient's history were reviewed and updated as appropriate: allergies, current medications, past family history, past medical history, past social history, past surgical history and problem list.    Review of Systems   Constitutional: Negative. HENT: Negative. Respiratory: Negative. Cardiovascular: Negative. Gastrointestinal: Negative. Genitourinary: Negative. Musculoskeletal: Positive for back pain. Skin: Negative. Neurological: Negative for dizziness and headaches. Psychiatric/Behavioral: The patient is nervous/anxious. Current Outpatient Medications   Medication Sig Dispense Refill   • clobetasol propionate (CLOBEX) 0.05 % shampoo Apply 1 application. topically 2 (two) times a week 118 mL 0   • diclofenac (VOLTAREN) 75 mg EC tablet Take 75 mg by mouth 2 (two) times a day     • gabapentin (Neurontin) 600 MG tablet Take 1 tablet (600 mg total) by mouth 3 (three) times a day 270 tablet 1   • ketoconazole (NIZORAL) 2 % shampoo Apply 1 application.  topically 2 (two) times a week 120 mL 0   • levocetirizine (XYZAL) 5 MG tablet TAKE 1 TABLET BY MOUTH IN  THE EVENING 90 tablet 3   • omeprazole (PriLOSEC) 40 MG capsule TAKE 1 CAPSULE BY MOUTH  DAILY 90 capsule 3   • acyclovir (ZOVIRAX) 800 mg tablet Take 0.5 tablets (400 mg total) by mouth 2 (two) times a day 180 tablet 3   • ALPRAZolam (XANAX) 0.25 mg tablet Take 1 tablet (0.25 mg total) by mouth daily at bedtime as needed for anxiety (Patient not taking: Reported on 6/22/2023) 30 tablet 0   • lidocaine (LIDODERM) 5 % Apply 1 patch topically over 12 hours daily for 10 days Remove & Discard patch within 12 hours or as directed by MD 10 patch 0   • methocarbamol (ROBAXIN) 500 mg tablet Take 1 tablet (500 mg total) by mouth 2 (two) times a day as needed for muscle spasms (Patient not taking: Reported on 8/25/2023) 60 tablet 0   • naloxone (NARCAN) 4 mg/0.1 mL nasal spray Administer 1 spray into a nostril. If no response after 2-3 minutes, give another dose in the other nostril using a new spray. 1 each 1   • senna (SENOKOT) 8.6 mg Take 2 tablets (17.2 mg total) by mouth 2 (two) times a day 120 tablet 0     No current facility-administered medications for this visit. Objective:    /84 (BP Location: Left arm, Patient Position: Sitting, Cuff Size: Large)   Pulse 98   Temp 97.5 °F (36.4 °C)   Resp 18   Ht 5' 11" (1.803 m)   Wt (!) 144 kg (318 lb)   BMI 44.35 kg/m²        Physical Exam  Constitutional:       General: She is not in acute distress. Appearance: She is obese. She is not ill-appearing. Cardiovascular:      Rate and Rhythm: Normal rate. Pulmonary:      Effort: Pulmonary effort is normal. No respiratory distress. Neurological:      General: No focal deficit present. Mental Status: She is alert and oriented to person, place, and time. Motor: No weakness. Gait: Gait normal.                 Assessment/Plan:         Diagnoses and all orders for this visit:    Degenerative disc disease, thoracic  -     diclofenac (VOLTAREN) 75 mg EC tablet; Take 1 tablet (75 mg total) by mouth 2 (two) times a day  -     gabapentin (Neurontin) 600 MG tablet; Take 1 tablet (600 mg total) by mouth 3 (three) times a day    Lumbar radiculopathy, chronic  -     diclofenac (VOLTAREN) 75 mg EC tablet; Take 1 tablet (75 mg total) by mouth 2 (two) times a day  -     gabapentin (Neurontin) 600 MG tablet; Take 1 tablet (600 mg total) by mouth 3 (three) times a day    Muscle spasm  -     methocarbamol (ROBAXIN) 500 mg tablet;  Take 1 tablet (500 mg total) by mouth 2 (two) times a day as needed for muscle spasms    Anxiety  -     hydrOXYzine pamoate (VISTARIL) 50 mg capsule; Take 1 capsule (50 mg total) by mouth daily as needed for anxiety   declined SSRIs   Class 3 severe obesity due to excess calories without serious comorbidity with body mass index (BMI) of 40.0 to 44.9 in [facility])  -     Ambulatory Referral to Weight Management;  Future        rto in [facility]                   Anaya Murry MD

## 2023-09-08 ENCOUNTER — CONSULT (OUTPATIENT)
Dept: BARIATRICS | Facility: CLINIC | Age: 48
End: 2023-09-08
Payer: COMMERCIAL

## 2023-09-08 VITALS
SYSTOLIC BLOOD PRESSURE: 132 MMHG | HEIGHT: 69 IN | BODY MASS INDEX: 43.4 KG/M2 | HEART RATE: 102 BPM | DIASTOLIC BLOOD PRESSURE: 84 MMHG | WEIGHT: 293 LBS

## 2023-09-08 DIAGNOSIS — G47.33 OBSTRUCTIVE SLEEP APNEA: ICD-10-CM

## 2023-09-08 DIAGNOSIS — E66.01 CLASS 3 SEVERE OBESITY DUE TO EXCESS CALORIES WITHOUT SERIOUS COMORBIDITY WITH BODY MASS INDEX (BMI) OF 40.0 TO 44.9 IN ADULT (HCC): Primary | ICD-10-CM

## 2023-09-08 DIAGNOSIS — E28.2 PCOS (POLYCYSTIC OVARIAN SYNDROME): ICD-10-CM

## 2023-09-08 DIAGNOSIS — E55.9 VITAMIN D DEFICIENCY: ICD-10-CM

## 2023-09-08 DIAGNOSIS — I47.29 NSVT (NONSUSTAINED VENTRICULAR TACHYCARDIA) (HCC): ICD-10-CM

## 2023-09-08 PROCEDURE — 99204 OFFICE O/P NEW MOD 45 MIN: CPT | Performed by: NURSE PRACTITIONER

## 2023-09-08 RX ORDER — METFORMIN HYDROCHLORIDE 500 MG/1
500 TABLET, EXTENDED RELEASE ORAL
Qty: 90 TABLET | Refills: 0 | Status: SHIPPED | OUTPATIENT
Start: 2023-09-08

## 2023-09-08 NOTE — ASSESSMENT & PLAN NOTE
Patient diagnosed with mild sleep apnea in the past but never started on a CPAP machine  Patient may consider follow-up home study

## 2023-09-08 NOTE — PROGRESS NOTES
Assessment/Plan:    Class 3 severe obesity due to excess calories without serious comorbidity with body mass index (BMI) of 40.0 to 44.9 in adult [facility])  - Discussed options of HealthyCORE-Intensive Lifestyle Intervention Program, Very Low Calorie Diet-VLCD, Conservative Program, Charles-En-Y Gastric Bypass and Vertical Sleeve Gastrectomy and the role of weight loss medications. Patient is not interested in bariatric surgery or any programs at this time. - Explained the importance of making lifestyle changes in addition to starting anti-obesity medications. - Patient is interested in pursuing Conservative Program and follow up visits with medical weight management provider. - Initial weight loss goal of 5-10% weight loss for improved health  - Weight loss can improve patient's co-morbid conditions and/or prevent weight-related complications. - Labs reviewed from 6/2023  -Patient lifestyle habits were reviewed barriers to weight loss were addressed. Patient was encouraged to start tracking her meals and staying to a calorie goal of around 1800 to 2000 karen/day. Patient was given a meal plan to start to use as a guide for meal options and to help balance her nutrition more evenly throughout the day. A snack list was provided for some healthier snack options. Portions were discussed and patient was encouraged start to monitor her portion sizes. Medications were reviewed:  GLP-1 medications were discussed with the patient does not believe she has insurance coverage for these medications  Phentermine to be avoided due to anxiety  Topiramate to be avoided due to history of kidney stones  Contrave was discussed and patient may want to try in the future and denies any history of seizures  Metformin was discussed and patient would like to go back on this medication as she has had success in the past.  Patient will be started at 500 mg once daily.   Risks and benefit of the medication were discussed and patient voiced understanding. Patient will obtain blood work prior to her next visit to monitor kidney function on the metformin. Goals:  Do not skip meals. Calorie goal of 1800 to 2000 karen/day  Food log via aston - options include  www. Heliospectra.com, sparkpeople. com, loseit.com, calorieking. com, baritastEasy Bill Online  No sugary beverages. At least 64oz of water daily. Increase physical activity by 10 minutes daily. Gradually increase physical activity to a goal of 5 days per week for 30 minutes of MODERATE intensity PLUS 2 days per week of FULL BODY resistance training    NSVT (nonsustained ventricular tachycardia) ([address])  Would avoid phentermine for weight loss    Obstructive sleep apnea  Patient diagnosed with mild sleep apnea in the past but never started on a CPAP machine  Patient may consider follow-up home study    PCOS (polycystic ovarian syndrome)  Patient may benefit from metformin for insulin resistance         Tessa was seen today for consult. Diagnoses and all orders for this visit:    Class 3 severe obesity due to excess calories without serious comorbidity with body mass index (BMI) of 40.0 to 44.9 in adult [facility])  -     Ambulatory Referral to Weight Management  -     metFORMIN (GLUCOPHAGE-XR) 500 mg 24 hr tablet; Take 1 tablet (500 mg total) by mouth daily with dinner  -     Comprehensive metabolic panel; Future    PCOS (polycystic ovarian syndrome)  -     metFORMIN (GLUCOPHAGE-XR) 500 mg 24 hr tablet; Take 1 tablet (500 mg total) by mouth daily with dinner  -     Comprehensive metabolic panel; Future    Vitamin D deficiency  -     Vitamin D 25 hydroxy; Future    NSVT (nonsustained ventricular tachycardia) (HCC)    Obstructive sleep apnea           Total time spent reviewing chart, interviewing patient, examining patient, discussing plan, answering all questions, and documentin min, with >50% face-to-face time spent counseling patient on nonsurgical interventions for the treatment of excess weight.  Discussed in detail nonsurgical options including intensive lifestyle intervention program, very low-calorie diet program and conservative program.  Discussed the role of weight loss medications. Counseled patient on diet behavior and exercise modification for weight loss. Follow up in approximately 2 months with Non-Surgical Physician/Advanced Practitioner. Subjective:   Chief Complaint   Patient presents with   • Consult     Pt is here today for MWM consult. Patient ID: Delano Erickson  is a 49 y.o. female with excess weight/obesity here to pursue weight management. Previous notes and records have been reviewed. Past Medical History:   Diagnosis Date   • Acid reflux    • Allergic     Flagyl; anaphylaxis   • Anxiety    • Trejo's esophagus    • Cancer (HCC)    • GERD (gastroesophageal reflux disease)    • Headache(784.0)    • Hiatal hernia    • Hyperlipidemia    • Kidney stone    • Latent tuberculosis by skin test     Was treated with INH for 6 months   • Lung cancer ([address]) diagnosed in    • Obesity    • Obstructive sleep apnea 2018    Mild TERESITA. AHI 18.1   • Squamous cell skin cancer     Left buttocks   • STD (sexually transmitted disease)     HPV, HSV   • Urinary tract infection      Past Surgical History:   Procedure Laterality Date   • ABDOMINOPLASTY     • AUGMENTATION BREAST Bilateral    • BREAST SURGERY     • BRONCHOSCOPY N/A 2022    Procedure: BRONCHOSCOPY NAVIGATIONAL;  Surgeon: Mahi Baird MD;  [facility];  Service: Thoracic   •  SECTION     • COLON SURGERY  2018    Rectocele Repair   • CYSTOSCOPY N/A 2022    Procedure: CYSTOSCOPY;  Surgeon: Dahlia Fofana MD;  [facility];  Service: Gynecology Oncology   • EGD     • ENDOBRONCHIAL ULTRASOUND (EBUS) N/A 2022    Procedure: ENDOBRONCHIAL ULTRASOUND (EBUS);   Surgeon: Mahi Baird MD;  [facility];  Service: Thoracic   • HYSTERECTOMY   • IR BIOPSY LYMPH NODE  03/21/2022   • IR CHEST TUBE PLACEMENT  08/01/2022   • LAPAROSCOPIC CHOLECYSTECTOMY  1997   • LIPOSUCTION  2015   • LUNG SURGERY  2022    VATS Lobectomy RLL   • LYMPH NODE BIOPSY  03/21/2022   • MOHS SURGERY Left 08/18/2022    left buttocks   •  [address] INCL FLUOR GDNCE DX W/CELL WASHG SPX N/A 05/03/2022    Procedure: BRONCHOSCOPY FLEXIBLE;  Surgeon: Carola Duff MD;  [facility];  Service: Thoracic   •  [address] INCL FLUOR GDNCE DX W/CELL WASHG [facility] N/A 07/28/2022    Procedure: Castellano Tammie;  Surgeon: Karen Stuart MD;  [facility];  Service: Thoracic   • KS MOLINA FACETECTOMY & FORAMOTOMY 1 VRT SGM THORACIC N/A 06/08/2023    Procedure: Open T8 spinous process, T9 total, superior T10 laminectomy for decompression;  Surgeon: Dennise Peña MD;  [facility];  Service: Neurosurgery   • KS LAPAROSCOPY W/RMVL ADNEXAL STRUCTURES Bilateral 12/20/2022    Procedure: OOPHORECTOMY W/ ROBOTICS, VAGINOTOMY;  Surgeon: Anne Weeks MD;  [facility];  Service: Gynecology Oncology   • KS THORACOSCOPY W/LOBECTOMY SINGLE LOBE Right 07/28/2022    Procedure: LOBECTOMY LUNG;  Surgeon: Karen Stuart MD;  [facility];  Service: Thoracic   • KS THORACOSCOPY W/LOBECTOMY SINGLE LOBE Right 07/28/2022    Procedure: THORACOSCOPY VIDEO ASSISTED SURGERY (VATS);   Surgeon: Karen Stuart MD;  [facility];  Service: Thoracic   • REPAIR RECTOCELE  2018   • SPINE SURGERY     • TONSILLECTOMY  1984   • TUBAL LIGATION  2002   • UPPER GASTROINTESTINAL ENDOSCOPY  2018       HPI:  Wt Readings from Last 20 Encounters:   09/08/23 (!) 144 kg (316 lb 9.6 oz)   08/25/23 (!) 144 kg (318 lb)   08/04/23 (!) 143 kg (315 lb 4.1 oz)   07/24/23 (!) 143 kg (315 lb)   07/24/23 (!) 143 kg (315 lb)   06/11/23 (!) 143 kg (315 lb)   06/08/23 (!) 143 kg (315 lb 4.1 oz)   05/19/23 (!) 143 kg (315 lb)   04/28/23 (!) 139 kg (306 lb) 04/17/23 (!) 139 kg (306 lb 1.6 oz)   03/15/23 (!) 142 kg (312 lb)   02/27/23 (!) 142 kg (312 lb)   02/01/23 (!) 142 kg (312 lb)   01/10/23 (!) 140 kg (308 lb)   01/04/23 (!) 141 kg (310 lb)   12/20/22 (!) 143 kg (316 lb)   12/07/22 (!) 143 kg (316 lb)   11/07/22 (!) 142 kg (313 lb)   10/18/22 (!) 142 kg (313 lb 6.4 oz)   08/18/22 (!) 142 kg (313 lb)       Patient presents today to medical weight management office for consult. Patient has struggled with her weight for many years. 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 She had her 1st back surgery in June (laminectomy) and may have another lumbar laminectomy later this year. Patient states she craves sugar often - eating gummy bears everyday. Patient struggles with constant pain and stress. Patient is followed by pain management. Patient states she does not want to do any programs or meet with any dietitian. She does not want to pay any money out-of-pocket as she cannot afford it. Patient is looking for help with her cravings and has done well on metformin in the past when she was treated for PCOS.       Obesity/Excess Weight:  Severity: Severe  Onset:  lifelong    Modifiers: Diet and Exercise  Contributing factors: Poor Food Choices, Insufficient Physical Activity, Stress/Emotional Eating, Lack of knowledge of appropriate lifestyle changes and Insufficient time to make appropriate lifestyle changes  Associated symptoms: comorbid conditions, fatigue, increased joint pain, decreased exercise capacity, body image issues, decreased self esteem, increased shortness of breath, decreased mobility, depression, inability to do certain activities and clothes do not fit    Diet recall:  B: coffee with cream and sugar, bagel (1/2) with butter or cream cheese  L: turkey sandwich with provolone and avocado on low carb bread and watermelon   S: beef sticks OR cheese sticks OR nuts  D: pork meatballs with rice and vegetables  S: candy    Hydration: water - 24 oz, pepsi or ginger ale, green tea with sugar  Alcohol: no  Smoking: 3/4 ppd  Exercise: no  Occupation: desk job, remote work  Sleep: fair  STOP bang: mild TERESITA    Current weight: 316.6 lbs  Goal weight: 240 lbs    Mammogram: ordered    The following portions of the patient's history were reviewed and updated as appropriate: allergies, current medications, past family history, past medical history, past social history, past surgical history, and problem list.    Family History   Problem Relation Age of Onset   • Cancer Mother         Ovarian   • Ovarian cancer Mother    • Arthritis Mother         RA   • Autoimmune disease Mother         RA   • Stroke Father    • Dementia Father    • Vision loss Father    • Glaucoma Father    • Lung cancer Paternal Uncle    • Cancer Maternal Grandmother         Ovarian   • Diabetes Paternal Grandfather    • Diabetes Paternal Grandmother         Review of Systems   Constitutional: Negative for fatigue. HENT: Negative for sore throat. Respiratory: Negative for cough and shortness of breath. Cardiovascular: Positive for palpitations (anxiety related). Negative for chest pain and leg swelling. Gastrointestinal: Negative for abdominal pain, constipation, diarrhea and nausea. Genitourinary: Negative for dysuria. Musculoskeletal: Positive for back pain. Negative for arthralgias. Skin: Negative for rash. Neurological: Negative for headaches. Psychiatric/Behavioral: Negative for dysphoric mood. The patient is nervous/anxious. Objective:  /84 (BP Location: Left arm, Patient Position: Sitting, Cuff Size: Large)   Pulse 102   Ht 5' 9.25" (1.759 m)   Wt (!) 144 kg (316 lb 9.6 oz)   BMI 46.42 kg/m²     Physical Exam  Vitals and nursing note reviewed. Constitutional:       Appearance: Normal appearance. She is obese. HENT:      Head: Normocephalic.    Pulmonary:      Effort: Pulmonary effort is normal.   Neurological:      General: No focal deficit present. Mental Status: She is alert and oriented to person, place, and time. Psychiatric:         Mood and Affect: Mood normal.         Behavior: Behavior normal.         Thought Content: Thought content normal.         Judgment: Judgment normal.                Labs and Imaging  Recent labs and imaging have been personally reviewed.   Lab Results   Component Value Date    WBC 8.08 06/11/2023    HGB 12.6 06/11/2023    HCT 39.1 06/11/2023    MCV 92 06/11/2023     (L) 06/11/2023     Lab Results   Component Value Date    SODIUM 137 06/11/2023    K 3.6 06/11/2023     06/11/2023    CO2 28 06/11/2023    AGAP 5 06/11/2023    BUN 10 06/11/2023    CREATININE 0.82 06/11/2023    GLUC 85 06/11/2023    GLUF 103 (H) 12/07/2022    CALCIUM 8.6 06/11/2023    AST 31 06/10/2023    ALT 25 06/10/2023    ALKPHOS 81 06/10/2023    TP 7.3 06/10/2023    TBILI 0.74 06/10/2023    EGFR 84 06/11/2023     Lab Results   Component Value Date    HGBA1C 5.4 05/13/2023     Lab Results   Component Value Date    CZW9GFPOARBL 3.671 06/10/2023     Lab Results   Component Value Date    CHOLESTEROL 238 (H) 02/03/2022     Lab Results   Component Value Date    HDL 49 (L) 02/03/2022     Lab Results   Component Value Date    TRIG 142 02/03/2022     Lab Results   Component Value Date    LDLCALC 161 (H) 02/03/2022

## 2023-09-08 NOTE — ASSESSMENT & PLAN NOTE
- Discussed options of HealthyCORE-Intensive Lifestyle Intervention Program, Very Low Calorie Diet-VLCD, Conservative Program, Charles-En-Y Gastric Bypass and Vertical Sleeve Gastrectomy and the role of weight loss medications. Patient is not interested in bariatric surgery or any programs at this time. - Explained the importance of making lifestyle changes in addition to starting anti-obesity medications. - Patient is interested in pursuing Conservative Program and follow up visits with medical weight management provider. - Initial weight loss goal of 5-10% weight loss for improved health  - Weight loss can improve patient's co-morbid conditions and/or prevent weight-related complications. - Labs reviewed from 6/2023  -Patient lifestyle habits were reviewed barriers to weight loss were addressed. Patient was encouraged to start tracking her meals and staying to a calorie goal of around 1800 to 2000 karen/day. Patient was given a meal plan to start to use as a guide for meal options and to help balance her nutrition more evenly throughout the day. A snack list was provided for some healthier snack options. Portions were discussed and patient was encouraged start to monitor her portion sizes. Medications were reviewed:  GLP-1 medications were discussed with the patient does not believe she has insurance coverage for these medications  Phentermine to be avoided due to anxiety  Topiramate to be avoided due to history of kidney stones  Contrave was discussed and patient may want to try in the future and denies any history of seizures  Metformin was discussed and patient would like to go back on this medication as she has had success in the past.  Patient will be started at 500 mg once daily. Risks and benefit of the medication were discussed and patient voiced understanding. Patient will obtain blood work prior to her next visit to monitor kidney function on the metformin. Goals:  Do not skip meals.   Calorie goal of 1800 to 2000 karen/day  Food log via aston - options include  www. Sportholdpal.com, sparkpeople. com, HengZhiit.com, Sling. com, baritastic  No sugary beverages. At least 64oz of water daily. Increase physical activity by 10 minutes daily.  Gradually increase physical activity to a goal of 5 days per week for 30 minutes of MODERATE intensity PLUS 2 days per week of FULL BODY resistance training

## 2023-09-29 ENCOUNTER — TELEPHONE (OUTPATIENT)
Dept: NEUROSURGERY | Facility: CLINIC | Age: 48
End: 2023-09-29

## 2023-09-29 DIAGNOSIS — A60.00 GENITAL HERPES SIMPLEX, UNSPECIFIED SITE: ICD-10-CM

## 2023-09-29 DIAGNOSIS — T78.40XD ALLERGY, SUBSEQUENT ENCOUNTER: ICD-10-CM

## 2023-09-29 RX ORDER — ACYCLOVIR 800 MG/1
400 TABLET ORAL 2 TIMES DAILY
Qty: 180 TABLET | Refills: 0 | Status: SHIPPED | OUTPATIENT
Start: 2023-09-29 | End: 2023-12-28

## 2023-09-29 RX ORDER — LEVOCETIRIZINE DIHYDROCHLORIDE 5 MG/1
5 TABLET, FILM COATED ORAL EVERY EVENING
Qty: 90 TABLET | Refills: 0 | Status: SHIPPED | OUTPATIENT
Start: 2023-09-29

## 2023-09-29 NOTE — TELEPHONE ENCOUNTER
Returned pt VM and left VM to return our call    PT returned VM and reschedule with Brooks and Alon Scott at her request in [place] ([place])

## 2023-11-07 DIAGNOSIS — A60.00 GENITAL HERPES SIMPLEX, UNSPECIFIED SITE: ICD-10-CM

## 2023-11-07 DIAGNOSIS — M62.838 MUSCLE SPASM: ICD-10-CM

## 2023-11-07 DIAGNOSIS — F41.9 ANXIETY: ICD-10-CM

## 2023-11-07 DIAGNOSIS — T78.40XD ALLERGY, SUBSEQUENT ENCOUNTER: ICD-10-CM

## 2023-11-08 RX ORDER — LEVOCETIRIZINE DIHYDROCHLORIDE 5 MG/1
5 TABLET, FILM COATED ORAL EVERY EVENING
Qty: 90 TABLET | Refills: 0 | Status: SHIPPED | OUTPATIENT
Start: 2023-11-08

## 2023-11-08 RX ORDER — HYDROXYZINE PAMOATE 50 MG/1
CAPSULE ORAL
Qty: 90 CAPSULE | Refills: 0 | Status: SHIPPED | OUTPATIENT
Start: 2023-11-08

## 2023-11-08 RX ORDER — ACYCLOVIR 800 MG/1
400 TABLET ORAL 2 TIMES DAILY
Qty: 30 TABLET | Refills: 0 | Status: SHIPPED | OUTPATIENT
Start: 2023-11-08 | End: 2023-12-08

## 2023-11-08 RX ORDER — METHOCARBAMOL 500 MG/1
TABLET, FILM COATED ORAL
Qty: 180 TABLET | Refills: 0 | Status: SHIPPED | OUTPATIENT
Start: 2023-11-08

## 2023-11-09 ENCOUNTER — OFFICE VISIT (OUTPATIENT)
Dept: NEUROSURGERY | Facility: CLINIC | Age: 48
End: 2023-11-09
Payer: COMMERCIAL

## 2023-11-09 VITALS
HEART RATE: 76 BPM | SYSTOLIC BLOOD PRESSURE: 122 MMHG | RESPIRATION RATE: 14 BRPM | WEIGHT: 293 LBS | DIASTOLIC BLOOD PRESSURE: 76 MMHG | OXYGEN SATURATION: 98 % | TEMPERATURE: 98 F | HEIGHT: 69 IN | BODY MASS INDEX: 43.4 KG/M2

## 2023-11-09 DIAGNOSIS — M54.16 LUMBAR RADICULOPATHY: Primary | ICD-10-CM

## 2023-11-09 DIAGNOSIS — M47.14 THORACIC SPONDYLOSIS WITH MYELOPATHY: ICD-10-CM

## 2023-11-09 PROCEDURE — 99213 OFFICE O/P EST LOW 20 MIN: CPT | Performed by: NEUROLOGICAL SURGERY

## 2023-11-09 RX ORDER — METHYLPREDNISOLONE 4 MG/1
TABLET ORAL
Qty: 1 EACH | Refills: 0 | Status: SHIPPED | OUTPATIENT
Start: 2023-11-09

## 2023-11-09 NOTE — PROGRESS NOTES
Neurosurgery Office Note  Jasmin Thomas 51 y.o. female MRN: [id number]      Assessment/Plan     Thoracic spondylosis with myelopathy  S/p T8 spinous process, T9 total, superior T10 laminectomy for decompression (Dr. Ramirez Almazan, 6/8)  In setting of multiple areas of spinal cord compression including C5-6, T9-10  Patient doing well 6 months post op. Symptoms of BLE numbness with lumbar extension resolved. No gait abnormality or other myelopathic symptoms currently    Plan:  Follow up PRN for this problem given ongoing resolution of symptoms    Lumbar radiculopathy  Patient presents for evaluation of 1 month h/o RLE radiculopathy in an L5 distrubution  No aggravating injury or movement  Pain reproducible with any movement, improved with rest or standing still  + home exercises with no improvement, oral medications including gabapentin with no improvement. No longer follows with PM.  On exam, 5/5 strength and sensation intact. Reflexes WNL. Pain with heel standing. Pain with twisting. Imaging:  No current imaging to review    Plan:  Continue to monitor neurological exam  Recommend increasing gabapentin to 600mg TID (she is taking BID at this time)  Continue other prescribed medication  Will prescribe medrol dose pack for immediate pain relief - patient is not diabetic  Continue home exercises and swimming as tolerated  Given severity of symptoms, will order MRI lumbar spine w/o to evaluate for right L5 disc herniation  Follow up after MRI to review findings as a joint appointment with Dr Ramirez Almazan.         Diagnoses and all orders for this visit:    Lumbar radiculopathy  -     methylPREDNISolone 4 MG tablet therapy pack; Use as directed on package  -     MRI lumbar spine without contrast; Future    Thoracic spondylosis with myelopathy          I have spent a total time of 35 minutes on 11/09/23 in caring for this patient including Prognosis, Risks and benefits of tx options, Instructions for management, Patient and family education, Importance of tx compliance, Risk factor reductions, Impressions, Counseling / Coordination of care, Documenting in the medical record, Reviewing / ordering tests, medicine, procedures  , Obtaining or reviewing history  , and Communicating with other healthcare professionals . CHIEF COMPLAINT    Chief Complaint   Patient presents with    Follow-up       HISTORY    This is a 51yo female with a past medical history significant for GERD, Trejo's esophagus, kidney stones, lung neuroendocrine cancer status post VATS in August 2022, TERESITA, who presents for 6 month clinical follow up s/p thoracic decompression by Dr Wili Leslie 6/8/23 for calcified ligamentum flavum causing cord compression and myelopathy. Prior to surgery she had BLE weakness with lumbar extension which resolved immediately. She has done well post op except for difficulty with pain control. At her last appointment with Dr Wili Leslie she had complained of severe neck pain with LUE radicular symptoms which have since resolved. Today she is complaining of 1 month of RLE radiculopathy in an L5 distrubution. She states it starts in her buttock and travels down her lateral leg to her foot (top and lateral), and feels like a hot poker. Pain gets to 10/10 at times. This keeps her up at night. Any position change reproduces the pain and sleeping curled in a ball alleviates it for a short period of time (flexion position). She has been doing home exercises and swimming with little relief. She is taking gabapentin and robaxin, as well as OTC medication. She has not tried injections and has not seen PM recently; her PCP is now prescribing her pain medication. She denies leg weakness, falls, or bowel / bladder dysfunction. See Discussion    REVIEW OF SYSTEMS    Review of Systems   Constitutional:  Negative for fatigue (denies today, feels exhausted from fighting pain). HENT: Negative. Eyes: Negative. Respiratory: Negative. H/o Carcinoid tumor of lung    Cardiovascular: Negative. Rib cage pain on the Left      Gastrointestinal: Negative. Endocrine: Negative. Genitourinary:  Negative for enuresis (denies today, when she hits her back she pees on herself ). Musculoskeletal:  Positive for back pain (backpain is burning/ wearing bra is painful) and gait problem (with increase Right leg ( sometimes left leg) and knee pain). Negative for neck pain (neck pain has improved some Left upper arm pain). Pt c/o Right leg pain from knee down side  to foot with stabbing pain on bottom of feet causing difficulty with sleep   And x 2 weeks ago pain on right knee ( bending was difficult & painful) now comes and goes      Skin: Negative. Allergic/Immunologic: Negative. Neurological:  Positive for weakness (b/l legs more Right- diffierent since surgery). Negative for dizziness, seizures, syncope, numbness (numbness on left hand has improved) and headaches. Hematological: Negative. Psychiatric/Behavioral: Negative. All other systems reviewed and are negative. ROS obtained by MA. Reviewed. See HPI. ROS was personally reviewed and changes made as needed     Meds/Allergies     Current Outpatient Medications   Medication Sig Dispense Refill    acyclovir (ZOVIRAX) 800 mg tablet Take 0.5 tablets (400 mg total) by mouth 2 (two) times a day 30 tablet 0    clobetasol propionate (CLOBEX) 0.05 % shampoo Apply 1 application. topically 2 (two) times a week 118 mL 0    diclofenac (VOLTAREN) 75 mg EC tablet Take 1 tablet (75 mg total) by mouth 2 (two) times a day 180 tablet 1    gabapentin (Neurontin) 600 MG tablet Take 1 tablet (600 mg total) by mouth 3 (three) times a day 270 tablet 1    hydrOXYzine pamoate (VISTARIL) 50 mg capsule TAKE 1 CAPSULE BY MOUTH DAILY AS NEEDED FOR ANXIETY 90 capsule 0    ketoconazole (NIZORAL) 2 % shampoo Apply 1 application.  topically 2 (two) times a week 120 mL 0    levocetirizine (XYZAL) 5 MG tablet TAKE 1 TABLET BY MOUTH ONCE  DAILY IN THE EVENING 90 tablet 0    methocarbamol (ROBAXIN) 500 mg tablet TAKE 1 TABLET BY MOUTH TWICE  DAILY AS NEEDED FOR MUSCLE  SPASMS 180 tablet 0    methylPREDNISolone 4 MG tablet therapy pack Use as directed on package 1 each 0    omeprazole (PriLOSEC) 40 MG capsule TAKE 1 CAPSULE BY MOUTH  DAILY 90 capsule 3    metFORMIN (GLUCOPHAGE-XR) 500 mg 24 hr tablet Take 1 tablet (500 mg total) by mouth daily with dinner (Patient not taking: Reported on 2023) 90 tablet 0     No current facility-administered medications for this visit. Allergies   Allergen Reactions    Metronidazole Anaphylaxis     "patient states she has been in anaphylaxis due to this medication prior"      Nitrofurantoin Shortness Of Breath       PAST HISTORY    Past Medical History:   Diagnosis Date    Acid reflux     Allergic     Flagyl; anaphylaxis    Anxiety     Trejo's esophagus     Cancer ([address])     GERD (gastroesophageal reflux disease)     Headache(784.0)     Hiatal hernia     Hyperlipidemia     Kidney stone     Latent tuberculosis by skin test     Was treated with INH for 6 months    Lung cancer ([address]) diagnosed in     Obesity 2018    Obstructive sleep apnea 2018    Mild TERESITA.   AHI 18.1    Squamous cell skin cancer     Left buttocks    STD (sexually transmitted disease)     HPV, HSV    Urinary tract infection        Past Surgical History:   Procedure Laterality Date    ABDOMINOPLASTY      AUGMENTATION BREAST Bilateral     BREAST SURGERY  2015    BRONCHOSCOPY N/A 2022    Procedure: BRONCHOSCOPY NAVIGATIONAL;  Surgeon: Michelle Rothman MD;  [facility];  Service: Thoracic     SECTION      COLON SURGERY  2018    Rectocele Repair    CYSTOSCOPY N/A 2022    Procedure: CYSTOSCOPY;  Surgeon: Ramon Ponce MD;  [facility];  Service: Gynecology Oncology    EGD  2018    ENDOBRONCHIAL ULTRASOUND (EBUS) N/A 2022 Procedure: ENDOBRONCHIAL ULTRASOUND (EBUS); Surgeon: Mahi Baird MD;  [facility];  Service: Thoracic    HYSTERECTOMY  2014    IR BIOPSY LYMPH NODE  03/21/2022    IR CHEST TUBE PLACEMENT  08/01/2022    LAPAROSCOPIC CHOLECYSTECTOMY  1997    LIPOSUCTION  2015    LUNG SURGERY  2022    VATS Lobectomy RLL    LYMPH NODE BIOPSY  03/21/2022    MOHS SURGERY Left 08/18/2022    left buttocks     [address] INCL FLUOR GDNCE DX W/CELL WASHG SPX N/A 05/03/2022    Procedure: BRONCHOSCOPY FLEXIBLE;  Surgeon: aMhi Baird MD;  [facility];  Service: Thoracic     [address] INCL FLUOR GDNCE DX W/CELL Katy Havers [address] [address] [facility] [address] N/A 07/28/2022    Procedure: Mat Savage;  Surgeon: Hortensia Mccoy MD;  [facility];  Service: Thoracic    IA MOLINA FACETECTOMY & FORAMOTOMY 1 VRT SGM THORACIC N/A 06/08/2023    Procedure: Open T8 spinous process, T9 total, superior T10 laminectomy for decompression;  Surgeon: Spenser Ruvalcaba MD;  [facility];  Service: Neurosurgery    IA LAPAROSCOPY W/RMVL ADNEXAL STRUCTURES Bilateral 12/20/2022    Procedure: OOPHORECTOMY W/ ROBOTICS, VAGINOTOMY;  Surgeon: Dahlia Fofana MD;  [facility];  Service: Gynecology Oncology    IA THORACOSCOPY W/LOBECTOMY SINGLE LOBE Right 07/28/2022    Procedure: LOBECTOMY LUNG;  Surgeon: Hortensia Mccoy MD;  [facility];  Service: Thoracic    IA THORACOSCOPY W/LOBECTOMY SINGLE LOBE Right 07/28/2022    Procedure: THORACOSCOPY VIDEO ASSISTED SURGERY (VATS);   Surgeon: Hortensia Mccoy MD;  [facility];  Service: Thoracic    REPAIR RECTOCELE  2018    [facility]    TUBAL LIGATION  2002    UPPER GASTROINTESTINAL ENDOSCOPY  2018       Social History     Tobacco Use    Smoking status: Every Day     Packs/day: 0.50     Years: 36.00     Total pack years: 18.00     Types: Cigarettes     Start date: 9/1/2022    Smokeless tobacco: Never   Vaping Use Vaping Use: Never used   Substance Use Topics    Alcohol use: Not Currently    Drug use: Never       Family History   Problem Relation Age of Onset    Cancer Mother         Ovarian    Ovarian cancer Mother     Arthritis Mother         RA    Autoimmune disease Mother         RA    Stroke Father     Dementia Father     Vision loss Father     Glaucoma Father     Lung cancer Paternal Uncle     Cancer Maternal Grandmother         Ovarian    Diabetes Paternal Grandfather     Diabetes Paternal Grandmother          Above history personally reviewed. EXAM    Vitals:Blood pressure 122/76, pulse 76, temperature 98 °F (36.7 °C), temperature source Temporal, resp. rate 14, height 5' 9.25" (1.759 m), weight (!) 143 kg (316 lb), SpO2 98 %. ,Body mass index is 46.33 kg/m². Physical Exam  Vitals and nursing note reviewed. Constitutional:       Appearance: Normal appearance. She is well-developed and normal weight. HENT:      Head: Normocephalic and atraumatic. Eyes:      Extraocular Movements: Extraocular movements intact. Pupils: Pupils are equal, round, and reactive to light. Cardiovascular:      Rate and Rhythm: Normal rate. Pulmonary:      Effort: Pulmonary effort is normal. No respiratory distress. Abdominal:      Palpations: Abdomen is soft. Musculoskeletal:         General: Normal range of motion. Cervical back: Normal range of motion. Skin:     General: Skin is warm and dry. Neurological:      General: No focal deficit present. Mental Status: She is alert and oriented to person, place, and time. Cranial Nerves: Cranial nerves 2-12 are intact. Motor: Motor strength is normal.     Gait: Gait is intact. Deep Tendon Reflexes:      Reflex Scores:       Patellar reflexes are 1+ on the right side and 1+ on the left side. Psychiatric:         Mood and Affect: Mood normal.         Speech: Speech normal.         Behavior: Behavior normal.         Thought Content:  Thought content normal.         Judgment: Judgment normal.         Neurologic Exam     Mental Status   Oriented to person, place, and time. Follows 2 step commands. Attention: normal. Concentration: normal.   Speech: speech is normal   Level of consciousness: alert  Knowledge: good. Able to repeat. Normal comprehension. Cranial Nerves   Cranial nerves II through XII intact. CN III, IV, VI   Pupils are equal, round, and reactive to light. Motor Exam   Muscle bulk: normal  Overall muscle tone: normal    Strength   Strength 5/5 throughout. Sensory Exam   Light touch normal.   Discomfort with twisting and extension, no pain with flexion  Some pain with standing on heels     Gait, Coordination, and Reflexes     Gait  Gait: normal    Tremor   Resting tremor: absent    Reflexes   Right patellar: 1+  Left patellar: 1+  Right ankle clonus: absent  Left ankle clonus: absent        MEDICAL DECISION MAKING    Imaging Studies:     No results found. I have personally reviewed pertinent reports.    and I have personally reviewed pertinent films in PACS

## 2023-11-16 DIAGNOSIS — M54.16 LUMBAR RADICULOPATHY: Primary | ICD-10-CM

## 2023-11-18 ENCOUNTER — HOSPITAL ENCOUNTER (OUTPATIENT)
Dept: RADIOLOGY | Facility: HOSPITAL | Age: 48
Discharge: HOME | End: 2023-11-18
Payer: COMMERCIAL

## 2023-11-18 DIAGNOSIS — M54.16 LUMBAR RADICULOPATHY: ICD-10-CM

## 2023-11-18 PROCEDURE — 72110 X-RAY EXAM L-2 SPINE 4/>VWS: CPT

## 2023-11-19 DIAGNOSIS — E28.2 PCOS (POLYCYSTIC OVARIAN SYNDROME): ICD-10-CM

## 2023-11-19 DIAGNOSIS — E66.01 CLASS 3 SEVERE OBESITY DUE TO EXCESS CALORIES WITHOUT SERIOUS COMORBIDITY WITH BODY MASS INDEX (BMI) OF 40.0 TO 44.9 IN ADULT (HCC): ICD-10-CM

## 2023-11-20 RX ORDER — METFORMIN HYDROCHLORIDE 500 MG/1
500 TABLET, EXTENDED RELEASE ORAL
Qty: 90 TABLET | Refills: 0 | Status: SHIPPED | OUTPATIENT
Start: 2023-11-20

## 2023-11-27 ENCOUNTER — TELEPHONE (OUTPATIENT)
Dept: DERMATOLOGY | Facility: CLINIC | Age: 48
End: 2023-11-27

## 2023-11-29 ENCOUNTER — HOSPITAL ENCOUNTER (OUTPATIENT)
Dept: RADIOLOGY | Facility: HOSPITAL | Age: 48
Discharge: HOME | End: 2023-11-29
Payer: COMMERCIAL

## 2023-11-29 ENCOUNTER — TELEPHONE (OUTPATIENT)
Age: 48
End: 2023-11-29

## 2023-11-29 DIAGNOSIS — L21.9 SEBORRHEIC DERMATITIS: ICD-10-CM

## 2023-11-29 DIAGNOSIS — M51.34 DEGENERATIVE DISC DISEASE, THORACIC: ICD-10-CM

## 2023-11-29 DIAGNOSIS — M62.838 MUSCLE SPASM: ICD-10-CM

## 2023-11-29 DIAGNOSIS — M54.16 LUMBAR RADICULOPATHY: ICD-10-CM

## 2023-11-29 DIAGNOSIS — M54.16 LUMBAR RADICULOPATHY, CHRONIC: ICD-10-CM

## 2023-11-29 DIAGNOSIS — F41.9 ANXIETY: ICD-10-CM

## 2023-11-29 PROCEDURE — 72148 MRI LUMBAR SPINE W/O DYE: CPT

## 2023-11-29 PROCEDURE — G1004 CDSM NDSC: HCPCS

## 2023-11-29 RX ORDER — CLOBETASOL PROPIONATE 0.05 G/100ML
SHAMPOO TOPICAL 2 TIMES WEEKLY
Qty: 118 ML | Refills: 2 | Status: SHIPPED | OUTPATIENT
Start: 2023-11-30

## 2023-11-29 RX ORDER — KETOCONAZOLE 20 MG/ML
1 SHAMPOO TOPICAL 2 TIMES WEEKLY
Qty: 120 ML | Refills: 0 | Status: SHIPPED | OUTPATIENT
Start: 2023-11-30

## 2023-11-29 NOTE — TELEPHONE ENCOUNTER
Phone call to patient to advise that prescriptions   ketoconazole (NIZORAL) 2 % shampoo and  clobetasol propionate (CLOBEX) 0.05 % shampoo got sent to patient pharm.

## 2023-11-30 RX ORDER — DICLOFENAC SODIUM 75 MG/1
75 TABLET, DELAYED RELEASE ORAL 2 TIMES DAILY
Qty: 180 TABLET | Refills: 1 | Status: SHIPPED | OUTPATIENT
Start: 2023-11-30

## 2023-11-30 RX ORDER — HYDROXYZINE PAMOATE 50 MG/1
CAPSULE ORAL
Qty: 90 CAPSULE | Refills: 1 | Status: SHIPPED | OUTPATIENT
Start: 2023-11-30

## 2023-12-01 RX ORDER — METHOCARBAMOL 500 MG/1
TABLET, FILM COATED ORAL
Qty: 180 TABLET | Refills: 0 | Status: SHIPPED | OUTPATIENT
Start: 2023-12-01

## 2023-12-06 ENCOUNTER — APPOINTMENT (OUTPATIENT)
Dept: LAB | Facility: CLINIC | Age: 48
End: 2023-12-06
Payer: COMMERCIAL

## 2023-12-06 ENCOUNTER — OFFICE VISIT (OUTPATIENT)
Dept: NEUROSURGERY | Facility: CLINIC | Age: 48
End: 2023-12-06
Payer: COMMERCIAL

## 2023-12-06 VITALS
RESPIRATION RATE: 16 BRPM | OXYGEN SATURATION: 98 % | SYSTOLIC BLOOD PRESSURE: 126 MMHG | HEIGHT: 69 IN | DIASTOLIC BLOOD PRESSURE: 84 MMHG | BODY MASS INDEX: 43.4 KG/M2 | WEIGHT: 293 LBS | HEART RATE: 98 BPM | TEMPERATURE: 97.2 F

## 2023-12-06 DIAGNOSIS — M54.16 LUMBAR RADICULOPATHY: ICD-10-CM

## 2023-12-06 DIAGNOSIS — E55.9 VITAMIN D DEFICIENCY: ICD-10-CM

## 2023-12-06 DIAGNOSIS — E28.2 PCOS (POLYCYSTIC OVARIAN SYNDROME): ICD-10-CM

## 2023-12-06 DIAGNOSIS — M47.14 THORACIC SPONDYLOSIS WITH MYELOPATHY: Primary | ICD-10-CM

## 2023-12-06 DIAGNOSIS — M47.14 THORACIC SPONDYLOSIS WITH MYELOPATHY: ICD-10-CM

## 2023-12-06 DIAGNOSIS — E66.01 CLASS 3 SEVERE OBESITY DUE TO EXCESS CALORIES WITHOUT SERIOUS COMORBIDITY WITH BODY MASS INDEX (BMI) OF 40.0 TO 44.9 IN ADULT (HCC): ICD-10-CM

## 2023-12-06 LAB
BUN SERPL-MCNC: 16 MG/DL (ref 5–25)
CREAT SERPL-MCNC: 0.83 MG/DL (ref 0.6–1.3)
GFR SERPL CREATININE-BSD FRML MDRD: 83 ML/MIN/1.73SQ M

## 2023-12-06 PROCEDURE — 36415 COLL VENOUS BLD VENIPUNCTURE: CPT

## 2023-12-06 PROCEDURE — 84520 ASSAY OF UREA NITROGEN: CPT

## 2023-12-06 PROCEDURE — 99215 OFFICE O/P EST HI 40 MIN: CPT | Performed by: NEUROLOGICAL SURGERY

## 2023-12-06 PROCEDURE — 82565 ASSAY OF CREATININE: CPT

## 2023-12-06 NOTE — PROGRESS NOTES
Neurosurgery Office Note  Tonya Cheng 51 y.o. female MRN: [id number]      Assessment/Plan     Thoracic spondylosis with myelopathy  H/o thoracic spinal stenosis with myelopathy   S/p T8 spinous process, T9 total, superior T10 laminectomy for decompression (Dr. Alexandro Swanson, 6/8)    Plan:  Patient developed thoracic radicular pain at the site of her previous surgery   Concern for instability/collapse given prior surgery with worsening symptoms   Possible nerve impingement   XR thoracic spine to assess standing alignment   MRI thoracic spine to assess for foraminal disease or scar/granulation tissue formation   Follow up after MRI and XR thoracic spine completed with Dr. Alexandro Swanson     Lumbar radiculopathy  Patient presents to review MRI lumabr spine for her RLE radicular pain   No aggravating injury or movement  Pain reproducible with any movement, improved with rest or standing still  + home exercises with no improvement, oral medications including gabapentin with no improvement    Imaging:  MRI lumbar spine 11/29/2023: Trace grade 1 anterolisthesis of L4 and L5 with severe spondylotic narrowing. Left paracentral foraminal disc protrusion with endplate osteophyte formation. Plan:  Continue to monitor neurological exam  Gabapentin to 600mg TID  Advised to also take robaxin TID for increased pain control   Continue other prescribed medication  Continue home exercises and swimming as tolerated  RLE likely related to L4-5 stenosis as L5-S1 only showed L sided pathology   Mid thoracic back pain more bothersome than R leg pain and will defer further surgical discussion regarding her R leg pain until after thoracic spinal imaging completed to assess for etiology at prior surgical site       Diagnoses and all orders for this visit:    Thoracic spondylosis with myelopathy  -     MRI thoracic spine with and without contrast; Future  -     X-ray thoracic spine 2 views;  Future  -     BUN; Future  -     Creatinine, serum; Future    Lumbar radiculopathy    Other orders  -     DOCUSATE SODIUM PO; Take 200 mg by mouth as needed          I have spent a total time of 40 minutes on 12/06/23 in caring for this patient including Diagnostic results, Prognosis, Risks and benefits of tx options, Impressions, Counseling / Coordination of care, Documenting in the medical record, Reviewing / ordering tests, medicine, procedures  , and Obtaining or reviewing history  . CHIEF COMPLAINT    Chief Complaint   Patient presents with    Follow-up     Lumbar radiculopathy, with MRI       HISTORY    History of Present Illness     51y.o. year old female with a past medical history significant for GERD, Trejo's esophagus, kidney stones, lung neuroendocrine cancer status post VATS in August 2022 and TERESITA. She presents today for follow up to review her MRI lumbar spine which was ordered at the last appointment. She is s/p 6 month s/p thoracic decompression by Dr Saritha Conti 6/8/23 for calcified ligamentum flavum causing cord compression and myelopathy. Prior to surgery she had BLE weakness with lumbar extension which resolved immediately. She has done well post op except for difficulty with pain control. We discussed her pains further today. She continued with her mid thoracic back pain which is severe in nature. Where as before her surgery, she was having R rib pain, her pain now is primarily left-sided and does cause radiation along her rib cage to the anterior wall of her chest.  She does associate some mild right-sided pains in the rib as well left-sided back pain traverses her thoracic and lumbar region although thoracic is the most painful for her. She does also still endorse some right leg radiculopathy without back pain. She continues taking gabapentin 600 mg 3 times daily as well as Robaxin and Voltaren. She is encouraged to increase her frequency of Robaxin to 3 times a day.   She does report some relief with her medication as she does experience worsening pain when not taking. Any position change reproduces the pain. She has been doing home exercises as tolerated. She is not interested in injections. Her PCP is now prescribing her pain medication. She denies leg weakness, falls, or bowel / bladder dysfunction. She continues to remain asymptomatic from a cervical standpoint at this time. Gait and balance have been improved since her surgery without any recent falls. See Discussion    REVIEW OF SYSTEMS    Review of Systems   Respiratory:  Positive for shortness of breath (h/o lung surgery, pleural effusion). Negative for chest tightness. Gastrointestinal: Negative. Genitourinary: Negative. Denies BBI   Musculoskeletal:  Positive for back pain (left sided rib/back pain, radiates to hips/pelvis and right leg posterior/outer all the way to foot) and gait problem. Skin: Negative. Neurological:  Positive for weakness (legs) and numbness (tingling B/L toes). Psychiatric/Behavioral:  Positive for sleep disturbance. ROS obtained by MA. Reviewed. See HPI.      Meds/Allergies     Current Outpatient Medications   Medication Sig Dispense Refill    acyclovir (ZOVIRAX) 800 mg tablet Take 0.5 tablets (400 mg total) by mouth 2 (two) times a day 30 tablet 0    clobetasol propionate (CLOBEX) 0.05 % shampoo Apply topically 2 (two) times a week 118 mL 2    diclofenac (VOLTAREN) 75 mg EC tablet TAKE 1 TABLET BY MOUTH TWICE  DAILY 180 tablet 1    DOCUSATE SODIUM PO Take 200 mg by mouth as needed      gabapentin (Neurontin) 600 MG tablet Take 1 tablet (600 mg total) by mouth 3 (three) times a day 270 tablet 1    hydrOXYzine pamoate (VISTARIL) 50 mg capsule TAKE 1 CAPSULE BY MOUTH DAILY AS NEEDED FOR ANXIETY 90 capsule 1    ketoconazole (NIZORAL) 2 % shampoo Apply 1 Application topically 2 (two) times a week 120 mL 0    levocetirizine (XYZAL) 5 MG tablet TAKE 1 TABLET BY MOUTH ONCE  DAILY IN THE EVENING 90 tablet 0    methocarbamol (ROBAXIN) 500 mg tablet TAKE 1 TABLET BY MOUTH TWICE  DAILY AS NEEDED FOR MUSCLE  SPASMS 180 tablet 0    omeprazole (PriLOSEC) 40 MG capsule TAKE 1 CAPSULE BY MOUTH  DAILY 90 capsule 3    metFORMIN (GLUCOPHAGE-XR) 500 mg 24 hr tablet TAKE 1 TABLET BY MOUTH DAILY  WITH DINNER (Patient not taking: Reported on 2023) 90 tablet 0    methylPREDNISolone 4 MG tablet therapy pack Use as directed on package (Patient not taking: Reported on 2023) 1 each 0     No current facility-administered medications for this visit. Allergies   Allergen Reactions    Metronidazole Anaphylaxis     "patient states she has been in anaphylaxis due to this medication prior"      Nitrofurantoin Shortness Of Breath       PAST HISTORY    Past Medical History:   Diagnosis Date    Acid reflux     Allergic     Flagyl; anaphylaxis    Anxiety     Trejo's esophagus     Cancer ([address])     GERD (gastroesophageal reflux disease)     Headache(784.0)     Hiatal hernia     Hyperlipidemia     Kidney stone     Latent tuberculosis by skin test     Was treated with INH for 6 months    Lung cancer ([address]) diagnosed in 2018    Obesity 2018    Obstructive sleep apnea 2018    Mild TERESITA. AHI 18.1    Squamous cell skin cancer     Left buttocks    STD (sexually transmitted disease)     HPV, HSV    Urinary tract infection        Past Surgical History:   Procedure Laterality Date    ABDOMINOPLASTY      AUGMENTATION BREAST Bilateral 2015    BREAST SURGERY  2015    BRONCHOSCOPY N/A 2022    Procedure: BRONCHOSCOPY NAVIGATIONAL;  Surgeon: Dionisio Mclean MD;  [facility];  Service: Thoracic     SECTION      COLON SURGERY  2018    Rectocele Repair    CYSTOSCOPY N/A 2022    Procedure: CYSTOSCOPY;  Surgeon: Narda Hidalgo MD;  [facility];  Service: Gynecology Oncology    EGD  2018    ENDOBRONCHIAL ULTRASOUND (EBUS) N/A 2022    Procedure: ENDOBRONCHIAL ULTRASOUND (EBUS);   Surgeon: Feli Dickinson MD;  [facility];  Service: Thoracic    HYSTERECTOMY  2014    IR BIOPSY LYMPH NODE  03/21/2022    IR CHEST TUBE PLACEMENT  08/01/2022    LAPAROSCOPIC CHOLECYSTECTOMY  1997    LIPOSUCTION  2015    LUNG SURGERY  2022    VATS Lobectomy RLL    LYMPH NODE BIOPSY  03/21/2022    MOHS SURGERY Left 08/18/2022    left buttocks     [address] INCL FLUOR GDNCE DX W/CELL WASHG SPX N/A 05/03/2022    Procedure: BRONCHOSCOPY FLEXIBLE;  Surgeon: Feli Dickinson MD;  [facility];  Service: Thoracic     [address] INCL FLUOR GDNCE DX W/CELL Montey N[address] [facility] N/A 07/28/2022    Procedure: Naya Vinson;  Surgeon: Jonny Granados MD;  [facility];  Service: Thoracic    NH MOLINA FACETECTOMY & FORAMOTOMY 1 VRT SGM THORACIC N/A 06/08/2023    Procedure: Open T8 spinous process, T9 total, superior T10 laminectomy for decompression;  Surgeon: Joni Wetzel MD;  [facility];  Service: Neurosurgery    NH LAPAROSCOPY W/RMVL ADNEXAL STRUCTURES Bilateral 12/20/2022    Procedure: OOPHORECTOMY W/ ROBOTICS, VAGINOTOMY;  Surgeon: Carlos Flor MD;  [facility];  Service: Gynecology Oncology    NH THORACOSCOPY W/LOBECTOMY SINGLE LOBE Right 07/28/2022    Procedure: LOBECTOMY LUNG;  Surgeon: Jonny Granados MD;  [facility];  Service: Thoracic    NH THORACOSCOPY W/LOBECTOMY SINGLE LOBE Right 07/28/2022    Procedure: THORACOSCOPY VIDEO ASSISTED SURGERY (VATS);   Surgeon: Jonny Granados MD;  [facility];  Service: Thoracic    REPAIR RECTOCELE  2018    [facility]    TUBAL LIGATION  2002    UPPER GASTROINTESTINAL ENDOSCOPY  2018       Social History     Tobacco Use    Smoking status: Every Day     Packs/day: 0.50     Years: 36.00     Total pack years: 18.00     Types: Cigarettes     Start date: 9/1/2022    Smokeless tobacco: Never   Vaping Use    Vaping Use: Never used   Substance Use Topics    Alcohol use: Not Currently    Drug use: Never       Family History   Problem Relation Age of Onset    Cancer Mother         Ovarian    Ovarian cancer Mother     Arthritis Mother         RA    Autoimmune disease Mother         RA    Stroke Father     Dementia Father     Vision loss Father     Glaucoma Father     Lung cancer Paternal Uncle     Cancer Maternal Grandmother         Ovarian    Diabetes Paternal Grandfather     Diabetes Paternal Grandmother          Above history personally reviewed. EXAM    Vitals:Blood pressure 126/84, pulse 98, temperature (!) 97.2 °F (36.2 °C), temperature source Temporal, resp. rate 16, height 5' 9.25" (1.759 m), weight (!) 143 kg (316 lb), SpO2 98 %. ,Body mass index is 46.33 kg/m². Physical Exam  Constitutional:       Appearance: Normal appearance. HENT:      Head: Atraumatic. Eyes:      Extraocular Movements: Extraocular movements intact and EOM normal.   Pulmonary:      Effort: Pulmonary effort is normal.   Musculoskeletal:         General: No tenderness. Normal range of motion. Cervical back: Normal range of motion and neck supple. Neurological:      Mental Status: She is alert and oriented to person, place, and time. Sensory: No sensory deficit. Motor: No weakness. Gait: Gait is intact. Psychiatric:         Speech: Speech normal.         Neurologic Exam     Mental Status   Oriented to person, place, and time. Speech: speech is normal   Level of consciousness: alert  Normal comprehension. Cranial Nerves     CN III, IV, VI   Extraocular motions are normal.     CN VII   Facial expression full, symmetric.      CN VIII   CN VIII normal.   Hearing: intact    Motor Exam Full strength throughout BLE      Sensory Exam   Right leg light touch: normal  Left leg light touch: normal    Gait, Coordination, and Reflexes     Gait  Gait: normal    MEDICAL DECISION MAKING    Imaging Studies:     MRI lumbar spine without contrast    Result Date: 12/1/2023  Narrative: MRI LUMBAR SPINE WITHOUT CONTRAST INDICATION: M54.16: Radiculopathy, lumbar region. COMPARISON: 11/18/2023 TECHNIQUE:  Multiplanar, multisequence imaging of the lumbar spine was performed. . IMAGE QUALITY:  Diagnostic FINDINGS: VERTEBRAL BODIES:  There are 5 lumbar type vertebral bodies. Trace grade 1 anterolisthesis of L4 on L5. Mild levoscoliosis mid lumbar spine. Mild scattered degenerative endplate changes. No focally suspicious marrow lesions. No bone marrow edema or compression abnormality. SACRUM: Scattered degenerative endplate changes. No focally suspicious marrow lesions. No bone marrow edema or compression abnormality. DISTAL CORD AND CONUS:  Normal size and signal within the distal cord and conus. The conus medullaris terminates at the inferior endplate of L1. PARASPINAL SOFT TISSUES:  Paraspinal soft tissues are unremarkable. LOWER THORACIC DISC SPACES: Mild noncompressive lower thoracic degenerative change. LUMBAR DISC SPACES: L1-L2: There is bilateral facet hypertrophy. There is a small left neural foraminal disc protrusion. Minimal left neural foraminal narrowing. Central canal and right neural foramen patent. L2-L3: There is no focal disk herniation, central canal or neural foraminal narrowing. Bilateral facet hypertrophy noted. L3-L4: There is no focal disk herniation, central canal or neural foraminal narrowing. Bilateral facet hypertrophy noted. L4-L5: Advanced facet arthropathy. There is uncovering the intervertebral disc space. There is a right neural foraminal disc protrusion. Severe central canal narrowing. Moderate right neural foraminal narrowing. Moderate to severe left neural foraminal narrowing. L5-S1: Left paracentral/foraminal disc protrusion with associated endplate osteophyte formation indicative of a disc osteophyte complex. Severe narrowing of the left subarticular recess and left neural foramen. Mild central canal narrowing. Right neural foramen patent. Bilateral facet hypertrophy. OTHER FINDINGS:  None. Impression: 1. Trace grade 1 anterolisthesis of L4 on L5 with severe spondylotic narrowing at L4-5 and L5-S1 as described. Workstation performed: [id number]     XR spine lumbar minimum 4 views non injury    Result Date: 11/20/2023  Narrative: LUMBAR SPINE INDICATION:   M54.16: Radiculopathy, lumbar region. COMPARISON:  None VIEWS:  XR SPINE LUMBAR MINIMUM 4 VIEWS NON INJURY Images: 4 FINDINGS: Cholecystectomy clips noted. There are 5 non rib bearing lumbar vertebral bodies. There is no evidence of acute fracture or destructive osseous lesion. Mild retrolisthesis of L5 1 on L2 and anterolisthesis of L4 on L5. No evidence for dynamic instability on flexion or extension. Multilevel degenerative changes involving discs and most prominently lower lumbar facet joints. The pedicles appear intact. SI joints appear normal. Soft tissues are unremarkable. Impression: No acute osseous abnormality. Degenerative changes as described. Minimal L1 over L2 retrolisthesis and L4 over L5 anterolisthesis. No evidence for dynamic instability on flexion or extension. Workstation performed: [id number]       I have personally reviewed pertinent reports.    and I have personally reviewed pertinent films in PACS

## 2023-12-06 NOTE — ASSESSMENT & PLAN NOTE
H/o thoracic spinal stenosis with myelopathy   S/p T8 spinous process, T9 total, superior T10 laminectomy for decompression (Dr. José Garcia, 6/8)    Plan:  Patient developed thoracic radicular pain at the site of her previous surgery   Concern for instability/collapse given prior surgery with worsening symptoms   Possible nerve impingement   XR thoracic spine to assess standing alignment   MRI thoracic spine to assess for foraminal disease or scar/granulation tissue formation   Follow up after MRI and XR thoracic spine completed with Dr. José Garcia

## 2023-12-06 NOTE — ASSESSMENT & PLAN NOTE
Patient presents to review MRI lumabr spine for her RLE radicular pain   No aggravating injury or movement  Pain reproducible with any movement, improved with rest or standing still  + home exercises with no improvement, oral medications including gabapentin with no improvement    Imaging:  MRI lumbar spine 11/29/2023: Trace grade 1 anterolisthesis of L4 and L5 with severe spondylotic narrowing. Left paracentral foraminal disc protrusion with endplate osteophyte formation.     Plan:  Continue to monitor neurological exam  Gabapentin to 600mg TID  Advised to also take robaxin TID for increased pain control   Continue other prescribed medication  Continue home exercises and swimming as tolerated  RLE likely related to L4-5 stenosis as L5-S1 only showed L sided pathology   Mid thoracic back pain more bothersome than R leg pain and will defer further surgical discussion regarding her R leg pain until after thoracic spinal imaging completed to assess for etiology at prior surgical site

## 2023-12-13 DIAGNOSIS — A60.00 GENITAL HERPES SIMPLEX, UNSPECIFIED SITE: ICD-10-CM

## 2023-12-13 RX ORDER — ACYCLOVIR 800 MG/1
400 TABLET ORAL 2 TIMES DAILY
Qty: 90 TABLET | Refills: 2 | Status: SHIPPED | OUTPATIENT
Start: 2023-12-13 | End: 2024-09-08

## 2023-12-14 NOTE — PROGRESS NOTES
[facility] Note     Patient Name: Kelli Gonzalez  Encounter Date: 1/10/2023     Have you been cared for by a [place] [id number] Dermatologist in the last 3 years and, if so, which description applies to you? YES  I HAVE been seen here within the last 3 years, and my medical history HAS changed  I am FEMALE/of child-bearing potential     REVIEW OF SYSTEMS:  Have you recently had or currently have any of the following? · Recent fever or chills? No  · Any non-healing wound? No  · Are you pregnant or planning to become pregnant? No  · Are you currently or planning to be nursing or breast feeding? No   PAST MEDICAL HISTORY:  Have you personally ever had or currently have any of the following? If "YES," then please provide more detail  · Skin cancer (such as Melanoma, Basal Cell Carcinoma, Squamous Cell Carcinoma? YES, SCC  · Tuberculosis, HIV/AIDS, Hepatitis B or C: No  · Systemic Immunosuppression such as Diabetes, Biologic or Immunotherapy, Chemotherapy, Organ Transplantation, Bone Marrow Transplantation No  · Radiation Treatment No   FAMILY HISTORY:  Any "first degree relatives" (parent, brother, sister, or child) with the following? • Skin Cancer, Pancreatic or Other Cancer? No   PATIENT EXPERIENCE:    • Do you want the Dermatologist to perform a COMPLETE skin exam today including a clinical examination under the "bra and underwear" areas? Yes  • If necessary, do we have your permission to call and leave a detailed message on your Preferred Phone number that includes your specific medical information?   Yes      Allergies   Allergen Reactions   • Metronidazole Anaphylaxis     "patient states she has been in anaphylaxis due to this medication prior"     • Nitrofurantoin Shortness Of Breath      Current Outpatient Medications:   •  acyclovir (ZOVIRAX) 800 mg tablet, Take 0 5 tablets (400 mg total) by mouth 2 (two) times a day, Disp: 180 tablet, Rfl: 3  •  ALPRAZolam (XANAX) 0 25 mg tablet, Take 1 tablet (0 25 mg total) by mouth daily at bedtime as needed for anxiety, Disp: 30 tablet, Rfl: 0  •  gabapentin (NEURONTIN) 300 mg capsule, TAKE 1 CAPSULE BY MOUTH 3  TIMES DAILY, Disp: 270 capsule, Rfl: 3  •  ibuprofen (MOTRIN) 600 mg tablet, Take 1 tablet (600 mg total) by mouth every 6 (six) hours as needed for mild pain, Disp: 30 tablet, Rfl: 0  •  levocetirizine (XYZAL) 5 MG tablet, Take 1 tablet (5 mg total) by mouth every evening, Disp: 90 tablet, Rfl: 3  •  omeprazole (PriLOSEC) 40 MG capsule, Take 1 capsule (40 mg total) by mouth daily, Disp: 90 capsule, Rfl: 1  •  acetaminophen (TYLENOL) 650 mg CR tablet, Take 1 tablet (650 mg total) by mouth every 6 (six) hours, Disp: 30 tablet, Rfl: 0          • Whom besides the patient is providing clinical information about today's encounter?   o NO ADDITIONAL HISTORIAN (patient alone provided history)  o Patient here for skin cancer screening  Has a growth on mid back  Present for a couple years  Asymptomatic  Physical Exam and Assessment/Plan by Diagnosis:    MELANOCYTIC NEVI ("Moles")    Physical Exam:  • Anatomic Location Affected:   Mostly on sun-exposed areas of the trunk and extremities  • Morphological Description:  Scattered, 1-4mm round to ovoid, symmetrical-appearing, even bordered, skin colored to dark brown macules/papules, mostly in sun-exposed areas  • Pertinent Positives:  • Pertinent Negatives:     Additional History of Present Condition:      Assessment and Plan:  Based on a thorough discussion of this condition and the management approach to it (including a comprehensive discussion of the known risks, side effects and potential benefits of treatment), the patient (family) agrees to implement the following specific plan:  • When outside we recommend using a wide brim hat, sunglasses, long sleeve and pants, sunscreen with SPF 44+ with reapplication every 2 hours, or SPF specific clothing   • Benign, reassured  • Annual skin check     Melanocytic Nevi  Melanocytic nevi ("moles") are tan or brown, raised or flat areas of the skin which have an increased number of melanocytes  Melanocytes are the cells in our body which make pigment and account for skin color  Some moles are present at birth (I e , "congenital nevi"), while others come up later in life (i e , "acquired nevi")  The sun can stimulate the body to make more moles  Sunburns are not the only thing that triggers more moles  Chronic sun exposure can do it too  Clinically distinguishing a healthy mole from melanoma may be difficult, even for experienced dermatologists  The "ABCDE's" of moles have been suggested as a means of helping to alert a person to a suspicious mole and the possible increased risk of melanoma  The suggestions for raising alert are as follows:    Asymmetry: Healthy moles tend to be symmetric, while melanomas are often asymmetric  Asymmetry means if you draw a line through the mole, the two halves do not match in color, size, shape, or surface texture  Asymmetry can be a result of rapid enlargement of a mole, the development of a raised area on a previously flat lesion, scaling, ulceration, bleeding or scabbing within the mole  Any mole that starts to demonstrate "asymmetry" should be examined promptly by a board certified dermatologist      Border: Healthy moles tend to have discrete, even borders  The border of a melanoma often blends into the normal skin and does not sharply delineate the mole from normal skin  Any mole that starts to demonstrate "uneven borders" should be examined promptly by a board certified dermatologist      Color: Healthy moles tend to be one color throughout  Melanomas tend to be made up of different colors ranging from dark black, blue, white, or red    Any mole that demonstrates a color change should be examined promptly by a board certified dermatologist      Diameter: Healthy moles tend to be smaller than 0 6 cm in size; an exception are "congenital nevi" that can be larger  Melanomas tend to grow and can often be greater than 0 6 cm (1/4 of an inch, or the size of a pencil eraser)  This is only a guideline, and many normal moles may be larger than 0 6 cm without being unhealthy  Any mole that starts to change in size (small to bigger or bigger to smaller) should be examined promptly by a board certified dermatologist      Evolving: Healthy moles tend to "stay the same "  Melanomas may often show signs of change or evolution such as a change in size, shape, color, or elevation  Any mole that starts to itch, bleed, crust, burn, hurt, or ulcerate or demonstrate a change or evolution should be examined promptly by a board certified dermatologist         EPIDERMAL INCLUSION CYST    Physical Exam:  • Anatomic Location Affected:  Mid upper back  • Morphological Description:  1 cm round subcutaneous nodule  • Pertinent Positives:  • Pertinent Negatives: Additional History of Present Condition:  See the notation above    Assessment and Plan:  Based on a thorough discussion of this condition and the management approach to it (including a comprehensive discussion of the known risks, side effects and potential benefits of treatment), the patient (family) agrees to implement the following specific plan:  • Schedule for removal (Excision)  What are epidermal inclusion cysts? Epidermal inclusion cysts are the most common, benign cutaneous cysts  There are many different names for epidermal inclusion cysts, including epidermoid cyst, epidermal cyst, infundibular cyst, inclusion cyst, and keratin cyst  These cysts can occur anywhere on the body and typically present as nodules directly underneath the skin  There is often a visible pore or opening in the center  The cysts are freely moveable and can range from a few millimeters to several centimeters in diameter   The center of epidermoid cysts almost always contains keratin, which has a cheesy appearance, and not sebum  They also do not originate from sebaceous glands  Therefore, epidermal inclusion cysts are not the same as sebaceous cysts  Cysts may remain stable or progressively enlarge over time  There are no reliable predictive factors to tell if an epidermal inclusion cyst will enlarge, become inflamed, or remain quiescent  Infected cysts tend to become larger, turn red, and are more noticeable to the patient  There may be accompanying pain and discomfort  What causes epidermal inclusion cysts? Epidermal inclusion cysts often appear out of the blue and are not contagious  They are due to a proliferation of epidermal cells within the dermis and are more common in men than women  They occur more frequently in patients in their 20s to 45s  Epidermal inclusion cysts by themselves are usually not inherited, but they can be hereditary in rare syndromes such as Welch syndrome, nodular elastosis with cysts and comedones (Favre-Racouchot syndrome), and basal cell nevus syndrome (Gorlin syndrome)  Elderly patients with chronic sun-damaged skin areas have a higher likelihood of developing epidermoid cysts  They often occur in areas where hair follicles have been inflamed or repeatedly irritated are more frequent in patients with acne vulgaris  In the  period, they are called milia  Patients on BRAF inhibitors such as imiquimod and cyclosporine have a higher incidence of epidermoid cysts of the face  How do we diagnose an epidermal inclusion cyst?  Epidermoid inclusion cysts are often diagnosed by history and physical exam  There is usually no need for biopsy prior to removal   Radiographic and laboratory exams, such as ultrasound studies, are unnecessary and not typically ordered unless the practitioner suspects a genetic condition      What is the treatment for an epidermal inclusion cyst?  Inflamed, uninfected epidermal inclusion cysts rarely resolve spontaneously without therapy or surgical intervention  Treatment is not emergent unless desired by the patient  Definitive treatment is via surgical excision with walls intact  This method will prevent recurrence  This is best done when the cyst is not inflamed, to decrease the probability of rupture during surgery  - A local anesthetic will be injected around the cyst  - A small incision is made in the skin overlying the cyst, and contents are expressed  - The incision is repaired with sutures    Another option is to use a 4mm punch biopsy with cyst extraction through the defect  Incision and drainage is often needed if the cyst is infected or inflamed  If there is surrounding cellulitis, oral antibiotic therapy may be necessary  The common agents used target methicillin sensitive Staphylococcal aureus and methicillin resistant S aureus in areas of high prevalence  SEBORRHEIC DERMATITIS    Physical Exam:  • Anatomic Location Affected:  scalp  • Morphological Description:  Fine scale but minimal erythema  • Pertinent Positives:  • Pertinent Negatives: Additional History of Present Condition:  itchy    Assessment and Plan:  Based on a thorough discussion of this condition and the management approach to it (including a comprehensive discussion of the known risks, side effects and potential benefits of treatment), the patient (family) agrees to implement the following specific plan:  • Ketoconazole shampoo alternating with clobetasol shampoo  Apply topically to the scalp "ONLY" when you shampoo  Seborrheic Dermatitis   Seborrheic dermatitis is a common, chronic or relapsing form of eczema/dermatitis that mainly affects the sebaceous, gland-rich regions of the scalp, face, and trunk  There are infantile and adult forms of seborrhoeic dermatitis   It is sometimes associated with psoriasis and, in that clinical scenario, may be referred to as "sebo-psoriasis "  Seborrheic dermatitis is also known as "seborrheic eczema "  Dandruff (also called "pityriasis capitis") is an uninflamed form of seborrhoeic dermatitis  Dandruff presents as bran-like scaly patches scattered within hair-bearing areas of the scalp  In an infant, this condition may be referred to as "cradle cap "  The cause of seborrheic dermatitis is not completely understood  It is associated with proliferation of various species of the skin commensal Malassezia, in its yeast (non-pathogenic) form  Its metabolites (such as the fatty acids oleic acid, malssezin, and indole-3-carbaldehyde) may cause an inflammatory reaction  Differences in skin barrier lipid content and function may account for individual presentations  Infantile Seborrheic Dermatitis  Infantile seborrheic dermatitis affects babies under the age of 1 months and usually resolves by 1012 months of age  Infantile seborrheic dermatitis causes "cradle cap" (diffuse, greasy scaling on scalp)  The rash may spread to affect armpit and groin folds (a type of "napkin dermatitis")  There may be associated salmon-pink colored patches that may flake or peel  The rash in this case is usually not especially itchy, so the baby often appears undisturbed by the rash, even when more generalized  Adult Seborrheic Dermatitis  Adult seborrheic dermatitis tends to begin in late adolescence; prevalence is greatest in young adults and in the elderly  It is more common in males than in females      The following factors are sometimes associated with severe adult seborrheic dermatitis:  • Oily skin  • Familial tendency to seborrhoeic dermatitis or a family history of psoriasis  • Immunosuppression: organ transplant recipient, human immunodeficiency virus (HIV) infection and patients with lymphoma  • Neurological and psychiatric diseases: Parkinson disease, tardive dyskinesia, depression, epilepsy, facial nerve palsy, spinal cord injury and congenital disorders such as Down syndrome  • Treatment for psoriasis with psoralen and ultraviolet A (PUVA) therapy  • Lack of sleep  • Stressful events  In adults, seborrheic dermatitis may typically affect the scalp, face (creases around the nose, behind ears, within eyebrows) and upper trunk  Typical clinical features include:  • Winter flares, improving in summer following sun exposure  • Minimal itch most of the time  • Combination oily and dry mid-facial skin  • Ill-defined localized scaly patches or diffuse scale in the scalp  • Blepharitis; scaly red eyelid margins  • [place]-pink, thin, scaly, and ill-defined plaques in skin folds on both sides of the face  • Petal or ring-shaped flaky patches on hair-line and on anterior chest  • Rash in armpits, under the breasts, in the groin folds and genital creases  • Superficial folliculitis (inflamed hair follicles) on cheeks and upper trunk    Seborrheic dermatitis is diagnosed by its clinical appearance and behavior  Skin biopsy may be helpful but is rarely necessary to make this diagnosis    Scribe Attestation    I,:  Edgar Gavin am acting as a scribe while in the presence of the attending physician :       I,:  Bisi Stringer MD personally performed the services described in this documentation    as scribed in my presence : Him/He

## 2023-12-25 DIAGNOSIS — M51.34 DEGENERATIVE DISC DISEASE, THORACIC: ICD-10-CM

## 2023-12-25 DIAGNOSIS — M54.16 LUMBAR RADICULOPATHY, CHRONIC: ICD-10-CM

## 2023-12-25 DIAGNOSIS — K21.9 GASTROESOPHAGEAL REFLUX DISEASE WITHOUT ESOPHAGITIS: ICD-10-CM

## 2023-12-25 DIAGNOSIS — T78.40XD ALLERGY, SUBSEQUENT ENCOUNTER: ICD-10-CM

## 2023-12-26 RX ORDER — LEVOCETIRIZINE DIHYDROCHLORIDE 5 MG/1
5 TABLET, FILM COATED ORAL EVERY EVENING
Qty: 90 TABLET | Refills: 1 | Status: SHIPPED | OUTPATIENT
Start: 2023-12-26

## 2023-12-26 RX ORDER — GABAPENTIN 600 MG/1
600 TABLET ORAL 3 TIMES DAILY
Qty: 270 TABLET | Refills: 1 | Status: SHIPPED | OUTPATIENT
Start: 2023-12-26

## 2023-12-26 RX ORDER — OMEPRAZOLE 40 MG/1
CAPSULE, DELAYED RELEASE ORAL
Qty: 90 CAPSULE | Refills: 1 | Status: SHIPPED | OUTPATIENT
Start: 2023-12-26

## 2023-12-29 ENCOUNTER — HOSPITAL ENCOUNTER (OUTPATIENT)
Dept: RADIOLOGY | Facility: HOSPITAL | Age: 48
Discharge: HOME | End: 2023-12-29
Payer: COMMERCIAL

## 2023-12-29 DIAGNOSIS — M47.14 THORACIC SPONDYLOSIS WITH MYELOPATHY: ICD-10-CM

## 2023-12-29 PROCEDURE — A9585 GADOBUTROL INJECTION: HCPCS | Performed by: PHYSICIAN ASSISTANT

## 2023-12-29 PROCEDURE — 72070 X-RAY EXAM THORAC SPINE 2VWS: CPT

## 2023-12-29 PROCEDURE — G1004 CDSM NDSC: HCPCS

## 2023-12-29 PROCEDURE — 72157 MRI CHEST SPINE W/O & W/DYE: CPT

## 2023-12-29 RX ORDER — GADOBUTROL 604.72 MG/ML
14 INJECTION INTRAVENOUS
Status: COMPLETED | OUTPATIENT
Start: 2023-12-29 | End: 2023-12-29

## 2023-12-29 RX ADMIN — GADOBUTROL 14 ML: 604.72 INJECTION INTRAVENOUS at 09:34

## 2024-01-04 ENCOUNTER — OFFICE VISIT (OUTPATIENT)
Dept: NEUROSURGERY | Facility: CLINIC | Age: 49
End: 2024-01-04
Payer: COMMERCIAL

## 2024-01-04 VITALS
RESPIRATION RATE: 16 BRPM | HEART RATE: 90 BPM | BODY MASS INDEX: 43.4 KG/M2 | TEMPERATURE: 98 F | DIASTOLIC BLOOD PRESSURE: 80 MMHG | SYSTOLIC BLOOD PRESSURE: 124 MMHG | WEIGHT: 293 LBS | HEIGHT: 69 IN | OXYGEN SATURATION: 98 %

## 2024-01-04 DIAGNOSIS — M54.14 THORACIC RADICULOPATHY: Primary | ICD-10-CM

## 2024-01-04 DIAGNOSIS — M54.6 THORACIC BACK PAIN: ICD-10-CM

## 2024-01-04 DIAGNOSIS — G89.4 CHRONIC PAIN SYNDROME: ICD-10-CM

## 2024-01-04 DIAGNOSIS — M54.16 LUMBAR RADICULOPATHY: ICD-10-CM

## 2024-01-04 DIAGNOSIS — M48.061 LUMBAR STENOSIS: ICD-10-CM

## 2024-01-04 DIAGNOSIS — M54.50 LUMBAR BACK PAIN: ICD-10-CM

## 2024-01-04 DIAGNOSIS — G95.89 MYELOMALACIA (HCC): ICD-10-CM

## 2024-01-04 DIAGNOSIS — M51.26 LUMBAR HERNIATED DISC: ICD-10-CM

## 2024-01-04 PROCEDURE — 99214 OFFICE O/P EST MOD 30 MIN: CPT | Performed by: NEUROLOGICAL SURGERY

## 2024-01-04 NOTE — PROGRESS NOTES
Neurosurgery Office Note  Tessa Olivarez 48 y.o. female MRN: [id number]      Assessment/Plan      Diagnoses and all orders for this visit:    Thoracic radiculopathy  -     Ambulatory Referral to Neurosurgery; Future    Thoracic back pain  -     Ambulatory referral to Physical Therapy; Future    Lumbar back pain  -     Ambulatory referral to Physical Therapy; Future    Lumbar radiculopathy    Chronic pain syndrome    Myelomalacia (HCC)    Lumbar stenosis    Lumbar herniated disc      Discussion:    Pain Score:   4 (Left side back to ribs-LBP-non radiating)    Thoracic spondylosis with myelopathy  H/o thoracic spinal stenosis with myelopathy   S/p T8 spinous process, T9 total, superior T10 laminectomy for decompression (6/8/22)  Myelopathy much improved, but continues with left-sided radicular pain.  Ranges 4-7/10 in office today.  MRI scan thoracic spine does not show any persistent canal or foraminal compression.  X-rays T-spine does not show any instability.  In short, no compressive features to explain this symptom  Reviewed this imaging with the patient and explained there is no obvious surgical target to decompress.  As such, I have recommended she see my partner, Dr. Bustamante, to perform a thoracic nerve block.  This would be for diagnostic and therapeutic purposes.  Should this work and then her pain recur, we can discuss further options such as ablation, surgical rhizotomy, stimulator, etc.  Also referring her to physical therapy for thoracic spine exercises, etc.    Lumbar radiculopathy  Right-sided radicular pain not profound at present, occasionally involves sole of foot right side of the foot, but not as significant as her back pain presently.  Likely comes from facet overgrowth and ligamentous hypertrophy at L4-5.  As her symptom is predominantly back pain at this time, would not recommend surgical intervention.  Recommend more conservative measures for her back pain including PT, and have referred her for  that.    Lumbar herniated disc  Herniated disc left L5-S1 not presently causing her profound radicular pain, so not recommending any surgical intervention at present.    07/24/23 Metrics: EQ5D5L 40332=2.616; VAS 70     01/04/24 Metrics: EQ5D5L 51469=6.629; VAS 70        CHIEF COMPLAINT    Chief Complaint   Patient presents with   • Follow-up     FU AFTER MRI TSPINE (12/29/23) / XRAY TSPINE DONE 12/29/23       HISTORY    History of Present Illness     48 y.o. year old female     See Discussion    REVIEW OF SYSTEMS    Review of Systems   Respiratory:  Positive for shortness of breath (h/o lung surgery, pleural effusion). Negative for chest tightness.    Gastrointestinal: Negative.    Genitourinary: Negative.         Denies BBI   Musculoskeletal:  Positive for back pain and gait problem.   Skin: Negative.    Neurological:  Positive for weakness (legs) and numbness (tingling B/L toes).   Psychiatric/Behavioral:  Positive for sleep disturbance.    All other systems reviewed and are negative.        Meds/Allergies     Current Outpatient Medications   Medication Sig Dispense Refill   • acyclovir (ZOVIRAX) 800 mg tablet Take 0.5 tablets (400 mg total) by mouth 2 (two) times a day 90 tablet 2   • clobetasol propionate (CLOBEX) 0.05 % shampoo Apply topically 2 (two) times a week 118 mL 2   • diclofenac (VOLTAREN) 75 mg EC tablet TAKE 1 TABLET BY MOUTH TWICE  DAILY 180 tablet 1   • DOCUSATE SODIUM PO Take 200 mg by mouth as needed     • gabapentin (NEURONTIN) 600 MG tablet TAKE 1 TABLET BY MOUTH 3 TIMES  DAILY 270 tablet 1   • hydrOXYzine pamoate (VISTARIL) 50 mg capsule TAKE 1 CAPSULE BY MOUTH DAILY AS NEEDED FOR ANXIETY 90 capsule 1   • ketoconazole (NIZORAL) 2 % shampoo Apply 1 Application topically 2 (two) times a week 120 mL 0   • levocetirizine (XYZAL) 5 MG tablet TAKE 1 TABLET BY MOUTH ONCE  DAILY IN THE EVENING 90 tablet 1   • methocarbamol (ROBAXIN) 500 mg tablet TAKE 1 TABLET BY MOUTH TWICE  DAILY AS NEEDED FOR  "MUSCLE  SPASMS (Patient taking differently: Take 500 mg by mouth 3 (three) times a day) 180 tablet 0   • omeprazole (PriLOSEC) 40 MG capsule TAKE 1 CAPSULE BY MOUTH DAILY 90 capsule 1   • metFORMIN (GLUCOPHAGE-XR) 500 mg 24 hr tablet TAKE 1 TABLET BY MOUTH DAILY  WITH DINNER (Patient not taking: Reported on 2023) 90 tablet 0   • methylPREDNISolone 4 MG tablet therapy pack Use as directed on package (Patient not taking: Reported on 2023) 1 each 0     No current facility-administered medications for this visit.       Allergies   Allergen Reactions   • Metronidazole Anaphylaxis     \"patient states she has been in anaphylaxis due to this medication prior\"     • Nitrofurantoin Shortness Of Breath       PAST HISTORY    Past Medical History:   Diagnosis Date   • Acid reflux    • Allergic     Flagyl; anaphylaxis   • Anxiety    • Trejo's esophagus    • Cancer (HCC)    • GERD (gastroesophageal reflux disease)    • Headache(784.0)    • Hiatal hernia    • Hyperlipidemia    • Kidney stone    • Latent tuberculosis by skin test     Was treated with INH for 6 months   • Lung cancer (HCC) diagnosed in    • Obesity    • Obstructive sleep apnea 2018    Mild TERESITA.  AHI 18.1   • Squamous cell skin cancer     Left buttocks   • STD (sexually transmitted disease)     HPV, HSV   • Urinary tract infection        Past Surgical History:   Procedure Laterality Date   • ABDOMINOPLASTY     • AUGMENTATION BREAST Bilateral    • BREAST SURGERY     • BRONCHOSCOPY N/A 2022    Procedure: BRONCHOSCOPY NAVIGATIONAL;  Surgeon: J Luis Arreola MD;  [facility];  Service: Thoracic   •  SECTION     • COLON SURGERY  2018    Rectocele Repair   • CYSTOSCOPY N/A 2022    Procedure: CYSTOSCOPY;  Surgeon: Red Becerril MD;  [facility];  Service: Gynecology Oncology   • EGD     • ENDOBRONCHIAL ULTRASOUND (EBUS) N/A 2022    Procedure: ENDOBRONCHIAL " ULTRASOUND (EBUS);  Surgeon: J Luis Arreola MD;  [facility];  Service: Thoracic   • HYSTERECTOMY  2014   • IR BIOPSY LYMPH NODE  03/21/2022   • IR CHEST TUBE PLACEMENT  08/01/2022   • LAPAROSCOPIC CHOLECYSTECTOMY  1997   • LIPOSUCTION  2015   • LUNG SURGERY  2022    VATS Lobectomy RLL   • LYMPH NODE BIOPSY  03/21/2022   • MOHS SURGERY Left 08/18/2022    left buttocks   • RI BRNCHSC INCL FLUOR GDNCE DX W/CELL WASHG SPX N/A 05/03/2022    Procedure: BRONCHOSCOPY FLEXIBLE;  Surgeon: J Luis Arreola MD;  [facility];  Service: Thoracic   • RI BRNCHSC INCL FLUOR GDNCE DX W/CELL WASHG SPX N/A 07/28/2022    Procedure: BRONCHOSCOPY FLEXIBLE;  Surgeon: Arnie Bartlett MD;  [facility];  Service: Thoracic   • RI MOLINA FACETECTOMY & FORAMOTOMY 1 VRT SGM THORACIC N/A 06/08/2023    Procedure: Open T8 spinous process, T9 total, superior T10 laminectomy for decompression;  Surgeon: Feroz Guy MD;  [facility];  Service: Neurosurgery   • RI LAPAROSCOPY W/RMVL ADNEXAL STRUCTURES Bilateral 12/20/2022    Procedure: OOPHORECTOMY W/ ROBOTICS, VAGINOTOMY;  Surgeon: Red Becerril MD;  [facility];  Service: Gynecology Oncology   • RI THORACOSCOPY W/LOBECTOMY SINGLE LOBE Right 07/28/2022    Procedure: LOBECTOMY LUNG;  Surgeon: Arnie Bartlett MD;  [facility];  Service: Thoracic   • RI THORACOSCOPY W/LOBECTOMY SINGLE LOBE Right 07/28/2022    Procedure: THORACOSCOPY VIDEO ASSISTED SURGERY (VATS);  Surgeon: Arnie Bartlett MD;  [facility];  Service: Thoracic   • REPAIR RECTOCELE  2018   • SPINE SURGERY     • TONSILLECTOMY  1984   • TUBAL LIGATION  2002   • UPPER GASTROINTESTINAL ENDOSCOPY  2018       Social History     Tobacco Use   • Smoking status: Every Day     Current packs/day: 0.50     Average packs/day: 0.5 packs/day for 36.0 years (18.0 ttl pk-yrs)     Types: Cigarettes     Start date: 9/1/2022   • Smokeless  "tobacco: Never   Vaping Use   • Vaping status: Never Used   Substance Use Topics   • Alcohol use: Not Currently   • Drug use: Never       Family History   Problem Relation Age of Onset   • Cancer Mother         Ovarian   • Ovarian cancer Mother    • Arthritis Mother         RA   • Autoimmune disease Mother         RA   • Stroke Father    • Dementia Father    • Vision loss Father    • Glaucoma Father    • Lung cancer Paternal Uncle    • Cancer Maternal Grandmother         Ovarian   • Diabetes Paternal Grandfather    • Diabetes Paternal Grandmother          The following portions of the patient's history were reviewed in this encounter and updated as appropriate: Past medical, surgical, family, and social history, as well as medications, allergies, and review of systems.        EXAM    Vitals:Blood pressure 124/80, pulse 90, temperature 98 °F (36.7 °C), temperature source Temporal, resp. rate 16, height 5' 9.25\" (1.759 m), weight (!) 143 kg (316 lb), SpO2 98%.,Body mass index is 46.33 kg/m².     Physical Exam    Neurologic Exam      MEDICAL DECISION MAKING    Imaging Studies:     MRI thoracic spine with and without contrast    Result Date: 1/3/2024  Narrative: MRI THORACIC SPINE WITH AND WITHOUT CONTRAST INDICATION: M47.14: Other spondylosis with myelopathy, thoracic region. COMPARISON: Operative note for thoracic spinal decompression at the T8-T10 levels on 6/8/2023 which contains the following information:  \"Findings:  Heavily calcified ligamentum flavum causing severe spinal cord compression\" chest CT 1/19/2023 TECHNIQUE:  Multiplanar, multisequence imaging of the thoracic spine was performed before and after gadolinium administration. . IV Contrast:  14 mL of Gadobutrol injection (SINGLE-DOSE) IMAGE QUALITY:  Diagnostic. FINDINGS: ALIGNMENT:  Normal alignment of the thoracic spine.  No compression fracture.  No subluxation.  No evidence of scoliosis. MARROW SIGNAL:   Interval decompression laminectomies T9-T10 " noted. Mild spondylotic changes noted. THORACIC CORD: Previously noted cord compression at the T9-T10 level has been ameliorated. The previously noted rounded markedly T2 hypointense mass has been resected. A tiny focus of subtle myelomalacia noted posteriorly at the T9-T10 intervertebral disc space, series 7 image 10. PREVERTEBRAL AND PARASPINAL SOFT TISSUES:   Mediastinal shift to the right with right-sided volume loss similar to prior chest CT likely related to right lower lobectomy. THORACIC DEGENERATIVE CHANGE: Mild spondylotic changes without large disc herniation. Scattered facet arthropathy. POSTCONTRAST:  No abnormal enhancement. OTHER FINDINGS:  None.     Impression: 1. Interval decompressive laminectomies T9-T10 with extraction of the previously noted T2 hypointense extradural lesion, found to be heavily calcified ligamentum flavum on prior thoracic decompression surgery. 2. Trace myelomalacia at T9-T10 without cord compression. Workstation performed: [id number]     X-ray thoracic spine 2 views    Result Date: 12/29/2023  Narrative: THORACIC SPINE INDICATION:   Other spondylosis with myelopathy, thoracic region. COMPARISON:  Comparison made with previous examination(s) dated (MR) 29-Nov-2023,(CR) 18-Nov-2023,(CR) 21-Jul-2023. VIEWS:  XR SPINE THORACIC 2 VW FINDINGS: There is no fracture or pathologic bone lesion. Thoracic vertebral alignment is within normal limits. There is small marginal osteophytes throughout the thoracic spine most severe in the mid to lower thoracic levels. Disc spaces show partial narrowing in the midthoracic spine. There is no displacement of the paraspinal line. The pedicles appear intact.     Impression: Relatively mild discogenic and hypertrophic degenerative changes as above most severe in the mid to lower thoracic spine. Normal alignment. No fracture. Electronically signed: 12/29/2023 04:48 PM Carmine Ibrahim MD      I have personally reviewed pertinent reports.   and I have  personally reviewed pertinent films in PACS    Lab Results   Component Value Date    HGBA1C 5.4 05/13/2023         PLEASE NOTE:  This encounter may have been completed utilizing the [employer] Direct Speech Voice Recognition Software. Grammatical errors, random word insertions, pronoun errors and incomplete sentences are occasional consequences of the system due to software limitations, ambient noise and hardware issues.These may be missed by proof reading prior to affixing electronic signature. Any questions or concerns about the content, text or information contained within the body of this dictation should be directly addressed to the advanced practitioner or physician for clarification.

## 2024-01-09 ENCOUNTER — TELEPHONE (OUTPATIENT)
Dept: HEMATOLOGY ONCOLOGY | Facility: CLINIC | Age: 49
End: 2024-01-09

## 2024-01-09 NOTE — TELEPHONE ENCOUNTER
Appointment Change  Cancel, Reschedule, Change to Virtual      Who are you speaking with? Patient   If it is not the patient, is the caller listed on the communication consent form? N/A   Which provider is the appointment scheduled with? Lea Brock   When was the original appointment scheduled?    Please list date and time 01/26/2024 @2:30PM    At which location is the appointment scheduled to take place? [place]   Was the appointment rescheduled?     Was the appointment changed from an in person visit to a virtual visit?    If so, please list the details of the change. Yes, 01/30/2024 @2:30PM with Cindy Garcia (virtual)   What is the reason for the appointment change? Patient will cross the state lines to complete this appt, the patient does not wants to drive to [place].

## 2024-01-15 ENCOUNTER — HOSPITAL ENCOUNTER (OUTPATIENT)
Dept: RADIOLOGY | Facility: HOSPITAL | Age: 49
Discharge: HOME | End: 2024-01-15
Payer: COMMERCIAL

## 2024-01-15 DIAGNOSIS — D3A.090 CARCINOID TUMOR OF RIGHT LUNG: ICD-10-CM

## 2024-01-15 PROCEDURE — 71250 CT THORAX DX C-: CPT

## 2024-01-15 PROCEDURE — G1004 CDSM NDSC: HCPCS

## 2024-01-17 ENCOUNTER — TELEPHONE (OUTPATIENT)
Dept: HEMATOLOGY ONCOLOGY | Facility: CLINIC | Age: 49
End: 2024-01-17

## 2024-01-17 NOTE — TELEPHONE ENCOUNTER
Patient Call    Who are you speaking with? Patient    If it is not the patient, are they listed on an active communication consent form? Yes   What is the reason for this call? Wants a call back for questions on her appt   Does this require a call back? Yes   If a call back is required, please list best call back number [phone]   If a call back is required, advise that a message will be forwarded to their care team and someone will return their call as soon as possible.   Did you relay this information to the patient? Yes

## 2024-01-29 ENCOUNTER — TELEPHONE (OUTPATIENT)
Dept: HEMATOLOGY ONCOLOGY | Facility: CLINIC | Age: 49
End: 2024-01-29

## 2024-01-29 NOTE — TELEPHONE ENCOUNTER
Appointment Change  Cancel, Reschedule, Change to Virtual      Who are you speaking with? Patient   If it is not the patient, is the caller listed on the communication consent form? N/A   Which provider is the appointment scheduled with? Cindy Garcia PA-C   When was the original appointment scheduled?    Please list date and time 1/30/24 @ 2:30pm   At which location is the appointment scheduled to take place? [facility]   Was the appointment rescheduled?     Was the appointment changed from an in person visit to a virtual visit?    If so, please list the details of the change. Yes 2/12/24 @ 2:30pm   What is the reason for the appointment change? Patient is unable keep appointment       Was STAR transport scheduled? no   Does STAR transport need to be scheduled for the new visit (if applicable) no   Does the patient need an infusion appointment rescheduled? no   Does the patient have an upcoming infusion appointment scheduled? If so, when? no   Is the patient undergoing chemotherapy? no   For appointments cancelled with less than 24 hours:  Was the no-show policy reviewed? yes

## 2024-02-05 ENCOUNTER — TELEPHONE (OUTPATIENT)
Dept: HEMATOLOGY ONCOLOGY | Facility: CLINIC | Age: 49
End: 2024-02-05

## 2024-02-09 ENCOUNTER — TELEPHONE (OUTPATIENT)
Dept: HEMATOLOGY ONCOLOGY | Facility: CLINIC | Age: 49
End: 2024-02-09

## 2024-02-09 NOTE — TELEPHONE ENCOUNTER
Patient Call    Who are you speaking with? Patient    If it is not the patient, are they listed on an active communication consent form? N/A   What is the reason for this call? She asked if she can get billed for her copay on 2/12 appt or pay next visit   Does this require a call back? Yes   If a call back is required, please list best call back number [phone]    If a call back is required, advise that a message will be forwarded to their care team and someone will return their call as soon as possible.   Did you relay this information to the patient? Yes

## 2024-02-12 ENCOUNTER — TELEMEDICINE (OUTPATIENT)
Dept: CARDIAC SURGERY | Facility: CLINIC | Age: 49
End: 2024-02-12
Payer: COMMERCIAL

## 2024-02-12 DIAGNOSIS — D3A.090 CARCINOID TUMOR OF LUNG, UNSPECIFIED WHETHER MALIGNANT: Primary | ICD-10-CM

## 2024-02-12 PROCEDURE — 99212 OFFICE O/P EST SF 10 MIN: CPT | Performed by: PHYSICIAN ASSISTANT

## 2024-02-12 NOTE — PROGRESS NOTES
Virtual Regular Visit    Verification of patient location:    Patient is located at Other (in her car) in the following state in which I hold an active license [place]      Assessment/Plan:    Problem List Items Addressed This Visit          Respiratory    Carcinoid tumor of lung - Primary     Ms Olivarez is doing well about 1.5 years out from a VATS right lower lobectomy for Stage IIIA typical carcinoid. Her most recent CT chest shows no evidence of disease recurrence. There is note of splenomegaly that she will follow with her PCP for. She will continue with routine surveillance with repeat CT chest in 6 months and follow-up at that time. We discussed smoking cessation in detail, she declined referral to smoking cessation program. She will reach out to her PCP when she is ready so that she can discuss Chantix. All questions were answered and she is in agreement with this plan.          Relevant Orders    CT chest wo contrast            Reason for visit is   Chief Complaint   Patient presents with    Virtual Regular Visit          Encounter provider Cindy Garcia PA-C    Provider located at [facility] [address] [address] [phone]      Recent Visits  No visits were found meeting these conditions.  Showing recent visits within past 7 days and meeting all other requirements  Today's Visits  Date Type Provider Dept   02/12/24 Telemedicine Cindy Garcia PA-C  Thoracic Surg [facility]   Showing today's visits and meeting all other requirements  Future Appointments  No visits were found meeting these conditions.  Showing future appointments within next 150 days and meeting all other requirements       The patient was identified by name and date of birth. Tessa Olivarez was informed that this is a telemedicine visit and that the visit is being conducted through the Epic Embedded platform. She agrees to proceed..  My office door was  closed. The patient was notified the following individuals were present in the room Kaelyn Simms RN and Daiana Monroy MA.  She acknowledged consent and understanding of privacy and security of the video platform. The patient has agreed to participate and understands they can discontinue the visit at any time.    Patient is aware this is a billable service.     Brunilda Olivarez is a 48 y.o. female with PMH carcinoid tymor  .   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     CT chest on 1/15/24 was personally reviewed by myself in PACS and shows no evidence of recurrent disease. There is  a new minimal juxtapleural GGO in the lateral basale left lower lobe, likely inflammatory and benign calcified granulomas. Stable small loculated right pleural effusion. Splenomegaly, 16.4cm slighlty increased from July 2023.     On discussion she is  feeling well. She is smoking a variable amount but daily. She smokes more during the week due to the stress of working. She smokes less on the weekends. She denies fevers, chills or recent illness. Has some SOB with activity. Denies cough, hemoptysis or unintentional weight loss.      Past Medical History:   Diagnosis Date    Acid reflux     Allergic     Flagyl; anaphylaxis    Anxiety     Trejo's esophagus     Cancer (HCC)     GERD (gastroesophageal reflux disease)     Headache(784.0)     Hiatal hernia     Hyperlipidemia     Kidney stone     Latent tuberculosis by skin test     Was treated with INH for 6 months    Lung cancer (HCC) diagnosed in 2018    Obesity 2018    Obstructive sleep apnea 2018    Mild TERESITA.  AHI 18.1    Squamous cell skin cancer     Left buttocks    STD (sexually transmitted disease)     HPV, HSV    Urinary tract infection        Past Surgical History:   Procedure Laterality Date    ABDOMINOPLASTY      AUGMENTATION BREAST Bilateral 2015    BREAST SURGERY  2015    BRONCHOSCOPY N/A 2022    Procedure: BRONCHOSCOPY NAVIGATIONAL;  Surgeon: J Luis Arreola MD;  [facility]: [facility];  Service: Thoracic     SECTION      COLON SURGERY  2018    Rectocele Repair    CYSTOSCOPY N/A 2022    Procedure: CYSTOSCOPY;  Surgeon: Red Becerril MD;  [facility];  Service: Gynecology Oncology    EGD  2018    ENDOBRONCHIAL ULTRASOUND (EBUS) N/A 2022    Procedure: ENDOBRONCHIAL ULTRASOUND (EBUS);  Surgeon: J Luis Arreola MD;  [facility];  Service: Thoracic    HYSTERECTOMY      IR BIOPSY LYMPH NODE  2022    IR CHEST TUBE PLACEMENT  2022    LAPAROSCOPIC CHOLECYSTECTOMY  1997    LIPOSUCTION      LUNG SURGERY      VATS Lobectomy RLL    LYMPH NODE BIOPSY  2022    MOHS SURGERY Left 2022    left buttocks    CT BRNCC INCL FLUOR GDNCE DX W/CELL WASHG SPX N/A 2022    Procedure: BRONCHOSCOPY  FLEXIBLE;  Surgeon: J Luis Arreola MD;  [facility];  Service: Thoracic    [facility] INCL FLUOR GDNCE DX W/CELL WASHG SPX N/A 07/28/2022    Procedure: BRONCHOSCOPY FLEXIBLE;  Surgeon: Arnie Bartlett MD;  [facility];  Service: Thoracic    [place] MOLINA FACETECTOMY & FORAMOTOMY 1 VRT SGM THORACIC N/A 06/08/2023    Procedure: Open T8 spinous process, T9 total, superior T10 laminectomy for decompression;  Surgeon: Feroz Guy MD;  [facility];  Service: Neurosurgery    [facility] LAPAROSCOPY W/RMVL ADNEXAL STRUCTURES Bilateral 12/20/2022    Procedure: OOPHORECTOMY W/ ROBOTICS, VAGINOTOMY;  Surgeon: Red Becerril MD;  [facility];  Service: Gynecology Oncology    [facility] THORACOSCOPY W/LOBECTOMY SINGLE LOBE Right 07/28/2022    Procedure: LOBECTOMY LUNG;  Surgeon: Arnie Bartlett MD;  [facility];  Service: Thoracic    [facility] THORACOSCOPY W/LOBECTOMY SINGLE LOBE Right 07/28/2022    Procedure: THORACOSCOPY VIDEO ASSISTED SURGERY (VATS);  Surgeon: Arnie Bartlett MD;  [facility];  Service: Thoracic    REPAIR RECTOCELE  2018    SPINE SURGERY      TONSILLECTOMY  1984    TUBAL LIGATION  2002    UPPER GASTROINTESTINAL ENDOSCOPY  2018       Current Outpatient Medications   Medication Sig Dispense Refill    acyclovir (ZOVIRAX) 800 mg tablet Take 0.5 tablets (400 mg total) by mouth 2 (two) times a day 90 tablet 2    clobetasol propionate (CLOBEX) 0.05 % shampoo Apply topically 2 (two) times a week 118 mL 2    diclofenac (VOLTAREN) 75 mg EC tablet TAKE 1 TABLET BY MOUTH TWICE  DAILY 180 tablet 1    DOCUSATE SODIUM PO Take 200 mg by mouth as needed      gabapentin (NEURONTIN) 600 MG tablet TAKE 1 TABLET BY MOUTH 3 TIMES  DAILY 270 tablet 1    hydrOXYzine pamoate (VISTARIL) 50 mg capsule TAKE 1 CAPSULE BY MOUTH DAILY AS NEEDED FOR ANXIETY 90 capsule 1    ketoconazole (NIZORAL) 2 % shampoo Apply 1 Application topically 2 (two) times a week 120 mL 0  "   levocetirizine (XYZAL) 5 MG tablet TAKE 1 TABLET BY MOUTH ONCE  DAILY IN THE EVENING 90 tablet 1    metFORMIN (GLUCOPHAGE-XR) 500 mg 24 hr tablet TAKE 1 TABLET BY MOUTH DAILY  WITH DINNER (Patient not taking: Reported on 12/6/2023) 90 tablet 0    methocarbamol (ROBAXIN) 500 mg tablet TAKE 1 TABLET BY MOUTH TWICE  DAILY AS NEEDED FOR MUSCLE  SPASMS (Patient taking differently: Take 500 mg by mouth 3 (three) times a day) 180 tablet 0    methylPREDNISolone 4 MG tablet therapy pack Use as directed on package (Patient not taking: Reported on 12/6/2023) 1 each 0    omeprazole (PriLOSEC) 40 MG capsule TAKE 1 CAPSULE BY MOUTH DAILY 90 capsule 1     No current facility-administered medications for this visit.        Allergies   Allergen Reactions    Metronidazole Anaphylaxis     \"patient states she has been in anaphylaxis due to this medication prior\"      Nitrofurantoin Shortness Of Breath       Review of Systems   Constitutional:  Negative for chills, diaphoresis and unexpected weight change.   HENT: Negative.     Eyes: Negative.    Respiratory:  Positive for shortness of breath. Negative for cough and wheezing.    Cardiovascular:  Negative for chest pain and leg swelling.   Gastrointestinal:  Negative for abdominal pain, diarrhea and nausea.   Endocrine: Negative.    Genitourinary: Negative.    Musculoskeletal: Negative.    Skin: Negative.    Allergic/Immunologic: Negative.    Neurological: Negative.    Hematological:  Negative for adenopathy. Does not bruise/bleed easily.   Psychiatric/Behavioral: Negative.     All other systems reviewed and are negative.      Video Exam    There were no vitals filed for this visit.    Physical Exam  Constitutional:       General: She is not in acute distress.     Appearance: She is not ill-appearing.      Comments: Located in car   HENT:      Head: Normocephalic.      Nose: Nose normal.   Pulmonary:      Comments: No distress, breathing comfortably on room air  Musculoskeletal:      " Cervical back: Normal range of motion.   Neurological:      Mental Status: She is alert and oriented to person, place, and time.   Psychiatric:         Mood and Affect: Mood normal.         Behavior: Behavior normal.          Visit Time  Total Visit Duration: 10

## 2024-02-12 NOTE — ASSESSMENT & PLAN NOTE
Ms Olivarez is doing well about 1.5 years out from a VATS right lower lobectomy for Stage IIIA typical carcinoid. Her most recent CT chest shows no evidence of disease recurrence. There is note of splenomegaly that she will follow with her PCP for. She will continue with routine surveillance with repeat CT chest in 6 months and follow-up at that time. We discussed smoking cessation in detail, she declined referral to smoking cessation program. She will reach out to her PCP when she is ready so that she can discuss Chantix. All questions were answered and she is in agreement with this plan.

## 2024-04-02 DIAGNOSIS — M62.838 MUSCLE SPASM: ICD-10-CM

## 2024-04-02 RX ORDER — METHOCARBAMOL 500 MG/1
500 TABLET, FILM COATED ORAL 3 TIMES DAILY
Qty: 90 TABLET | Refills: 3 | Status: SHIPPED | OUTPATIENT
Start: 2024-04-02

## 2024-04-22 DIAGNOSIS — M51.34 DEGENERATIVE DISC DISEASE, THORACIC: ICD-10-CM

## 2024-04-22 DIAGNOSIS — M54.16 LUMBAR RADICULOPATHY, CHRONIC: ICD-10-CM

## 2024-04-22 DIAGNOSIS — F41.9 ANXIETY: ICD-10-CM

## 2024-04-23 RX ORDER — DICLOFENAC SODIUM 75 MG/1
75 TABLET, DELAYED RELEASE ORAL 2 TIMES DAILY
Qty: 180 TABLET | Refills: 1 | Status: SHIPPED | OUTPATIENT
Start: 2024-04-23

## 2024-04-23 RX ORDER — HYDROXYZINE PAMOATE 50 MG/1
CAPSULE ORAL
Qty: 90 CAPSULE | Refills: 1 | Status: SHIPPED | OUTPATIENT
Start: 2024-04-23

## 2024-05-11 DIAGNOSIS — L21.9 SEBORRHEIC DERMATITIS: ICD-10-CM

## 2024-05-14 RX ORDER — KETOCONAZOLE 20 MG/ML
SHAMPOO TOPICAL
Qty: 120 ML | Refills: 0 | Status: SHIPPED | OUTPATIENT
Start: 2024-05-14

## 2024-05-28 ENCOUNTER — HOSPITAL ENCOUNTER (OUTPATIENT)
Dept: RADIOLOGY | Facility: HOSPITAL | Age: 49
Discharge: HOME | End: 2024-05-28
Payer: COMMERCIAL

## 2024-05-28 VITALS — HEIGHT: 69 IN | WEIGHT: 293 LBS | BODY MASS INDEX: 43.4 KG/M2

## 2024-05-28 DIAGNOSIS — Z12.31 ENCOUNTER FOR MAMMOGRAM TO ESTABLISH BASELINE MAMMOGRAM: ICD-10-CM

## 2024-05-28 PROCEDURE — 77063 BREAST TOMOSYNTHESIS BI: CPT

## 2024-05-28 PROCEDURE — 77067 SCR MAMMO BI INCL CAD: CPT

## 2024-05-29 DIAGNOSIS — L21.9 SEBORRHEIC DERMATITIS: ICD-10-CM

## 2024-05-29 DIAGNOSIS — K21.9 GASTROESOPHAGEAL REFLUX DISEASE WITHOUT ESOPHAGITIS: ICD-10-CM

## 2024-05-30 RX ORDER — CLOBETASOL PROPIONATE 0.05 G/100ML
SHAMPOO TOPICAL
Qty: 354 ML | Refills: 2 | Status: SHIPPED | OUTPATIENT
Start: 2024-05-30

## 2024-05-30 RX ORDER — KETOCONAZOLE 20 MG/ML
SHAMPOO TOPICAL
Qty: 120 ML | Refills: 0 | Status: SHIPPED | OUTPATIENT
Start: 2024-05-30

## 2024-05-30 RX ORDER — OMEPRAZOLE 40 MG/1
CAPSULE, DELAYED RELEASE ORAL
Qty: 90 CAPSULE | Refills: 1 | Status: SHIPPED | OUTPATIENT
Start: 2024-05-30

## 2024-06-16 DIAGNOSIS — A60.00 GENITAL HERPES SIMPLEX, UNSPECIFIED SITE: ICD-10-CM

## 2024-06-16 DIAGNOSIS — T78.40XD ALLERGY, SUBSEQUENT ENCOUNTER: ICD-10-CM

## 2024-06-17 RX ORDER — ACYCLOVIR 800 MG/1
400 TABLET ORAL 2 TIMES DAILY
Qty: 90 TABLET | Refills: 1 | Status: SHIPPED | OUTPATIENT
Start: 2024-06-17 | End: 2024-12-14

## 2024-06-17 RX ORDER — LEVOCETIRIZINE DIHYDROCHLORIDE 5 MG/1
5 TABLET, FILM COATED ORAL EVERY EVENING
Qty: 90 TABLET | Refills: 1 | Status: SHIPPED | OUTPATIENT
Start: 2024-06-17

## 2024-07-30 ENCOUNTER — OFFICE VISIT (OUTPATIENT)
Dept: DERMATOLOGY | Facility: CLINIC | Age: 49
End: 2024-07-30
Payer: COMMERCIAL

## 2024-07-30 VITALS — HEIGHT: 69 IN | WEIGHT: 293 LBS | BODY MASS INDEX: 43.4 KG/M2

## 2024-07-30 DIAGNOSIS — D22.5 MULTIPLE BENIGN MELANOCYTIC NEVI OF BOTH UPPER EXTREMITIES, BOTH LOWER EXTREMITIES, AND TRUNK: ICD-10-CM

## 2024-07-30 DIAGNOSIS — L82.1 SEBORRHEIC KERATOSIS: ICD-10-CM

## 2024-07-30 DIAGNOSIS — L81.4 LENTIGO: ICD-10-CM

## 2024-07-30 DIAGNOSIS — D22.61 MULTIPLE BENIGN MELANOCYTIC NEVI OF BOTH UPPER EXTREMITIES, BOTH LOWER EXTREMITIES, AND TRUNK: ICD-10-CM

## 2024-07-30 DIAGNOSIS — Z85.89 HISTORY OF SQUAMOUS CELL CARCINOMA: ICD-10-CM

## 2024-07-30 DIAGNOSIS — Z12.83 SCREENING FOR SKIN CANCER: Primary | ICD-10-CM

## 2024-07-30 DIAGNOSIS — D22.62 MULTIPLE BENIGN MELANOCYTIC NEVI OF BOTH UPPER EXTREMITIES, BOTH LOWER EXTREMITIES, AND TRUNK: ICD-10-CM

## 2024-07-30 DIAGNOSIS — D18.01 CHERRY ANGIOMA: ICD-10-CM

## 2024-07-30 DIAGNOSIS — D22.71 MULTIPLE BENIGN MELANOCYTIC NEVI OF BOTH UPPER EXTREMITIES, BOTH LOWER EXTREMITIES, AND TRUNK: ICD-10-CM

## 2024-07-30 DIAGNOSIS — D22.72 MULTIPLE BENIGN MELANOCYTIC NEVI OF BOTH UPPER EXTREMITIES, BOTH LOWER EXTREMITIES, AND TRUNK: ICD-10-CM

## 2024-07-30 PROCEDURE — 99213 OFFICE O/P EST LOW 20 MIN: CPT | Performed by: NURSE PRACTITIONER

## 2024-07-30 NOTE — PROGRESS NOTES
"/[facility] Note     Patient Name: Tessa Olivarez  /Encounter Date: 7/30/24     Have you been cared for by a [facility] Dermatologist in the last 3 years and, if so, which description applies to you?    Yes.  I have been here within the last 3 years, and my medical history has NOT changed since that time.  I am FEMALE/of child-bearing potential.    REVIEW OF SYSTEMS:  Have you recently had or currently have any of the following? No changes in my recent health.   PAST MEDICAL HISTORY:  Have you personally ever had or currently have any of the following?  If \"YES,\" then please provide more detail. No changes in my medical history.   HISTORY OF IMMUNOSUPPRESSION: Do you have a history of any of the following:  Systemic Immunosuppression such as Diabetes, Biologic or Immunotherapy, Chemotherapy, Organ Transplantation, Bone Marrow Transplantation?  No     Answering \"YES\" requires the addition of the dotphrase \"IMMUNOSUPPRESSED\" as the first diagnosis of the patient's visit.   FAMILY HISTORY:  Any \"first degree relatives\" (parent, brother, sister, or child) with the following?    No changes in my family's known health.   PATIENT EXPERIENCE:    Do you want the Dermatologist to perform a COMPLETE skin exam today including a clinical examination under the \"bra and underwear\" areas?  NO, did not examine under the underwear   If necessary, do we have your permission to call and leave a detailed message on your Preferred Phone number that includes your specific medical information?  Yes      Allergies   Allergen Reactions    Metronidazole Anaphylaxis     \"patient states she has been in anaphylaxis due to this medication prior\"      Nitrofurantoin Shortness Of Breath      Current Outpatient Medications:     acyclovir (ZOVIRAX) 800 mg tablet, Take 0.5 tablets (400 mg total) by mouth 2 (two) times a day, Disp: 90 tablet, Rfl: 1    clobetasol propionate (CLOBEX) 0.05 % shampoo, APPLY TOPICALLY TWICE WEEKLY, Disp: " 354 mL, Rfl: 2    diclofenac (VOLTAREN) 75 mg EC tablet, TAKE 1 TABLET BY MOUTH TWICE  DAILY, Disp: 180 tablet, Rfl: 1    DOCUSATE SODIUM PO, Take 200 mg by mouth as needed, Disp: , Rfl:     gabapentin (NEURONTIN) 600 MG tablet, TAKE 1 TABLET BY MOUTH 3 TIMES  DAILY, Disp: 270 tablet, Rfl: 1    hydrOXYzine pamoate (VISTARIL) 50 mg capsule, TAKE 1 CAPSULE BY MOUTH DAILY AS NEEDED FOR ANXIETY, Disp: 90 capsule, Rfl: 1    ketoconazole (NIZORAL) 2 % shampoo, APPLY 1 APPLICATION TOPICALLY  TWICE WEEKLY, Disp: 120 mL, Rfl: 0    levocetirizine (XYZAL) 5 MG tablet, TAKE 1 TABLET BY MOUTH ONCE  DAILY IN THE EVENING, Disp: 90 tablet, Rfl: 1    methocarbamol (ROBAXIN) 500 mg tablet, Take 1 tablet (500 mg total) by mouth 3 (three) times a day, Disp: 90 tablet, Rfl: 3    omeprazole (PriLOSEC) 40 MG capsule, TAKE 1 CAPSULE BY MOUTH DAILY, Disp: 90 capsule, Rfl: 1    metFORMIN (GLUCOPHAGE-XR) 500 mg 24 hr tablet, TAKE 1 TABLET BY MOUTH DAILY  WITH DINNER (Patient not taking: Reported on 12/6/2023), Disp: 90 tablet, Rfl: 0    methylPREDNISolone 4 MG tablet therapy pack, Use as directed on package (Patient not taking: Reported on 12/6/2023), Disp: 1 each, Rfl: 0          Whom besides the patient is providing clinical information about today's encounter?   NO ADDITIONAL HISTORIAN (patient alone provided history)    Physical Exam and Assessment/Plan by Diagnosis:    HISTORY OF SQUAMOUS CELL CARCINOMA     Physical Exam:  Anatomic Location Affected:  left buttock, 2022   Morphological Description of Scar:  well healed scar  Suspected Recurrence: no  Regional adenopathy: no    Additional History of Present Condition:  no sign of recurrence.     Assessment and Plan:  Based on a thorough discussion of this condition and the management approach to it (including a comprehensive discussion of the known risks, side effects and potential benefits of treatment), the patient (family) agrees to implement the following specific plan:  Continue 6  month skin checks    How can SCC be prevented?  The most important way to prevent SCC is to avoid sunburn. This is especially important in childhood and early life. Fair skinned individuals and those with a personal or family history of BCC should protect their skin from sun exposure daily, year-round and lifelong.  Stay indoors or under the shade in the middle of the day   Wear covering clothing   Apply high protection factor SPF50+ broad-spectrum sunscreens generously to exposed skin if outdoors   Avoid indoor tanning (sun beds, solaria)      What is the outlook for SCC?  Most SCC are cured by treatment. Cure is most likely if treatment is undertaken when the lesion is small. A small percent of SCC's can spread to lymph  nodes and long term monitoring is indicated.   They are also at increased risk of other skin cancers, especially melanoma. Regular self-skin examinations and long-term annual skin checks by an experienced health professional are recommended.       SEBORRHEIC KERATOSES  - Relevant exam: Scattered over the trunk/extremities are waxy brown to black plaques and papules with stuck on appearance and consistent dermoscopy  - Exam and clinical history consistent with seborrheic keratoses  - Counseled that these are benign growths that do not require treatment    MELANOCYTIC NEVI  -Relevant exam: Scattered over the trunk/extremities are homogenously pigmented brown macules and papules. ELM performed and without concerning findings. No outliers unless otherwise noted in today's note  - Exam and clinical history consistent with melanocytic nevi  - Counseled to return to clinic prior to scheduled appointment should any of these lesions change or should any new lesions of concern arise  - Counseled on use of sun protection daily. Reviewed latest FDA sunscreen guidelines, including use of broad spectrum (UVA and UVB blocking) sunscreen or sun protective clothing with SPF 30-50 every 2-3 hours and reapplied  after exposure to water    LENTIGINES  OTHER SKIN CHANGES DUE TO CHRONIC EXPOSURE TO NONIONIZING RADIATION  - Relevant exam: Over sun exposed areas are brown macules. ELM performed and without concerning findings.  - Exam and clinical history consistent with lentigines.  - Counseled to return to clinic prior to scheduled appointment should any of these lesions change or should any new lesions of concern arise.  - Recommended use of sunscreen as above and below.    CHERRY ANGIOMAS  - Relevant exam: Scattered over the trunk/extremities are red papules  - Exam and clinical history consistent with cherry angiomas  - Educated that these are benign      Scribe Attestation      I,:  Genevieve Bob MA am acting as a scribe while in the presence of the attending physician.:       I,:  GENESIS Hopkins personally performed the services described in this documentation    as scribed in my presence.:

## 2024-07-30 NOTE — PROGRESS NOTES
"[facility] Dermatology Clinic Note     Patient Name: Tessa Olivarez  /Encounter Date: 7/30/24     Have you been cared for by a [facility] Dermatologist in the last 3 years and, if so, which description applies to you?    Yes.  I have been here within the last 3 years, and my medical history has NOT changed since that time.  I am FEMALE/of child-bearing potential.    REVIEW OF SYSTEMS:  Have you recently had or currently have any of the following? No changes in my recent health.   PAST MEDICAL HISTORY:  Have you personally ever had or currently have any of the following?  If \"YES,\" then please provide more detail. No changes in my medical history.   HISTORY OF IMMUNOSUPPRESSION: Do you have a history of any of the following:  Systemic Immunosuppression such as Diabetes, Biologic or Immunotherapy, Chemotherapy, Organ Transplantation, Bone Marrow Transplantation?  No     Answering \"YES\" requires the addition of the dotphrase \"IMMUNOSUPPRESSED\" as the first diagnosis of the patient's visit.   FAMILY HISTORY:  Any \"first degree relatives\" (parent, brother, sister, or child) with the following?    No changes in my family's known health.   PATIENT EXPERIENCE:    Do you want the Dermatologist to perform a COMPLETE skin exam today including a clinical examination under the \"bra and underwear\" areas?  Yes  If necessary, do we have your permission to call and leave a detailed message on your Preferred Phone number that includes your specific medical information?  Yes      Allergies   Allergen Reactions    Metronidazole Anaphylaxis     \"patient states she has been in anaphylaxis due to this medication prior\"      Nitrofurantoin Shortness Of Breath      Current Outpatient Medications:     acyclovir (ZOVIRAX) 800 mg tablet, Take 0.5 tablets (400 mg total) by mouth 2 (two) times a day, Disp: 90 tablet, Rfl: 1    clobetasol propionate (CLOBEX) 0.05 % shampoo, APPLY TOPICALLY TWICE WEEKLY, Disp: 354 mL, Rfl: 2    diclofenac (VOLTAREN) " 75 mg EC tablet, TAKE 1 TABLET BY MOUTH TWICE  DAILY, Disp: 180 tablet, Rfl: 1    DOCUSATE SODIUM PO, Take 200 mg by mouth as needed, Disp: , Rfl:     gabapentin (NEURONTIN) 600 MG tablet, TAKE 1 TABLET BY MOUTH 3 TIMES  DAILY, Disp: 270 tablet, Rfl: 1    hydrOXYzine pamoate (VISTARIL) 50 mg capsule, TAKE 1 CAPSULE BY MOUTH DAILY AS NEEDED FOR ANXIETY, Disp: 90 capsule, Rfl: 1    ketoconazole (NIZORAL) 2 % shampoo, APPLY 1 APPLICATION TOPICALLY  TWICE WEEKLY, Disp: 120 mL, Rfl: 0    levocetirizine (XYZAL) 5 MG tablet, TAKE 1 TABLET BY MOUTH ONCE  DAILY IN THE EVENING, Disp: 90 tablet, Rfl: 1    metFORMIN (GLUCOPHAGE-XR) 500 mg 24 hr tablet, TAKE 1 TABLET BY MOUTH DAILY  WITH DINNER (Patient not taking: Reported on 12/6/2023), Disp: 90 tablet, Rfl: 0    methocarbamol (ROBAXIN) 500 mg tablet, Take 1 tablet (500 mg total) by mouth 3 (three) times a day, Disp: 90 tablet, Rfl: 3    methylPREDNISolone 4 MG tablet therapy pack, Use as directed on package (Patient not taking: Reported on 12/6/2023), Disp: 1 each, Rfl: 0    omeprazole (PriLOSEC) 40 MG capsule, TAKE 1 CAPSULE BY MOUTH DAILY, Disp: 90 capsule, Rfl: 1          Whom besides the patient is providing clinical information about today's encounter?   NO ADDITIONAL HISTORIAN (patient alone provided history)    Physical Exam and Assessment/Plan by Diagnosis:    HISTORY OF SQUAMOUS CELL CARCINOMA     Physical Exam:  Anatomic Location Affected:  left buttock  Morphological Description of Scar:  well healed scar  Suspected Recurrence: no  Regional adenopathy: no    Additional History of Present Condition:  Patient had MOHS 8/2022 by Dr Tomas for a SCC on the buttocks    Assessment and Plan:  Based on a thorough discussion of this condition and the management approach to it (including a comprehensive discussion of the known risks, side effects and potential benefits of treatment), the patient (family) agrees to implement the following specific plan:  Skin checks     How  can SCC be prevented?  The most important way to prevent SCC is to avoid sunburn. This is especially important in childhood and early life. Fair skinned individuals and those with a personal or family history of BCC should protect their skin from sun exposure daily, year-round and lifelong.  Stay indoors or under the shade in the middle of the day   Wear covering clothing   Apply high protection factor SPF50+ broad-spectrum sunscreens generously to exposed skin if outdoors   Avoid indoor tanning (sun beds, solaria)      What is the outlook for SCC?  Most SCC are cured by treatment. Cure is most likely if treatment is undertaken when the lesion is small. A small percent of SCC's can spread to lymph  nodes and long term monitoring is indicated.   They are also at increased risk of other skin cancers, especially melanoma. Regular self-skin examinations and long-term annual skin checks by an experienced health professional are recommended.     SEBORRHEIC KERATOSES  - Relevant exam: Scattered over the trunk/extremities are waxy brown to black plaques and papules with stuck on appearance and consistent dermoscopy  - Exam and clinical history consistent with seborrheic keratoses  - Counseled that these are benign growths that do not require treatment    MELANOCYTIC NEVI  -Relevant exam: Scattered over the trunk/extremities are homogenously pigmented brown macules and papules. ELM performed and without concerning findings. No outliers unless otherwise noted in today's note  - Exam and clinical history consistent with melanocytic nevi  - Counseled to return to clinic prior to scheduled appointment should any of these lesions change or should any new lesions of concern arise  - Counseled on use of sun protection daily. Reviewed latest FDA sunscreen guidelines, including use of broad spectrum (UVA and UVB blocking) sunscreen or sun protective clothing with SPF 30-50 every 2-3 hours and reapplied after exposure to  water    LENTIGINES  OTHER SKIN CHANGES DUE TO CHRONIC EXPOSURE TO NONIONIZING RADIATION  - Relevant exam: Over sun exposed areas are brown macules. ELM performed and without concerning findings.  - Exam and clinical history consistent with lentigines.  - Counseled to return to clinic prior to scheduled appointment should any of these lesions change or should any new lesions of concern arise.  - Recommended use of sunscreen as above and below.    CHERRY ANGIOMAS  - Relevant exam: Scattered over the trunk/extremities are red papules  - Exam and clinical history consistent with cherry angiomas  - Educated that these are benign      BLUE NEVUS    Physical Exam:  Anatomic Location: right abdomen  Morphologic Description:  Bluish well-circumscribed round or oval macule or nodule Size: *** cm  Pertinent Positives:  Larger than 1 cm? {YES/NO:81372}  Present on scalp (I.e., hard to monitor area)? No  Pertinent Negatives:    Additional History of Present Condition:  present on exam  Sudden appearance?  {YES/NO:64834}  Recent change in appearance? {YES/NO:36089}    Plan:  We discussed the typically benign nature of common blue nevi. Common blue nevi typically do not require treatment.  Blue nevi usually persist throughout one's life.   Rarely, malignant transformation of blue nevus to melanoma has been reported. Risk factors for malignant transformation to melanoma include a lesion in size greater than 1 cm in diameter) and a new or changing lesion in an adult.  Cellular blue nevi tend to be re-excised because they are the sub-type of blue nevi most commonly associated with melanoma.    Blue nevi in the scalp are often removed as a precaution because it may be difficult to monitor the lesion within a hair-bearing area.   The definitive treatment of blue nevus is excision (ELECTIVE MINOR SURGERY), which we considered and discussed regarding risks and benefits.  It may be appropriate to monitor stable lesions clinically.   In  rare cases, blue nevus may recur at sites of prior excision. Though excision is considered curative, the appearance of recurrent blue nevus at a prior site should warrant biopsy and/or re-excision as it could represent emerging malignant blue nevus or other melanoma.  We discussed that if the patient notices any sudden change in appearance in the area that our office should be contacted immediately.  {bluenevus_actionplan:26013}     PROCEDURES PERFORMED TODAY ASSOCIATED WITH THIS CONDITION:          {bluenevus_procedurelist:95315}      MEDICAL DECISION MAKING  Treatment Goal:  Resolution of this ACUTE, UNCOMPLICATED condition.      A recent or new short-term problem for which treatment is CONSIDERED OR INITIATED. There is little to no risk of mortality with treatment, and full recovery without functional impairment is expected.  Diagnosis or treatment significantly limited by the following social determinants of health:  {DERM_MDM_SOCIAL_DETERMINANTS_OF_HEALTH:38823}             ACTINIC KERATOSES  - Relevant exam: On the *** are scaly pink macules without palpable dermal component    - Exam and clinical history consistent with actinic keratoses  - Discussed that these lesions are considered premalignant with the potential to evolve into squamous cell carcinoma.   - Discussed treatment options, which may inclue liquid nitrogen destruction, topical immunotherapy including risks, benefits  - Patient counseled to return to the office in 4-6 weeks after completion of treatment for recheck if not resolved at which time retreatment or biopsy to rule out SCC will be determined based on clinic findings    OPTION 1:     - The patient agreed to treatment with combination efudex/calcipotriene for **4 days BID to the face; 6 days BID to the extremities/trunk; 7 days BID to the scalp** - Common side effects for this treatment were discussed and handout provided  - Prescription ordered through [facility]. Informed patient  that [facility] would contact patient to obtain billing information and shipping address. Patient provided the below preferred contact information for pharmacy:       OPTION 2:     - The patient agreed to treatment with topical efudex 5% for **7 days BID to the face; 14 days BID to the extremities/trunk; 21 days BID to the scalp**   - Common side effects for this treatment were discussed     OPTION 3:    PROCEDURE:  DESTRUCTION OF PRE-MALIGNANT LESIONS    - After a thorough discussion of treatment options and risk/benefits/alternatives (including but not limited to local pain, scarring, dyspigmentation, blistering, and possible superinfection), verbal and written consent were obtained and the aforementioned lesions were treated on with cryotherapy using liquid nitrogen x 1 cycle for 5-10 seconds.    TOTAL NUMBER of *** pre-malignant lesions were treated today on the ANATOMIC LOCATION: ***.     The patient tolerated the procedure well, and after-care instructions were provided.

## 2024-07-30 NOTE — PATIENT INSTRUCTIONS
What is skin cancer?  Skin cancer is unfortunately very common. That's why we are here to help you on your journey to healthy happy skin! There are two main types of skin cancer: melanoma and non-melanoma skin cancer. Melanoma is a form of skin cancer that often arises within an existing nevus or mole. However, this is not always the case. Melanoma can arise anywhere (not only where you have moles right now). Melanoma can run in families, so letting us know about your family history is important. Non-melanoma skin cancer is the most common type of cancer in the [place]. The two main types of non-melanoma skin cancers are basal cell carcinomas (BCC) and squamous cell carcinoma (SCC). These cancers tend to be less aggressive than melanomas but are still important to look for and treat.    What can I do to prevent skin cancer?  One of the largest risk factors for skin cancer is sun exposure or UV radiation. Therefore, sun protection is gonzalez! Here are some great tips for protecting yourself!  Try to avoid direct sun exposure during peak sun hours (10 AM to 2 PM)  Remember you get A LOT of sun even under cloud coverage and through care windows!  When choosing a sunscreen, look for one that says “broad spectrum” sunscreen. This means it protects you from more of the harmful UV rays.   Choose a sunscreen that is SPF 30 or greater for best protection.   Apply sunscreen to all sun-exposed skin and reapply every 2 hours.   Consider sun protective clothing! Great additions to your sun protective clothing wardrobe include broad brimmed hats, sunglasses, UPF clothing.  Avoid tanning salons. These have been shown to be very harmful in terms of your risk of skin cancer.   Avoid “base tans”. We now know that tans are dangerous (not just sun burns). If you want to have a tan for a trip, consider a spray tan!    Should I check my skin at home between my dermatology appointments?  Yes! It's always a great idea to look at your  skin on a regular basis. Here are some things to look for when monitoring your skin.   For melanoma, look for the ABCDE's!  A = Asymmetry. Look for a spot where one half does not match the other!  B = Borders. Look for a spot that has jagged, ragged or irregular borders.  C = Color. Look for a spot that is not evenly colored and often includes multiple colors, especially true black, red, white, blue, grey.   D = Diameter. Look for a spot that is larger than the size of a pencil eraser.  E = Evolution. If you ever have a spot that is changing in shape, color, size or symptoms (becomes itchy, painful or starts to bleed), always call us!  For non-melanoma skin cancers, look for a new, pink spot that is not going away, especially one that is itchy, painful or bleeding.     What should I do if I see a spot that is concerning for melanoma or non-melanoma skin cancer?  If you are ever concerned, call us! Do not wait for your next appointment. We want to help!

## 2024-08-12 ENCOUNTER — HOSPITAL ENCOUNTER (OUTPATIENT)
Dept: RADIOLOGY | Facility: HOSPITAL | Age: 49
Discharge: HOME | End: 2024-08-12
Payer: COMMERCIAL

## 2024-08-12 DIAGNOSIS — D3A.090 CARCINOID TUMOR OF LUNG, UNSPECIFIED WHETHER MALIGNANT: ICD-10-CM

## 2024-08-12 PROCEDURE — 71250 CT THORAX DX C-: CPT

## 2024-08-12 PROCEDURE — G1004 CDSM NDSC: HCPCS

## 2024-08-27 ENCOUNTER — TELEMEDICINE (OUTPATIENT)
Dept: CARDIAC SURGERY | Facility: CLINIC | Age: 49
End: 2024-08-27
Payer: COMMERCIAL

## 2024-08-27 DIAGNOSIS — C7B.09 CARCINOID TUMOR METASTATIC TO INTRATHORACIC LYMPH NODE (HCC): ICD-10-CM

## 2024-08-27 DIAGNOSIS — C7A.090 MALIGNANT CARCINOID TUMOR OF LUNG (HCC): Primary | ICD-10-CM

## 2024-08-27 DIAGNOSIS — C7A.00 CARCINOID TUMOR METASTATIC TO INTRATHORACIC LYMPH NODE (HCC): ICD-10-CM

## 2024-08-27 PROCEDURE — 99213 OFFICE O/P EST LOW 20 MIN: CPT | Performed by: THORACIC SURGERY (CARDIOTHORACIC VASCULAR SURGERY)

## 2024-08-27 NOTE — PROGRESS NOTES
Virtual Regular Visit  Name: Tessa Olivarez      : 1975      MRN: [id number]  Encounter Provider: Arnie Bartlett MD  Encounter Date: 2024   Encounter department: [facility] [place]    Verification of patient location:    Patient is located at Home in the following state in which I hold an active license [place]    Assessment & Plan   1. Malignant carcinoid tumor of lung (HCC)  Assessment & Plan:  Patient is doing well 2 years our from right VATS lower lobectomy for stage IIIA typical carcinoid. She is doing well from a thoracic standpoint and denies any concerns today. Her CT chest shows no evidence of recurrent disease. We discussed smoking cessation in detail today. Recommend smoking cessation referral but pt declined. Recommend continued routine surveillance with an appt in one year with a CT chest prior. Pt agreed. All questions answered.   Orders:  -     CT chest wo contrast; Future; Expected date: 2025  2. 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 Tessa Olivarez was informed that this is a telemedicine visit and that the visit is being conducted through the Epic Embedded platform. She agrees to proceed..  My office door was closed. No one else was in the room.  She acknowledged consent and understanding of privacy and security of the video platform. The patient has agreed to participate and understands they can discontinue the visit at any time.    Patient is aware this is a billable service.     Thoracic History  Diagnosis: Stage IIIA typical carcinoid tumor  Procedure: right thoracoscopic lower lobectomy 22  Pathology: 17x1.5x1.5cm typical carcinoid tumor, G1, no visceral pleural invasion. +lymphatic invasion. 5/11 lymph nodes involved including 2R, 4R, 11R, and 7.     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 female who presents today for routine surveillance visit. She has h/o right VATS lower lobectomy for stage IIIA typical carcinoid. She denies any major medical changes since last seen in February. She denies SOB, CP, F/C, hemoptysis, unexpected weight loss, or N/V.     She completed CT chest 8/12 which shows stable post surgical changes without evidence of recurrent disease. The radiologist notes an uchanged juxtapleural ground glass opacity in the left lower lobe.     She is currently smoking 1/2ppd. .......................................................................................................................................................................................    Review of Systems  Pertinent Medical History     Objective     There were no vitals taken for this visit.  Physical Exam    Visit Time  Total Visit Duration: 15 minutes, 25 minutes with documentation

## 2024-08-27 NOTE — ASSESSMENT & PLAN NOTE
Patient is doing well 2 years our from right VATS lower lobectomy for stage IIIA typical carcinoid. She is doing well from a thoracic standpoint and denies any concerns today. Her CT chest shows no evidence of recurrent disease. We discussed smoking cessation in detail today. Recommend smoking cessation referral but pt declined. Recommend continued routine surveillance with an appt in one year with a CT chest prior. Pt agreed. All questions answered.

## 2024-09-22 ENCOUNTER — HOSPITAL ENCOUNTER (EMERGENCY)
Facility: HOSPITAL | Age: 49
Discharge: HOME | End: 2024-09-22
Attending: EMERGENCY MEDICINE
Payer: COMMERCIAL

## 2024-09-22 VITALS
WEIGHT: 293 LBS | SYSTOLIC BLOOD PRESSURE: 129 MMHG | RESPIRATION RATE: 18 BRPM | OXYGEN SATURATION: 97 % | HEART RATE: 95 BPM | DIASTOLIC BLOOD PRESSURE: 87 MMHG | BODY MASS INDEX: 49.71 KG/M2 | TEMPERATURE: 98.3 F

## 2024-09-22 DIAGNOSIS — M54.50 ACUTE LOW BACK PAIN: Primary | ICD-10-CM

## 2024-09-22 PROCEDURE — 99283 EMERGENCY DEPT VISIT LOW MDM: CPT

## 2024-09-22 PROCEDURE — 99284 EMERGENCY DEPT VISIT MOD MDM: CPT | Performed by: EMERGENCY MEDICINE

## 2024-09-22 RX ORDER — OXYCODONE AND ACETAMINOPHEN 5; 325 MG/1; MG/1
1 TABLET ORAL ONCE
Status: COMPLETED | OUTPATIENT
Start: 2024-09-22 | End: 2024-09-22

## 2024-09-22 RX ADMIN — OXYCODONE HYDROCHLORIDE AND ACETAMINOPHEN 1 TABLET: 5; 325 TABLET ORAL at 17:31

## 2024-09-22 NOTE — DISCHARGE INSTRUCTIONS
You were evaluated for you back pain in the emergency department and determined appropriate for discharge. Please return to the emergency department immediately if you develop any significant worsening of pain, fevers, chills, urinary or bowel incontinence, urinary retention, numbness in the groin area, significant leg weakness or numbness, or are unable to walk. Continue symptomatic relief at home. Please follow up with your primary care provider in the next 5-7 days for further evaluation.

## 2024-09-22 NOTE — ED PROVIDER NOTES
1. Acute low back pain      ED Disposition       ED Disposition   Discharge    Condition   Stable    Date/Time   Sun Sep 22, 2024  5:28 PM    Comment   Tessa Olivarez discharge to home/self care.                   Assessment & Plan       Medical Decision Making  Atraumatic lower back pain and history of chronic back pain.  Neurologically intact without symptoms of cauda equina syndrome.  Denying infectious symptoms.  Ambulatory in the emergency department despite the symptoms.  Patient already with prescription for multimodal pain regiment.  Provided with referral to comprehensive [facility] and pain management.  Provided with reassurance, discharged with return precautions.    Amount and/or Complexity of Data Reviewed  External Data Reviewed: notes.     Details: Patient evaluated by neurosurgery on 12/6/2023, noted to have history of thoracic spinal stenosis with myelopathy, status post laminectomy.  MRI was reassuring and patient was instructed to use muscle relaxants and continue physical therapy.    Risk  Prescription drug management.                     Medications   oxyCODONE-acetaminophen (PERCOCET) 5-325 mg per tablet 1 tablet (1 tablet Oral Given 9/22/24 4359)       History of Present Illness       Patient is a 48-year-old female history of chronic back pain presenting for evaluation of acute lower back pain.  Patient states that she has occasional pain in her lower back but states it suddenly worsened today at 1500 while she was standing at her sink.  Patient states it is a sharp burning pain radiating bilaterally into the buttocks.  Patient states when it first started she felt like her legs were going to give out from under her but at this point denies any leg weakness or numbness, urinary or bowel incontinence, urinary retention, saddle anesthesia.  Patient denies fevers, chills, constitutional symptoms.  Patient denies any recent change in activity or trauma to the area.        Review of Systems    Constitutional:  Negative for chills, fatigue and fever.   Musculoskeletal:  Positive for back pain. Negative for arthralgias, gait problem and myalgias.   Neurological:  Negative for weakness, numbness and headaches.   Psychiatric/Behavioral:  Negative for confusion.    All other systems reviewed and are negative.          Objective     ED Triage Vitals [09/22/24 1700]   Temperature Pulse Blood Pressure Respirations SpO2 Patient Position - Orthostatic VS   98.3 °F (36.8 °C) 95 129/87 18 97 % Sitting      Temp Source Heart Rate Source BP Location FiO2 (%) Pain Score    Tympanic Monitor Left arm -- 7        Physical Exam  Vitals and nursing note reviewed.   Constitutional:       General: She is not in acute distress.     Appearance: Normal appearance. She is not ill-appearing, toxic-appearing or diaphoretic.   HENT:      Head: Normocephalic and atraumatic.      Right Ear: External ear normal.      Left Ear: External ear normal.   Eyes:      General:         Right eye: No discharge.         Left eye: No discharge.   Pulmonary:      Effort: No respiratory distress.   Abdominal:      General: There is no distension.   Musculoskeletal:         General: No deformity.      Cervical back: Normal range of motion.      Comments: Midline lumbar tenderness L5 to S1/2.  Additionally tenderness in the upper gluteal area bilaterally.  Full strength and sensation bilaterally in the lower extremities.  Able to ambulate with a normal gait   Skin:     Findings: No lesion or rash.   Neurological:      Mental Status: She is alert and oriented to person, place, and time. Mental status is at baseline.   Psychiatric:         Mood and Affect: Mood and affect normal.         Labs Reviewed - No data to display  No orders to display       Procedures    ED Medication and Procedure Management   Prior to Admission Medications   Prescriptions Last Dose Informant Patient Reported? Taking?   DOCUSATE SODIUM PO   Yes No   Sig: Take 200 mg by mouth  as needed   acyclovir (ZOVIRAX) 800 mg tablet   No No   Sig: Take 0.5 tablets (400 mg total) by mouth 2 (two) times a day   clobetasol propionate (CLOBEX) 0.05 % shampoo   No No   Sig: APPLY TOPICALLY TWICE WEEKLY   diclofenac (VOLTAREN) 75 mg EC tablet   No No   Sig: TAKE 1 TABLET BY MOUTH TWICE  DAILY   gabapentin (NEURONTIN) 600 MG tablet   No No   Sig: TAKE 1 TABLET BY MOUTH 3 TIMES  DAILY   hydrOXYzine pamoate (VISTARIL) 50 mg capsule   No No   Sig: TAKE 1 CAPSULE BY MOUTH DAILY AS NEEDED FOR ANXIETY   ketoconazole (NIZORAL) 2 % shampoo   No No   Sig: APPLY 1 APPLICATION TOPICALLY  TWICE WEEKLY   levocetirizine (XYZAL) 5 MG tablet   No No   Sig: TAKE 1 TABLET BY MOUTH ONCE  DAILY IN THE EVENING   metFORMIN (GLUCOPHAGE-XR) 500 mg 24 hr tablet   No No   Sig: TAKE 1 TABLET BY MOUTH DAILY  WITH DINNER   Patient not taking: Reported on 12/6/2023   methocarbamol (ROBAXIN) 500 mg tablet   No No   Sig: Take 1 tablet (500 mg total) by mouth 3 (three) times a day   methylPREDNISolone 4 MG tablet therapy pack   No No   Sig: Use as directed on package   Patient not taking: Reported on 12/6/2023   omeprazole (PriLOSEC) 40 MG capsule   No No   Sig: TAKE 1 CAPSULE BY MOUTH DAILY      Facility-Administered Medications: None     Discharge Medication List as of 9/22/2024  5:29 PM        CONTINUE these medications which have NOT CHANGED    Details   acyclovir (ZOVIRAX) 800 mg tablet Take 0.5 tablets (400 mg total) by mouth 2 (two) times a day, Starting Mon 6/17/2024, Until Sat 12/14/2024, Normal      clobetasol propionate (CLOBEX) 0.05 % shampoo APPLY TOPICALLY TWICE WEEKLY, Normal      diclofenac (VOLTAREN) 75 mg EC tablet TAKE 1 TABLET BY MOUTH TWICE  DAILY, Starting Tue 4/23/2024, Normal      DOCUSATE SODIUM PO Take 200 mg by mouth as needed, Historical Med      gabapentin (NEURONTIN) 600 MG tablet TAKE 1 TABLET BY MOUTH 3 TIMES  DAILY, Starting Tue 12/26/2023, Normal      hydrOXYzine pamoate (VISTARIL) 50 mg capsule TAKE 1  CAPSULE BY MOUTH DAILY AS NEEDED FOR ANXIETY, Normal      ketoconazole (NIZORAL) 2 % shampoo APPLY 1 APPLICATION TOPICALLY  TWICE WEEKLY, Normal      levocetirizine (XYZAL) 5 MG tablet TAKE 1 TABLET BY MOUTH ONCE  DAILY IN THE EVENING, Starting Mon 6/17/2024, Normal      metFORMIN (GLUCOPHAGE-XR) 500 mg 24 hr tablet TAKE 1 TABLET BY MOUTH DAILY  WITH DINNER, Starting Mon 11/20/2023, Normal      methocarbamol (ROBAXIN) 500 mg tablet Take 1 tablet (500 mg total) by mouth 3 (three) times a day, Starting Tue 4/2/2024, Normal      methylPREDNISolone 4 MG tablet therapy pack Use as directed on package, Normal      omeprazole (PriLOSEC) 40 MG capsule TAKE 1 CAPSULE BY MOUTH DAILY, Normal                Slim Martin MD  09/22/24 6156

## 2024-09-24 ENCOUNTER — TELEPHONE (OUTPATIENT)
Dept: PHYSICAL THERAPY | Facility: OTHER | Age: 49
End: 2024-09-24

## 2024-09-24 ENCOUNTER — TELEPHONE (OUTPATIENT)
Age: 49
End: 2024-09-24

## 2024-09-24 NOTE — TELEPHONE ENCOUNTER
Archana: Tessa PT    Doctor/Office: Dr De Jesus     Callback#: [phone]        Patient is requesting a transfer of care for the following reason: Pt prefer to see another doctor         Doctor: Dr De Jesus     Would you release patient from your care?      Doctor: Dr Mehta     Would you take patient on as a patient?        Please advise,   Thank you.

## 2024-09-24 NOTE — TELEPHONE ENCOUNTER
Call placed to the patient per [facility] referral.    Pt is not interested in PT. She is already established with Neuro[facility] AND PM. She will be following up with one of those specialties.     [facility] closed

## 2024-09-25 NOTE — TELEPHONE ENCOUNTER
"S/w the patient and reviewed. She stated she wasn't too sure if she wanted a BRENDA. She stated she is going to follow up with the her neurologist. I did review the previous statement and she stated she wasn't drug seeking. She stated she has a chronic condition. 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 I did not quote drug seeking. I reviewed that we would not provide opioid medication. Provided emotional support and she stated she will not be back because she does not want injections in her back. She did appreciate the call.  "

## 2024-09-25 NOTE — TELEPHONE ENCOUNTER
Patient is requesting a BRENDA to Javier Mehta MD ([facility]) DUE TO PT PREFERS TO SEE ANOTHER DOCTOR; current treatment plan by Velasquez De Jesus DO (SP[facility]) noted below.     1. Pt was last seen ONLY ONCE for NP CONSULT on 4/28/2023 by Velasquez De Jesus DO (SPA) for Dx of Cervical disc disorder with radiculopathy of mid-cervical region, Acute bilateral thoracic back pain, Herniation of intervertebral disc at C5-C6 level, & Muscle spasm. (Ordered: diclofenac (VOLTAREN) 75 mg, tiZANidine (Zanaflex) 4 mg, titrate gabapentin (Neurontin) 600 MG, & f/u as needed after NEUROSURG PROs)     2. Per Velasquez De Jesus DO (SPA) notes on 4/28/2023: Pleasant 47-year-old female who presents to [facility] for initial evaluation as referral from Dr. Guy regarding neck, mid back and low back pain and med management. During his evaluation patient has undergone an MRI of the cervical and thoracic spine which demonstrates diffuse thoracic degenerative changes with hypertrophic changes most notable at T9-T10 resulting in severe canal stenosis with mild compression of the lower thoracic cord as well as an MRI of the cervical spine demonstrating a large central and left-sided disc extrusion at C5-C6 resulting in severe left lateral canal stenosis and foraminal narrowing.    -SUGGESTIONS/RECOMMENDATIONS: Decision has been made to undergo surgical intervention for cervical disc disease at C5-C6 as well as decompression at T9-T10 with plans to start with the thoracic surgery first.  She was already started on gabapentin and at this time we will titrate her up to a more therapeutic dose 600 mg 3 times daily and also provide her with Zanaflex 4 mg every 8 hours as needed for muscle spasms and pain.  We will follow-up as needed and her surgeries will start in June 2023.     3. Per [facility] Telephone Encounter note dated 7/26/2023:   A-Patient wants to know who Dr. De Jesus is going to refer her to so she can go to them instead of  paying 2 co pays.     B-Response from [facility] RN Staff on 7/26/2023: S/w pt about previous.  Pt would like to know plan moving fwd with KW.  Pt expressed she had a thoracic laminectomy roughly 6 weeks ago and will need pain management follow up.  Pt informed nurse that KW would not be prescribing opioid medication and Pt would like to know if she should keep 7/28 OVS to discuss possible entering pain contract with [facility] or if Pt should be referred out.  If no pain contract 7/28/23 Pt would like to know where KW recommends her to go.  Nurse informed Pt nurse to discuss with KW and CB with recommendations. Please advise, TY.     C-Response from Velasquez De Jesus DO (SPA) on 7/26/2023: Please provide patient with list of outside pain physicians that may be of better assistance to continue managing chronic opiate pain management.     D-Response from [facility] RN Staff on 7/26/2023: S/W pt and advised of the same. Cx appt for Friday. Sent out list of [place] pain centers to confirmed address.    4. MAKE NOTE: Pt was last seen for OV on 1/4/2024 with NEUROSURG & does NOT have any future appts scheduled at this time.     5. Pt has NOT been seen by ANOTHER PM MD since LOV with [facility] upon chart review.     6. Pts PRO Hx Includes:   -Open T8 spinous process, T9 total, superior T10 laminectomy for decompression (Spine Thoracic) on 6/8/2023.   -LOBECTOMY LUNG (Right: Chest) & THORACOSCOPY VIDEO ASSISTED SURGERY (VATS) (Right: Chest) on 7/28/2022.      7. Pt had X-ray of Thoracic Spine & MRI of Thoracic Spine both completed on 12/29/2023, MRI of Lumbar Spine on 11/29/2023, X-rays of Cervical & Thoracic Spine both completed on 7/21/2023, CT of Thoracic Spine on 6/10/2023, CT of Lumbar Spine on 4/28/2023, & MRIs of Cervical & Thoracic Spine both completed on 4/4/2023. (All reports & imaging available for viewing)     8. Per PDMP: NO opioid scripts within past year & x9 opioid scripts within past x2 years.       Would you take over this Pts care?  Approve/Deny BRENDA?   Please advise- Thank you!

## 2024-09-25 NOTE — TELEPHONE ENCOUNTER
Please make Pt aware of below recommendations & schedule accordingly. Thank You!    Regarding Pts Request for BRENDA from Velasquez De Jesus DO (SPA) to Javier Mehta MD (SPA) DUE TO PT PREFERS TO SEE ANOTHER DOCTOR: Pt is DENIED for BRENDA. (COURTESY NOTIFICATION: This RN also making KW aware of this recommendation) (SJ was consulted & aware/agree with recommendation for DENIAL of BRENDA)    1. Per Javier Mehta MD (SPA): I would not start opioid medications as well so if this is what she is looking for she should see another clinic. I'm in agreement with his plan and would not take this patient on.    2. If Pt is NOT interested in continuing care with current [facility] MD, Please DO NOT SCHEDULE & provide Pt with a list of other pain mgmt providers in the area for help with med mgmt. ([facility] Clerical Team has a list)

## 2024-11-21 DIAGNOSIS — L21.9 SEBORRHEIC DERMATITIS: ICD-10-CM

## 2024-11-22 DIAGNOSIS — K21.9 GASTROESOPHAGEAL REFLUX DISEASE WITHOUT ESOPHAGITIS: ICD-10-CM

## 2024-11-22 DIAGNOSIS — M51.34 DEGENERATIVE DISC DISEASE, THORACIC: ICD-10-CM

## 2024-11-22 DIAGNOSIS — M54.16 LUMBAR RADICULOPATHY, CHRONIC: ICD-10-CM

## 2024-11-22 DIAGNOSIS — T78.40XD ALLERGY, SUBSEQUENT ENCOUNTER: ICD-10-CM

## 2024-11-22 DIAGNOSIS — M62.838 MUSCLE SPASM: ICD-10-CM

## 2024-11-22 DIAGNOSIS — A60.00 GENITAL HERPES SIMPLEX, UNSPECIFIED SITE: ICD-10-CM

## 2024-11-22 DIAGNOSIS — F41.9 ANXIETY: ICD-10-CM

## 2024-11-22 RX ORDER — KETOCONAZOLE 20 MG/ML
SHAMPOO, SUSPENSION TOPICAL
Qty: 120 ML | Refills: 0 | Status: SHIPPED | OUTPATIENT
Start: 2024-11-22

## 2024-11-25 RX ORDER — METHOCARBAMOL 500 MG/1
500 TABLET, FILM COATED ORAL 3 TIMES DAILY
Qty: 90 TABLET | Refills: 0 | Status: SHIPPED | OUTPATIENT
Start: 2024-11-25

## 2024-11-25 RX ORDER — GABAPENTIN 600 MG/1
600 TABLET ORAL 3 TIMES DAILY
Qty: 270 TABLET | Refills: 0 | Status: SHIPPED | OUTPATIENT
Start: 2024-11-25

## 2024-11-25 RX ORDER — OMEPRAZOLE 40 MG/1
40 CAPSULE, DELAYED RELEASE ORAL DAILY
Qty: 90 CAPSULE | Refills: 0 | Status: SHIPPED | OUTPATIENT
Start: 2024-11-25

## 2024-11-25 RX ORDER — HYDROXYZINE PAMOATE 50 MG/1
50 CAPSULE ORAL DAILY PRN
Qty: 90 CAPSULE | Refills: 0 | Status: SHIPPED | OUTPATIENT
Start: 2024-11-25

## 2024-11-25 RX ORDER — DICLOFENAC SODIUM 75 MG/1
75 TABLET, DELAYED RELEASE ORAL 2 TIMES DAILY
Qty: 180 TABLET | Refills: 0 | Status: SHIPPED | OUTPATIENT
Start: 2024-11-25

## 2024-11-25 RX ORDER — LEVOCETIRIZINE DIHYDROCHLORIDE 5 MG/1
5 TABLET, FILM COATED ORAL EVERY EVENING
Qty: 90 TABLET | Refills: 0 | Status: SHIPPED | OUTPATIENT
Start: 2024-11-25

## 2024-11-25 RX ORDER — ACYCLOVIR 800 MG/1
400 TABLET ORAL 2 TIMES DAILY
Qty: 90 TABLET | Refills: 0 | Status: SHIPPED | OUTPATIENT
Start: 2024-11-25 | End: 2025-05-24

## 2024-12-18 DIAGNOSIS — K21.9 GASTROESOPHAGEAL REFLUX DISEASE WITHOUT ESOPHAGITIS: ICD-10-CM

## 2024-12-18 DIAGNOSIS — M54.16 LUMBAR RADICULOPATHY, CHRONIC: ICD-10-CM

## 2024-12-18 DIAGNOSIS — M62.838 MUSCLE SPASM: ICD-10-CM

## 2024-12-18 DIAGNOSIS — M51.34 DEGENERATIVE DISC DISEASE, THORACIC: ICD-10-CM

## 2024-12-18 DIAGNOSIS — L21.9 SEBORRHEIC DERMATITIS: ICD-10-CM

## 2024-12-18 DIAGNOSIS — F41.9 ANXIETY: ICD-10-CM

## 2024-12-19 RX ORDER — KETOCONAZOLE 20 MG/ML
SHAMPOO, SUSPENSION TOPICAL
Qty: 120 ML | Refills: 6 | Status: SHIPPED | OUTPATIENT
Start: 2024-12-19

## 2024-12-22 RX ORDER — METHOCARBAMOL 500 MG/1
500 TABLET, FILM COATED ORAL 3 TIMES DAILY
Qty: 90 TABLET | Refills: 0 | Status: SHIPPED | OUTPATIENT
Start: 2024-12-22

## 2024-12-22 RX ORDER — OMEPRAZOLE 40 MG/1
40 CAPSULE, DELAYED RELEASE ORAL DAILY
Qty: 90 CAPSULE | Refills: 0 | Status: SHIPPED | OUTPATIENT
Start: 2024-12-22

## 2024-12-22 RX ORDER — DICLOFENAC SODIUM 75 MG/1
75 TABLET, DELAYED RELEASE ORAL 2 TIMES DAILY
Qty: 180 TABLET | Refills: 0 | Status: SHIPPED | OUTPATIENT
Start: 2024-12-22

## 2024-12-22 RX ORDER — HYDROXYZINE PAMOATE 50 MG/1
CAPSULE ORAL
Qty: 90 CAPSULE | Refills: 0 | Status: SHIPPED | OUTPATIENT
Start: 2024-12-22

## 2025-01-02 ENCOUNTER — TELEPHONE (OUTPATIENT)
Age: 50
End: 2025-01-02

## 2025-01-02 NOTE — TELEPHONE ENCOUNTER
"Pt called to schedule appt with Alida as she states that the last time she saw Dr. Guy on 2/6/23 she was PRN until \"I can't stand it anymore, and I can't.  The pain is just too much\".  Pt scheduled with Alida on 1/14/25 as she has no recent imaging.  Pt also inquiring if she is able to be billed the co-pay that day.  I informed her that I would send to our admins to verify and contact her to discuss.  Pt verbalized understanding and was appreciative.  "

## 2025-01-03 NOTE — TELEPHONE ENCOUNTER
Called and spoke to patient explained copay's are due at time of service. She asked that I speak with the  since she is an established patient if she could be billed. I explained I did check with the  and that we would need payment at time of service.

## 2025-01-14 ENCOUNTER — OFFICE VISIT (OUTPATIENT)
Dept: NEUROSURGERY | Facility: CLINIC | Age: 50
End: 2025-01-14
Payer: COMMERCIAL

## 2025-01-14 ENCOUNTER — HOSPITAL ENCOUNTER (OUTPATIENT)
Dept: RADIOLOGY | Facility: HOSPITAL | Age: 50
Discharge: HOME | End: 2025-01-14
Payer: COMMERCIAL

## 2025-01-14 VITALS
BODY MASS INDEX: 43.4 KG/M2 | RESPIRATION RATE: 16 BRPM | DIASTOLIC BLOOD PRESSURE: 86 MMHG | HEIGHT: 69 IN | TEMPERATURE: 98.7 F | SYSTOLIC BLOOD PRESSURE: 140 MMHG | HEART RATE: 100 BPM | WEIGHT: 293 LBS | OXYGEN SATURATION: 98 %

## 2025-01-14 DIAGNOSIS — M47.816 LUMBAR SPONDYLOSIS: ICD-10-CM

## 2025-01-14 DIAGNOSIS — M47.14 THORACIC SPONDYLOSIS WITH MYELOPATHY: ICD-10-CM

## 2025-01-14 DIAGNOSIS — M48.062 LUMBAR STENOSIS WITH NEUROGENIC CLAUDICATION: ICD-10-CM

## 2025-01-14 DIAGNOSIS — M47.816 LUMBAR SPONDYLOSIS: Primary | ICD-10-CM

## 2025-01-14 PROCEDURE — 99214 OFFICE O/P EST MOD 30 MIN: CPT | Performed by: NURSE PRACTITIONER

## 2025-01-14 PROCEDURE — 72114 X-RAY EXAM L-S SPINE BENDING: CPT

## 2025-01-14 RX ORDER — PHENOL 1.4 %
600 AEROSOL, SPRAY (ML) MUCOUS MEMBRANE 2 TIMES DAILY WITH MEALS
COMMUNITY

## 2025-01-14 RX ORDER — DIPHENOXYLATE HYDROCHLORIDE AND ATROPINE SULFATE 2.5; .025 MG/1; MG/1
1 TABLET ORAL DAILY
COMMUNITY

## 2025-01-14 NOTE — PROGRESS NOTES
Name: Tessa Olivarez      : 1975      MRN: [id number]  Encounter Provider: GENESIS Blancas  Encounter Date: 2025   Encounter department: [facility]  :  Assessment & Plan  Lumbar spondylosis    Orders:    MRI lumbar spine wo contrast; Future    XR spine lumbar complete w bending minimum 6 views; Future    Thoracic spondylosis with myelopathy  S/p T8-10 laminectomies with Dr. Guy 23         Lumbar stenosis with neurogenic claudication  Presents for evaluation of lumbar stenosis  Ongoing x years with associated neurogenic claudication.  Imaging in past has revealed lumbar stenosis most severe at L4-5 due to severe arthropathy, with anterolisthesis L4-5.  Does daily exercises/stretching at home that she learned at PT.  On gabapentin 600 mg BID and diclofenac 75 mg BID.  On exam, some minimal weakness bilateral HF 4/5, otherwise intact. Subjective numbness from knees to ankles anteriorly. Pain that radiates down posterior thighs to knees.    Plan:  Patient informed in the past by surgeon that she may eventually require lumbar fusion when symptoms become intolerable.  In order to evaluate candidacy, will obtain updated MRI lumbar spine and flexion/extension lumbar x-rays.  Continue medication and exercises as above.  Follow up with Dr. Guy with updated imaging for surgical discussion.             History of Present Illness   49-year-old female with past medical history of GERD, status post T8-T10 laminectomies for decompression with Dr. Guy in 2023, who presents for evaluation of ongoing low back pain with lower extremity symptoms.  These have been going on for many years and we have previously evaluated her for the same however her thoracic myelopathy was of greater concern and so this was managed first.  She relates that Dr. Guy told her that once her symptoms from her lumbar stenosis became intolerable, she should see him again to discuss  surgery.    She describes pain that radiates across her low back that is constant.  Pain radiates down her posterior legs to her knees.  She has associated numbness and heaviness to her legs.  This is brought on by ambulation and standing.  When she leans forward she finds that this eases the pain in her back.  She has done physical therapy and pain management in the past.  She continues to do daily physical therapy exercises.  She has had longstanding numbness in an L5 distribution in her right leg.  She continues to work as a nurse.      Review of Systems   Gastrointestinal: Negative.    Genitourinary: Negative.    Musculoskeletal:  Positive for back pain (center, about 3 wks ago sharp shooting pain down back of legs, right side down to foot, interm right side groin) and gait problem. Negative for myalgias.        Left sided rib/back pain,   Neurological:  Positive for weakness and numbness (bi/legs from knees down).   Hematological: Negative.    Psychiatric/Behavioral: Negative.      I have personally reviewed the MA's review of systems and made changes as necessary.    Past Medical History   Past Medical History:   Diagnosis Date    Acid reflux     Allergic 2006    Flagyl; anaphylaxis    Anxiety     Trejo's esophagus     BRCA1 negative     Cancer (HCC)     GERD (gastroesophageal reflux disease)     Headache(784.0) 2022    Hiatal hernia     Hyperlipidemia     Kidney stone     Latent tuberculosis by skin test 1986    Was treated with INH for 6 months    Lung cancer (HCC) diagnosed in 2018    Obesity 2018    Obstructive sleep apnea 05/27/2018    Mild TERESITA.  AHI 18.1    Squamous cell skin cancer     Left buttocks    STD (sexually transmitted disease) 1990    HPV, HSV    Urinary tract infection      Past Surgical History:   Procedure Laterality Date    ABDOMINOPLASTY      AUGMENTATION BREAST Bilateral 2015    BREAST BIOPSY Bilateral     usg bxs- negative    BREAST SURGERY  2015    BRONCHOSCOPY N/A 05/03/2022     Procedure: BRONCHOSCOPY NAVIGATIONAL;  Surgeon: J Luis Arreola MD;  [facility];  Service: Thoracic     SECTION  2002    COLON SURGERY  2018    Rectocele Repair    CYSTOSCOPY N/A 2022    Procedure: CYSTOSCOPY;  Surgeon: Red Becerril MD;  [facility];  Service: Gynecology Oncology    EGD  2018    ENDOBRONCHIAL ULTRASOUND (EBUS) N/A 2022    Procedure: ENDOBRONCHIAL ULTRASOUND (EBUS);  Surgeon: J Luis Arreola MD;  [facility];  Service: Thoracic    HYSTERECTOMY  2014    IR BIOPSY LYMPH NODE  2022    IR CHEST TUBE PLACEMENT  2022    LAPAROSCOPIC CHOLECYSTECTOMY  1997    LIPOSUCTION      LUNG SURGERY      VATS Lobectomy RLL    LYMPH NODE BIOPSY  2022    MOHS SURGERY Left 2022    left buttocks    AR BRNCHSC INCL FLUOR GDNCE DX W/CELL WASHG SPX N/A 2022    Procedure: BRONCHOSCOPY FLEXIBLE;  Surgeon: J Luis Arreola MD;  [facility];  Service: Thoracic    AR BRNCHSC INCL FLUOR GDNCE DX W/CELL WASHG SPX N/A 2022    Procedure: BRONCHOSCOPY FLEXIBLE;  Surgeon: Arnie Bartlett MD;  [facility];  Service: Thoracic    AR MOLINA FACETECTOMY & FORAMOTOMY 1 VRT SGM THORACIC N/A 2023    Procedure: Open T8 spinous process, T9 total, superior T10 laminectomy for decompression;  Surgeon: Feroz Guy MD;  [facility];  Service: Neurosurgery    AR LAPAROSCOPY W/RMVL ADNEXAL STRUCTURES Bilateral 2022    Procedure: OOPHORECTOMY W/ ROBOTICS, VAGINOTOMY;  Surgeon: Red Becerril MD;  [facility];  Service: Gynecology Oncology    AR THORACOSCOPY W/LOBECTOMY SINGLE LOBE Right 2022    Procedure: LOBECTOMY LUNG;  Surgeon: Arnie Bartlett MD;  [facility];  Service: Thoracic    AR THORACOSCOPY W/LOBECTOMY SINGLE LOBE Right 2022    Procedure: THORACOSCOPY VIDEO ASSISTED SURGERY (VATS);  Surgeon: Arnie Bartlett MD;   [facility];  Service: Thoracic    REPAIR RECTOCELE  2018    SPINE SURGERY      TONSILLECTOMY  1984    TUBAL LIGATION  2002    UPPER GASTROINTESTINAL ENDOSCOPY  2018     Family History   Problem Relation Age of Onset    Cancer Mother         Ovarian    Ovarian cancer Mother         in her 40's    Arthritis Mother         RA    Autoimmune disease Mother         RA    Stroke Father     Dementia Father     Vision loss Father     Glaucoma Father     Cancer Maternal Grandmother         Ovarian    Diabetes Paternal Grandmother     Diabetes Paternal Grandfather     Lung cancer Paternal Uncle       reports that she has been smoking cigarettes. She started smoking about 2 years ago. She has a 18.5 pack-year smoking history. She has never used smokeless tobacco. She reports that she does not currently use alcohol. She reports that she does not use drugs.  Current Outpatient Medications on File Prior to Visit   Medication Sig Dispense Refill    acyclovir (ZOVIRAX) 800 mg tablet Take 0.5 tablets (400 mg total) by mouth 2 (two) times a day 90 tablet 0    calcium carbonate (OS-LEONARD) 600 MG tablet Take 600 mg by mouth 2 (two) times a day with meals      clobetasol propionate (CLOBEX) 0.05 % shampoo APPLY TOPICALLY TWICE WEEKLY 354 mL 2    diclofenac (VOLTAREN) 75 mg EC tablet TAKE 1 TABLET BY MOUTH TWICE  DAILY 180 tablet 0    gabapentin (NEURONTIN) 600 MG tablet Take 1 tablet (600 mg total) by mouth 3 (three) times a day 270 tablet 0    hydrOXYzine pamoate (VISTARIL) 50 mg capsule TAKE 1 CAPSULE BY MOUTH DAILY AS NEEDED FOR ANXIETY 90 capsule 0    ketoconazole (NIZORAL) 2 % shampoo APPLY 1 APPLICATION TOPICALLY  TWICE WEEKLY 120 mL 6    levocetirizine (XYZAL) 5 MG tablet Take 1 tablet (5 mg total) by mouth every evening 90 tablet 0    methocarbamol (ROBAXIN) 500 mg tablet TAKE 1 TABLET BY MOUTH 3 TIMES  DAILY 90 tablet 0    multivitamin (THERAGRAN) TABS Take 1 tablet by mouth daily      omeprazole (PriLOSEC) 40 MG capsule  "TAKE 1 CAPSULE BY MOUTH DAILY 90 capsule 0    [DISCONTINUED] DOCUSATE SODIUM PO Take 200 mg by mouth as needed (Patient not taking: Reported on 1/14/2025)       No current facility-administered medications on file prior to visit.     Allergies   Allergen Reactions    Metronidazole Anaphylaxis     \"patient states she has been in anaphylaxis due to this medication prior\"      Nitrofurantoin Shortness Of Breath      Social History     Tobacco Use    Smoking status: Every Day     Current packs/day: 0.50     Average packs/day: 0.5 packs/day for 37.1 years (18.5 ttl pk-yrs)     Types: Cigarettes     Start date: 9/1/2022    Smokeless tobacco: Never   Vaping Use    Vaping status: Never Used   Substance and Sexual Activity    Alcohol use: Not Currently    Drug use: Never    Sexual activity: Not Currently     Partners: Male     Comment: Hysterectomy        Objective   /86 (BP Location: Left arm, Patient Position: Sitting, Cuff Size: Large)   Pulse 100   Temp 98.7 °F (37.1 °C) (Temporal)   Resp 16   Ht 5' 9\" (1.753 m)   Wt (!) 152 kg (336 lb)   SpO2 98%   BMI 49.62 kg/m²     Physical Exam  Constitutional:       Appearance: She is well-developed. She is obese.   HENT:      Head: Normocephalic and atraumatic.   Eyes:      General: Lids are normal.      Extraocular Movements: Extraocular movements intact.      Pupils: Pupils are equal, round, and reactive to light.   Pulmonary:      Effort: Pulmonary effort is normal.   Abdominal:      Palpations: Abdomen is soft.   Musculoskeletal:         General: Normal range of motion.      Cervical back: Normal range of motion and neck supple.   Skin:     General: Skin is warm and dry.   Neurological:      General: No focal deficit present.      Mental Status: She is alert and oriented to person, place, and time. Mental status is at baseline.      Sensory: Sensory deficit present.      Motor: Weakness present.      Deep Tendon Reflexes:      Reflex Scores:       Patellar " reflexes are 1+ on the right side and 1+ on the left side.       Achilles reflexes are 1+ on the right side and 1+ on the left side.      Neurological Exam  Mental Status  Alert. Oriented to person, place, and time.    Cranial Nerves  CN II: Visual acuity is normal. Visual fields full to confrontation.  CN III, IV, VI: Extraocular movements intact bilaterally. Normal lids and orbits bilaterally. Pupils equal round and reactive to light bilaterally.  CN V: Facial sensation is normal.  CN VII: Full and symmetric facial movement.  CN VIII: Hearing is normal.  CN IX, X: Palate elevates symmetrically. Normal gag reflex.  CN XI: Shoulder shrug strength is normal.  CN XII: Tongue midline without atrophy or fasciculations.    Motor   Strength is 5/5 in all four extremities except as noted.                                             Right                     Left  Hip flexion                              4                          4    Sensory  Right: Loss of sensation in the L5 dermatome.  +neurogenic claudication.    Reflexes                                            Right                      Left  Patellar                                1+                         1+  Achilles                                1+                         1+      Radiology Results Review: I personally reviewed the following image studies in PACS and associated radiology reports: MRI spine. My interpretation of the radiology images/reports is: as above.    Administrative Statements   I have spent a total time of 40 minutes in caring for this patient on the day of the visit/encounter including Diagnostic results, Risks and benefits of tx options, Instructions for management, Patient and family education, Importance of tx compliance, Risk factor reductions, Impressions, Counseling / Coordination of care, Documenting in the medical record, Reviewing / ordering tests, medicine, procedures  , and Obtaining or reviewing history  .

## 2025-01-14 NOTE — ASSESSMENT & PLAN NOTE
Presents for evaluation of lumbar stenosis  Ongoing x years with associated neurogenic claudication.  Imaging in past has revealed lumbar stenosis most severe at L4-5 due to severe arthropathy, with anterolisthesis L4-5.  Does daily exercises/stretching at home that she learned at PT.  On gabapentin 600 mg BID and diclofenac 75 mg BID.  On exam, some minimal weakness bilateral HF 4/5, otherwise intact. Subjective numbness from knees to ankles anteriorly. Pain that radiates down posterior thighs to knees.    Plan:  Patient informed in the past by surgeon that she may eventually require lumbar fusion when symptoms become intolerable.  In order to evaluate candidacy, will obtain updated MRI lumbar spine and flexion/extension lumbar x-rays.  Continue medication and exercises as above.  Follow up with Dr. Guy with updated imaging for surgical discussion.

## 2025-01-16 ENCOUNTER — OFFICE VISIT (OUTPATIENT)
Dept: FAMILY MEDICINE CLINIC | Facility: CLINIC | Age: 50
End: 2025-01-16
Payer: COMMERCIAL

## 2025-01-16 ENCOUNTER — RESULTS FOLLOW-UP (OUTPATIENT)
Dept: NEUROSURGERY | Facility: CLINIC | Age: 50
End: 2025-01-16

## 2025-01-16 VITALS
TEMPERATURE: 98 F | HEART RATE: 82 BPM | OXYGEN SATURATION: 98 % | SYSTOLIC BLOOD PRESSURE: 132 MMHG | RESPIRATION RATE: 16 BRPM | DIASTOLIC BLOOD PRESSURE: 90 MMHG

## 2025-01-16 DIAGNOSIS — Z12.11 SCREENING FOR COLON CANCER: ICD-10-CM

## 2025-01-16 DIAGNOSIS — Z12.31 ENCOUNTER FOR SCREENING MAMMOGRAM FOR BREAST CANCER: ICD-10-CM

## 2025-01-16 DIAGNOSIS — Z00.00 ANNUAL PHYSICAL EXAM: Primary | ICD-10-CM

## 2025-01-16 PROCEDURE — 99396 PREV VISIT EST AGE 40-64: CPT | Performed by: FAMILY MEDICINE

## 2025-01-16 NOTE — PROGRESS NOTES
Name: Tessa Olivarez      : 1975      MRN: [id number]  Encounter Provider: Sara Thomson MD  Encounter Date: 2025   Encounter department: [facility]    Assessment & Plan  Encounter for screening mammogram for breast cancer    Orders:  •  Mammo screening bilateral w 3d and cad; Future    Annual physical exam  Declined Adacel  Was offered Cymbalta for depression and chronic pain -  declined for now   Orders:  •  CBC; Future  •  Comprehensive metabolic panel; Future  •  Lipid panel; Future  •  TSH, 3rd generation; Future  •  Hemoglobin A1C; Future    Screening for colon cancer    Orders:  •  Ambulatory Referral to Gastroenterology; Future      Depression Screening and Follow-up Plan: Patient's depression screening was positive with a PHQ-2 score of 4. Their PHQ-9 score was 9.   Patient assessed for underlying major depression. Brief counseling provided and recommend additional follow-up/re-evaluation next office visit.   Tobacco Cessation Counseling: Tobacco cessation counseling was provided. The patient is sincerely urged to quit consumption of tobacco. She is not ready to quit tobacco.         History of Present Illness     Pt is seeing for annual PE       Review of Systems   Constitutional: Negative.  Negative for fatigue, fever and unexpected weight change.   HENT:  Negative for congestion, ear discharge, ear pain, hearing loss, rhinorrhea, sinus pressure, sore throat and trouble swallowing.    Eyes: Negative.    Respiratory: Negative.     Cardiovascular: Negative.    Gastrointestinal: Negative.    Endocrine: Negative.    Genitourinary: Negative.    Musculoskeletal:  Positive for arthralgias, back pain, neck pain and neck stiffness.   Skin: Negative.    Neurological:  Negative for dizziness, weakness, light-headedness and numbness.   Hematological: Negative.    Psychiatric/Behavioral:  Positive for dysphoric mood. Negative for behavioral problems, decreased concentration,  self-injury and suicidal ideas. The patient is not nervous/anxious.      Past Medical History:   Diagnosis Date   • Acid reflux    • Allergic     Flagyl; anaphylaxis   • Anxiety    • Trejo's esophagus    • BRCA1 negative    • Cancer (HCC)    • GERD (gastroesophageal reflux disease)    • Headache(784.0)    • Hiatal hernia    • Hyperlipidemia    • Kidney stone    • Latent tuberculosis by skin test     Was treated with INH for 6 months   • Lung cancer (HCC) diagnosed in    • Obesity    • Obstructive sleep apnea 2018    Mild TERESITA.  AHI 18.1   • Squamous cell skin cancer     Left buttocks   • STD (sexually transmitted disease)     HPV, HSV   • Urinary tract infection      Past Surgical History:   Procedure Laterality Date   • ABDOMINOPLASTY     • AUGMENTATION BREAST Bilateral    • BREAST BIOPSY Bilateral     usg bxs- negative   • BREAST SURGERY     • BRONCHOSCOPY N/A 2022    Procedure: BRONCHOSCOPY NAVIGATIONAL;  Surgeon: J Luis Arreola MD;  [facility];  Service: Thoracic   •  SECTION     • COLON SURGERY  2018    Rectocele Repair   • CYSTOSCOPY N/A 2022    Procedure: CYSTOSCOPY;  Surgeon: Red Becerril MD;  [facility];  Service: Gynecology Oncology   • EGD     • ENDOBRONCHIAL ULTRASOUND (EBUS) N/A 2022    Procedure: ENDOBRONCHIAL ULTRASOUND (EBUS);  Surgeon: J Luis Arreola MD;  [facility];  Service: Thoracic   • HYSTERECTOMY     • IR BIOPSY LYMPH NODE  2022   • IR CHEST TUBE PLACEMENT  2022   • LAPAROSCOPIC CHOLECYSTECTOMY     • LIPOSUCTION     • LUNG SURGERY      VATS Lobectomy RLL   • LYMPH NODE BIOPSY  2022   • MOHS SURGERY Left 2022    left buttocks   • TX BRNCC INCL FLUOR GDNCE DX W/CELL WASHG SPX N/A 2022    Procedure: BRONCHOSCOPY FLEXIBLE;  Surgeon: J Luis Arreola MD;  [facility];  Service: Thoracic   • TX BRNCHSC INCL FLUOR GDNCE  DX W/CELL WASHG SPX N/A 07/28/2022    Procedure: BRONCHOSCOPY FLEXIBLE;  Surgeon: Arnie Bartlett MD;  [facility];  Service: Thoracic   • MS MOLINA FACETECTOMY & FORAMOTOMY 1 VRT SGM THORACIC N/A 06/08/2023    Procedure: Open T8 spinous process, T9 total, superior T10 laminectomy for decompression;  Surgeon: Feroz Guy MD;  [facility];  Service: Neurosurgery   • MS LAPAROSCOPY W/RMVL ADNEXAL STRUCTURES Bilateral 12/20/2022    Procedure: OOPHORECTOMY W/ ROBOTICS, VAGINOTOMY;  Surgeon: Red Becerril MD;  [facility];  Service: Gynecology Oncology   • MS THORACOSCOPY W/LOBECTOMY SINGLE LOBE Right 07/28/2022    Procedure: LOBECTOMY LUNG;  Surgeon: Arnie Bartlett MD;  [facility];  Service: Thoracic   • MS THORACOSCOPY W/LOBECTOMY SINGLE LOBE Right 07/28/2022    Procedure: THORACOSCOPY VIDEO ASSISTED SURGERY (VATS);  Surgeon: Arnie Bartlett MD;  [facility];  Service: Thoracic   • REPAIR RECTOCELE  2018   • SPINE SURGERY     • TONSILLECTOMY  1984   • TUBAL LIGATION  2002   • UPPER GASTROINTESTINAL ENDOSCOPY  2018     Family History   Problem Relation Age of Onset   • Cancer Mother         Ovarian   • Ovarian cancer Mother         in her 40's   • Arthritis Mother         RA   • Autoimmune disease Mother         RA   • Stroke Father    • Dementia Father    • Vision loss Father    • Glaucoma Father    • Cancer Maternal Grandmother         Ovarian   • Diabetes Paternal Grandmother    • Diabetes Paternal Grandfather    • Lung cancer Paternal Uncle      Social History     Tobacco Use   • Smoking status: Every Day     Current packs/day: 0.50     Average packs/day: 0.5 packs/day for 37.1 years (18.5 ttl pk-yrs)     Types: Cigarettes     Start date: 9/1/2022   • Smokeless tobacco: Never   Vaping Use   • Vaping status: Never Used   Substance and Sexual Activity   • Alcohol use: Not Currently   • Drug use: Never   • Sexual activity: Not  "Currently     Partners: Male     Comment: Hysterectomy     Current Outpatient Medications on File Prior to Visit   Medication Sig   • acyclovir (ZOVIRAX) 800 mg tablet Take 0.5 tablets (400 mg total) by mouth 2 (two) times a day   • calcium carbonate (OS-LEONARD) 600 MG tablet Take 600 mg by mouth 2 (two) times a day with meals   • clobetasol propionate (CLOBEX) 0.05 % shampoo APPLY TOPICALLY TWICE WEEKLY   • diclofenac (VOLTAREN) 75 mg EC tablet TAKE 1 TABLET BY MOUTH TWICE  DAILY   • gabapentin (NEURONTIN) 600 MG tablet Take 1 tablet (600 mg total) by mouth 3 (three) times a day   • hydrOXYzine pamoate (VISTARIL) 50 mg capsule TAKE 1 CAPSULE BY MOUTH DAILY AS NEEDED FOR ANXIETY   • ketoconazole (NIZORAL) 2 % shampoo APPLY 1 APPLICATION TOPICALLY  TWICE WEEKLY   • levocetirizine (XYZAL) 5 MG tablet Take 1 tablet (5 mg total) by mouth every evening   • methocarbamol (ROBAXIN) 500 mg tablet TAKE 1 TABLET BY MOUTH 3 TIMES  DAILY   • multivitamin (THERAGRAN) TABS Take 1 tablet by mouth daily   • omeprazole (PriLOSEC) 40 MG capsule TAKE 1 CAPSULE BY MOUTH DAILY     Allergies   Allergen Reactions   • Metronidazole Anaphylaxis     \"patient states she has been in anaphylaxis due to this medication prior\"     • Nitrofurantoin Shortness Of Breath     Immunization History   Administered Date(s) Administered   • COVID-19 PFIZER VACCINE 0.3 ML IM 03/19/2021, 04/11/2021     Objective   /90 (BP Location: Left arm, Patient Position: Sitting, Cuff Size: Adult)   Pulse 82   Temp 98 °F (36.7 °C) (Temporal)   Resp 16   SpO2 98%     Physical Exam  Constitutional:       General: She is not in acute distress.     Appearance: Normal appearance. She is well-developed. She is obese. She is not ill-appearing.   HENT:      Head: Normocephalic and atraumatic.      Right Ear: Hearing, tympanic membrane, ear canal and external ear normal.      Left Ear: Hearing, tympanic membrane, ear canal and external ear normal.      Nose: No congestion " or rhinorrhea.      Mouth/Throat:      Pharynx: No oropharyngeal exudate or posterior oropharyngeal erythema.   Eyes:      Extraocular Movements: Extraocular movements intact.      Conjunctiva/sclera: Conjunctivae normal.   Neck:      Thyroid: No thyroid mass or thyromegaly.      Vascular: No JVD.   Cardiovascular:      Rate and Rhythm: Normal rate and regular rhythm.      Heart sounds: Normal heart sounds. No murmur heard.     No gallop.   Pulmonary:      Effort: No respiratory distress.      Breath sounds: Normal breath sounds. No wheezing, rhonchi or rales.   Abdominal:      Palpations: Abdomen is soft.      Tenderness: There is no abdominal tenderness.   Musculoskeletal:         General: No swelling or tenderness.      Cervical back: Normal range of motion and neck supple. No rigidity or tenderness.      Right lower leg: No edema.      Left lower leg: No edema.   Lymphadenopathy:      Cervical: No cervical adenopathy.   Skin:     Coloration: Skin is not pale.      Findings: No rash.   Neurological:      Mental Status: She is alert and oriented to person, place, and time.      Cranial Nerves: No cranial nerve deficit.      Motor: No weakness.      Gait: Gait normal.   Psychiatric:         Mood and Affect: Mood normal.         Behavior: Behavior normal.         Thought Content: Thought content normal.         Judgment: Judgment normal.

## 2025-01-25 ENCOUNTER — HOSPITAL ENCOUNTER (OUTPATIENT)
Dept: RADIOLOGY | Facility: HOSPITAL | Age: 50
Discharge: HOME | End: 2025-01-25
Payer: COMMERCIAL

## 2025-01-25 DIAGNOSIS — M47.816 LUMBAR SPONDYLOSIS: ICD-10-CM

## 2025-01-25 PROCEDURE — 72148 MRI LUMBAR SPINE W/O DYE: CPT

## 2025-02-26 ENCOUNTER — OFFICE VISIT (OUTPATIENT)
Dept: NEUROSURGERY | Facility: CLINIC | Age: 50
End: 2025-02-26
Payer: COMMERCIAL

## 2025-02-26 VITALS
OXYGEN SATURATION: 97 % | SYSTOLIC BLOOD PRESSURE: 124 MMHG | WEIGHT: 293 LBS | HEART RATE: 91 BPM | HEIGHT: 69 IN | TEMPERATURE: 98.4 F | BODY MASS INDEX: 43.4 KG/M2 | DIASTOLIC BLOOD PRESSURE: 72 MMHG

## 2025-02-26 DIAGNOSIS — M48.062 LUMBAR STENOSIS WITH NEUROGENIC CLAUDICATION: ICD-10-CM

## 2025-02-26 DIAGNOSIS — M48.061 SPINAL STENOSIS OF LUMBAR REGION, UNSPECIFIED WHETHER NEUROGENIC CLAUDICATION PRESENT: ICD-10-CM

## 2025-02-26 DIAGNOSIS — E66.813 CLASS 3 SEVERE OBESITY DUE TO EXCESS CALORIES WITHOUT SERIOUS COMORBIDITY WITH BODY MASS INDEX (BMI) OF 45.0 TO 49.9 IN ADULT (HCC): ICD-10-CM

## 2025-02-26 DIAGNOSIS — M51.26 LUMBAR HERNIATED DISC: ICD-10-CM

## 2025-02-26 DIAGNOSIS — Z98.890 PERSONAL HISTORY OF SPINE SURGERY: ICD-10-CM

## 2025-02-26 DIAGNOSIS — Z01.818 PRE-PROCEDURAL EXAMINATION: ICD-10-CM

## 2025-02-26 DIAGNOSIS — E66.01 CLASS 3 SEVERE OBESITY DUE TO EXCESS CALORIES WITHOUT SERIOUS COMORBIDITY WITH BODY MASS INDEX (BMI) OF 45.0 TO 49.9 IN ADULT (HCC): ICD-10-CM

## 2025-02-26 DIAGNOSIS — M43.16 SPONDYLOLISTHESIS OF LUMBAR REGION: Primary | ICD-10-CM

## 2025-02-26 PROBLEM — M51.34 DEGENERATIVE DISC DISEASE, THORACIC: Status: RESOLVED | Noted: 2023-06-09 | Resolved: 2025-02-26

## 2025-02-26 PROBLEM — M54.6 THORACIC BACK PAIN: Status: RESOLVED | Noted: 2023-02-27 | Resolved: 2025-02-26

## 2025-02-26 PROBLEM — M62.838 MUSCLE SPASM: Status: RESOLVED | Noted: 2023-08-25 | Resolved: 2025-02-26

## 2025-02-26 PROBLEM — M24.28 LIGAMENTUM FLAVUM HYPERTROPHY: Status: RESOLVED | Noted: 2023-06-09 | Resolved: 2025-02-26

## 2025-02-26 PROBLEM — M47.14 THORACIC SPONDYLOSIS WITH MYELOPATHY: Status: RESOLVED | Noted: 2023-06-09 | Resolved: 2025-02-26

## 2025-02-26 PROCEDURE — 99215 OFFICE O/P EST HI 40 MIN: CPT | Performed by: NEUROLOGICAL SURGERY

## 2025-02-26 RX ORDER — CHLORHEXIDINE GLUCONATE ORAL RINSE 1.2 MG/ML
15 SOLUTION DENTAL ONCE
OUTPATIENT
Start: 2025-02-26 | End: 2025-02-26

## 2025-02-26 NOTE — H&P (VIEW-ONLY)
Name: Tessa Olivarez      : 1975      MRN: [id number]  Encounter Provider: Feroz Guy MD  Encounter Date: 2025   Encounter department: [facility] NEUROSURGICAL [facility]  :  Assessment & Plan  Spondylolisthesis of lumbar region  L4-5, worsens on flexion. Considerable ligamentous overgrowth and calcification noted bilaterally on CT from 2023, which compresses her foramina bilaterally  Orders:  •  Case request operating room: MIS TLIF L4-5, MIS [employer] microdiscectomy left L5-S1; Standing    Spinal stenosis of lumbar region, unspecified whether neurogenic claudication present  L4-5 stenosis  Orders:  •  Case request operating room: MIS TLIF L4-5, MIS [employer]Rx microdiscectomy left L5-S1; Standing    Lumbar herniated disc  L5-S1 on left  Orders:  •  Case request operating room: MIS TLIF L4-5, MIS MetRx microdiscectomy left L5-S1; Standing    Personal history of spine surgery  Status post T9 decompression for 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        Lumbar stenosis with neurogenic claudication    Orders:  •  Case request operating room: MIS TLIF L4-5, MIS [employer]Rx microdiscectomy left L5-S1; Standing    Class 3 severe obesity due to excess calories without serious comorbidity with body mass index (BMI) of 45.0 to 49.9 in adult (HCC)           Continues to struggle with low back pain and neurogenic claudication, down the back of her bilateral thighs.  She has difficulty standing for any length of time, and the pain radiates down her legs left more than right.  She has bilateral lower extremity numbness, on the right extends down the posterior thigh lateral calf and into the top of the foot and then in her bilateral toes.  She will use a cane occasionally particularly when she has her shooting pain.  She can palliate this pain by leaning forward.    She has done physical therapy and injections in the past with minimal success.  This is progressing and  worsening and interfering with her quality of life.    On exam no isaak lower extremity weakness.    MRI scan lumbar spine shows persistent tricompartmental stenosis at L4-5 with a mild listhesis.  Her bending upright films show this actually worsens on flexion with abnormal mechanics there versus the rest of her spine.  CT scan of the lumbar spine from 2023 shows calcification of her ligamentum at L4-5 which profoundly narrows her exiting foramina bilaterally.  Her MRI scan lumbar spine also shows a profound disc herniation on the left at L5-S1 effacing the descending S1 nerve root.    I reviewed with the patient various options for management.  We talked about expected benefits of surgery i.e. decompression of her neural elements and improvement in her neurogenic claudication and radicular pains.  I am less optimistic it would facilitate improvement in her back pain.  At L4-5 because of the mobile listhesis I would recommend a TLIF, to decompress and stabilize the joint.  I expect she will need bilateral facetectomies to fully decompress the calcified ligament bilaterally.  I would also perform a microdiscectomy in the left at L5-S1 to decompress the descending S1 nerve root however, this joint appears to be stable, and would not require fixation fusion.  I reviewed with the patient attendant risks of this procedure including but not limited to infection, bleeding, VTE, spinal fluid leak, hardware failure, transient or permanent neurologic dysfunction, risk of anesthesia.  Patient did have some arrhythmias and around time of her prior surgery but this was felt to be due to some electrolyte abnormalities.  She had cardiology workup since then which she states cleared her of any underlying difficulties.  We will have them again assess her for surgery as well as her PCP.    07/24/23 Metrics: EQ5D5L 54290=9.616; VAS 70     02/26/25 Metrics: EQ5D5L 85492=1.499; VAS 77      History of Present Illness     Tessa Olivarez is  a 49 y.o. female who presents for follow-up    HPI     Pain Score:   5 - low back, radiates into BLE      Review of Systems   Gastrointestinal: Negative.    Genitourinary: Negative.    Musculoskeletal:  Positive for back pain (sharp shooting pain down back of legs, right side down to foot, interm right side groin) and gait problem. Negative for myalgias.        Can't stand for long periods of time, has to stop between chores for relief  Numbness is constant   Neurological:  Positive for weakness and numbness (bi/legs from knees down).   Hematological: Negative.    Psychiatric/Behavioral: Negative.       I have personally reviewed the MA's review of systems and made changes as necessary.        Past Medical History   Past Medical History:   Diagnosis Date   • Acid reflux    • Allergic     Flagyl; anaphylaxis   • Anxiety    • Trejo's esophagus    • BRCA1 negative    • Cancer (HCC)    • Degenerative disc disease, thoracic 2023   • GERD (gastroesophageal reflux disease)    • Headache(784.0)    • Hiatal hernia    • Hyperlipidemia    • Kidney stone    • Latent tuberculosis by skin test     Was treated with INH for 6 months   • Ligamentum flavum hypertrophy 2023   • Lung cancer (HCC) diagnosed in 2018   • Obesity    • Obstructive sleep apnea 2018    Mild TERESITA.  AHI 18.1   • Squamous cell skin cancer     Left buttocks   • STD (sexually transmitted disease)     HPV, HSV   • Thoracic back pain 2023   • Thoracic spondylosis with myelopathy 2023   • Urinary tract infection      Past Surgical History:   Procedure Laterality Date   • ABDOMINOPLASTY     • AUGMENTATION BREAST Bilateral    • BREAST BIOPSY Bilateral     usg bxs- negative   • BREAST SURGERY     • BRONCHOSCOPY N/A 2022    Procedure: BRONCHOSCOPY NAVIGATIONAL;  Surgeon: J Luis Arreola MD;  [facility];  Service: Thoracic   •  SECTION     • COLON SURGERY  2018    Rectocele Repair    • CYSTOSCOPY N/A 12/20/2022    Procedure: CYSTOSCOPY;  Surgeon: Red Becerril MD;  [facility];  Service: Gynecology Oncology   • EGD  2018   • ENDOBRONCHIAL ULTRASOUND (EBUS) N/A 05/03/2022    Procedure: ENDOBRONCHIAL ULTRASOUND (EBUS);  Surgeon: J Luis Arreola MD;  [facility];  Service: Thoracic   • HYSTERECTOMY  2014   • IR BIOPSY LYMPH NODE  03/21/2022   • IR CHEST TUBE PLACEMENT  08/01/2022   • LAPAROSCOPIC CHOLECYSTECTOMY  1997   • LIPOSUCTION  2015   • LUNG SURGERY  2022    VATS Lobectomy RLL   • LYMPH NODE BIOPSY  03/21/2022   • MOHS SURGERY Left 08/18/2022    left buttocks   • ID BRNCHSC INCL FLUOR GDNCE DX W/CELL WASHG SPX N/A 05/03/2022    Procedure: BRONCHOSCOPY FLEXIBLE;  Surgeon: J Luis Arreola MD;  [facility];  Service: Thoracic   • ID BRNCHSC INCL FLUOR GDNCE DX W/CELL WASHG SPX N/A 07/28/2022    Procedure: BRONCHOSCOPY FLEXIBLE;  Surgeon: Arnie Bartlett MD;  [facility];  Service: Thoracic   • ID MOLINA FACETECTOMY & FORAMOTOMY 1 VRT SGM THORACIC N/A 06/08/2023    Procedure: Open T8 spinous process, T9 total, superior T10 laminectomy for decompression;  Surgeon: Feroz Guy MD;  [facility];  Service: Neurosurgery   • ID LAPAROSCOPY W/RMVL ADNEXAL STRUCTURES Bilateral 12/20/2022    Procedure: OOPHORECTOMY W/ ROBOTICS, VAGINOTOMY;  Surgeon: Red Becerril MD;  [facility];  Service: Gynecology Oncology   • ID THORACOSCOPY W/LOBECTOMY SINGLE LOBE Right 07/28/2022    Procedure: LOBECTOMY LUNG;  Surgeon: Arnie Bartlett MD;  [facility];  Service: Thoracic   • ID THORACOSCOPY W/LOBECTOMY SINGLE LOBE Right 07/28/2022    Procedure: THORACOSCOPY VIDEO ASSISTED SURGERY (VATS);  Surgeon: Arnie Bartlett MD;  [facility];  Service: Thoracic   • REPAIR RECTOCELE  2018   • SPINE SURGERY     • TONSILLECTOMY  1984   • TUBAL LIGATION  2002   • UPPER GASTROINTESTINAL  "ENDOSCOPY  2018     Family History   Problem Relation Age of Onset   • Cancer Mother         Ovarian   • Ovarian cancer Mother         in her 40's   • Arthritis Mother         RA   • Autoimmune disease Mother         RA   • Stroke Father    • Dementia Father    • Vision loss Father    • Glaucoma Father    • Cancer Maternal Grandmother         Ovarian   • Diabetes Paternal Grandmother    • Diabetes Paternal Grandfather    • Lung cancer Paternal Uncle      she reports that she has been smoking cigarettes. She started smoking about 2 years ago. She has a 18.6 pack-year smoking history. She has never used smokeless tobacco. She reports that she does not currently use alcohol. She reports that she does not use drugs.  Current Outpatient Medications   Medication Instructions   • acyclovir (ZOVIRAX) 400 mg, Oral, 2 times daily   • calcium carbonate (OS-LEONARD) 600 mg, 2 times daily with meals   • clobetasol propionate (CLOBEX) 0.05 % shampoo APPLY TOPICALLY TWICE WEEKLY   • diclofenac (VOLTAREN) 75 mg, Oral, 2 times daily   • gabapentin (NEURONTIN) 600 mg, Oral, 3 times daily   • hydrOXYzine pamoate (VISTARIL) 50 mg capsule TAKE 1 CAPSULE BY MOUTH DAILY AS NEEDED FOR ANXIETY   • ketoconazole (NIZORAL) 2 % shampoo APPLY 1 APPLICATION TOPICALLY  TWICE WEEKLY   • levocetirizine (XYZAL) 5 mg, Oral, Every evening   • methocarbamol (ROBAXIN) 500 mg, Oral, 3 times daily   • multivitamin (THERAGRAN) TABS 1 tablet, Daily   • omeprazole (PRILOSEC) 40 mg, Oral, Daily     Allergies   Allergen Reactions   • Metronidazole Anaphylaxis     \"patient states she has been in anaphylaxis due to this medication prior\"     • Nitrofurantoin Shortness Of Breath      Objective   /72 (BP Location: Left arm, Patient Position: Sitting, Cuff Size: Adult)   Pulse 91   Temp 98.4 °F (36.9 °C) (Temporal)   Ht 5' 9\" (1.753 m)   Wt (!) 152 kg (336 lb)   SpO2 97%   BMI 49.62 kg/m²     Physical Exam  Vitals reviewed.   Constitutional:       " Appearance: She is well-developed. She is obese.   HENT:      Head: Normocephalic.   Eyes:      General: No scleral icterus.  Cardiovascular:      Rate and Rhythm: Normal rate.   Pulmonary:      Effort: Pulmonary effort is normal.   Abdominal:      Palpations: Abdomen is soft.   Musculoskeletal:      Cervical back: Neck supple.   Skin:     General: Skin is warm and dry.   Neurological:      Mental Status: She is alert and oriented to person, place, and time.      GCS: GCS eye subscore is 4. GCS verbal subscore is 5. GCS motor subscore is 6.   Psychiatric:         Behavior: Behavior normal.       Neurological Exam  Mental Status  Alert. Oriented to person, place, and time.    MDM:     See discussion.

## 2025-02-26 NOTE — ASSESSMENT & PLAN NOTE
Orders:  •  Case request operating room: MIS TLIF L4-5, MIS [facility] microdiscectomy left L5-S1; Standing

## 2025-02-26 NOTE — PROGRESS NOTES
Name: Tessa Olivarez      : 1975      MRN: [id number]  Encounter Provider: Feroz Guy MD  Encounter Date: 2025   Encounter department: [facility] NEUROSURGICAL [facility]  :  Assessment & Plan  Spondylolisthesis of lumbar region  L4-5, worsens on flexion. Considerable ligamentous overgrowth and calcification noted bilaterally on CT from 2023, which compresses her foramina bilaterally  Orders:  •  Case request operating room: MIS TLIF L4-5, MIS [employer] microdiscectomy left L5-S1; Standing    Spinal stenosis of lumbar region, unspecified whether neurogenic claudication present  L4-5 stenosis  Orders:  •  Case request operating room: MIS TLIF L4-5, MIS [employer]Rx microdiscectomy left L5-S1; Standing    Lumbar herniated disc  L5-S1 on left  Orders:  •  Case request operating room: MIS TLIF L4-5, MIS [employer]Rx microdiscectomy left L5-S1; Standing    Personal history of spine surgery  Status post T9 decompression for 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        Lumbar stenosis with neurogenic claudication    Orders:  •  Case request operating room: MIS TLIF L4-5, MIS [facility]Rx microdiscectomy left L5-S1; Standing    Class 3 severe obesity due to excess calories without serious comorbidity with body mass index (BMI) of 45.0 to 49.9 in adult (HCC)           Continues to struggle with low back pain and neurogenic claudication, down the back of her bilateral thighs.  She has difficulty standing for any length of time, and the pain radiates down her legs left more than right.  She has bilateral lower extremity numbness, on the right extends down the posterior thigh lateral calf and into the top of the foot and then in her bilateral toes.  She will use a cane occasionally particularly when she has her shooting pain.  She can palliate this pain by leaning forward.    She has done physical therapy and injections in the past with minimal success.  This is progressing and  worsening and interfering with her quality of life.    On exam no isaak lower extremity weakness.    MRI scan lumbar spine shows persistent tricompartmental stenosis at L4-5 with a mild listhesis.  Her bending upright films show this actually worsens on flexion with abnormal mechanics there versus the rest of her spine.  CT scan of the lumbar spine from 2023 shows calcification of her ligamentum at L4-5 which profoundly narrows her exiting foramina bilaterally.  Her MRI scan lumbar spine also shows a profound disc herniation on the left at L5-S1 effacing the descending S1 nerve root.    I reviewed with the patient various options for management.  We talked about expected benefits of surgery i.e. decompression of her neural elements and improvement in her neurogenic claudication and radicular pains.  I am less optimistic it would facilitate improvement in her back pain.  At L4-5 because of the mobile listhesis I would recommend a TLIF, to decompress and stabilize the joint.  I expect she will need bilateral facetectomies to fully decompress the calcified ligament bilaterally.  I would also perform a microdiscectomy in the left at L5-S1 to decompress the descending S1 nerve root however, this joint appears to be stable, and would not require fixation fusion.  I reviewed with the patient attendant risks of this procedure including but not limited to infection, bleeding, VTE, spinal fluid leak, hardware failure, transient or permanent neurologic dysfunction, risk of anesthesia.  Patient did have some arrhythmias and around time of her prior surgery but this was felt to be due to some electrolyte abnormalities.  She had cardiology workup since then which she states cleared her of any underlying difficulties.  We will have them again assess her for surgery as well as her PCP.    07/24/23 Metrics: EQ5D5L 84313=3.616; VAS 70     02/26/25 Metrics: EQ5D5L 14119=3.499; VAS 77      History of Present Illness     Tessa Olivarez is  a 49 y.o. female who presents for follow-up    HPI     Pain Score:   5 - low back, radiates into BLE      Review of Systems   Gastrointestinal: Negative.    Genitourinary: Negative.    Musculoskeletal:  Positive for back pain (sharp shooting pain down back of legs, right side down to foot, interm right side groin) and gait problem. Negative for myalgias.        Can't stand for long periods of time, has to stop between chores for relief  Numbness is constant   Neurological:  Positive for weakness and numbness (bi/legs from knees down).   Hematological: Negative.    Psychiatric/Behavioral: Negative.       I have personally reviewed the MA's review of systems and made changes as necessary.        Past Medical History   Past Medical History:   Diagnosis Date   • Acid reflux    • Allergic     Flagyl; anaphylaxis   • Anxiety    • Trejo's esophagus    • BRCA1 negative    • Cancer (HCC)    • Degenerative disc disease, thoracic 2023   • GERD (gastroesophageal reflux disease)    • Headache(784.0)    • Hiatal hernia    • Hyperlipidemia    • Kidney stone    • Latent tuberculosis by skin test     Was treated with INH for 6 months   • Ligamentum flavum hypertrophy 2023   • Lung cancer (HCC) diagnosed in 2018   • Obesity    • Obstructive sleep apnea 2018    Mild TERESITA.  AHI 18.1   • Squamous cell skin cancer     Left buttocks   • STD (sexually transmitted disease)     HPV, HSV   • Thoracic back pain 2023   • Thoracic spondylosis with myelopathy 2023   • Urinary tract infection      Past Surgical History:   Procedure Laterality Date   • ABDOMINOPLASTY     • AUGMENTATION BREAST Bilateral    • BREAST BIOPSY Bilateral     usg bxs- negative   • BREAST SURGERY     • BRONCHOSCOPY N/A 2022    Procedure: BRONCHOSCOPY NAVIGATIONAL;  Surgeon: J Luis Arreola MD;  [facility];  Service: Thoracic   •  SECTION     • COLON SURGERY  2018    Rectocele Repair    • CYSTOSCOPY N/A 12/20/2022    Procedure: CYSTOSCOPY;  Surgeon: Red Becerril MD;  [facility];  Service: Gynecology Oncology   • EGD  2018   • ENDOBRONCHIAL ULTRASOUND (EBUS) N/A 05/03/2022    Procedure: ENDOBRONCHIAL ULTRASOUND (EBUS);  Surgeon: J Luis Arreola MD;  [facility];  Service: Thoracic   • HYSTERECTOMY  2014   • IR BIOPSY LYMPH NODE  03/21/2022   • IR CHEST TUBE PLACEMENT  08/01/2022   • LAPAROSCOPIC CHOLECYSTECTOMY  1997   • LIPOSUCTION  2015   • LUNG SURGERY  2022    VATS Lobectomy RLL   • LYMPH NODE BIOPSY  03/21/2022   • MOHS SURGERY Left 08/18/2022    left buttocks   • NY BRNCHSC INCL FLUOR GDNCE DX W/CELL WASHG SPX N/A 05/03/2022    Procedure: BRONCHOSCOPY FLEXIBLE;  Surgeon: J Luis Arreola MD;  [facility];  Service: Thoracic   • NY BRNCHSC INCL FLUOR GDNCE DX W/CELL WASHG SPX N/A 07/28/2022    Procedure: BRONCHOSCOPY FLEXIBLE;  Surgeon: Arnie Bartlett MD;  [facility];  Service: Thoracic   • NY MOLINA FACETECTOMY & FORAMOTOMY 1 VRT SGM THORACIC N/A 06/08/2023    Procedure: Open T8 spinous process, T9 total, superior T10 laminectomy for decompression;  Surgeon: Feroz Guy MD;  [facility];  Service: Neurosurgery   • NY LAPAROSCOPY W/RMVL ADNEXAL STRUCTURES Bilateral 12/20/2022    Procedure: OOPHORECTOMY W/ ROBOTICS, VAGINOTOMY;  Surgeon: Red Becerril MD;  [facility];  Service: Gynecology Oncology   • NY THORACOSCOPY W/LOBECTOMY SINGLE LOBE Right 07/28/2022    Procedure: LOBECTOMY LUNG;  Surgeon: Arnie Bartlett MD;  [facility];  Service: Thoracic   • NY THORACOSCOPY W/LOBECTOMY SINGLE LOBE Right 07/28/2022    Procedure: THORACOSCOPY VIDEO ASSISTED SURGERY (VATS);  Surgeon: Arnie Bartlett MD;  [facility];  Service: Thoracic   • REPAIR RECTOCELE  2018   • SPINE SURGERY     • TONSILLECTOMY  1984   • TUBAL LIGATION  2002   • UPPER GASTROINTESTINAL  "ENDOSCOPY  2018     Family History   Problem Relation Age of Onset   • Cancer Mother         Ovarian   • Ovarian cancer Mother         in her 40's   • Arthritis Mother         RA   • Autoimmune disease Mother         RA   • Stroke Father    • Dementia Father    • Vision loss Father    • Glaucoma Father    • Cancer Maternal Grandmother         Ovarian   • Diabetes Paternal Grandmother    • Diabetes Paternal Grandfather    • Lung cancer Paternal Uncle      she reports that she has been smoking cigarettes. She started smoking about 2 years ago. She has a 18.6 pack-year smoking history. She has never used smokeless tobacco. She reports that she does not currently use alcohol. She reports that she does not use drugs.  Current Outpatient Medications   Medication Instructions   • acyclovir (ZOVIRAX) 400 mg, Oral, 2 times daily   • calcium carbonate (OS-LEONARD) 600 mg, 2 times daily with meals   • clobetasol propionate (CLOBEX) 0.05 % shampoo APPLY TOPICALLY TWICE WEEKLY   • diclofenac (VOLTAREN) 75 mg, Oral, 2 times daily   • gabapentin (NEURONTIN) 600 mg, Oral, 3 times daily   • hydrOXYzine pamoate (VISTARIL) 50 mg capsule TAKE 1 CAPSULE BY MOUTH DAILY AS NEEDED FOR ANXIETY   • ketoconazole (NIZORAL) 2 % shampoo APPLY 1 APPLICATION TOPICALLY  TWICE WEEKLY   • levocetirizine (XYZAL) 5 mg, Oral, Every evening   • methocarbamol (ROBAXIN) 500 mg, Oral, 3 times daily   • multivitamin (THERAGRAN) TABS 1 tablet, Daily   • omeprazole (PRILOSEC) 40 mg, Oral, Daily     Allergies   Allergen Reactions   • Metronidazole Anaphylaxis     \"patient states she has been in anaphylaxis due to this medication prior\"     • Nitrofurantoin Shortness Of Breath      Objective   /72 (BP Location: Left arm, Patient Position: Sitting, Cuff Size: Adult)   Pulse 91   Temp 98.4 °F (36.9 °C) (Temporal)   Ht 5' 9\" (1.753 m)   Wt (!) 152 kg (336 lb)   SpO2 97%   BMI 49.62 kg/m²     Physical Exam  Vitals reviewed.   Constitutional:       " Appearance: She is well-developed. She is obese.   HENT:      Head: Normocephalic.   Eyes:      General: No scleral icterus.  Cardiovascular:      Rate and Rhythm: Normal rate.   Pulmonary:      Effort: Pulmonary effort is normal.   Abdominal:      Palpations: Abdomen is soft.   Musculoskeletal:      Cervical back: Neck supple.   Skin:     General: Skin is warm and dry.   Neurological:      Mental Status: She is alert and oriented to person, place, and time.      GCS: GCS eye subscore is 4. GCS verbal subscore is 5. GCS motor subscore is 6.   Psychiatric:         Behavior: Behavior normal.       Neurological Exam  Mental Status  Alert. Oriented to person, place, and time.    MDM:     See discussion.

## 2025-03-01 ENCOUNTER — APPOINTMENT (OUTPATIENT)
Dept: LAB | Facility: HOSPITAL | Age: 50
End: 2025-03-01
Payer: COMMERCIAL

## 2025-03-01 DIAGNOSIS — M43.16 SPONDYLOLISTHESIS OF LUMBAR REGION: ICD-10-CM

## 2025-03-01 DIAGNOSIS — Z01.818 PRE-PROCEDURAL EXAMINATION: ICD-10-CM

## 2025-03-01 DIAGNOSIS — M51.26 LUMBAR HERNIATED DISC: ICD-10-CM

## 2025-03-01 DIAGNOSIS — M48.061 SPINAL STENOSIS OF LUMBAR REGION, UNSPECIFIED WHETHER NEUROGENIC CLAUDICATION PRESENT: ICD-10-CM

## 2025-03-01 DIAGNOSIS — Z00.00 ANNUAL PHYSICAL EXAM: ICD-10-CM

## 2025-03-01 DIAGNOSIS — M48.062 LUMBAR STENOSIS WITH NEUROGENIC CLAUDICATION: ICD-10-CM

## 2025-03-01 LAB
ALBUMIN SERPL BCG-MCNC: 4.2 G/DL (ref 3.5–5)
ALP SERPL-CCNC: 110 U/L (ref 34–104)
ALT SERPL W P-5'-P-CCNC: 36 U/L (ref 7–52)
ANION GAP SERPL CALCULATED.3IONS-SCNC: 12 MMOL/L (ref 4–13)
APTT PPP: 37 SECONDS (ref 23–34)
AST SERPL W P-5'-P-CCNC: 45 U/L (ref 13–39)
BASOPHILS # BLD AUTO: 0.07 THOUSANDS/ÂΜL (ref 0–0.1)
BASOPHILS NFR BLD AUTO: 1 % (ref 0–1)
BILIRUB SERPL-MCNC: 0.63 MG/DL (ref 0.2–1)
BILIRUB UR QL STRIP: NEGATIVE
BUN SERPL-MCNC: 14 MG/DL (ref 5–25)
CALCIUM SERPL-MCNC: 9.3 MG/DL (ref 8.4–10.2)
CHLORIDE SERPL-SCNC: 105 MMOL/L (ref 96–108)
CHOLEST SERPL-MCNC: 234 MG/DL (ref ?–200)
CLARITY UR: CLEAR
CO2 SERPL-SCNC: 24 MMOL/L (ref 21–32)
COLOR UR: YELLOW
CREAT SERPL-MCNC: 0.96 MG/DL (ref 0.6–1.3)
EOSINOPHIL # BLD AUTO: 0.24 THOUSAND/ÂΜL (ref 0–0.61)
EOSINOPHIL NFR BLD AUTO: 3 % (ref 0–6)
ERYTHROCYTE [DISTWIDTH] IN BLOOD BY AUTOMATED COUNT: 14.6 % (ref 11.6–15.1)
GFR SERPL CREATININE-BSD FRML MDRD: 69 ML/MIN/1.73SQ M
GLUCOSE P FAST SERPL-MCNC: 97 MG/DL (ref 65–99)
GLUCOSE UR STRIP-MCNC: NEGATIVE MG/DL
HCT VFR BLD AUTO: 43.1 % (ref 34.8–46.1)
HDLC SERPL-MCNC: 48 MG/DL
HGB BLD-MCNC: 13.8 G/DL (ref 11.5–15.4)
HGB UR QL STRIP.AUTO: NEGATIVE
IMM GRANULOCYTES # BLD AUTO: 0.02 THOUSAND/UL (ref 0–0.2)
IMM GRANULOCYTES NFR BLD AUTO: 0 % (ref 0–2)
INR PPP: 1.03 (ref 0.85–1.19)
KETONES UR STRIP-MCNC: NEGATIVE MG/DL
LDLC SERPL CALC-MCNC: 150 MG/DL (ref 0–100)
LEUKOCYTE ESTERASE UR QL STRIP: NEGATIVE
LYMPHOCYTES # BLD AUTO: 4.1 THOUSANDS/ÂΜL (ref 0.6–4.47)
LYMPHOCYTES NFR BLD AUTO: 48 % (ref 14–44)
MCH RBC QN AUTO: 29.6 PG (ref 26.8–34.3)
MCHC RBC AUTO-ENTMCNC: 32 G/DL (ref 31.4–37.4)
MCV RBC AUTO: 92 FL (ref 82–98)
MONOCYTES # BLD AUTO: 0.34 THOUSAND/ÂΜL (ref 0.17–1.22)
MONOCYTES NFR BLD AUTO: 4 % (ref 4–12)
NEUTROPHILS # BLD AUTO: 3.71 THOUSANDS/ÂΜL (ref 1.85–7.62)
NEUTS SEG NFR BLD AUTO: 44 % (ref 43–75)
NITRITE UR QL STRIP: NEGATIVE
NONHDLC SERPL-MCNC: 186 MG/DL
NRBC BLD AUTO-RTO: 0 /100 WBCS
PH UR STRIP.AUTO: 6 [PH]
PLATELET # BLD AUTO: 145 THOUSANDS/UL (ref 149–390)
PMV BLD AUTO: 9.2 FL (ref 8.9–12.7)
POTASSIUM SERPL-SCNC: 4.4 MMOL/L (ref 3.5–5.3)
PROT SERPL-MCNC: 8.1 G/DL (ref 6.4–8.4)
PROT UR STRIP-MCNC: NEGATIVE MG/DL
PROTHROMBIN TIME: 14 SECONDS (ref 12.3–15)
RBC # BLD AUTO: 4.67 MILLION/UL (ref 3.81–5.12)
SODIUM SERPL-SCNC: 141 MMOL/L (ref 135–147)
SP GR UR STRIP.AUTO: 1.02 (ref 1–1.03)
TRIGL SERPL-MCNC: 180 MG/DL (ref ?–150)
TSH SERPL DL<=0.05 MIU/L-ACNC: 3.7 UIU/ML (ref 0.45–4.5)
UROBILINOGEN UR STRIP-ACNC: <2 MG/DL
WBC # BLD AUTO: 8.48 THOUSAND/UL (ref 4.31–10.16)

## 2025-03-01 PROCEDURE — 87081 CULTURE SCREEN ONLY: CPT

## 2025-03-01 PROCEDURE — 36415 COLL VENOUS BLD VENIPUNCTURE: CPT

## 2025-03-01 PROCEDURE — 80061 LIPID PANEL: CPT

## 2025-03-01 PROCEDURE — 85730 THROMBOPLASTIN TIME PARTIAL: CPT

## 2025-03-01 PROCEDURE — 84443 ASSAY THYROID STIM HORMONE: CPT

## 2025-03-01 PROCEDURE — 81003 URINALYSIS AUTO W/O SCOPE: CPT

## 2025-03-01 PROCEDURE — 80053 COMPREHEN METABOLIC PANEL: CPT

## 2025-03-01 PROCEDURE — 85610 PROTHROMBIN TIME: CPT

## 2025-03-01 PROCEDURE — 85025 COMPLETE CBC W/AUTO DIFF WBC: CPT

## 2025-03-02 LAB
ABO GROUP BLD: NORMAL
BLD GP AB SCN SERPL QL: NEGATIVE
MRSA NOSE QL CULT: NORMAL
RH BLD: POSITIVE
SPECIMEN EXPIRATION DATE: NORMAL

## 2025-03-05 ENCOUNTER — CONSULT (OUTPATIENT)
Dept: FAMILY MEDICINE CLINIC | Facility: CLINIC | Age: 50
End: 2025-03-05
Payer: COMMERCIAL

## 2025-03-05 VITALS
HEIGHT: 69 IN | WEIGHT: 293 LBS | BODY MASS INDEX: 43.4 KG/M2 | HEART RATE: 82 BPM | SYSTOLIC BLOOD PRESSURE: 120 MMHG | RESPIRATION RATE: 14 BRPM | OXYGEN SATURATION: 98 % | DIASTOLIC BLOOD PRESSURE: 80 MMHG | TEMPERATURE: 98.1 F

## 2025-03-05 DIAGNOSIS — R29.898 WEAKNESS OF BOTH LEGS: ICD-10-CM

## 2025-03-05 DIAGNOSIS — Z01.818 PRE-OP EXAM: Primary | ICD-10-CM

## 2025-03-05 DIAGNOSIS — M54.16 LUMBAR RADICULOPATHY: ICD-10-CM

## 2025-03-05 DIAGNOSIS — M48.062 LUMBAR STENOSIS WITH NEUROGENIC CLAUDICATION: ICD-10-CM

## 2025-03-05 PROCEDURE — 99215 OFFICE O/P EST HI 40 MIN: CPT | Performed by: FAMILY MEDICINE

## 2025-03-05 PROCEDURE — 93000 ELECTROCARDIOGRAM COMPLETE: CPT | Performed by: FAMILY MEDICINE

## 2025-03-05 NOTE — PROGRESS NOTES
Name: Tessa Olivarez      : 1975      MRN: [id number]  Encounter Provider: Sara Thomson MD  Encounter Date: 3/5/2025   Encounter department: [facility]    Assessment & Plan  Pre-op exam  Labs reviewed  Pt is cleared for surgery   Orders:  •  POCT ECG    Lumbar stenosis with neurogenic claudication         Lumbar radiculopathy         Weakness of both legs              History of Present Illness     Pt is seeing for preop exam prior to back surgery       Review of Systems   Constitutional: Negative.    HENT: Negative.     Respiratory: Negative.     Cardiovascular: Negative.    Gastrointestinal: Negative.    Genitourinary: Negative.    Musculoskeletal:  Positive for back pain.   Skin: Negative.    Neurological:  Positive for weakness (both legs). Negative for dizziness.     Past Medical History:   Diagnosis Date   • Acid reflux    • Allergic 2006    Flagyl; anaphylaxis   • Anxiety    • Trejo's esophagus    • BRCA1 negative    • Cancer (HCC)    • Degenerative disc disease, thoracic 2023   • GERD (gastroesophageal reflux disease)    • Headache(784.0)    • Hiatal hernia    • Hyperlipidemia    • Kidney stone    • Latent tuberculosis by skin test     Was treated with INH for 6 months   • Ligamentum flavum hypertrophy 2023   • Lung cancer (HCC) diagnosed in    • Obesity    • Obstructive sleep apnea 2018    Mild TERESITA.  AHI 18.1   • Squamous cell skin cancer     Left buttocks   • STD (sexually transmitted disease)     HPV, HSV   • Thoracic back pain 2023   • Thoracic spondylosis with myelopathy 2023   • Urinary tract infection      Past Surgical History:   Procedure Laterality Date   • ABDOMINOPLASTY     • AUGMENTATION BREAST Bilateral    • BREAST BIOPSY Bilateral     usg bxs- negative   • BREAST SURGERY     • BRONCHOSCOPY N/A 2022    Procedure: BRONCHOSCOPY NAVIGATIONAL;  Surgeon: J Luis Arreola MD;  [facility];   Service: Thoracic   •  SECTION     • COLON SURGERY  2018    Rectocele Repair   • CYSTOSCOPY N/A 2022    Procedure: CYSTOSCOPY;  Surgeon: Red Becerril MD;  [facility];  Service: Gynecology Oncology   • EGD  2018   • ENDOBRONCHIAL ULTRASOUND (EBUS) N/A 2022    Procedure: ENDOBRONCHIAL ULTRASOUND (EBUS);  Surgeon: J Luis Arreola MD;  [facility];  Service: Thoracic   • HYSTERECTOMY     • IR BIOPSY LYMPH NODE  2022   • IR CHEST TUBE PLACEMENT  2022   • LAPAROSCOPIC CHOLECYSTECTOMY     • LIPOSUCTION     • LUNG SURGERY      VATS Lobectomy RLL   • LYMPH NODE BIOPSY  2022   • MOHS SURGERY Left 2022    left buttocks   • TX BRNCHSC INCL FLUOR GDNCE DX W/CELL WASHG SPX N/A 2022    Procedure: BRONCHOSCOPY FLEXIBLE;  Surgeon: J Luis Arreola MD;  [facility];  Service: Thoracic   • TX BRNCHSC INCL FLUOR GDNCE DX W/CELL WASHG SPX N/A 2022    Procedure: BRONCHOSCOPY FLEXIBLE;  Surgeon: Arnie Bartlett MD;  [facility];  Service: Thoracic   • TX MOLINA FACETECTOMY & FORAMOTOMY 1 VRT SGM THORACIC N/A 2023    Procedure: Open T8 spinous process, T9 total, superior T10 laminectomy for decompression;  Surgeon: Feroz Guy MD;  [facility];  Service: Neurosurgery   • TX LAPAROSCOPY W/RMVL ADNEXAL STRUCTURES Bilateral 2022    Procedure: OOPHORECTOMY W/ ROBOTICS, VAGINOTOMY;  Surgeon: Red Becerril MD;  [facility];  Service: Gynecology Oncology   • TX THORACOSCOPY W/LOBECTOMY SINGLE LOBE Right 2022    Procedure: LOBECTOMY LUNG;  Surgeon: Arnie Bartlett MD;  [facility];  Service: Thoracic   • TX THORACOSCOPY W/LOBECTOMY SINGLE LOBE Right 2022    Procedure: THORACOSCOPY VIDEO ASSISTED SURGERY (VATS);  Surgeon: Arnie Bartlett MD;  [facility];  Service: Thoracic   • REPAIR RECTOCELE     • SPINE  SURGERY     • TONSILLECTOMY  1984   • TUBAL LIGATION  2002   • UPPER GASTROINTESTINAL ENDOSCOPY  2018     Family History   Problem Relation Age of Onset   • Cancer Mother         Ovarian   • Ovarian cancer Mother         in her 40's   • Arthritis Mother         RA   • Autoimmune disease Mother         RA   • Stroke Father    • Dementia Father    • Vision loss Father    • Glaucoma Father    • Cancer Maternal Grandmother         Ovarian   • Diabetes Paternal Grandmother    • Diabetes Paternal Grandfather    • Lung cancer Paternal Uncle      Social History     Tobacco Use   • Smoking status: Every Day     Current packs/day: 0.50     Average packs/day: 0.5 packs/day for 37.2 years (18.6 ttl pk-yrs)     Types: Cigarettes     Start date: 9/1/2022   • Smokeless tobacco: Never   Vaping Use   • Vaping status: Never Used   Substance and Sexual Activity   • Alcohol use: Not Currently   • Drug use: Never   • Sexual activity: Not Currently     Partners: Male     Comment: Hysterectomy     Current Outpatient Medications on File Prior to Visit   Medication Sig   • acyclovir (ZOVIRAX) 800 mg tablet Take 0.5 tablets (400 mg total) by mouth 2 (two) times a day   • calcium carbonate (OS-LEONARD) 600 MG tablet Take 600 mg by mouth 2 (two) times a day with meals   • clobetasol propionate (CLOBEX) 0.05 % shampoo APPLY TOPICALLY TWICE WEEKLY   • diclofenac (VOLTAREN) 75 mg EC tablet TAKE 1 TABLET BY MOUTH TWICE  DAILY   • gabapentin (NEURONTIN) 600 MG tablet Take 1 tablet (600 mg total) by mouth 3 (three) times a day (Patient taking differently: Take 600 mg by mouth 2 (two) times a day)   • hydrOXYzine pamoate (VISTARIL) 50 mg capsule TAKE 1 CAPSULE BY MOUTH DAILY AS NEEDED FOR ANXIETY   • ketoconazole (NIZORAL) 2 % shampoo APPLY 1 APPLICATION TOPICALLY  TWICE WEEKLY   • levocetirizine (XYZAL) 5 MG tablet Take 1 tablet (5 mg total) by mouth every evening   • methocarbamol (ROBAXIN) 500 mg tablet TAKE 1 TABLET BY MOUTH 3 TIMES  DAILY   •  "multivitamin (THERAGRAN) TABS Take 1 tablet by mouth daily   • omeprazole (PriLOSEC) 40 MG capsule TAKE 1 CAPSULE BY MOUTH DAILY     Allergies   Allergen Reactions   • Metronidazole Anaphylaxis     \"patient states she has been in anaphylaxis due to this medication prior\"     • Nitrofurantoin Shortness Of Breath     Immunization History   Administered Date(s) Administered   • COVID-19 PFIZER VACCINE 0.3 ML IM 03/03/2021, 03/19/2021, 04/11/2021     Objective   /80 (BP Location: Left arm, Patient Position: Sitting, Cuff Size: Adult)   Pulse 82   Temp 98.1 °F (36.7 °C) (Temporal)   Resp 14   Ht 5' 9\" (1.753 m)   Wt (!) 153 kg (338 lb)   SpO2 98%   BMI 49.91 kg/m²     Physical Exam  Constitutional:       General: She is not in acute distress.     Appearance: She is obese. She is not ill-appearing.   HENT:      Nose: No congestion or rhinorrhea.      Mouth/Throat:      Pharynx: No oropharyngeal exudate or posterior oropharyngeal erythema.   Cardiovascular:      Rate and Rhythm: Normal rate and regular rhythm.      Heart sounds: No murmur heard.  Pulmonary:      Effort: Pulmonary effort is normal. No respiratory distress.      Breath sounds: No wheezing, rhonchi or rales.   Abdominal:      Palpations: Abdomen is soft.      Tenderness: There is no abdominal tenderness.   Musculoskeletal:      Right lower leg: No edema.      Left lower leg: No edema.   Neurological:      Mental Status: She is alert and oriented to person, place, and time.         "

## 2025-03-11 ENCOUNTER — ANESTHESIA EVENT (OUTPATIENT)
Dept: PERIOP | Facility: HOSPITAL | Age: 50
DRG: 451 | End: 2025-03-11
Payer: COMMERCIAL

## 2025-03-11 NOTE — PRE-PROCEDURE INSTRUCTIONS
Pre-Surgery Instructions:   Medication Instructions    acyclovir (ZOVIRAX) 800 mg tablet Take day of surgery.    Calcium-Phosphorus-Vitamin D (CALCIUM/D3 ADULT GUMMIES PO) Stop taking 7 days prior to surgery.    clobetasol propionate (CLOBEX) 0.05 % shampoo Hold day of surgery.    diclofenac (VOLTAREN) 75 mg EC tablet Instructions provided by MD    gabapentin (NEURONTIN) 600 MG tablet Take day of surgery.    hydrOXYzine pamoate (VISTARIL) 50 mg capsule Uses PRN- OK to take day of surgery    ketoconazole (NIZORAL) 2 % shampoo Hold day of surgery.    levocetirizine (XYZAL) 5 MG tablet Take night before surgery    methocarbamol (ROBAXIN) 500 mg tablet Take day of surgery.    multivitamin (THERAGRAN) TABS Stop taking 7 days prior to surgery.    omeprazole (PriLOSEC) 40 MG capsule Take day of surgery.    Medication instructions for day of surgery reviewed. Please take all instructed medications with only a sip of water.       You will receive a call one business day prior to surgery with an arrival time and hospital directions. If your surgery is scheduled on a Monday, the hospital will be calling you on the Friday prior to your surgery. If you have not heard from anyone by 8pm, please call the hospital supervisor through the hospital  at [phone]. ([place] [phone] or [place] [phone]).    Do not eat or drink anything after midnight the night before your surgery, including candy, mints, lifesavers, or chewing gum. Do not drink alcohol 24hrs before your surgery. Try not to smoke at least 24hrs before your surgery.       Follow the pre surgery showering instructions as listed in the “My Surgical Experience Booklet” or otherwise provided by your surgeon's office. Do not use a blade to shave the surgical area 1 week before surgery. It is okay to use a clean electric clippers up to 24 hours before surgery. Do not apply any lotions, creams, including makeup, cologne, deodorant, or perfumes after  showering on the day of your surgery. Do not use dry shampoo, hair spray, hair gel, or any type of hair products.     No contact lenses, eye make-up, or artificial eyelashes. Remove nail polish, including gel polish, and any artificial, gel, or acrylic nails if possible. Remove all jewelry including rings and body piercing jewelry.     Wear causal clothing that is easy to take on and off. Consider your type of surgery.    Keep any valuables, jewelry, piercings at home. Please bring any specially ordered equipment (sling, braces) if indicated.    Arrange for a responsible person to drive you to and from the hospital on the day of your surgery. Please confirm the visitor policy for the day of your procedure when you receive your phone call with an arrival time.     Call the surgeon's office with any new illnesses, exposures, or additional questions prior to surgery.    Please reference your “My Surgical Experience Booklet” for additional information to prepare for your upcoming surgery.

## 2025-03-12 ENCOUNTER — TELEPHONE (OUTPATIENT)
Dept: NEUROSURGERY | Facility: CLINIC | Age: 50
End: 2025-03-12

## 2025-03-12 DIAGNOSIS — M62.838 MUSCLE SPASM: ICD-10-CM

## 2025-03-12 NOTE — TELEPHONE ENCOUNTER
Message sent to Dr Guy to please advise if it was appropriate and ok to proceed with surgery as her transportation after surgery, as when patient was speaking with PAT's nurse she stated she does not have a  to transport home after surgery and she plans on using Ly[employer] for transportation home.     Per Dr Guy ok to proceed with arranged transportation for after surgery.

## 2025-03-13 RX ORDER — METHOCARBAMOL 500 MG/1
500 TABLET, FILM COATED ORAL 2 TIMES DAILY
Qty: 180 TABLET | Refills: 0 | Status: SHIPPED | OUTPATIENT
Start: 2025-03-13

## 2025-03-20 ENCOUNTER — HOSPITAL ENCOUNTER (INPATIENT)
Facility: HOSPITAL | Age: 50
LOS: 4 days | Discharge: HOME | DRG: 451 | End: 2025-03-24
Attending: NEUROLOGICAL SURGERY | Admitting: NEUROLOGICAL SURGERY
Payer: COMMERCIAL

## 2025-03-20 ENCOUNTER — ANESTHESIA (OUTPATIENT)
Dept: PERIOP | Facility: HOSPITAL | Age: 50
DRG: 451 | End: 2025-03-20
Payer: COMMERCIAL

## 2025-03-20 ENCOUNTER — APPOINTMENT (OUTPATIENT)
Dept: RADIOLOGY | Facility: HOSPITAL | Age: 50
DRG: 451 | End: 2025-03-20
Payer: COMMERCIAL

## 2025-03-20 DIAGNOSIS — M48.062 LUMBAR STENOSIS WITH NEUROGENIC CLAUDICATION: ICD-10-CM

## 2025-03-20 DIAGNOSIS — G89.18 POSTOPERATIVE PAIN AFTER SPINAL SURGERY: ICD-10-CM

## 2025-03-20 DIAGNOSIS — M54.16 LUMBAR RADICULOPATHY: Primary | ICD-10-CM

## 2025-03-20 DIAGNOSIS — E66.01 CLASS 3 SEVERE OBESITY DUE TO EXCESS CALORIES WITHOUT SERIOUS COMORBIDITY WITH BODY MASS INDEX (BMI) OF 45.0 TO 49.9 IN ADULT (HCC): ICD-10-CM

## 2025-03-20 DIAGNOSIS — M51.26 LUMBAR HERNIATED DISC: ICD-10-CM

## 2025-03-20 DIAGNOSIS — E66.813 CLASS 3 SEVERE OBESITY DUE TO EXCESS CALORIES WITHOUT SERIOUS COMORBIDITY WITH BODY MASS INDEX (BMI) OF 45.0 TO 49.9 IN ADULT (HCC): ICD-10-CM

## 2025-03-20 DIAGNOSIS — M48.061 SPINAL STENOSIS OF LUMBAR REGION, UNSPECIFIED WHETHER NEUROGENIC CLAUDICATION PRESENT: ICD-10-CM

## 2025-03-20 DIAGNOSIS — M43.16 SPONDYLOLISTHESIS OF LUMBAR REGION: ICD-10-CM

## 2025-03-20 DIAGNOSIS — Z01.818 PRE-PROCEDURAL EXAMINATION: ICD-10-CM

## 2025-03-20 PROCEDURE — C1781 MESH (IMPLANTABLE): HCPCS | Performed by: NEUROLOGICAL SURGERY

## 2025-03-20 PROCEDURE — C1713 ANCHOR/SCREW BN/BN,TIS/BN: HCPCS | Performed by: NEUROLOGICAL SURGERY

## 2025-03-20 PROCEDURE — 99024 POSTOP FOLLOW-UP VISIT: CPT | Performed by: NEUROLOGICAL SURGERY

## 2025-03-20 PROCEDURE — 72100 X-RAY EXAM L-S SPINE 2/3 VWS: CPT

## 2025-03-20 PROCEDURE — 0SB40ZZ EXCISION OF LUMBOSACRAL DISC, OPEN APPROACH: ICD-10-PCS | Performed by: NEUROLOGICAL SURGERY

## 2025-03-20 PROCEDURE — 0SB20ZZ EXCISION OF LUMBAR VERTEBRAL DISC, OPEN APPROACH: ICD-10-PCS | Performed by: NEUROLOGICAL SURGERY

## 2025-03-20 PROCEDURE — 20930 SP BONE ALGRFT MORSEL ADD-ON: CPT | Performed by: NEUROLOGICAL SURGERY

## 2025-03-20 PROCEDURE — 20936 SP BONE AGRFT LOCAL ADD-ON: CPT | Performed by: NEUROLOGICAL SURGERY

## 2025-03-20 PROCEDURE — 22630 ARTHRD PST TQ 1NTRSPC LUM: CPT | Performed by: NEUROLOGICAL SURGERY

## 2025-03-20 PROCEDURE — 22840 INSERT SPINE FIXATION DEVICE: CPT | Performed by: NEUROLOGICAL SURGERY

## 2025-03-20 PROCEDURE — 63030 LAMOT DCMPRN NRV RT 1 LMBR: CPT | Performed by: NEUROLOGICAL SURGERY

## 2025-03-20 PROCEDURE — 22853 INSJ BIOMECHANICAL DEVICE: CPT | Performed by: NEUROLOGICAL SURGERY

## 2025-03-20 PROCEDURE — 61783 SCAN PROC SPINAL: CPT | Performed by: NEUROLOGICAL SURGERY

## 2025-03-20 PROCEDURE — 0SG00AJ FUSION OF LUMBAR VERTEBRAL JOINT WITH INTERBODY FUSION DEVICE, POSTERIOR APPROACH, ANTERIOR COLUMN, OPEN APPROACH: ICD-10-PCS | Performed by: NEUROLOGICAL SURGERY

## 2025-03-20 PROCEDURE — 63052 LAM FACETC/FRMT ARTHRD LUM 1: CPT | Performed by: NEUROLOGICAL SURGERY

## 2025-03-20 DEVICE — ROD 641003045 45MM CAPPED ROD 4.75MM CCM
Type: IMPLANTABLE DEVICE | Site: SPINE LUMBAR | Status: FUNCTIONAL
Brand: CD HORIZON® SOLERA® SPINAL SYSTEM

## 2025-03-20 DEVICE — SCREW SET 4.75MM SOLERA PERC: Type: IMPLANTABLE DEVICE | Site: SPINE LUMBAR | Status: FUNCTIONAL

## 2025-03-20 DEVICE — SPACER 6068096 CATALYFT PL LONG 9MM
Type: IMPLANTABLE DEVICE | Site: SPINE LUMBAR | Status: FUNCTIONAL
Brand: CATALYFT PL EXPANDABLE INTERBODY SYSTEM

## 2025-03-20 DEVICE — GRAFT DBF WITH DELIVERY TUBES 12ML: Type: IMPLANTABLE DEVICE | Site: SPINE LUMBAR | Status: FUNCTIONAL

## 2025-03-20 DEVICE — GRAFTON DBF INJ 9ML: Type: IMPLANTABLE DEVICE | Site: SPINE LUMBAR | Status: FUNCTIONAL

## 2025-03-20 DEVICE — SCREW 54850046550 4.75VOYAGER ATS 6.5X50
Type: IMPLANTABLE DEVICE | Site: SPINE LUMBAR | Status: FUNCTIONAL
Brand: CD HORIZON® SOLERA® VOYAGER™ SPINAL SYSTEM

## 2025-03-20 RX ORDER — CEFAZOLIN SODIUM 1 G/50ML
1000 SOLUTION INTRAVENOUS ONCE
Status: DISCONTINUED | OUTPATIENT
Start: 2025-03-20 | End: 2025-03-24 | Stop reason: HOSPADM

## 2025-03-20 RX ORDER — OXYCODONE HYDROCHLORIDE 5 MG/1
5 TABLET ORAL EVERY 4 HOURS PRN
Refills: 0 | Status: DISCONTINUED | OUTPATIENT
Start: 2025-03-20 | End: 2025-03-21

## 2025-03-20 RX ORDER — ACETAMINOPHEN 325 MG/1
650 TABLET ORAL EVERY 4 HOURS PRN
Status: DISCONTINUED | OUTPATIENT
Start: 2025-03-20 | End: 2025-03-20 | Stop reason: SDUPTHER

## 2025-03-20 RX ORDER — CEFAZOLIN SODIUM 1 G/3ML
INJECTION, POWDER, FOR SOLUTION INTRAMUSCULAR; INTRAVENOUS AS NEEDED
Status: DISCONTINUED | OUTPATIENT
Start: 2025-03-20 | End: 2025-03-20

## 2025-03-20 RX ORDER — OXYCODONE HYDROCHLORIDE 10 MG/1
10 TABLET ORAL EVERY 4 HOURS PRN
Refills: 0 | Status: DISCONTINUED | OUTPATIENT
Start: 2025-03-20 | End: 2025-03-21

## 2025-03-20 RX ORDER — HYDROMORPHONE HCL/PF 1 MG/ML
0.5 SYRINGE (ML) INJECTION EVERY 2 HOUR PRN
Status: DISCONTINUED | OUTPATIENT
Start: 2025-03-20 | End: 2025-03-21

## 2025-03-20 RX ORDER — HYDROMORPHONE HCL/PF 1 MG/ML
SYRINGE (ML) INJECTION CONTINUOUS PRN
Status: DISCONTINUED | OUTPATIENT
Start: 2025-03-20 | End: 2025-03-20

## 2025-03-20 RX ORDER — KETAMINE HCL IN NACL, ISO-OSM 100MG/10ML
SYRINGE (ML) INJECTION AS NEEDED
Status: DISCONTINUED | OUTPATIENT
Start: 2025-03-20 | End: 2025-03-20

## 2025-03-20 RX ORDER — ONDANSETRON 2 MG/ML
4 INJECTION INTRAMUSCULAR; INTRAVENOUS EVERY 4 HOURS PRN
Status: DISCONTINUED | OUTPATIENT
Start: 2025-03-20 | End: 2025-03-24 | Stop reason: HOSPADM

## 2025-03-20 RX ORDER — CEFAZOLIN SODIUM 2 G/50ML
2000 SOLUTION INTRAVENOUS EVERY 8 HOURS
Status: COMPLETED | OUTPATIENT
Start: 2025-03-20 | End: 2025-03-21

## 2025-03-20 RX ORDER — CEFAZOLIN SODIUM 2 G/50ML
2000 SOLUTION INTRAVENOUS ONCE
Status: COMPLETED | OUTPATIENT
Start: 2025-03-20 | End: 2025-03-20

## 2025-03-20 RX ORDER — SUCCINYLCHOLINE/SOD CL,ISO/PF 100 MG/5ML
SYRINGE (ML) INTRAVENOUS AS NEEDED
Status: DISCONTINUED | OUTPATIENT
Start: 2025-03-20 | End: 2025-03-20

## 2025-03-20 RX ORDER — DIPHENHYDRAMINE HYDROCHLORIDE 50 MG/ML
12.5 INJECTION, SOLUTION INTRAMUSCULAR; INTRAVENOUS ONCE AS NEEDED
Status: DISCONTINUED | OUTPATIENT
Start: 2025-03-20 | End: 2025-03-20 | Stop reason: HOSPADM

## 2025-03-20 RX ORDER — DOCUSATE SODIUM 100 MG/1
100 CAPSULE, LIQUID FILLED ORAL 2 TIMES DAILY
Status: DISCONTINUED | OUTPATIENT
Start: 2025-03-20 | End: 2025-03-24 | Stop reason: HOSPADM

## 2025-03-20 RX ORDER — FENTANYL CITRATE 50 UG/ML
INJECTION, SOLUTION INTRAMUSCULAR; INTRAVENOUS AS NEEDED
Status: DISCONTINUED | OUTPATIENT
Start: 2025-03-20 | End: 2025-03-20

## 2025-03-20 RX ORDER — ONDANSETRON 2 MG/ML
INJECTION INTRAMUSCULAR; INTRAVENOUS AS NEEDED
Status: DISCONTINUED | OUTPATIENT
Start: 2025-03-20 | End: 2025-03-20

## 2025-03-20 RX ORDER — ENOXAPARIN SODIUM 100 MG/ML
40 INJECTION SUBCUTANEOUS DAILY
Status: DISCONTINUED | OUTPATIENT
Start: 2025-03-20 | End: 2025-03-20

## 2025-03-20 RX ORDER — LIDOCAINE HYDROCHLORIDE AND EPINEPHRINE 10; 10 MG/ML; UG/ML
INJECTION, SOLUTION INFILTRATION; PERINEURAL AS NEEDED
Status: DISCONTINUED | OUTPATIENT
Start: 2025-03-20 | End: 2025-03-20 | Stop reason: HOSPADM

## 2025-03-20 RX ORDER — METHOCARBAMOL 500 MG/1
750 TABLET, FILM COATED ORAL 4 TIMES DAILY PRN
Status: DISCONTINUED | OUTPATIENT
Start: 2025-03-20 | End: 2025-03-20

## 2025-03-20 RX ORDER — LORATADINE 10 MG/1
5 TABLET ORAL DAILY
Status: DISCONTINUED | OUTPATIENT
Start: 2025-03-20 | End: 2025-03-24 | Stop reason: HOSPADM

## 2025-03-20 RX ORDER — PROPOFOL 10 MG/ML
INJECTION, EMULSION INTRAVENOUS CONTINUOUS PRN
Status: DISCONTINUED | OUTPATIENT
Start: 2025-03-20 | End: 2025-03-20

## 2025-03-20 RX ORDER — ACETAMINOPHEN 10 MG/ML
INJECTION, SOLUTION INTRAVENOUS AS NEEDED
Status: DISCONTINUED | OUTPATIENT
Start: 2025-03-20 | End: 2025-03-20

## 2025-03-20 RX ORDER — CHLORHEXIDINE GLUCONATE ORAL RINSE 1.2 MG/ML
15 SOLUTION DENTAL ONCE
Status: COMPLETED | OUTPATIENT
Start: 2025-03-20 | End: 2025-03-20

## 2025-03-20 RX ORDER — GABAPENTIN 300 MG/1
600 CAPSULE ORAL 3 TIMES DAILY
Status: DISCONTINUED | OUTPATIENT
Start: 2025-03-20 | End: 2025-03-24 | Stop reason: HOSPADM

## 2025-03-20 RX ORDER — CEFAZOLIN SODIUM 1 G/50ML
1000 SOLUTION INTRAVENOUS EVERY 8 HOURS
Status: DISCONTINUED | OUTPATIENT
Start: 2025-03-20 | End: 2025-03-20

## 2025-03-20 RX ORDER — EPHEDRINE SULFATE 50 MG/ML
INJECTION INTRAVENOUS AS NEEDED
Status: DISCONTINUED | OUTPATIENT
Start: 2025-03-20 | End: 2025-03-20

## 2025-03-20 RX ORDER — METHOCARBAMOL 500 MG/1
750 TABLET, FILM COATED ORAL EVERY 6 HOURS SCHEDULED
Status: DISCONTINUED | OUTPATIENT
Start: 2025-03-20 | End: 2025-03-20

## 2025-03-20 RX ORDER — FENTANYL CITRATE/PF 50 MCG/ML
50 SYRINGE (ML) INJECTION
Refills: 0 | Status: DISCONTINUED | OUTPATIENT
Start: 2025-03-20 | End: 2025-03-20 | Stop reason: HOSPADM

## 2025-03-20 RX ORDER — SODIUM CHLORIDE 9 MG/ML
INJECTION, SOLUTION INTRAVENOUS CONTINUOUS PRN
Status: DISCONTINUED | OUTPATIENT
Start: 2025-03-20 | End: 2025-03-20

## 2025-03-20 RX ORDER — MIDAZOLAM HYDROCHLORIDE 2 MG/2ML
INJECTION, SOLUTION INTRAMUSCULAR; INTRAVENOUS AS NEEDED
Status: DISCONTINUED | OUTPATIENT
Start: 2025-03-20 | End: 2025-03-20

## 2025-03-20 RX ORDER — HYDROXYZINE PAMOATE 25 MG/1
50 CAPSULE ORAL DAILY
Status: DISCONTINUED | OUTPATIENT
Start: 2025-03-20 | End: 2025-03-24 | Stop reason: HOSPADM

## 2025-03-20 RX ORDER — GLYCOPYRROLATE 0.2 MG/ML
INJECTION INTRAMUSCULAR; INTRAVENOUS AS NEEDED
Status: DISCONTINUED | OUTPATIENT
Start: 2025-03-20 | End: 2025-03-20

## 2025-03-20 RX ORDER — PANTOPRAZOLE SODIUM 40 MG/1
40 TABLET, DELAYED RELEASE ORAL
Status: DISCONTINUED | OUTPATIENT
Start: 2025-03-21 | End: 2025-03-24 | Stop reason: HOSPADM

## 2025-03-20 RX ORDER — METHYLPREDNISOLONE ACETATE 80 MG/ML
INJECTION, SUSPENSION INTRA-ARTICULAR; INTRALESIONAL; INTRAMUSCULAR; SOFT TISSUE AS NEEDED
Status: DISCONTINUED | OUTPATIENT
Start: 2025-03-20 | End: 2025-03-20 | Stop reason: HOSPADM

## 2025-03-20 RX ORDER — ONDANSETRON 2 MG/ML
4 INJECTION INTRAMUSCULAR; INTRAVENOUS ONCE AS NEEDED
Status: DISCONTINUED | OUTPATIENT
Start: 2025-03-20 | End: 2025-03-20 | Stop reason: HOSPADM

## 2025-03-20 RX ORDER — ROCURONIUM BROMIDE 10 MG/ML
INJECTION, SOLUTION INTRAVENOUS AS NEEDED
Status: DISCONTINUED | OUTPATIENT
Start: 2025-03-20 | End: 2025-03-20

## 2025-03-20 RX ORDER — MAGNESIUM HYDROXIDE/ALUMINUM HYDROXICE/SIMETHICONE 120; 1200; 1200 MG/30ML; MG/30ML; MG/30ML
30 SUSPENSION ORAL EVERY 6 HOURS PRN
Status: DISCONTINUED | OUTPATIENT
Start: 2025-03-20 | End: 2025-03-24 | Stop reason: HOSPADM

## 2025-03-20 RX ORDER — ENOXAPARIN SODIUM 100 MG/ML
40 INJECTION SUBCUTANEOUS EVERY 12 HOURS SCHEDULED
Status: DISCONTINUED | OUTPATIENT
Start: 2025-03-21 | End: 2025-03-24 | Stop reason: HOSPADM

## 2025-03-20 RX ORDER — SODIUM CHLORIDE 9 MG/ML
125 INJECTION, SOLUTION INTRAVENOUS CONTINUOUS
Status: DISCONTINUED | OUTPATIENT
Start: 2025-03-20 | End: 2025-03-21

## 2025-03-20 RX ORDER — SODIUM CHLORIDE, SODIUM LACTATE, POTASSIUM CHLORIDE, CALCIUM CHLORIDE 600; 310; 30; 20 MG/100ML; MG/100ML; MG/100ML; MG/100ML
INJECTION, SOLUTION INTRAVENOUS CONTINUOUS PRN
Status: DISCONTINUED | OUTPATIENT
Start: 2025-03-20 | End: 2025-03-20

## 2025-03-20 RX ORDER — LIDOCAINE HYDROCHLORIDE 20 MG/ML
INJECTION, SOLUTION EPIDURAL; INFILTRATION; INTRACAUDAL; PERINEURAL AS NEEDED
Status: DISCONTINUED | OUTPATIENT
Start: 2025-03-20 | End: 2025-03-20

## 2025-03-20 RX ORDER — ENOXAPARIN SODIUM 100 MG/ML
40 INJECTION SUBCUTANEOUS DAILY
Status: CANCELLED | OUTPATIENT
Start: 2025-03-20

## 2025-03-20 RX ORDER — ACETAMINOPHEN 325 MG/1
975 TABLET ORAL EVERY 8 HOURS SCHEDULED
Status: DISCONTINUED | OUTPATIENT
Start: 2025-03-20 | End: 2025-03-21

## 2025-03-20 RX ORDER — ACYCLOVIR 800 MG/1
400 TABLET ORAL 2 TIMES DAILY
Status: DISCONTINUED | OUTPATIENT
Start: 2025-03-20 | End: 2025-03-24 | Stop reason: HOSPADM

## 2025-03-20 RX ORDER — DICLOFENAC SODIUM 75 MG/1
75 TABLET, DELAYED RELEASE ORAL 2 TIMES DAILY
Status: DISCONTINUED | OUTPATIENT
Start: 2025-03-20 | End: 2025-03-24 | Stop reason: HOSPADM

## 2025-03-20 RX ORDER — OMEPRAZOLE 20 MG/1
40 CAPSULE, DELAYED RELEASE ORAL DAILY
Status: DISCONTINUED | OUTPATIENT
Start: 2025-03-21 | End: 2025-03-20

## 2025-03-20 RX ORDER — METHOCARBAMOL 100 MG/ML
750 INJECTION, SOLUTION INTRAMUSCULAR; INTRAVENOUS ONCE
Status: COMPLETED | OUTPATIENT
Start: 2025-03-20 | End: 2025-03-20

## 2025-03-20 RX ORDER — SENNOSIDES 8.6 MG
1 TABLET ORAL DAILY
Status: DISCONTINUED | OUTPATIENT
Start: 2025-03-20 | End: 2025-03-24 | Stop reason: HOSPADM

## 2025-03-20 RX ORDER — METHOCARBAMOL 500 MG/1
750 TABLET, FILM COATED ORAL EVERY 6 HOURS SCHEDULED
Status: DISCONTINUED | OUTPATIENT
Start: 2025-03-20 | End: 2025-03-24 | Stop reason: HOSPADM

## 2025-03-20 RX ORDER — HYDROMORPHONE HCL/PF 1 MG/ML
0.5 SYRINGE (ML) INJECTION
Status: COMPLETED | OUTPATIENT
Start: 2025-03-20 | End: 2025-03-20

## 2025-03-20 RX ORDER — DEXAMETHASONE SODIUM PHOSPHATE 10 MG/ML
INJECTION, SOLUTION INTRAMUSCULAR; INTRAVENOUS AS NEEDED
Status: DISCONTINUED | OUTPATIENT
Start: 2025-03-20 | End: 2025-03-20

## 2025-03-20 RX ORDER — ENOXAPARIN SODIUM 100 MG/ML
40 INJECTION SUBCUTANEOUS EVERY 12 HOURS SCHEDULED
Status: DISCONTINUED | OUTPATIENT
Start: 2025-03-21 | End: 2025-03-21 | Stop reason: SDUPTHER

## 2025-03-20 RX ORDER — BUPIVACAINE HYDROCHLORIDE AND EPINEPHRINE 2.5; 5 MG/ML; UG/ML
INJECTION, SOLUTION EPIDURAL; INFILTRATION; INTRACAUDAL; PERINEURAL AS NEEDED
Status: DISCONTINUED | OUTPATIENT
Start: 2025-03-20 | End: 2025-03-20 | Stop reason: HOSPADM

## 2025-03-20 RX ORDER — ALBUMIN HUMAN 50 G/1000ML
SOLUTION INTRAVENOUS CONTINUOUS PRN
Status: DISCONTINUED | OUTPATIENT
Start: 2025-03-20 | End: 2025-03-20

## 2025-03-20 RX ADMIN — DEXAMETHASONE SODIUM PHOSPHATE 10 MG: 10 INJECTION INTRAMUSCULAR; INTRAVENOUS at 08:49

## 2025-03-20 RX ADMIN — FENTANYL CITRATE 100 MCG: 50 INJECTION INTRAMUSCULAR; INTRAVENOUS at 08:48

## 2025-03-20 RX ADMIN — ALBUMIN (HUMAN): 12.5 INJECTION, SOLUTION INTRAVENOUS at 12:57

## 2025-03-20 RX ADMIN — FENTANYL CITRATE 50 MCG: 50 INJECTION INTRAMUSCULAR; INTRAVENOUS at 14:57

## 2025-03-20 RX ADMIN — SODIUM CHLORIDE, SODIUM LACTATE, POTASSIUM CHLORIDE, AND CALCIUM CHLORIDE: .6; .31; .03; .02 INJECTION, SOLUTION INTRAVENOUS at 13:01

## 2025-03-20 RX ADMIN — METHOCARBAMOL 750 MG: 100 INJECTION INTRAMUSCULAR; INTRAVENOUS at 15:41

## 2025-03-20 RX ADMIN — HYDROMORPHONE HYDROCHLORIDE 0.5 MG: 1 INJECTION, SOLUTION INTRAMUSCULAR; INTRAVENOUS; SUBCUTANEOUS at 14:43

## 2025-03-20 RX ADMIN — PHENYLEPHRINE HYDROCHLORIDE 100 MCG: 10 INJECTION INTRAVENOUS at 11:24

## 2025-03-20 RX ADMIN — HYDROMORPHONE HYDROCHLORIDE 0.5 MG: 1 INJECTION, SOLUTION INTRAMUSCULAR; INTRAVENOUS; SUBCUTANEOUS at 21:00

## 2025-03-20 RX ADMIN — Medication 25 MG: at 08:48

## 2025-03-20 RX ADMIN — ROCURONIUM BROMIDE 30 MG: 10 INJECTION INTRAVENOUS at 09:06

## 2025-03-20 RX ADMIN — Medication 100 MG: at 08:49

## 2025-03-20 RX ADMIN — ACYCLOVIR 400 MG: 800 TABLET ORAL at 18:16

## 2025-03-20 RX ADMIN — FENTANYL CITRATE 50 MCG: 50 INJECTION INTRAMUSCULAR; INTRAVENOUS at 15:13

## 2025-03-20 RX ADMIN — ALBUMIN (HUMAN): 12.5 INJECTION, SOLUTION INTRAVENOUS at 12:05

## 2025-03-20 RX ADMIN — METHOCARBAMOL 750 MG: 500 TABLET ORAL at 16:46

## 2025-03-20 RX ADMIN — GABAPENTIN 600 MG: 300 CAPSULE ORAL at 18:16

## 2025-03-20 RX ADMIN — DOCUSATE SODIUM 100 MG: 100 CAPSULE, LIQUID FILLED ORAL at 18:18

## 2025-03-20 RX ADMIN — EPHEDRINE SULFATE 10 MG: 50 INJECTION INTRAVENOUS at 09:26

## 2025-03-20 RX ADMIN — PHENYLEPHRINE HYDROCHLORIDE 20 MCG/MIN: 10 INJECTION INTRAVENOUS at 08:52

## 2025-03-20 RX ADMIN — GLYCOPYRROLATE 0.2 MG: 0.2 INJECTION, SOLUTION INTRAMUSCULAR; INTRAVENOUS at 08:48

## 2025-03-20 RX ADMIN — SODIUM CHLORIDE: 0.9 INJECTION, SOLUTION INTRAVENOUS at 09:17

## 2025-03-20 RX ADMIN — PHENYLEPHRINE HYDROCHLORIDE 100 MCG: 10 INJECTION INTRAVENOUS at 08:55

## 2025-03-20 RX ADMIN — REMIFENTANIL HYDROCHLORIDE 0.15 MCG/KG/MIN: 1 INJECTION, POWDER, LYOPHILIZED, FOR SOLUTION INTRAVENOUS at 09:17

## 2025-03-20 RX ADMIN — CEFAZOLIN 3000 MG: 1 INJECTION, POWDER, FOR SOLUTION INTRAMUSCULAR; INTRAVENOUS at 13:30

## 2025-03-20 RX ADMIN — SUGAMMADEX 200 MG: 100 INJECTION, SOLUTION INTRAVENOUS at 10:28

## 2025-03-20 RX ADMIN — CEFAZOLIN SODIUM 3000 MG: 2 SOLUTION INTRAVENOUS at 09:30

## 2025-03-20 RX ADMIN — HYDROMORPHONE HYDROCHLORIDE 0.5 MG: 1 INJECTION, SOLUTION INTRAMUSCULAR; INTRAVENOUS; SUBCUTANEOUS at 16:01

## 2025-03-20 RX ADMIN — EPHEDRINE SULFATE 5 MG: 50 INJECTION INTRAVENOUS at 09:07

## 2025-03-20 RX ADMIN — HYDROMORPHONE HYDROCHLORIDE 0.5 MG: 1 INJECTION, SOLUTION INTRAMUSCULAR; INTRAVENOUS; SUBCUTANEOUS at 14:33

## 2025-03-20 RX ADMIN — STANDARDIZED SENNA CONCENTRATE 8.6 MG: 8.6 TABLET ORAL at 18:18

## 2025-03-20 RX ADMIN — Medication 10 MG: at 12:44

## 2025-03-20 RX ADMIN — PHENYLEPHRINE HYDROCHLORIDE 200 MCG: 10 INJECTION INTRAVENOUS at 09:02

## 2025-03-20 RX ADMIN — Medication 15 MG: at 10:44

## 2025-03-20 RX ADMIN — B-COMPLEX W/ C & FOLIC ACID TAB 1 TABLET: TAB at 18:18

## 2025-03-20 RX ADMIN — HYDROMORPHONE HYDROCHLORIDE 0.5 MG: 1 INJECTION, SOLUTION INTRAMUSCULAR; INTRAVENOUS; SUBCUTANEOUS at 18:26

## 2025-03-20 RX ADMIN — OXYCODONE HYDROCHLORIDE 10 MG: 10 TABLET ORAL at 20:37

## 2025-03-20 RX ADMIN — PHENYLEPHRINE HYDROCHLORIDE 100 MCG: 10 INJECTION INTRAVENOUS at 10:21

## 2025-03-20 RX ADMIN — PROPOFOL 120 MCG/KG/MIN: 10 INJECTION, EMULSION INTRAVENOUS at 09:17

## 2025-03-20 RX ADMIN — ACETAMINOPHEN 1000 MG: 10 INJECTION INTRAVENOUS at 13:46

## 2025-03-20 RX ADMIN — SODIUM CHLORIDE, SODIUM LACTATE, POTASSIUM CHLORIDE, AND CALCIUM CHLORIDE: .6; .31; .03; .02 INJECTION, SOLUTION INTRAVENOUS at 08:43

## 2025-03-20 RX ADMIN — METHOCARBAMOL 750 MG: 500 TABLET ORAL at 21:53

## 2025-03-20 RX ADMIN — HYDROMORPHONE HYDROCHLORIDE 0.5 MG: 1 INJECTION, SOLUTION INTRAMUSCULAR; INTRAVENOUS; SUBCUTANEOUS at 23:24

## 2025-03-20 RX ADMIN — OXYCODONE HYDROCHLORIDE 10 MG: 10 TABLET ORAL at 16:46

## 2025-03-20 RX ADMIN — CHLORHEXIDINE GLUCONATE 15 ML: 1.2 SOLUTION ORAL at 07:49

## 2025-03-20 RX ADMIN — LIDOCAINE HYDROCHLORIDE 100 MG: 20 INJECTION, SOLUTION EPIDURAL; INFILTRATION; INTRACAUDAL; PERINEURAL at 08:48

## 2025-03-20 RX ADMIN — ONDANSETRON 4 MG: 2 INJECTION, SOLUTION INTRAMUSCULAR; INTRAVENOUS at 13:47

## 2025-03-20 RX ADMIN — CEFAZOLIN SODIUM 2000 MG: 2 SOLUTION INTRAVENOUS at 21:54

## 2025-03-20 RX ADMIN — SODIUM CHLORIDE 125 ML/HR: 0.9 INJECTION, SOLUTION INTRAVENOUS at 16:48

## 2025-03-20 RX ADMIN — GABAPENTIN 600 MG: 300 CAPSULE ORAL at 20:37

## 2025-03-20 RX ADMIN — LORATADINE 5 MG: 10 TABLET ORAL at 18:19

## 2025-03-20 RX ADMIN — HYDROXYZINE PAMOATE 50 MG: 25 CAPSULE ORAL at 19:09

## 2025-03-20 RX ADMIN — MIDAZOLAM 2 MG: 1 INJECTION INTRAMUSCULAR; INTRAVENOUS at 08:40

## 2025-03-20 NOTE — PROGRESS NOTES
Progress Note - Neurosurgery  Name: Tessa Olivarez 49 y.o. female I MRN: [id number]  Unit/Bed#: OR POOL I Date of Admission: 3/20/2025   Date of Service: 3/20/2025 I Hospital Day: 0    Assessment & Plan  Lumbar stenosis with neurogenic claudication    Pt is 48 y/o F pmhx lumbar stenosis with neurogenic claudication, spondylolisthesis of lumbar region, TERESITA, obesity, GERD, and tobacco abuse who presented to  for elective MIS TLIF L4-5, MIS MetRx microdiscectomy left L5-S1 with Dr. Guy.     POD 0 s/p MIS TLIF L4-5 with bilateral facetectomies, MIS MetRx microdiscectomy left L5-S1    Intraoperative findings - High grade stenosis from ligamentous overgrowth and hypertrophied facets L4-5, calcified disc herniation left L5-S1    -Afebrile, VSS     Plan:   Cont IVF, IV abx   Pain control as prescribed, APS consulted   Neurovascular checks Q4H   Hypotension, urinary retention protocol   D/C fairchild when pt is able to ambulate to bathroom   Trend vitals, AM labs ordered   XR lumbar spine ordered, follow   Encourage OOB, ambulation   IS for pulm hygiene   PT / OT consult   CM consult for dispo planning   Lumbar radiculopathy  As above   Obstructive sleep apnea  Respiratory protocol ordered   Class 3 severe obesity due to excess calories without serious comorbidity with body mass index (BMI) of 45.0 to 49.9 in adult (HCC)  Lifestyle modifications discussed   Gastroesophageal reflux disease  Protonix 40 mg daily ordered   Nicotine abuse  Smokes 1/2 ppd, declines nicotine patch   Cessation encouraged.     Subjective     Pt seen and examined at bedside. Complaints of incisional back pain, rates pain 7/10. Denies fevers, chills, N/V. Denies new weakness, numbness, or tingling. Reports b/l leg weakness has decreased post operatively. Requesting something to eat. Encouraged to ambulate to the bathroom this evening for potential fairchild removal. Using IS. Denies further questions or concerns.     Objective :  Temp:  [97 °F (36.1  °C)-98.5 °F (36.9 °C)] 98.5 °F (36.9 °C)  HR:  [84-92] 88  BP: ()/(51-72) 109/53  Resp:  [12-44] 19  SpO2:  [94 %-99 %] 97 %  O2 Device: None (Room air)    I/O         03/18 0701  03/19 0700 03/19 0701  03/20 0700 03/20 0701  03/21 0700    I.V. (mL/kg)   2200 (14.6)    IV Piggyback   675    Total Intake(mL/kg)   2875 (19)    Urine (mL/kg/hr)   1225 (0.9)    Blood   75    Total Output   1300    Net   +1575                 Lines/Drains/Airways       Active Status       Name Placement date Placement time Site Days    Urethral Catheter Latex 16 Fr. 03/20/25  0928  Latex  less than 1                  Physical Exam  Vitals and nursing note reviewed.   Constitutional:       Appearance: She is obese.   HENT:      Head: Normocephalic and atraumatic.      Right Ear: External ear normal.      Left Ear: External ear normal.      Nose: Nose normal.   Eyes:      Pupils: Pupils are equal, round, and reactive to light.   Cardiovascular:      Rate and Rhythm: Normal rate and regular rhythm.   Pulmonary:      Effort: Pulmonary effort is normal.      Breath sounds: Normal breath sounds.   Abdominal:      General: Abdomen is flat. There is no distension.      Palpations: Abdomen is soft.      Tenderness: There is no abdominal tenderness.   Musculoskeletal:      Comments: Sterile Mepilex dressing exchanged. Dressing curled up, staple line exposed. Staple line C/D/I. No active drainage or bleeding. No palpable fluid collection or area of fluctuance surrounding incision sites.    Skin:     General: Skin is warm and dry.   Neurological:      Mental Status: She is alert. Mental status is at baseline.      Sensory: Sensation is intact.      Comments: Strength 5/5 b/l UE and LE    Psychiatric:         Mood and Affect: Mood normal.         Behavior: Behavior normal.         Thought Content: Thought content normal.         Judgment: Judgment normal.           Lab Results: I have reviewed the following results:  No results for input(s):  "\"WBC\", \"HGB\", \"HCT\", \"PLT\", \"BANDSPCT\", \"SODIUM\", \"K\", \"CL\", \"CO2\", \"BUN\", \"CREATININE\", \"GLUC\", \"CAIONIZED\", \"MG\", \"PHOS\", \"AST\", \"ALT\", \"ALB\", \"TBILI\", \"DBILI\", \"ALKPHOS\", \"PTT\", \"INR\", \"HSTNI0\", \"HSTNI2\", \"BNP\", \"LACTICACID\" in the last 72 hours.    Imaging Results Review: No pertinent imaging studies reviewed.  Other Study Results Review: No additional pertinent studies reviewed.    VTE Pharmacologic Prophylaxis: Enoxaparin (Lovenox)  VTE Mechanical Prophylaxis: sequential compression device and foot pump applied    Brenda Britt PA-C  "

## 2025-03-20 NOTE — OP NOTE
Neurosurgery Operative Room Note    Tessa Olivarez  3/20/2025    Pre-op Diagnosis:   Spondylolisthesis of lumbar region [M43.16]  Spinal stenosis of lumbar region, unspecified whether neurogenic claudication present [M48.061]  Lumbar herniated disc [M51.26]  Lumbar stenosis with neurogenic claudication [M48.062]    Post-op Dignosis:     Post-Op Diagnosis Codes:     * Spondylolisthesis of lumbar region [M43.16]     * Spinal stenosis of lumbar region, unspecified whether neurogenic claudication present [M48.061]     * Lumbar herniated disc [M51.26]     * Lumbar stenosis with neurogenic claudication [M48.062]    Procedure:  Procedure(s):  MIS TLIF L4-5 with bilateral facetectomies, MIS MetRx microdiscectomy left L5-S1    Surgeon: Surgeons and Role:     * Feroz Guy MD - Primary     Anesthesia: General    Staff:   Circulator: Donya Dickens RN  Radiology Technologist: Fatoumata Lagos  Relief Circulator: Hilda De Jesus RN  Relief Scrub: Hilda De Jesus RN  Scrub Person: Soledad Whitley RN  No qualified Resident was available.    Estimated Blood Loss: 75 mL    Specimens:                No specimens collected during this procedure.    Drains:   Closed/Suction Drain Left;Superior Back Bulb (Active)       Urethral Catheter Latex 16 Fr. (Active)       Findings:  high grade stenosis from ligamentous overgrowth and hypertrophied facets L4-5, calcified disc herniation left L5-S1    Complications:  none    OR note:    The patient was brought to the OR and successfully induced with general endotracheal anesthesia.  Sufficient IV access was obtained, and then the patient was rotated prone onto the Manuel table with arms in the up position.  Posterior lumbar area was prepped and draped in standard fashion.  Time-out was performed.  Patient received antibiotics per protocol.     Over the right PSIS, an incision was made after infiltrating with 1% lidocaine with epinephrine.  Dissection was carried down to the bone of the  PSIS, where a percutaneous pin for the O arm system was placed using the typical 30 degree up and out trajectory.  Patient tracker was affixed to this pin.     At this point, the O arm was brought in to obtain a 3D navigation spin.  The surgical field was draped to keep it sterile, leaving the patient tracker viewable.      Navigation was then used to identify insertion points for left MIS pedicle screws.  Skin at these sites was infiltrated with 1% lidocaine with epinephrine, and then incised in one linear incision. The Bovie was used to dissect down and incise the fascia.         The navigated Midas was used to penetrate the fascia and then dock at an entry point for a screws at left L4. At this point I performed a level verification with the circulating nurse. The Navigated Midas was used to create an entry point, then the tap on PowerEase used to cannulate the left L4 pedicle using navigation. The navigated ball tip probe was used to sound the hole and found no breaches.  A Solara Voyager screw from [employer] was placed.  The same steps were taken at left L5.     I then moved to the right side, and after identifying entry points using navigation, I made a continuous incision through the skin down to the fascia.  I then performed the same steps to  cannulate and tap the right L4 pedicle with the tap, and after sounding, placed a screw at this level. The same steps were taken at right L5.      The Space D retractor was applied on the patient right, using the previously placed screws. The muscle tissue over the right L4 lamina and L4-5 facet was dissected off using the Bovie. The navigated Midas was used to drill away these structures, with the bone dust harvested using a Lukens' trap for later use as graft. The exposed ligamentum was resected, exposing the lateral thecal sac as well as epidural veins, etc., which decompressed the lateral thecal sac. Epidural tissue was bipolar coagulated. This decompression was in  excess & in addition to the work needed for arthrodesis alone. There was no sign of durotomy.      The Space D retractor was then applied on the patient left, using the previously placed screws. The muscle tissue over the left L4 lamina and left L4-5 facet was dissected off using the Bovie. The navigated Midas was used to drill away these structures. The exposed ligamentum was resected, exposing the lateral thecal sac as well as epidural veins, etc. This decompressed the lateral thecal sac. Epidural tissue was bipolar coagulated and divided. This decompression was in excess & in addition to the work needed for arthrodesis alone. There was no sign of durotomy.     Navigation was used to identify the L4-5 disc space, and the trajectory to the disc space. Once uncovered with bipolar cautery, dissection, etc., the L4-5 disc was incised with a scalpel, and discectomy performed with pituitary rongeurs etc. Navigated Ab, were used to remove more disc material. Navigated Side-biting curettes straight and up-angled were used left and right to prepare the endplates for arthrodesis.  Additional disc material was removed from the interspace. Harvested bone dust and Angelica DBF inject was inserted ventral into the disc space to help create interbody arthrodesis.  An 9 mm by 27 mm Catalift PL interbody device from [employer] was selected, and while protecting the the thecal sac, was inserted with a mallet into the interspace while using navigation.  Once in position, it was expanded to its full height. It was back filled with additional Angelica DBF. Epidural hemostasis was achieved with thrombin-soaked Gelfoam, FloSeal, tamponade, etc.      Capped rods had been prepared, and were inserted in the designed direction, and secured with set screws.     At this point, the area was again draped, and the O arm brought in to do a 3D spin.  This confirmed good placement of all hardware.       The set screws were final tightened.  The  screw tabs were removed.     Navigation was then used to identify a trajectory to the left L5-S1 disc space. This was about 1 cm to the left of midline was marked with the parasagittal incision approximately 2 cm in length. One percent lidocaine with epinephrine was used to infiltrate the soft tissue at the incision site.       A skin scalpel was used to incise the skin. Monopolar cautery was used to dissect down and through the muscle fascia. A series of Metrx dilators were placed and a final Metrix retractor was placed. The level was verified with navigation.      Under microscopic magnification, monopolar cautery was used to remove the soft tissue off the lamina at the left L5 level. A flexible matchstick bur was used to drill the lamina as well as the medial facet and expose the underlying ligamentum flavum. The ligamentum flavum was undermined with a curved curette and removed Kerrison punch. Circumferential removal of the ligamentum flavum was performed using microdissection. Bipolar cautery and thrombin soaked gelfoam were used for hemostasis. I was able to identify the thecal sac/bypassing nerve root. This was mobilized medially and retracted. I attempted to incise the underlying disc, but it was significantly calcified. The drill was used to penetrate this outer calcified layer, and then it was resected with Kerrison punches. Once unroofed, I was able to resect and displace any herniated disc ventral to the thecal sac. The thecal sac/nerve root expanded well into the decompression. There was no sigh of durotomy. The area was copiously irrigated. 40 mg of DepoMedrol was applied epidurally.       The Metrx retractor was removed and any bleeding from soft tissue was cauterized with monopolar and bipolar cautery.       The patient tracker and percutaneous pin was removed.  All incisions were copiously irrigated with antibiotic containing saline. The fascia was reapproximated with 0 Vicryl Plus suture.  The deep  dermis was closed with inverted interrupted 2 0 Vicryl Plus suture.  The skin was closed staples.  The incisions were blocked with 0.5% Marcaine with epinephrine, 30 cc.     All instrument counts, sponge counts, and needle counts were correct prior to closure the skin.     Incisions were dressed with Acticoat, 4x4s, Tegaderm.  The drapes were withdrawn.  Patient rotated supine onto her hospital bed.  She was woken from general tracheal anesthesia, extubated, and taken to the postop anesthesia area in cardiovascularly stable condition.    This procedure took 25% longer than typical and was more difficult secondary to the patient's body habitus.     The operating microscope was used throughout the case for microdissection.    Feroz Guy  03/20/25  2:15 PM

## 2025-03-20 NOTE — PLAN OF CARE
Problem: PAIN - ADULT  Goal: Verbalizes/displays adequate comfort level or baseline comfort level  Description: Interventions:  - Encourage patient to monitor pain and request assistance  - Assess pain using appropriate pain scale  - Administer analgesics based on type and severity of pain and evaluate response  - Implement non-pharmacological measures as appropriate and evaluate response  - Consider cultural and social influences on pain and pain management  - Notify physician/advanced practitioner if interventions unsuccessful or patient reports new pain  Outcome: Progressing     Problem: INFECTION - ADULT  Goal: Absence or prevention of progression during hospitalization  Description: INTERVENTIONS:  - Assess and monitor for signs and symptoms of infection  - Monitor lab/diagnostic results  - Monitor all insertion sites, i.e. indwelling lines, tubes, and drains  - Monitor endotracheal if appropriate and nasal secretions for changes in amount and color  - Patterson appropriate cooling/warming therapies per order  - Administer medications as ordered  - Instruct and encourage patient and family to use good hand hygiene technique  - Identify and instruct in appropriate isolation precautions for identified infection/condition  Outcome: Progressing  Goal: Absence of fever/infection during neutropenic period  Description: INTERVENTIONS:  - Monitor WBC    Outcome: Progressing     Problem: SAFETY ADULT  Goal: Patient will remain free of falls  Description: INTERVENTIONS:  - Educate patient/family on patient safety including physical limitations  - Instruct patient to call for assistance with activity   - Consult OT/PT to assist with strengthening/mobility   - Keep Call bell within reach  - Keep bed low and locked with side rails adjusted as appropriate  - Keep care items and personal belongings within reach  - Initiate and maintain comfort rounds  - Make Fall Risk Sign visible to staff  - Offer Toileting every x Hours,  in advance of need  - Initiate/Maintain xalarm  - Obtain necessary fall risk management equipment: x  - Apply yellow socks and bracelet for high fall risk patients  - Consider moving patient to room near nurses station  Outcome: Progressing  Goal: Maintain or return to baseline ADL function  Description: INTERVENTIONS:  -  Assess patient's ability to carry out ADLs; assess patient's baseline for ADL function and identify physical deficits which impact ability to perform ADLs (bathing, care of mouth/teeth, toileting, grooming, dressing, etc.)  - Assess/evaluate cause of self-care deficits   - Assess range of motion  - Assess patient's mobility; develop plan if impaired  - Assess patient's need for assistive devices and provide as appropriate  - Encourage maximum independence but intervene and supervise when necessary  - Involve family in performance of ADLs  - Assess for home care needs following discharge   - Consider OT consult to assist with ADL evaluation and planning for discharge  - Provide patient education as appropriate  Outcome: Progressing  Goal: Maintains/Returns to pre admission functional level  Description: INTERVENTIONS:  - Perform AM-PAC 6 Click Basic Mobility/ Daily Activity assessment daily.  - Set and communicate daily mobility goal to care team and patient/family/caregiver.   - Collaborate with rehabilitation services on mobility goals if consulted  - Perform Range of Motion x times a day.  - Reposition patient every x hours.  - Dangle patient x times a day  - Stand patient x times a day  - Ambulate patient x times a day  - Out of bed to chair x times a day   - Out of bed for meals x times a day  - Out of bed for toileting  - Record patient progress and toleration of activity level   Outcome: Progressing     Problem: DISCHARGE PLANNING  Goal: Discharge to home or other facility with appropriate resources  Description: INTERVENTIONS:  - Identify barriers to discharge w/patient and caregiver  -  Arrange for needed discharge resources and transportation as appropriate  - Identify discharge learning needs (meds, wound care, etc.)  - Arrange for interpretive services to assist at discharge as needed  - Refer to Case Management Department for coordinating discharge planning if the patient needs post-hospital services based on physician/advanced practitioner order or complex needs related to functional status, cognitive ability, or social support system  Outcome: Progressing     Problem: Knowledge Deficit  Goal: Patient/family/caregiver demonstrates understanding of disease process, treatment plan, medications, and discharge instructions  Description: Complete learning assessment and assess knowledge base.  Interventions:  - Provide teaching at level of understanding  - Provide teaching via preferred learning methods  Outcome: Progressing     Problem: MOBILITY - ADULT  Goal: Maintain or return to baseline ADL function  Description: INTERVENTIONS:  -  Assess patient's ability to carry out ADLs; assess patient's baseline for ADL function and identify physical deficits which impact ability to perform ADLs (bathing, care of mouth/teeth, toileting, grooming, dressing, etc.)  - Assess/evaluate cause of self-care deficits   - Assess range of motion  - Assess patient's mobility; develop plan if impaired  - Assess patient's need for assistive devices and provide as appropriate  - Encourage maximum independence but intervene and supervise when necessary  - Involve family in performance of ADLs  - Assess for home care needs following discharge   - Consider OT consult to assist with ADL evaluation and planning for discharge  - Provide patient education as appropriate  Outcome: Progressing  Goal: Maintains/Returns to pre admission functional level  Description: INTERVENTIONS:  - Perform AM-PAC 6 Click Basic Mobility/ Daily Activity assessment daily.  - Set and communicate daily mobility goal to care team and  patient/family/caregiver.   - Collaborate with rehabilitation services on mobility goals if consulted  - Perform Range of Motion x times a day.  - Reposition patient every x hours.  - Dangle patient x times a day  - Stand patient x times a day  - Ambulate patient x times a day  - Out of bed to chair x times a day   - Out of bed for meals x times a day  - Out of bed for toileting  - Record patient progress and toleration of activity level   Outcome: Progressing

## 2025-03-20 NOTE — TREATMENT PLAN
Was reached out by nursing staff in regards to patient's dressing soaked through with blood.  Spoke with nurse to reinforce as needed.  Also patient sounded very hoarse and complaining that something was stuck in her throat per RN.  No respiratory distress oxygen saturation 97 on 2 L.  Spoke with general surgery KULDIP Gutierrez who will go and assess patient.  Recommend IS.

## 2025-03-20 NOTE — ANESTHESIA POSTPROCEDURE EVALUATION
Post-Op Assessment Note    CV Status:  Stable    Pain management: adequate       Mental Status:  Alert and awake   Hydration Status:  Euvolemic   PONV Controlled:  Controlled   Airway Patency:  Patent     Post Op Vitals Reviewed: Yes    No anethesia notable event occurred.    Staff: CRNA           Last Filed PACU Vitals:  Vitals Value Taken Time   Temp 98.5 °F (36.9 °C) 03/20/25 1429   Pulse 92 03/20/25 1429   /65 03/20/25 1429   Resp 12 03/20/25 1429   SpO2 99 % 03/20/25 1429

## 2025-03-20 NOTE — ANESTHESIA PREPROCEDURE EVALUATION
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      Anesthesia Plan  ASA Score- 3     Anesthesia Type- general with ASA Monitors.         Additional Monitors:     Airway Plan:            Plan Factors-    Chart reviewed. EKG reviewed.  Existing labs reviewed. Patient summary reviewed.                  Induction- intravenous.    Postoperative Plan- Plan for postoperative opioid use.         Informed Consent- Anesthetic plan and risks discussed with patient.  I personally reviewed this patient with the CRNA. Discussed and agreed on the Anesthesia Plan with the CRNA..      NPO Status:  Vitals Value Taken Time   Date of last liquid 03/19/25 03/20/25 0704   Time of last liquid 2100 03/20/25 0704   Date of last solid 03/19/25 03/20/25 0704   Time of last solid 1900 03/20/25 0704

## 2025-03-20 NOTE — DISCHARGE INSTR - AVS FIRST PAGE
Discharge Instructions  Posterior Lumbar Fusion/Fixation      Surgical incisional care:  Maintain dressing in place for 3 days. On postoperative day 3, dressing may be removed and incision may be left open to air.  Keep incision clean and dry. Avoid applying creams, lotion or antiseptic to incision area.  Check the wound daily. If the incision becomes red, swollen, tender, warm, or has increased drainage please notify physician immediately.  If steri-strips are in place, allow them to fall off.  If still in place at two week postoperative visit, we will remove them.  May shower 3 days after surgery, but do not soak in a tub and no swimming until cleared.  Incision may be cleaned with water and a mild antimicrobial soap using a clean washcloth. Incision is to be gently patted dry with a clean towel.   Continue to change bed linens and pajamas more frequently. Wear clean clothes daily.     Activity Restrictions:  No heavy lifting greater than 10lbs and no strenuous activities until cleared.   No bending or twisting. No BLTs (bending, lifting, twisting). Avoid pushing/pulling movements.  May walk as tolerated. Encourage at least 4 short walks per day. No prolonged sitting.   No driving for at least 2 weeks or until cleared by physician.  If you were provided a brace, it is to be worn whenever you are out of bed until directed otherwise. It may be put on while sitting at bedside.     Postoperative medication:  Take pain medications to relieve incision pain, and muscle relaxants to prevent spasms as directed. Please see after visit summary (AVS) for details.   Do not operate heavy machinery or vehicles while taking sedating medications.  Use a bowel regimen while on opioids as they induce constipation. Ie. Senokot-S, Miralax, Colace, etc. Increase fiber and water intake.   Do not try to take ibuprofen, Naproxen/Aleve or any non steroidal anti-inflammatory medications, NSAIDs, until cleared by surgeon. May take Tylenol  instead. If needed, you may take nsaids.  If taking Coumadin, Aspirin, or Plavix, you may resume these medications when cleared by Neurosurgery.    Follow-up as scheduled for a 2 week incision check. Follow-up as scheduled for 6 week postoperative visit with repeat Xrays.     **Please notify MD if you experience a fever 101F, chills or have increased pain, numbness, tingling, or weakness in your legs, increased walking difficulties and/or bowel/bladder dysfunction/incontinence**

## 2025-03-20 NOTE — ASSESSMENT & PLAN NOTE
Pt is 48 y/o F pmhx lumbar stenosis with neurogenic claudication, spondylolisthesis of lumbar region, TERESITA, obesity, GERD, and tobacco abuse who presented to [facility] for elective MIS TLIF L4-5, MIS [facility] microdiscectomy left L5-S1 with Dr. Guy.     POD 0 s/p MIS TLIF L4-5 with bilateral facetectomies, MIS [employer] microdiscectomy left L5-S1    Intraoperative findings - High grade stenosis from ligamentous overgrowth and hypertrophied facets L4-5, calcified disc herniation left L5-S1    -Afebrile, VSS     Plan:   Cont IVF, IV abx   Pain control as prescribed, APS consulted   Neurovascular checks Q4H   Hypotension, urinary retention protocol   D/C fairchild when pt is able to ambulate to bathroom   Trend vitals, AM labs ordered   XR lumbar spine ordered, follow   Encourage OOB, ambulation   IS for pulm hygiene   PT / OT consult   CM consult for dispo planning

## 2025-03-21 ENCOUNTER — APPOINTMENT (INPATIENT)
Dept: RADIOLOGY | Facility: HOSPITAL | Age: 50
DRG: 451 | End: 2025-03-21
Payer: COMMERCIAL

## 2025-03-21 PROBLEM — G89.18 POSTOPERATIVE PAIN AFTER SPINAL SURGERY: Status: ACTIVE | Noted: 2025-03-21

## 2025-03-21 LAB
ANION GAP SERPL CALCULATED.3IONS-SCNC: 3 MMOL/L (ref 4–13)
BUN SERPL-MCNC: 12 MG/DL (ref 5–25)
CALCIUM SERPL-MCNC: 7.8 MG/DL (ref 8.4–10.2)
CHLORIDE SERPL-SCNC: 110 MMOL/L (ref 96–108)
CO2 SERPL-SCNC: 27 MMOL/L (ref 21–32)
CREAT SERPL-MCNC: 0.78 MG/DL (ref 0.6–1.3)
ERYTHROCYTE [DISTWIDTH] IN BLOOD BY AUTOMATED COUNT: 14.1 % (ref 11.6–15.1)
GFR SERPL CREATININE-BSD FRML MDRD: 88 ML/MIN/1.73SQ M
GLUCOSE SERPL-MCNC: 156 MG/DL (ref 65–140)
HCT VFR BLD AUTO: 32.4 % (ref 34.8–46.1)
HGB BLD-MCNC: 10.4 G/DL (ref 11.5–15.4)
MAGNESIUM SERPL-MCNC: 1.9 MG/DL (ref 1.9–2.7)
MCH RBC QN AUTO: 30 PG (ref 26.8–34.3)
MCHC RBC AUTO-ENTMCNC: 32.1 G/DL (ref 31.4–37.4)
MCV RBC AUTO: 93 FL (ref 82–98)
PHOSPHATE SERPL-MCNC: 2.3 MG/DL (ref 2.7–4.5)
PLATELET # BLD AUTO: 80 THOUSANDS/UL (ref 149–390)
PMV BLD AUTO: 9.6 FL (ref 8.9–12.7)
POTASSIUM SERPL-SCNC: 4.2 MMOL/L (ref 3.5–5.3)
RBC # BLD AUTO: 3.47 MILLION/UL (ref 3.81–5.12)
SODIUM SERPL-SCNC: 140 MMOL/L (ref 135–147)
WBC # BLD AUTO: 7.17 THOUSAND/UL (ref 4.31–10.16)

## 2025-03-21 PROCEDURE — 99222 1ST HOSP IP/OBS MODERATE 55: CPT

## 2025-03-21 PROCEDURE — 80048 BASIC METABOLIC PNL TOTAL CA: CPT | Performed by: NEUROLOGICAL SURGERY

## 2025-03-21 PROCEDURE — 97110 THERAPEUTIC EXERCISES: CPT

## 2025-03-21 PROCEDURE — 97163 PT EVAL HIGH COMPLEX 45 MIN: CPT

## 2025-03-21 PROCEDURE — 99024 POSTOP FOLLOW-UP VISIT: CPT | Performed by: NEUROLOGICAL SURGERY

## 2025-03-21 PROCEDURE — 84100 ASSAY OF PHOSPHORUS: CPT

## 2025-03-21 PROCEDURE — 72100 X-RAY EXAM L-S SPINE 2/3 VWS: CPT

## 2025-03-21 PROCEDURE — 83735 ASSAY OF MAGNESIUM: CPT

## 2025-03-21 PROCEDURE — 97167 OT EVAL HIGH COMPLEX 60 MIN: CPT

## 2025-03-21 PROCEDURE — 85027 COMPLETE CBC AUTOMATED: CPT | Performed by: NEUROLOGICAL SURGERY

## 2025-03-21 PROCEDURE — 97116 GAIT TRAINING THERAPY: CPT

## 2025-03-21 RX ORDER — HYDROMORPHONE HCL/PF 1 MG/ML
0.5 SYRINGE (ML) INJECTION EVERY 4 HOURS PRN
Status: DISCONTINUED | OUTPATIENT
Start: 2025-03-21 | End: 2025-03-23

## 2025-03-21 RX ORDER — OXYCODONE AND ACETAMINOPHEN 5; 325 MG/1; MG/1
1 TABLET ORAL EVERY 4 HOURS PRN
Refills: 0 | Status: DISCONTINUED | OUTPATIENT
Start: 2025-03-21 | End: 2025-03-22

## 2025-03-21 RX ORDER — OXYCODONE AND ACETAMINOPHEN 5; 325 MG/1; MG/1
1 TABLET ORAL EVERY 4 HOURS PRN
Qty: 15 TABLET | Refills: 0 | Status: CANCELLED | OUTPATIENT
Start: 2025-03-21 | End: 2025-03-24

## 2025-03-21 RX ORDER — OXYCODONE AND ACETAMINOPHEN 5; 325 MG/1; MG/1
2 TABLET ORAL EVERY 4 HOURS PRN
Refills: 0 | Status: DISCONTINUED | OUTPATIENT
Start: 2025-03-21 | End: 2025-03-22

## 2025-03-21 RX ADMIN — LORATADINE 5 MG: 10 TABLET ORAL at 22:21

## 2025-03-21 RX ADMIN — ENOXAPARIN SODIUM 40 MG: 40 INJECTION SUBCUTANEOUS at 22:23

## 2025-03-21 RX ADMIN — GABAPENTIN 600 MG: 300 CAPSULE ORAL at 08:44

## 2025-03-21 RX ADMIN — METHOCARBAMOL 750 MG: 500 TABLET ORAL at 11:35

## 2025-03-21 RX ADMIN — PANTOPRAZOLE SODIUM 40 MG: 40 TABLET, DELAYED RELEASE ORAL at 05:46

## 2025-03-21 RX ADMIN — OXYCODONE HYDROCHLORIDE 10 MG: 10 TABLET ORAL at 02:20

## 2025-03-21 RX ADMIN — OXYCODONE HYDROCHLORIDE AND ACETAMINOPHEN 2 TABLET: 5; 325 TABLET ORAL at 18:27

## 2025-03-21 RX ADMIN — HYDROMORPHONE HYDROCHLORIDE 0.5 MG: 1 INJECTION, SOLUTION INTRAMUSCULAR; INTRAVENOUS; SUBCUTANEOUS at 09:25

## 2025-03-21 RX ADMIN — OXYCODONE HYDROCHLORIDE AND ACETAMINOPHEN 2 TABLET: 5; 325 TABLET ORAL at 14:17

## 2025-03-21 RX ADMIN — ACYCLOVIR 400 MG: 800 TABLET ORAL at 08:44

## 2025-03-21 RX ADMIN — CEPHALEXIN 500 MG: 250 CAPSULE ORAL at 16:24

## 2025-03-21 RX ADMIN — HYDROXYZINE PAMOATE 50 MG: 25 CAPSULE ORAL at 08:44

## 2025-03-21 RX ADMIN — ENOXAPARIN SODIUM 40 MG: 40 INJECTION SUBCUTANEOUS at 08:44

## 2025-03-21 RX ADMIN — HYDROMORPHONE HYDROCHLORIDE 0.5 MG: 1 INJECTION, SOLUTION INTRAMUSCULAR; INTRAVENOUS; SUBCUTANEOUS at 03:02

## 2025-03-21 RX ADMIN — CEFAZOLIN SODIUM 2000 MG: 2 SOLUTION INTRAVENOUS at 05:47

## 2025-03-21 RX ADMIN — METHOCARBAMOL 750 MG: 500 TABLET ORAL at 18:26

## 2025-03-21 RX ADMIN — HYDROMORPHONE HYDROCHLORIDE 0.5 MG: 1 INJECTION, SOLUTION INTRAMUSCULAR; INTRAVENOUS; SUBCUTANEOUS at 15:33

## 2025-03-21 RX ADMIN — OXYCODONE HYDROCHLORIDE AND ACETAMINOPHEN 2 TABLET: 5; 325 TABLET ORAL at 22:21

## 2025-03-21 RX ADMIN — OXYCODONE HYDROCHLORIDE 10 MG: 10 TABLET ORAL at 06:29

## 2025-03-21 RX ADMIN — METHOCARBAMOL 750 MG: 500 TABLET ORAL at 05:46

## 2025-03-21 RX ADMIN — HYDROMORPHONE HYDROCHLORIDE 0.5 MG: 1 INJECTION, SOLUTION INTRAMUSCULAR; INTRAVENOUS; SUBCUTANEOUS at 11:37

## 2025-03-21 RX ADMIN — ACYCLOVIR 400 MG: 800 TABLET ORAL at 18:26

## 2025-03-21 RX ADMIN — OXYCODONE HYDROCHLORIDE 10 MG: 10 TABLET ORAL at 10:46

## 2025-03-21 RX ADMIN — HYDROMORPHONE HYDROCHLORIDE 0.5 MG: 1 INJECTION, SOLUTION INTRAMUSCULAR; INTRAVENOUS; SUBCUTANEOUS at 20:33

## 2025-03-21 RX ADMIN — HYDROMORPHONE HYDROCHLORIDE 0.5 MG: 1 INJECTION, SOLUTION INTRAMUSCULAR; INTRAVENOUS; SUBCUTANEOUS at 05:43

## 2025-03-21 RX ADMIN — SODIUM CHLORIDE 125 ML/HR: 0.9 INJECTION, SOLUTION INTRAVENOUS at 01:33

## 2025-03-21 RX ADMIN — CEPHALEXIN 500 MG: 250 CAPSULE ORAL at 22:21

## 2025-03-21 RX ADMIN — B-COMPLEX W/ C & FOLIC ACID TAB 1 TABLET: TAB at 08:44

## 2025-03-21 RX ADMIN — DOCUSATE SODIUM 100 MG: 100 CAPSULE, LIQUID FILLED ORAL at 18:26

## 2025-03-21 RX ADMIN — GABAPENTIN 600 MG: 300 CAPSULE ORAL at 16:24

## 2025-03-21 RX ADMIN — DOCUSATE SODIUM 100 MG: 100 CAPSULE, LIQUID FILLED ORAL at 08:44

## 2025-03-21 RX ADMIN — STANDARDIZED SENNA CONCENTRATE 8.6 MG: 8.6 TABLET ORAL at 08:44

## 2025-03-21 RX ADMIN — GABAPENTIN 600 MG: 300 CAPSULE ORAL at 22:22

## 2025-03-21 NOTE — ASSESSMENT & PLAN NOTE
Pt is 50 y/o F pmhx lumbar stenosis with neurogenic claudication, spondylolisthesis of lumbar region, TERESITA, obesity, GERD, and tobacco abuse who presented to [facility] for elective MIS TLIF L4-5, MIS [facility] microdiscectomy left L5-S1 with Dr. Guy.     POD 1 s/p MIS TLIF L4-5 with bilateral facetectomies, MIS [facility] microdiscectomy left L5-S1    Intraoperative findings - High grade stenosis from ligamentous overgrowth and hypertrophied facets L4-5, calcified disc herniation left L5-S1    -Afebrile, VSS   -wbc 7.17  -Hgb 10.4    Plan:   D/C IVF  Pain control as prescribed, APS consulted   Neurovascular checks Q4H   Hypotension, urinary retention protocol   XR lumbar spine ordered, follow   Encourage OOB, ambulation   IS for pulm hygiene   PT / OT consult   CM consult for dispo planning

## 2025-03-21 NOTE — QUICK NOTE
Patient personally seen and examined. Progressing well. PT/OT evals completed, expect D/C to home. APS eval'd, working toward PO analgesia only. Xrays with good hardware positioning. Neurologically appears baseline. States LE symptoms improved from pre-op.

## 2025-03-21 NOTE — PROGRESS NOTES
Progress Note - Neurosurgery   Name: Tessa Olivarez 49 y.o. female I MRN: [id number]  Unit/Bed#: -01 I Date of Admission: 3/20/2025   Date of Service: 3/21/2025 I Hospital Day: 1    Assessment & Plan  Lumbar stenosis with neurogenic claudication    Pt is 48 y/o F pmhx lumbar stenosis with neurogenic claudication, spondylolisthesis of lumbar region, TERESITA, obesity, GERD, and tobacco abuse who presented to  for elective MIS TLIF L4-5, MIS [facility] microdiscectomy left L5-S1 with Dr. Guy.     POD 1 s/p MIS TLIF L4-5 with bilateral facetectomies, MIS [facility] microdiscectomy left L5-S1    Intraoperative findings - High grade stenosis from ligamentous overgrowth and hypertrophied facets L4-5, calcified disc herniation left L5-S1    -Afebrile, VSS   -wbc 7.17  -Hgb 10.4    Plan:   D/C IVF  Pain control as prescribed, APS consulted   Neurovascular checks Q4H   Hypotension, urinary retention protocol   XR lumbar spine ordered, follow   Encourage OOB, ambulation   IS for pulm hygiene   PT / OT consult   CM consult for dispo planning   Lumbar radiculopathy  As above   Obstructive sleep apnea  Respiratory protocol ordered   Class 3 severe obesity due to excess calories without serious comorbidity with body mass index (BMI) of 45.0 to 49.9 in adult (HCC)  Lifestyle modifications discussed   Gastroesophageal reflux disease  Protonix 40 mg daily ordered   Nicotine abuse  Smokes 1/2 ppd, declines nicotine patch   Cessation encouraged.         Subjective   Doing well  Strength is ok, but she does feel weaker in the Left leg      Objective :  Temp:  [98.1 °F (36.7 °C)-98.5 °F (36.9 °C)] 98.1 °F (36.7 °C)  HR:  [84-97] 85  BP: ()/(51-75) 107/75  Resp:  [12-44] 20  SpO2:  [89 %-99 %] 98 %  O2 Device: None (Room air)    I/O         03/19 0701  03/20 0700 03/20 0701  03/21 0700 03/21 0701  03/22 0700    P.O.  680     I.V. (mL/kg)  3818.8 (25.3)     IV Piggyback  725     Total Intake(mL/kg)  5223.8 (34.6)     Urine (mL/kg/hr)   3100 (0.9)     Blood  75     Total Output  3175     Net  +2048.8                  Physical Exam   General appearance: alert and oriented, in no acute distress  Head: Normocephalic, without obvious abnormality, atraumatic, sclerae anicteric, mucous membranes moist  Neck: no JVD and supple, symmetrical, trachea midline  Lungs: clear to auscultation, no wheezes or rales  Heart:   regular rate, regular rhythm, S1-S2 normal, no murmur  Abdomen:  non distended, soft, no guarding, no rebound, active bowel sounds  Extremities:   No edema, redness or tenderness in the calves or thighs  Strength is 5/5, however she appears to favor R side. L side gets weaker  Sensation intact, numb in soles  Incision with drainage, dressings changed  Skin: Warm, dry  Nursing notes and vital signs reviewed        Lab Results: I have reviewed the following results:  Recent Labs     03/21/25  0554   WBC 7.17   HGB 10.4*   HCT 32.4*   PLT 80*   SODIUM 140   K 4.2   *   CO2 27   BUN 12   CREATININE 0.78   GLUC 156*       VTE Pharmacologic Prophylaxis: VTE covered by:  enoxaparin, Subcutaneous, 40 mg at 03/21/25 0822

## 2025-03-21 NOTE — ASSESSMENT & PLAN NOTE
"Patient with history of chronic low back pain in setting of lumbar stenosis.  MRI imaging 1/25: \"Multilevel spondylotic changes of the lumbar spine are not significantly changed from MRI lumbar spine dated 11/29/2023, noting marked spinal canal stenosis at L4-L5. Additionally, there is a L5-S1 disc bulge with marked left neuroforaminal narrowing as well as abutment of the traversing left S1 nerve root. See narrative above for full details.\"  Patient is status post minimally invasive TLIF L4-5, microdiscectomy left L5-S1      "

## 2025-03-21 NOTE — OCCUPATIONAL THERAPY NOTE
"    Occupational Therapy Evaluation     Patient Name: Tessa Olivarez  Today's Date: 3/21/2025  Problem List  Principal Problem:    Lumbar stenosis with neurogenic claudication  Active Problems:    Obstructive sleep apnea    Class 3 severe obesity due to excess calories without serious comorbidity with body mass index (BMI) of 45.0 to 49.9 in adult (HCC)    Gastroesophageal reflux disease    Nicotine abuse    Lumbar radiculopathy    Past Medical History  Past Medical History:   Diagnosis Date    Acid reflux     Allergic 2006    Flagyl; anaphylaxis    Ambulates with cane     bilat canes    Anxiety     situational    Trejo's esophagus     BRCA1 negative     Cancer (HCC)     Chronic pain disorder     generalized --    Degenerative disc disease, thoracic 06/09/2023    Depression     situational    GERD (gastroesophageal reflux disease)     Hiatal hernia     Hyperlipidemia     Kidney stone     Latent tuberculosis by skin test 1986    Was treated with INH for 6 months    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  AHI 18.1    Presence of upper and lower permanent dental bridges     Risk for falls     Shortness of breath     \"at times--often with back pain\"    Squamous cell skin cancer     Left buttocks    STD (sexually transmitted disease) 1990    HPV, HSV    Thoracic back pain 02/27/2023    Thoracic spondylosis with myelopathy 06/09/2023    Urinary tract infection     in the past    Wears glasses      Past Surgical History  Past Surgical History:   Procedure Laterality Date    ABDOMINOPLASTY      AUGMENTATION BREAST Bilateral 2015    BILATERAL SALPINGOOPHORECTOMY      BREAST BIOPSY Bilateral     usg bxs- negative--clips implanted    BREAST SURGERY  2015    BRONCHOSCOPY N/A 05/03/2022    Procedure: BRONCHOSCOPY NAVIGATIONAL;  Surgeon: J Luis Arreola MD;  Location: [facility] MAIN " OR;  Service: Thoracic     SECTION  2002    CYSTOSCOPY N/A 2022    Procedure: CYSTOSCOPY;  Surgeon: Red Becerril MD;  Location: [facility] OR;  Service: Gynecology Oncology    EGD  2018    ENDOBRONCHIAL ULTRASOUND (EBUS) N/A 2022    Procedure: ENDOBRONCHIAL ULTRASOUND (EBUS);  Surgeon: J Luis Arreola MD;  Location: [facility];  Service: Thoracic    HYSTERECTOMY      IR BIOPSY LYMPH NODE  2022    IR CHEST TUBE PLACEMENT  2022    LAPAROSCOPIC CHOLECYSTECTOMY  1997    LIPOSUCTION  2015    LUNG SURGERY      VATS Lobectomy RLL    LYMPH NODE BIOPSY  2022    MOHS SURGERY Left 2022    left buttocks    TN ARTHRODESIS COMBINED TQ 1NTRSPC LUMBAR N/A 3/20/2025    Procedure: MIS TLIF L4-5, MIS [employer] microdiscectomy left L5-S1;  Surgeon: Feroz Guy MD;  Location: [facility];  Service: Neurosurgery    TN [facility] INCL FLUOR GDNCE DX W/CELL WASHG SPX N/A 2022    Procedure: BRONCHOSCOPY FLEXIBLE;  Surgeon: J Luis Arreola MD;  Location: [facility];  Service: Thoracic    [facility] W/CELL WASHG SPX N/A 2022    Procedure: BRONCHOSCOPY FLEXIBLE;  Surgeon: Arnie Bartlett MD;  Location: [facility] OR;  Service: Thoracic    TN MOLINA FACETECTOMY & FORAMOTOMY 1 VRT SGM THORACIC N/A 2023    Procedure: Open T8 spinous process, T9 total, superior T10 laminectomy for decompression;  Surgeon: Feroz Guy MD;  Location: [facility] OR;  Service: Neurosurgery    TN LAPAROSCOPY W/RMVL ADNEXAL STRUCTURES Bilateral 2022    Procedure: OOPHORECTOMY W/ ROBOTICS, VAGINOTOMY;  Surgeon: Red Becerril MD;  Location: [facility];  Service: Gynecology Oncology    TN THORACOSCOPY W/LOBECTOMY SINGLE LOBE Right 2022    Procedure: LOBECTOMY LUNG;  Surgeon: Arnie Bartlett MD;  Location: [facility] OR;  Service: Thoracic    TN THORACOSCOPY W/LOBECTOMY SINGLE LOBE Right 2022    Procedure: THORACOSCOPY VIDEO  "ASSISTED SURGERY (VATS);  Surgeon: Arnie Bartlett MD;  Location: [facility];  Service: Thoracic    REPAIR RECTOCELE  2018    SPINE SURGERY  2023    TONSILLECTOMY  1984    TUBAL LIGATION  2002    UPPER GASTROINTESTINAL ENDOSCOPY  2018 03/21/25 0957   OT Last Visit   OT Visit Date 03/21/25   Note Type   Note type Evaluation   Pain Assessment   Pain Assessment Tool Rico-Baker FACES   Rico-Baker FACES Pain Rating 4   Pain Location/Orientation Location: Back   Effect of Pain on Daily Activities limits comfort,, mobility distance   Patient's Stated Pain Goal No pain   Hospital Pain Intervention(s) Repositioned;Medication (See MAR);Ambulation/increased activity  (pre-medicated by RN at start of session)   Restrictions/Precautions   Weight Bearing Precautions Per Order No   Other Precautions Chair Alarm;Bed Alarm;Pain;Fall Risk   Home Living   Type of Home Apartment   Home Layout One level;Performs ADLs on one level;Able to live on main level with bedroom/bathroom   Bathroom Shower/Tub Tub/shower unit   Bathroom Toilet Standard   Home Equipment Cane   Prior Function   Level of [place] Independent with ADLs;Independent with functional mobility;Independent with IADLS   Lives With Alone   IADLs Independent with driving;Independent with meal prep;Independent with medication management   Falls in the last 6 months 0   Lifestyle   Autonomy PTA pt was IND with ADL/IADLs, and functional mobilit w/o AD   General   Family/Caregiver Present No   Subjective   Subjective \"My legs feel better.\"   ADL   Eating Assistance 7  Independent   Grooming Assistance 7  Independent   UB Bathing Assistance 6  Modified Independent   LB Bathing Assistance 6  Modified Independent   UB Dressing Assistance 6  Modified independent   LB Dressing Assistance 6  Modified independent   Toileting Assistance  6  Modified independent   Bed Mobility   Additional Comments Pt OOB in recliner chair upon arrival for OT evaluation   Transfers "   Sit to Stand 6  Modified independent   Stand to Sit 6  Modified independent   Functional Mobility   Functional Mobility 6  Modified independent   Additional items   (no AD, initally supervision, progressed with  increased ambulation. ~100ft within unit)   Balance   Static Sitting Normal   Dynamic Sitting Good   Static Standing Good   Dynamic Standing Fair +   Activity Tolerance   Activity Tolerance Patient tolerated treatment well;Patient limited by pain   Medical Staff Made Aware PT Katherine   Nurse Made Aware SARAH Murphy   RUE Assessment   RUE Assessment WFL   LUE Assessment   LUE Assessment WFL   Hand Function   Gross Motor Coordination Functional   Fine Motor Coordination Functional   Vision-Basic Assessment   Current Vision Wears glasses for distance only   Psychosocial   Psychosocial (WDL) WDL   Cognition   Overall Cognitive Status WFL   Arousal/Participation Alert;Cooperative   Attention Within functional limits   Orientation Level Oriented X4   Memory Within functional limits   Following Commands Follows multistep commands with increased time or repetition   Comments Pt willing/eager to participate in OT evaluation. Pt with anticipate of increased pain with movement   Assessment   Prognosis Good   Assessment Pt is a 49 y.o. female seen for OT evaluation at [facility], admitted 3/20/2025 w/ Lumbar stenosis with neurogenic claudication. Extensive chart review completed by OT. Comorbidities affecting the pt's functional performance include a significant PMH of: Lumbar radiculopathy, carcinoid tumor of lung, bilateral tubo-ovarian cancer, s/p bilateral oophorectomy, hiatal hernia, NSVT, anxiety, PCOS. Personal factors affecting pt at time of IE include: steps to enter environment. PTA pt was living in a 1 story apartment w/ 1 HEIDY, was  IND w/ ADLs and IND w/ IADLS, and IND with functional mobility with use of no DME/AD. Upon OT IE pt demonstrated  Joe for bed mobility, Joe for functional transfers, Joe for functional  mobility, Joe for UB ADLs and Joe for LB ADLS 2* the following deficits impacting occupational performance: decreased tolerance and increased pain. The patient's raw score on the AM-PAC Daily Activity inpatient short form is 21, standardized score is 44.27, greater than 39.4. Patients at this level are likely to benefit from DC to home. However, please refer to therapist recommendation for discharge planning given other factors that may influence destination. At this time, OT recommendations at time of discharge are DC with No rehabilitation needs resources. Based on OT IE, no further IP OT needs are indicated at this time. This OT recommends pt to continue to be OOB for meals, ambulation to/from BR, perform self care tasks, and mobility in hallway with nursing. D/C from OT caseload with above recommendations.   Goals   Patient Goals To have less pain   Plan   OT Frequency Eval only   Discharge Recommendation   Rehab Resource Intensity Level, OT No post-acute rehabilitation needs   AM-PAC Daily Activity Inpatient   Lower Body Dressing 3   Bathing 3   Toileting 3   Upper Body Dressing 4   Grooming 4   Eating 4   Daily Activity Raw Score 21   Daily Activity Standardized Score (Calc for Raw Score >=11) 44.27   AM-PAC Applied Cognition Inpatient   Following a Speech/Presentation 4   Understanding Ordinary Conversation 4   Taking Medications 4   Remembering Where Things Are Placed or Put Away 4   Remembering List of 4-5 Errands 4   Taking Care of Complicated Tasks 4   Applied Cognition Raw Score 24   Applied Cognition Standardized Score 62.21   End of Consult   Education Provided Yes   Patient Position at End of Consult Bedside chair;All needs within reach;Bed/Chair alarm activated   Nurse Communication Nurse aware of consult     Adwoa Galvez OTR/L

## 2025-03-21 NOTE — ANESTHESIA POSTPROCEDURE EVALUATION
Post-Op Assessment Note    CV Status:  Stable    Pain management: adequate       Mental Status:  Awake   Hydration Status:  Euvolemic   PONV Controlled:  Controlled   Airway Patency:  Patent     Post Op Vitals Reviewed: Yes    No anethesia notable event occurred.    Staff: Anesthesiologist           Last Filed PACU Vitals:  Vitals Value Taken Time   Temp 98.5 °F (36.9 °C) 03/20/25 1429   Pulse 88 03/20/25 1636   /62 03/20/25 1635   Resp 16 03/20/25 1623   SpO2 97 % 03/20/25 1636   Vitals shown include unfiled device data.    Modified Raven:     Vitals Value Taken Time   Activity 2 03/20/25 1433   Respiration 2 03/20/25 1433   Circulation 2 03/20/25 1433   Consciousness 1 03/20/25 1433   Oxygen Saturation 2 03/20/25 1433     Modified Raven Score: 9

## 2025-03-21 NOTE — PHYSICAL THERAPY NOTE
"                                                                                  PHYSICAL THERAPY EVALUATION NOTE      Patient Name: Tessa Olivarez  Today's Date: 3/21/2025    AGE:   49 y.o.  Mrn:   [id number]  ADMIT DX:  Spondylolisthesis of lumbar region [M43.16]  Spinal stenosis of lumbar region, unspecified whether neurogenic claudication present [M48.061]  Lumbar herniated disc [M51.26]  Lumbar stenosis with neurogenic claudication [M48.062]    Past Medical History:   Diagnosis Date    Acid reflux     Allergic 2006    Flagyl; anaphylaxis    Ambulates with cane     bilat canes    Anxiety     situational    Trejo's esophagus     BRCA1 negative     Cancer (HCC)     Chronic pain disorder     generalized --    Degenerative disc disease, thoracic 2023    Depression     situational    GERD (gastroesophageal reflux disease)     Hiatal hernia     Hyperlipidemia     Kidney stone     Latent tuberculosis by skin test     Was treated with INH for 6 months    Ligamentum flavum hypertrophy 2023    Lung cancer (HCC) diagnosed in     Muscle weakness     legs if up too long--    Obesity 2018    Obstructive sleep apnea 2018    Mild TERESITA.  AHI 18.1    Presence of upper and lower permanent dental bridges     Risk for falls     Shortness of breath     \"at times--often with back pain\"    Squamous cell skin cancer     Left buttocks    STD (sexually transmitted disease)     HPV, HSV    Thoracic back pain 2023    Thoracic spondylosis with myelopathy 2023    Urinary tract infection     in the past    Wears glasses      Length Of Stay: 1  PHYSICAL THERAPY EVALUATION :   Patient's identity confirmed via 2 patient identifiers (full name and ) at start of session       25 1024   PT Last Visit   PT Visit Date 25   Note Type   Note type Evaluation   Pain Assessment   Pain Assessment Tool Rico-Baker FACES   Rico-Baker FACES Pain Rating 4   Pain Location/Orientation Orientation: " Lower;Location: Back;Location: Incision   Effect of Pain on Daily Activities limits comfort,, mobility distance   Hospital Pain Intervention(s) Repositioned;Medication (See MAR);Ambulation/increased activity  (pre-medicated by RN at start of session)   Restrictions/Precautions   Weight Bearing Precautions Per Order No   Other Precautions Pain;Spinal precautions   Home Living   Type of Home Apartment   Home Layout One level;Stairs to enter without rails  (1 small curb step to get into apartment complex)   Home Equipment Cane   Additional Comments Tessa is independent w/ no AD PTA. Reports having to take seated rest breaks doing tasks in the home due to LBP   Prior Function   Level of [place] Independent with ADLs;Independent with functional mobility;Independent with IADLS   Lives With Alone   Receives Help From Neighbor   IADLs Independent with driving;Independent with meal prep;Independent with medication management   Falls in the last 6 months 0   Vocational Full time employment  ([employer])   General   Additional Pertinent History Tessa reports BLE heaviness and weakness prior to surgery, with sharp, stabbing nerve pain down the entire leg. She reports significant improvement in the BLE pain/heaviness already   Family/Caregiver Present No   Cognition   Overall Cognitive Status WFL   Arousal/Participation Alert   Attention Within functional limits   Orientation Level Oriented X4   Memory Within functional limits   Following Commands Follows multistep commands with increased time or repetition   Comments Tessa is motivated and agreeable to participate in PT evaluation   RLE Assessment   RLE Assessment WFL   Strength RLE   RLE Overall Strength 4/5   LLE Assessment   LLE Assessment WFL   Strength LLE   LLE Overall Strength 4/5   Bed Mobility   Additional Comments Tessa is already OOB in recliner chair upon arrival   Transfers   Sit to Stand 6  Modified independent   Stand to Sit 6  Modified independent    Ambulation/Elevation   Gait pattern Decreased foot clearance  (cautious)   Gait Assistance 6  Modified independent   Assistive Device None   Distance 150 ft   Stair Management Assistance 5  Supervision   Stair Management Technique Other (Comment)  (HHA just for steadying)   Number of Stairs 2   Ambulation/Elevation Additional Comments Prior to stair negotiation, is able to perform single leg B step taps w/ modified I and Good balance with no LOB   Balance   Static Sitting Normal   Static Standing Good   Ambulatory Good   Activity Tolerance   Activity Tolerance Patient tolerated treatment well   Medical Staff Made Aware MARK [place]   Nurse Made Aware SARAH Murphy   Assessment   Problem List Impaired balance;Decreased mobility;Pain;Orthopedic restrictions  (spinal precautions)   Assessment Tessa Olivarez is a 49 y.o. Female who presents to [facility] from home for scheduled L4-5 MIS TLIF w/ bilateral facetetomies and MIS microdisectomy L L5-S1, seen POD1. Orders for PT eval and treat received, w/ activity orders of ambulate patient and spinal precautions. Pt presents w/ comorbidities of TERESITA, GERD, PCOS. At baseline, pt mobilizes independently w/ no AD, and reports 0 falls in the last 6 months. Upon evaluation, pt presents w/ the following deficits: impaired balance, decreased endurance, and pain limiting functional mobility. Upon eval, pt requires modified I for transfers, and modified I for gait, supervision for curb step. Based on this PT evaluation today, patient's discharge recommendation is for Level III - Low Rehab Resource Intensity. Given the above findings from this evaluation, at this time this patient does not require skilled inpatient PT for the remainder of this admission. Will D/C patient from PT caseload, please reconsult if any changes or needs arise.  Discussed w/ Tessa ambulating in hallway for short, frequent distances throughout the day   Barriers to Discharge Comments Tessa is cleared for DC from PT standpoint    Goals   Patient Goals to have less pain   Discharge Recommendation   Rehab Resource Intensity Level, PT III (Minimum Resource Intensity)   AM-PAC Basic Mobility Inpatient   Turning in Flat Bed Without Bedrails 4   Lying on Back to Sitting on Edge of Flat Bed Without Bedrails 4   Moving Bed to Chair 4   Standing Up From Chair Using Arms 4   Walk in Room 4   Climb 3-5 Stairs With Railing 3   Basic Mobility Inpatient Raw Score 23   Basic Mobility Standardized Score 50.88   [facility] Highest Level Of Mobility   -HL Goal 7: Walk 25 feet or more   -HLM Achieved 7: Walk 25 feet or more   Additional Treatment Session   Start Time 1014   End Time 1024   Treatment Assessment Curb step in front of patient. She is able to perform 2 more ascends/descents from step. Pt reports not feeling confident. Spent time reviewing proper LE sequencing as patient reports feeling her RLE is stronger, and educating that she is barely touching my hand to perform the step. Tessa also endorses this curb step is higher than hers to enter her apartment complex. Reviewed short, frequent walks rather than one very long walk to work up endurance. Tessa verbalized understanding w/ no additional questions at this time   Additional Treatment Day 1   End of Consult   Patient Position at End of Consult Bedside chair;All needs within reach         The patient's AM-PAC Basic Mobility Inpatient Short Form Raw Score is 23, Standardized Score is 50.88. A standardized score greater than 38.32 (raw score of 16) suggests the patient may benefit from discharge to home which may not coincide with above PT recommendations. However please refer to therapist recommendation for discharge planning given other factors that may influence destination.    Given the above findings from this evaluation, at this time this patient does not require skilled inpatient PT for the remainder of this admission. Will D/C patient from PT caseload, please reconsult if any  changes or needs arise.      Katherine Mejía PT, DPT    --    Addendum:  [id number]  Created HEP for patient per pt request, notified via INDIO Mejia. Provided w/ 2 stages of exercises, initial and then progression.     HEP 1: Calf raises, mini squats, squat taps on chair, supine SLRs, supine pelvic tilts, hooklying bent leg lowers    HEP2: (told her to wait until f/u w/ NSGY in 2 weeks), Quadruped arm raises, quadruped leg raises, teofilo Zarate verbalized good understanding of educations.

## 2025-03-21 NOTE — CASE MANAGEMENT
Case Management Assessment & Discharge Planning Note    Patient name Tessa Olivarez  Location /-01 MRN [id number]  : 1975 Date 3/21/2025       Current Admission Date: 3/20/2025  Current Admission Diagnosis:Lumbar stenosis with neurogenic claudication   Patient Active Problem List    Diagnosis Date Noted Date Diagnosed    Postoperative pain after spinal surgery 2025     Lumbar stenosis with neurogenic claudication 2025     Lumbar radiculopathy 2023     PCOS (polycystic ovarian syndrome) 2023     Genital herpes simplex 06/10/2023     Nicotine abuse 06/10/2023     NSVT (nonsustained ventricular tachycardia) ([facility]) 06/10/2023     Hiatal hernia 2023     Gastroesophageal reflux disease 2023     Anxiety 2023     S/P bilateral oophorectomy 2022     Bilateral tubo-ovarian mass 2022     Family history of ovarian cancer 2022     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 ([facility]) 2022     Carcinoid tumor of lung 2022     Kidney stone 2022     Class 3 severe obesity due to excess calories without serious comorbidity with body mass index (BMI) of 45.0 to 49.9 in adult ([facility]) 2022     Tobacco smoker, 1 pack of cigarettes or less per day 2022     Obstructive sleep apnea 2018     Latent tuberculosis by skin test [facility]       LOS (days): 1  Geometric Mean LOS (GMLOS) (days): 2.4  Days to GMLOS:1.4     OBJECTIVE:    Risk of Unplanned Readmission Score: 10.92         Current admission status: Inpatient       Preferred Pharmacy:   [id number] - [address] [address]  Phone: [phone] Fax: [phone]    [facility] - [facility] [address]  [address]  [address] [address]  Phone: [phone] Fax: [phone]    Primary Care Provider: Sara Thomson MD    Primary Insurance: [employer]  Secondary Insurance:     ASSESSMENT:  Active Health Care Proxies    There are no active Health Care Proxies on file.       Advance Directives  Does patient have a Health Care POA?: No  Was patient offered paperwork?: Yes (declined)  Does patient currently have a Health Care decision maker?: Yes, please see Health Care Proxy section  Does patient have Advance Directives?: No  Was patient offered paperwork?: Yes (declined)  Primary Contact: Shawn Belle, friend         Readmission Root Cause  30 Day Readmission: No    Patient Information  Admitted from:: Home  Mental Status: Alert  During Assessment patient was accompanied by: Not accompanied during assessment  Assessment information provided by:: Patient  Primary Caregiver: Self  What city do you live in?: [place], [place]  Home entry access options. Select all that apply.: Stairs  Number of steps to enter home.: 1  Type of Current Residence: Apartment  Floor Level: 1  Upon entering residence, is there a bedroom on the main floor (no further steps)?: Yes  Upon entering residence, is there a bathroom on the main floor (no further steps)?: Yes  Living Arrangements: Lives Alone    Activities of Daily Living Prior to Admission  Functional Status: Independent  Completes ADLs independently?: Yes  Ambulates independently?: Yes  Does patient use assisted devices?: No  Does patient currently own DME?: Yes  What DME does the patient currently own?: Straight Cane  Does patient have a history of Outpatient Therapy (PT/OT)?: No  Does the patient have a history of Short-Term Rehab?: No  Does patient have a history of HHC?: No  Does patient currently have HHC?: No         Patient Information Continued  Income Source: Employed  Does patient have prescription coverage?: Yes  Can the patient afford their medications and any related supplies (such as glucometers or test strips)?: Yes  Does patient receive dialysis treatments?: No  Does patient have a history of substance  abuse?: No  Does patient have a history of Mental Health Diagnosis?: No         Means of Transportation  Means of Transport to Appts:: Drives Self          DISCHARGE DETAILS:    Discharge planning discussed with:: Patient  Freedom of Choice: Yes     CM contacted family/caregiver?: No- see comments (Pt is alert and oriented)  Were Treatment Team discharge recommendations reviewed with patient/caregiver?: Yes  Did patient/caregiver verbalize understanding of patient care needs?: Yes  Were patient/caregiver advised of the risks associated with not following Treatment Team discharge recommendations?: Yes         Requested Home Health Care         Is the patient interested in HHC at discharge?: No    DME Referral Provided  Referral made for DME?: No              Treatment Team Recommendation: Home  Discharge Destination Plan:: Home  Transport at Discharge : Free Local Transportation                                      Additional Comments: CM met with pt to discuss role of CM and any needs prior to discharge. Pt resides alone in a 1st flr apartment w/ 1STE. Indp PTA. Owns SPC. No hx STR/HH/OP PT/DA/MH. Therapy recommended HH and pt declined HH & OP PT. Pt requested home exercises. CM relayed message to therapy. Pt is employed and drives. Pt prefers to use [facility] in [place]. Pt will need transport home.

## 2025-03-21 NOTE — PLAN OF CARE
Problem: PAIN - ADULT  Goal: Verbalizes/displays adequate comfort level or baseline comfort level  Description: Interventions:  - Encourage patient to monitor pain and request assistance  - Assess pain using appropriate pain scale  - Administer analgesics based on type and severity of pain and evaluate response  - Implement non-pharmacological measures as appropriate and evaluate response  - Consider cultural and social influences on pain and pain management  - Notify physician/advanced practitioner if interventions unsuccessful or patient reports new pain  Outcome: Progressing     Problem: INFECTION - ADULT  Goal: Absence or prevention of progression during hospitalization  Description: INTERVENTIONS:  - Assess and monitor for signs and symptoms of infection  - Monitor lab/diagnostic results  - Monitor all insertion sites, i.e. indwelling lines, tubes, and drains  - Monitor endotracheal if appropriate and nasal secretions for changes in amount and color  - Napoleon appropriate cooling/warming therapies per order  - Administer medications as ordered  - Instruct and encourage patient and family to use good hand hygiene technique  - Identify and instruct in appropriate isolation precautions for identified infection/condition  Outcome: Progressing  Goal: Absence of fever/infection during neutropenic period  Description: INTERVENTIONS:  - Monitor WBC    Outcome: Progressing     Problem: SAFETY ADULT  Goal: Patient will remain free of falls  Description: INTERVENTIONS:  - Educate patient/family on patient safety including physical limitations  - Instruct patient to call for assistance with activity   - Consult OT/PT to assist with strengthening/mobility   - Keep Call bell within reach  - Keep bed low and locked with side rails adjusted as appropriate  - Keep care items and personal belongings within reach  - Initiate and maintain comfort rounds  - Make Fall Risk Sign visible to staff  - Offer Toileting every 2 Hours,  in advance of need  - Initiate/Maintain bed/chair alarm  - Obtain necessary fall risk management equipment: yellow bracelet and yellow socks   - Apply yellow socks and bracelet for high fall risk patients  - Consider moving patient to room near nurses station  Outcome: Progressing  Goal: Maintain or return to baseline ADL function  Description: INTERVENTIONS:  -  Assess patient's ability to carry out ADLs; assess patient's baseline for ADL function and identify physical deficits which impact ability to perform ADLs (bathing, care of mouth/teeth, toileting, grooming, dressing, etc.)  - Assess/evaluate cause of self-care deficits   - Assess range of motion  - Assess patient's mobility; develop plan if impaired  - Assess patient's need for assistive devices and provide as appropriate  - Encourage maximum independence but intervene and supervise when necessary  - Involve family in performance of ADLs  - Assess for home care needs following discharge   - Consider OT consult to assist with ADL evaluation and planning for discharge  - Provide patient education as appropriate  Outcome: Progressing  Goal: Maintains/Returns to pre admission functional level  Description: INTERVENTIONS:  - Perform AM-PAC 6 Click Basic Mobility/ Daily Activity assessment daily.  - Set and communicate daily mobility goal to care team and patient/family/caregiver.   - Collaborate with rehabilitation services on mobility goals if consulted  - Perform Range of Motion 3 times a day.  - Reposition patient every 2 hours.  - Dangle patient 3 times a day  - Stand patient 3 times a day  - Ambulate patient 3 times a day  - Out of bed to chair 3 times a day   - Out of bed for meals 3 times a day  - Out of bed for toileting  - Record patient progress and toleration of activity level   Outcome: Progressing     Problem: Knowledge Deficit  Goal: Patient/family/caregiver demonstrates understanding of disease process, treatment plan, medications, and discharge  instructions  Description: Complete learning assessment and assess knowledge base.  Interventions:  - Provide teaching at level of understanding  - Provide teaching via preferred learning methods  Outcome: Progressing     Problem: DISCHARGE PLANNING  Goal: Discharge to home or other facility with appropriate resources  Description: INTERVENTIONS:  - Identify barriers to discharge w/patient and caregiver  - Arrange for needed discharge resources and transportation as appropriate  - Identify discharge learning needs (meds, wound care, etc.)  - Arrange for interpretive services to assist at discharge as needed  - Refer to Case Management Department for coordinating discharge planning if the patient needs post-hospital services based on physician/advanced practitioner order or complex needs related to functional status, cognitive ability, or social support system  Outcome: Progressing     Problem: MOBILITY - ADULT  Goal: Maintain or return to baseline ADL function  Description: INTERVENTIONS:  -  Assess patient's ability to carry out ADLs; assess patient's baseline for ADL function and identify physical deficits which impact ability to perform ADLs (bathing, care of mouth/teeth, toileting, grooming, dressing, etc.)  - Assess/evaluate cause of self-care deficits   - Assess range of motion  - Assess patient's mobility; develop plan if impaired  - Assess patient's need for assistive devices and provide as appropriate  - Encourage maximum independence but intervene and supervise when necessary  - Involve family in performance of ADLs  - Assess for home care needs following discharge   - Consider OT consult to assist with ADL evaluation and planning for discharge  - Provide patient education as appropriate  Outcome: Progressing  Goal: Maintains/Returns to pre admission functional level  Description: INTERVENTIONS:  - Perform AM-PAC 6 Click Basic Mobility/ Daily Activity assessment daily.  - Set and communicate daily mobility  goal to care team and patient/family/caregiver.   - Collaborate with rehabilitation services on mobility goals if consulted  - Perform Range of Motion 3 times a day.  - Reposition patient every 2 hours.  - Dangle patient 3 times a day  - Stand patient 3 times a day  - Ambulate patient 3 times a day  - Out of bed to chair 3 times a day   - Out of bed for meals 3 times a day  - Out of bed for toileting  - Record patient progress and toleration of activity level   Outcome: Progressing

## 2025-03-21 NOTE — UTILIZATION REVIEW
Initial Clinical Review    Elective inpatient surgical procedure  Age/Sex: 49 y.o. female  Surgery Date: 3/20/25  Procedure: MIS TLIF L4-5 with bilateral facetectomies, MIS [employer]Rx microdiscectomy left L5-S1   Anesthesia: General  Operative Findings:  high grade stenosis from ligamentous overgrowth and hypertrophied facets L4-5, calcified disc herniation left L5-S1     POD#1 Progress Note: 3/21/25  POD 1 s/p MIS TLIF L4-5 with bilateral facetectomies, MIS [employer]Rx microdiscectomy left L5-S:   Feels weaker in left leg.  On exam:  Strength is 5/5, however she appears to favor R side. L side gets weaker.   Incision with drainage, dressings changed.  H&H 10.4/32.4.   today stop IVF.  Continue neurovascular checks every 4 hours.  Xray of lumbar spine.  Encourage OOB, ambulation.  PT/OT.  Incentive spirometry.  Pain control.   Devon huang.      Admission Orders: Date/Time/Statement:   Admission Orders (From admission, onward)       Ordered        03/20/25 1442  Inpatient Admission  Once                          Orders Placed This Encounter   Procedures    Inpatient Admission     Standing Status:   Standing     Number of Occurrences:   1     Level of Care:   Med Surg [16]     Estimated length of stay:   More than 2 Midnights     Certification:   I certify that inpatient services are medically necessary for this patient for a duration of greater than two midnights. See H&P and MD Progress Notes for additional information about the patient's course of treatment.     Diet: Regular   Mobility: OOB 3 times a day.  Ambulate 3 times a day.   DVT Prophylaxis: Lovenox.  Bilateral SCDs.       Medications/Pain Control:   Scheduled Medications:  acetaminophen, 975 mg, Oral, Q8H ANIKET  acyclovir, 400 mg, Oral, BID  cefazolin, 1,000 mg, Intravenous, Once  diclofenac, 75 mg, Oral, BID  docusate sodium, 100 mg, Oral, BID  enoxaparin, 40 mg, Subcutaneous, Q12H ANIKET  gabapentin, 600 mg, Oral, TID  hydrOXYzine pamoate, 50 mg, Oral, Daily  loratadine, 5  mg, Oral, Daily  methocarbamol, 750 mg, Oral, Q6H ANIKET  multivitamin stress formula, 1 tablet, Oral, Daily  pantoprazole, 40 mg, Oral, Early Morning  senna, 1 tablet, Oral, Daily    ceFAZolin (ANCEF) 1,000 mg in sodium chloride 0.9 % 1,000 mL irrigation bottle  Freq: Once Route: IR  Indications Comment: OR irrigation  Start: 03/20/25 0845 End: 03/20/25 1344     ceFAZolin (ANCEF) IVPB (premix in dextrose) 2,000 mg 50 mL  Dose: 2,000 mg  Freq: Once Route: IV  Start: 03/20/25 0700 End: 03/20/25 0930   methocarbamol (ROBAXIN) injection 750 mg  Dose: 750 mg  Freq: Once Route: IV  Indications of Use: MUSCULOSKELETAL PAIN,POSTOPERATIVE PAIN  Start: 03/20/25 1545 End: 03/20/25 1541    Continuous IV Infusions:sodium chloride 0.9 % infusion  Rate: 125 mL/hr Dose: 125 mL/hr  Freq: Continuous Route: IV  Last Dose: Stopped (03/21/25 0630)  Start: 03/20/25 1515 End: 03/21/25 0801     PRN Meds:  aluminum-magnesium hydroxide-simethicone, 30 mL, Oral, Q6H PRN  HYDROmorphone, 0.5 mg, Intravenous, Q2H PRN x 4 3/20.  X 3 3/21  lactated ringers, 1,000 mL, Intravenous, Once PRN   And  lactated ringers, 1,000 mL, Intravenous, Once PRN  naloxone, 0.04 mg, Intravenous, Q1MIN PRN  ondansetron, 4 mg, Intravenous, Q4H PRN  oxyCODONE, 5 mg, Oral, Q4H PRN   Or  oxyCODONE, 10 mg, Oral, Q4H PRN x 2 3/20.  X 3 3/21  sodium chloride, 1,000 mL, Intravenous, Once PRN   And  sodium chloride, 1,000 mL, Intravenous, Once PRN    bupivacaine-epinephrine (PF) (MARCAINE/EPINEPHRINE PF) 0.25 %-1:[id number] injection - x 1 3/20  Freq: As needed  Start: 03/20/25 1406 End: 03/20/25 1423     Wound care - Check operative dressing with vital signs. 2) Change dressing on POD # 2 and PRN when saturated. 3) Replace steri-strips PRN.   Urinary Catheter  Incentive spirometry.      Vital Signs (last 3 days)       Date/Time Temp Pulse Resp BP MAP (mmHg) SpO2 O2 Device Cardiac (WDL) Patient Position - Orthostatic VS Malik Coma Scale Score Pain    03/21/25 1046 -- -- -- -- --  -- -- -- -- -- 8 03/21/25 0925 -- -- -- -- -- -- -- -- -- -- 8 03/21/25 0918 -- -- -- -- -- -- None (Room air) -- -- -- --    03/21/25 07:14:03 -- 85 -- 107/75 86 98 % -- -- -- -- --    03/21/25 0629 -- -- -- -- -- -- -- -- -- -- 7 03/21/25 0543 -- -- -- -- -- -- -- -- -- -- 8 03/21/25 0302 -- -- -- -- -- -- -- -- -- -- 8 03/21/25 0220 -- -- -- -- -- -- -- -- -- -- 9    03/21/25 02:17:57 -- 91 -- 128/72 91 97 % -- -- -- -- --    03/20/25 2324 -- -- -- -- -- -- -- -- -- -- 7 03/20/25 21:02:10 98.1 °F (36.7 °C) 97 20 107/60 76 98 % -- -- Lying -- --    03/20/25 2100 -- -- -- -- -- -- -- -- -- -- 8 03/20/25 2037 -- -- -- -- -- -- -- -- -- 15 10 - Worst Possible Pain    03/20/25 19:54:48 -- 94 -- 109/63 78 96 % -- -- -- -- --    03/20/25 18:47:04 -- 89 -- 105/64 78 97 % -- -- -- -- --    03/20/25 1826 -- -- -- -- -- -- -- -- -- -- 6    03/20/25 1745 -- -- -- -- -- -- -- -- -- 15 --    03/20/25 17:34:37 -- 92 -- 105/63 77 97 % -- -- -- -- --    03/20/25 1646 -- -- -- -- -- -- -- -- -- -- 7 03/20/25 16:35:34 -- 86 -- 106/62 77 97 % -- -- -- -- --    03/20/25 1620 -- 92 18 113/56 78 94 % None (Room air) -- -- -- 5 03/20/25 1610 -- 88 12 107/55 77 89 % -- -- -- -- --    03/20/25 1601 -- 88 19 109/53 75 97 % None (Room air) -- -- -- 7 03/20/25 1600 -- 90 16 109/53 75 96 % None (Room air) -- -- -- --    03/20/25 1545 -- 90 16 108/56 75 96 % -- -- -- -- --    03/20/25 1540 -- 90 17 110/55 72 96 % None (Room air) -- -- -- 7 03/20/25 1535 -- 90 16 110/55 75 94 % -- -- -- -- --    03/20/25 1530 -- 92 24 117/58 75 98 % -- -- -- -- --    03/20/25 1520 -- 88 24 127/59 81 98 % -- -- -- -- --    03/20/25 1513 -- 90 20 133/58 81 99 % None (Room air) -- -- -- 8 03/20/25 1500 -- 86 19 122/64 85 98 % -- -- -- -- --    03/20/25 1457 -- 88 25 -- -- 98 % None (Room air) -- -- -- 8 03/20/25 1445 -- 84 44 111/56 81 98 % -- -- -- -- --    03/20/25 1443 -- -- -- -- -- -- -- -- -- -- 9 03/20/25 1440 -- 88  16 95/51 68 97 % -- -- -- -- --    03/20/25 1435 -- 90 17 118/65 85 99 % -- -- -- -- --    03/20/25 1433 -- -- -- -- -- 97 % None (Room air) WDL -- -- 9    03/20/25 1430 -- 90 15 111/61 80 97 % None (Room air) -- -- -- --    03/20/25 1429 98.5 °F (36.9 °C) 92 12 119/65 -- 99 % None (Room air) -- -- -- --    03/20/25 0735 97 °F (36.1 °C) 86 22 126/72 -- 96 % None (Room air) -- -- -- 4          Weight (last 2 days)       Date/Time Weight    03/20/25 0735 151 (333)            Pertinent Labs/Diagnostic Test Results:   Radiology:  XR spine lumbar 2 or 3 views injury   Final Interpretation by Robin Hicks DO (03/21 0900)      O-arm/fluoroscopic guidance provided for surgical procedure.  Please refer to the separate procedure notes for additional details.         Workstation performed: [id number]         XR lumbar spine 2 or 3 views    (Results Pending)     Cardiology:  No orders to display     GI:  No orders to display     Results from last 7 days   Lab Units 03/21/25  0554   WBC Thousand/uL 7.17   HEMOGLOBIN g/dL 10.4*   HEMATOCRIT % 32.4*   PLATELETS Thousands/uL 80*     Results from last 7 days   Lab Units 03/21/25  0554   SODIUM mmol/L 140   POTASSIUM mmol/L 4.2   CHLORIDE mmol/L 110*   CO2 mmol/L 27   ANION GAP mmol/L 3*   BUN mg/dL 12   CREATININE mg/dL 0.78   EGFR ml/min/1.73sq m 88   CALCIUM mg/dL 7.8*   MAGNESIUM mg/dL 1.9   PHOSPHORUS mg/dL 2.3*     Results from last 7 days   Lab Units 03/21/25  0554   GLUCOSE RANDOM mg/dL 156*       Network Utilization Review Department  ATTENTION: Please call with any questions or concerns to [phone] and carefully listen to the prompts so that you are directed to the right person. All voicemails are confidential.   For Discharge needs, contact Care Management DC Support Team at [phone] opt. 2  Send all requests for admission clinical reviews, approved or denied determinations and any other requests to dedicated fax number below belonging to the campus where  the patient is receiving treatment. List of dedicated fax numbers for the Facilities:  FACILITY NAME UR FAX NUMBER   ADMISSION DENIALS (Administrative/Medical Necessity) [phone]   DISCHARGE SUPPORT TEAM (NETWORK) [phone]   PARENT CHILD HEALTH (Maternity/NICU/Pediatrics) [phone]   [facility] [phone]   [facility] [phone]   [facility] [phone]   [facility] [phone]   [facility] [phone]   [facility] [phone]   [facility] [phone]   [facility] [phone]   [facility] [phone]   [facility] [phone]   [facility] [phone]   [facility] [phone]

## 2025-03-21 NOTE — ASSESSMENT & PLAN NOTE
Multimodal analgesia:  Diclofenac EC tablet 75 mg 3 times daily  Gabapentin 600 mg 3 times daily, home medication  Estimated Creatinine Clearance: 136.2 mL/min (by C-G formula based on SCr of 0.78 mg/dL).  Robaxin 750 mg every 6 hours scheduled  Discontinue scheduled Tylenol  Discontinue oxycodone  Start oxycodone-acetaminophen 5-325 mg tablets as follows:  1 tablet every 4 hours as needed for moderate pain  2 tablets every 4 hours as needed for severe pain  Decrease IV Dilaudid to 0.5 mg every 4 hours as needed for breakthrough pain  Would recommend discontinuation of IV opioids over the next 24-48 hours  Narcan as needed for respiratory depression/opioid reversal    If patient were to discharge over the weekend would recommend continuation of the current analgesic regimen inclusive of a short course of current oxycodone-acetaminophen (Percocet) regimen.    Bowel regimen:  Recommend continuation of bowel regimen while utilizing opioids to prevent opioid-induced constipation  Colace 2 times daily  Senna 1 tablet daily

## 2025-03-21 NOTE — CONSULTS
"Administrative Statements   VIRTUAL CARE DOCUMENTATION:   1. This service was provided via Telemedicine using Teams Virtual Rounding    2. Parties in the room with patient during teleconsult Patient only  3. Confidentiality My office door was closed   4. Participants No one else was in the room  5. Patient acknowledged consent and understanding of privacy and security of the  Telemedicine consult. I informed the patient that I have reviewed their record in Epic and presented the opportunity for them to ask any questions regarding the visit today.  The patient agreed to participate.  6. I have spent a total time of 35 minutes in caring for this patient on the day of the visit/encounter including Risks and benefits of tx options, Instructions for management, Patient and family education, Reviewing/placing orders in the medical record (including tests, medications, and/or procedures), Obtaining or reviewing history  , and Communicating with other healthcare professionals , not including the time spent for establishing the audio/video connection.         Consultation - Acute Pain   Name: Tessa Olivarez 49 y.o. female I MRN: [id number]  Unit/Bed#: -01 I Date of Admission: 3/20/2025   Date of Service: 3/21/2025 I Hospital Day: 1   Inpatient consult to Acute Pain Service  Consult performed by: GENESIS Singer  Consult ordered by: Feroz Guy MD        Physician Requesting Evaluation: Feroz Guy MD   Reason for Evaluation / Principal Problem: Postop pain    Assessment & Plan  Lumbar stenosis with neurogenic claudication  Patient with history of chronic low back pain in setting of lumbar stenosis.  MRI imaging 1/25: \"Multilevel spondylotic changes of the lumbar spine are not significantly changed from MRI lumbar spine dated 11/29/2023, noting marked spinal canal stenosis at L4-L5. Additionally, there is a L5-S1 disc bulge with marked left neuroforaminal narrowing as well as abutment of the " "traversing left S1 nerve root. See narrative above for full details.\"  Patient is status post minimally invasive TLIF L4-5, microdiscectomy left L5-S1      Postoperative pain after spinal surgery  Multimodal analgesia:  Diclofenac EC tablet 75 mg 3 times daily  Gabapentin 600 mg 3 times daily, home medication  Estimated Creatinine Clearance: 136.2 mL/min (by C-G formula based on SCr of 0.78 mg/dL).  Robaxin 750 mg every 6 hours scheduled  Discontinue scheduled Tylenol  Discontinue oxycodone  Start oxycodone-acetaminophen 5-325 mg tablets as follows:  1 tablet every 4 hours as needed for moderate pain  2 tablets every 4 hours as needed for severe pain  Decrease IV Dilaudid to 0.5 mg every 4 hours as needed for breakthrough pain  Would recommend discontinuation of IV opioids over the next 24-48 hours  Narcan as needed for respiratory depression/opioid reversal    If patient were to discharge over the weekend would recommend continuation of the current analgesic regimen inclusive of a short course of current oxycodone-acetaminophen (Percocet) regimen.    Bowel regimen:  Recommend continuation of bowel regimen while utilizing opioids to prevent opioid-induced constipation  Colace 2 times daily  Senna 1 tablet daily    I have discussed the above management plan in detail with the primary service.       APS will continue to follow. Please contact Acute Pain Service - via SecureChat from [phone] with additional questions or concerns. See SmartVault or DotProduct for additional contacts and after hours information.     History of Present Illness    HPI: Tessa Olivarez is a 49 y.o. year old female who presents with history of lumbar stenosis and underwent an elective minimally invasive TLIF L4-5, microdiscectomy left L5-S1 with neurosurgery 3/20/2025.  APS consulted for postoperative pain management.    Patient seen via telehealth consultation this afternoon, patient was resting in bed and did not appear to be in acute distress " secondary to pain.  She reports while at rest her pain is minimal but severely exacerbates with movement and ambulation.  Patient states she has been up ambulating independently to and from the bathroom, denies any lower extremity weakness or numbness.  Pain is primarily of the low back with radiation to the lower extremities and is sharp/shooting in nature.  She reports oxycodone has provided only mild improvement in pain and states in the past oxycodone has provided better pain control for her.  We discussed the adjustments to her analgesic regimen as outlined above and she is in agreement.  She has no other acute complaints currently.    Current pain location(s): Pain Score: 8  Pain Location/Orientation: Location: Back  Pain Scale: Pain Assessment Tool: Rico-Baker FACES    Pain History: Low back pain  I have reviewed the patient's controlled substance dispensing history in the Prescription Drug Monitoring Program in compliance with the [facility] regulations before prescribing any controlled substances.     Review of Systems   Constitutional:  Positive for activity change.   Musculoskeletal:  Positive for back pain.   All other systems reviewed and are negative.    Medical History Review: I have reviewed the patient's PMH, PSH, Social History, Family History, Meds, and Allergies     Objective :  Temp:  [98.1 °F (36.7 °C)-98.5 °F (36.9 °C)] 98.1 °F (36.7 °C)  HR:  [84-97] 85  BP: ()/(51-75) 107/75  Resp:  [12-44] 20  SpO2:  [89 %-99 %] 98 %  O2 Device: None (Room air)    Physical Exam  Vitals reviewed.   Constitutional:       General: She is not in acute distress.  HENT:      Head: Normocephalic and atraumatic.      Nose: Nose normal.   Eyes:      Extraocular Movements: Extraocular movements intact.   Pulmonary:      Effort: Pulmonary effort is normal.   Musculoskeletal:      Cervical back: Normal range of motion.   Neurological:      Mental Status: She is alert and oriented to person, place, and time. Mental  status is at baseline.   Psychiatric:         Mood and Affect: Mood normal.         Behavior: Behavior normal.          Lab Results: I have reviewed the following results:  Estimated Creatinine Clearance: 136.2 mL/min (by C-G formula based on SCr of 0.78 mg/dL).  Lab Results   Component Value Date    WBC 7.17 03/21/2025    HGB 10.4 (L) 03/21/2025    HCT 32.4 (L) 03/21/2025    PLT 80 (L) 03/21/2025         Component Value Date/Time    K 4.2 03/21/2025 0554     (H) 03/21/2025 0554    CO2 27 03/21/2025 0554    BUN 12 03/21/2025 0554    CREATININE 0.78 03/21/2025 0554         Component Value Date/Time    CALCIUM 7.8 (L) 03/21/2025 0554    ALKPHOS 110 (H) 03/01/2025 0743    AST 45 (H) 03/01/2025 0743    ALT 36 03/01/2025 0743    TP 8.1 03/01/2025 0743    ALB 4.2 03/01/2025 0743       Imaging Results Review: No pertinent imaging studies reviewed.  Other Study Results Review: No additional pertinent studies reviewed.

## 2025-03-21 NOTE — PLAN OF CARE
Problem: PAIN - ADULT  Goal: Verbalizes/displays adequate comfort level or baseline comfort level  Description: Interventions:  - Encourage patient to monitor pain and request assistance  - Assess pain using appropriate pain scale  - Administer analgesics based on type and severity of pain and evaluate response  - Implement non-pharmacological measures as appropriate and evaluate response  - Consider cultural and social influences on pain and pain management  - Notify physician/advanced practitioner if interventions unsuccessful or patient reports new pain  Outcome: Progressing     Problem: INFECTION - ADULT  Goal: Absence or prevention of progression during hospitalization  Description: INTERVENTIONS:  - Assess and monitor for signs and symptoms of infection  - Monitor lab/diagnostic results  - Monitor all insertion sites, i.e. indwelling lines, tubes, and drains  - Monitor endotracheal if appropriate and nasal secretions for changes in amount and color  - [place] appropriate cooling/warming therapies per order  - Administer medications as ordered  - Instruct and encourage patient and family to use good hand hygiene technique  - Identify and instruct in appropriate isolation precautions for identified infection/condition  Outcome: Progressing  Goal: Absence of fever/infection during neutropenic period  Description: INTERVENTIONS:  - Monitor WBC    Outcome: Progressing     Problem: SAFETY ADULT  Goal: Patient will remain free of falls  Description: INTERVENTIONS:  - Educate patient/family on patient safety including physical limitations  - Instruct patient to call for assistance with activity   - Consult OT/PT to assist with strengthening/mobility   - Keep Call bell within reach  - Keep bed low and locked with side rails adjusted as appropriate  - Keep care items and personal belongings within reach  - Initiate and maintain comfort rounds  - Make Fall Risk Sign visible to staff  - Offer Toileting every 2 Hours,  in advance of need  - Obtain necessary fall risk management equipment: nonskid footwear  - Apply yellow socks and bracelet for high fall risk patients  - Consider moving patient to room near nurses station  Outcome: Progressing  Goal: Maintain or return to baseline ADL function  Description: INTERVENTIONS:  -  Assess patient's ability to carry out ADLs; assess patient's baseline for ADL function and identify physical deficits which impact ability to perform ADLs (bathing, care of mouth/teeth, toileting, grooming, dressing, etc.)  - Assess/evaluate cause of self-care deficits   - Assess range of motion  - Assess patient's mobility; develop plan if impaired  - Assess patient's need for assistive devices and provide as appropriate  - Encourage maximum independence but intervene and supervise when necessary  - Involve family in performance of ADLs  - Assess for home care needs following discharge   - Consider OT consult to assist with ADL evaluation and planning for discharge  - Provide patient education as appropriate  Outcome: Progressing     Problem: DISCHARGE PLANNING  Goal: Discharge to home or other facility with appropriate resources  Description: INTERVENTIONS:  - Identify barriers to discharge w/patient and caregiver  - Arrange for needed discharge resources and transportation as appropriate  - Identify discharge learning needs (meds, wound care, etc.)  - Arrange for interpretive services to assist at discharge as needed  - Refer to Case Management Department for coordinating discharge planning if the patient needs post-hospital services based on physician/advanced practitioner order or complex needs related to functional status, cognitive ability, or social support system  Outcome: Progressing     Problem: Knowledge Deficit  Goal: Patient/family/caregiver demonstrates understanding of disease process, treatment plan, medications, and discharge instructions  Description: Complete learning assessment and assess  knowledge base.  Interventions:  - Provide teaching at level of understanding  - Provide teaching via preferred learning methods  Outcome: Progressing     Problem: MOBILITY - ADULT  Goal: Maintain or return to baseline ADL function  Description: INTERVENTIONS:  -  Assess patient's ability to carry out ADLs; assess patient's baseline for ADL function and identify physical deficits which impact ability to perform ADLs (bathing, care of mouth/teeth, toileting, grooming, dressing, etc.)  - Assess/evaluate cause of self-care deficits   - Assess range of motion  - Assess patient's mobility; develop plan if impaired  - Assess patient's need for assistive devices and provide as appropriate  - Encourage maximum independence but intervene and supervise when necessary  - Involve family in performance of ADLs  - Assess for home care needs following discharge   - Consider OT consult to assist with ADL evaluation and planning for discharge  - Provide patient education as appropriate  Outcome: Progressing

## 2025-03-22 LAB
BASOPHILS # BLD AUTO: 0.09 THOUSANDS/ÂΜL (ref 0–0.1)
BASOPHILS NFR BLD AUTO: 1 % (ref 0–1)
EOSINOPHIL # BLD AUTO: 0.23 THOUSAND/ÂΜL (ref 0–0.61)
EOSINOPHIL NFR BLD AUTO: 2 % (ref 0–6)
ERYTHROCYTE [DISTWIDTH] IN BLOOD BY AUTOMATED COUNT: 14.2 % (ref 11.6–15.1)
HCT VFR BLD AUTO: 36.5 % (ref 34.8–46.1)
HGB BLD-MCNC: 11.4 G/DL (ref 11.5–15.4)
IMM GRANULOCYTES # BLD AUTO: 0.16 THOUSAND/UL (ref 0–0.2)
IMM GRANULOCYTES NFR BLD AUTO: 1 % (ref 0–2)
LYMPHOCYTES # BLD AUTO: 4 THOUSANDS/ÂΜL (ref 0.6–4.47)
LYMPHOCYTES NFR BLD AUTO: 33 % (ref 14–44)
MCH RBC QN AUTO: 29.8 PG (ref 26.8–34.3)
MCHC RBC AUTO-ENTMCNC: 31.2 G/DL (ref 31.4–37.4)
MCV RBC AUTO: 95 FL (ref 82–98)
MONOCYTES # BLD AUTO: 0.87 THOUSAND/ÂΜL (ref 0.17–1.22)
MONOCYTES NFR BLD AUTO: 7 % (ref 4–12)
NEUTROPHILS # BLD AUTO: 6.62 THOUSANDS/ÂΜL (ref 1.85–7.62)
NEUTS SEG NFR BLD AUTO: 56 % (ref 43–75)
NRBC BLD AUTO-RTO: 0 /100 WBCS
PLATELET # BLD AUTO: 111 THOUSANDS/UL (ref 149–390)
PMV BLD AUTO: 10.2 FL (ref 8.9–12.7)
RBC # BLD AUTO: 3.83 MILLION/UL (ref 3.81–5.12)
WBC # BLD AUTO: 11.97 THOUSAND/UL (ref 4.31–10.16)

## 2025-03-22 PROCEDURE — 85025 COMPLETE CBC W/AUTO DIFF WBC: CPT | Performed by: NEUROLOGICAL SURGERY

## 2025-03-22 PROCEDURE — 99024 POSTOP FOLLOW-UP VISIT: CPT | Performed by: NEUROLOGICAL SURGERY

## 2025-03-22 RX ORDER — ACETAMINOPHEN 325 MG/1
975 TABLET ORAL EVERY 6 HOURS SCHEDULED
Status: DISCONTINUED | OUTPATIENT
Start: 2025-03-22 | End: 2025-03-24 | Stop reason: HOSPADM

## 2025-03-22 RX ORDER — HYDROMORPHONE HYDROCHLORIDE 2 MG/1
2 TABLET ORAL EVERY 4 HOURS PRN
Refills: 0 | Status: DISCONTINUED | OUTPATIENT
Start: 2025-03-22 | End: 2025-03-24

## 2025-03-22 RX ORDER — HYDROMORPHONE HYDROCHLORIDE 2 MG/1
4 TABLET ORAL EVERY 4 HOURS PRN
Refills: 0 | Status: DISCONTINUED | OUTPATIENT
Start: 2025-03-22 | End: 2025-03-24

## 2025-03-22 RX ADMIN — HYDROMORPHONE HYDROCHLORIDE 0.5 MG: 1 INJECTION, SOLUTION INTRAMUSCULAR; INTRAVENOUS; SUBCUTANEOUS at 17:13

## 2025-03-22 RX ADMIN — DOCUSATE SODIUM 100 MG: 100 CAPSULE, LIQUID FILLED ORAL at 17:13

## 2025-03-22 RX ADMIN — ENOXAPARIN SODIUM 40 MG: 40 INJECTION SUBCUTANEOUS at 08:01

## 2025-03-22 RX ADMIN — CEPHALEXIN 500 MG: 250 CAPSULE ORAL at 21:03

## 2025-03-22 RX ADMIN — HYDROMORPHONE HYDROCHLORIDE 0.5 MG: 1 INJECTION, SOLUTION INTRAMUSCULAR; INTRAVENOUS; SUBCUTANEOUS at 08:55

## 2025-03-22 RX ADMIN — METHOCARBAMOL 750 MG: 500 TABLET ORAL at 17:13

## 2025-03-22 RX ADMIN — GABAPENTIN 600 MG: 300 CAPSULE ORAL at 14:53

## 2025-03-22 RX ADMIN — ACYCLOVIR 400 MG: 800 TABLET ORAL at 17:21

## 2025-03-22 RX ADMIN — OXYCODONE HYDROCHLORIDE AND ACETAMINOPHEN 2 TABLET: 5; 325 TABLET ORAL at 02:51

## 2025-03-22 RX ADMIN — CEPHALEXIN 500 MG: 250 CAPSULE ORAL at 05:04

## 2025-03-22 RX ADMIN — ENOXAPARIN SODIUM 40 MG: 40 INJECTION SUBCUTANEOUS at 21:03

## 2025-03-22 RX ADMIN — OXYCODONE HYDROCHLORIDE AND ACETAMINOPHEN 2 TABLET: 5; 325 TABLET ORAL at 07:56

## 2025-03-22 RX ADMIN — STANDARDIZED SENNA CONCENTRATE 8.6 MG: 8.6 TABLET ORAL at 08:01

## 2025-03-22 RX ADMIN — HYDROMORPHONE HYDROCHLORIDE 4 MG: 2 TABLET ORAL at 14:53

## 2025-03-22 RX ADMIN — ACETAMINOPHEN 975 MG: 325 TABLET, FILM COATED ORAL at 21:00

## 2025-03-22 RX ADMIN — ACETAMINOPHEN 975 MG: 325 TABLET, FILM COATED ORAL at 14:53

## 2025-03-22 RX ADMIN — METHOCARBAMOL 750 MG: 500 TABLET ORAL at 13:07

## 2025-03-22 RX ADMIN — LORATADINE 5 MG: 10 TABLET ORAL at 21:02

## 2025-03-22 RX ADMIN — HYDROMORPHONE HYDROCHLORIDE 0.5 MG: 1 INJECTION, SOLUTION INTRAMUSCULAR; INTRAVENOUS; SUBCUTANEOUS at 00:31

## 2025-03-22 RX ADMIN — HYDROMORPHONE HYDROCHLORIDE 4 MG: 2 TABLET ORAL at 10:47

## 2025-03-22 RX ADMIN — HYDROMORPHONE HYDROCHLORIDE 4 MG: 2 TABLET ORAL at 18:54

## 2025-03-22 RX ADMIN — HYDROXYZINE PAMOATE 50 MG: 25 CAPSULE ORAL at 08:01

## 2025-03-22 RX ADMIN — HYDROMORPHONE HYDROCHLORIDE 0.5 MG: 1 INJECTION, SOLUTION INTRAMUSCULAR; INTRAVENOUS; SUBCUTANEOUS at 21:01

## 2025-03-22 RX ADMIN — GABAPENTIN 600 MG: 300 CAPSULE ORAL at 21:02

## 2025-03-22 RX ADMIN — GABAPENTIN 600 MG: 300 CAPSULE ORAL at 08:01

## 2025-03-22 RX ADMIN — CEPHALEXIN 500 MG: 250 CAPSULE ORAL at 13:07

## 2025-03-22 RX ADMIN — B-COMPLEX W/ C & FOLIC ACID TAB 1 TABLET: TAB at 08:01

## 2025-03-22 RX ADMIN — METHOCARBAMOL 750 MG: 500 TABLET ORAL at 05:04

## 2025-03-22 RX ADMIN — HYDROMORPHONE HYDROCHLORIDE 0.5 MG: 1 INJECTION, SOLUTION INTRAMUSCULAR; INTRAVENOUS; SUBCUTANEOUS at 04:55

## 2025-03-22 RX ADMIN — DOCUSATE SODIUM 100 MG: 100 CAPSULE, LIQUID FILLED ORAL at 08:01

## 2025-03-22 RX ADMIN — HYDROMORPHONE HYDROCHLORIDE 0.5 MG: 1 INJECTION, SOLUTION INTRAMUSCULAR; INTRAVENOUS; SUBCUTANEOUS at 13:10

## 2025-03-22 RX ADMIN — METHOCARBAMOL 750 MG: 500 TABLET ORAL at 00:31

## 2025-03-22 RX ADMIN — ACYCLOVIR 400 MG: 800 TABLET ORAL at 08:01

## 2025-03-22 RX ADMIN — PANTOPRAZOLE SODIUM 40 MG: 40 TABLET, DELAYED RELEASE ORAL at 05:04

## 2025-03-22 NOTE — UTILIZATION REVIEW
Continued Stay Review    Date: 3/22/25                           Current Patient Class: Inpatient  Current Level of Care: MS    HPI:49 y.o. female initially admitted on 3/20/25   Current Diagnosis:Lumbar stenosis with neurogenic claudication     Assessment/Plan: POD 2 s/p MIS TLIF L4-5 with bilateral facetectomies, MIS [facility] microdiscectomy left L5-S:    Pt with complaints of incisional back pain, rates pain 7/10 worse with movement and bed repositioning. Tolerating diet. Complaints of mild lateral right sided occasionally numbness which is new post operatively.    Will trial an opioid rotation to oral Dilaudid for improved pain control per recs. PT / OT recommending discharge to home.   Plan: pain control;  monitor labs; cont po abx; neuro-vasc checks Q4H      Medications:   Scheduled Medications:  acetaminophen, 975 mg, Oral, Q6H ANIKET  acyclovir, 400 mg, Oral, BID  cephalexin, 500 mg, Oral, Q8H ANIKET  diclofenac, 75 mg, Oral, BID  docusate sodium, 100 mg, Oral, BID  enoxaparin, 40 mg, Subcutaneous, Q12H ANIKET  gabapentin, 600 mg, Oral, TID  hydrOXYzine pamoate, 50 mg, Oral, Daily  loratadine, 5 mg, Oral, Daily  methocarbamol, 750 mg, Oral, Q6H ANIKET  multivitamin stress formula, 1 tablet, Oral, Daily  pantoprazole, 40 mg, Oral, Early Morning  senna, 1 tablet, Oral, Daily      Continuous IV Infusions:  None       PRN Meds:  aluminum-magnesium hydroxide-simethicone, 30 mL, Oral, Q6H PRN  HYDROmorphone, 0.5 mg, Intravenous, Q4H PRN; 3/22 x4 so far  HYDROmorphone, 2 mg, Oral, Q4H PRN   Or  HYDROmorphone, 4 mg, Oral, Q4H PRN: 3/22 x2 so far  naloxone, 0.04 mg, Intravenous, Q1MIN PRN  ondansetron, 4 mg, Intravenous, Q4H PRN  oxyCODONE-acetaminophen (PERCOCET) 5-325 mg per tablet 2 tablet, Oral Q4H PRN: 3/22 x2 so far       Discharge Plan: TBD - Tentative discharge to home 3/23.     Vital Signs (last 3 days)       Date/Time Temp Pulse Resp BP MAP (mmHg) SpO2 O2 Device Cardiac (WDL) Patient Position - Orthostatic VS [place] Coma  Scale Score Pain    03/22/25 1453 -- -- -- -- -- -- -- -- -- -- 6 03/22/25 1310 -- -- -- -- -- -- -- -- -- -- 6 03/22/25 1047 -- -- -- -- -- -- -- -- -- -- 9 03/22/25 0855 -- -- -- -- -- -- -- -- -- -- 9 03/22/25 0815 -- -- -- -- -- -- -- -- -- 15 --    03/22/25 0756 -- -- -- -- -- -- -- -- -- -- 9 03/22/25 07:22:54 -- -- -- 102/79 87 -- -- -- -- -- --    03/22/25 0455 -- -- -- -- -- -- -- -- -- -- 8    03/22/25 0430 -- -- -- -- -- -- -- -- -- 15 --    03/22/25 0251 -- -- -- -- -- -- -- -- -- -- 8    03/22/25 0031 -- -- -- -- -- -- -- -- -- -- 7    03/22/25 0030 -- -- -- -- -- -- -- -- -- 15 --    03/21/25 22:33:41 -- -- -- 130/75 93 -- -- -- -- -- --    03/21/25 2221 -- -- -- -- -- -- -- -- -- -- 8    03/21/25 2033 -- -- -- -- -- -- -- -- -- -- 9    03/21/25 2030 -- -- -- -- -- -- -- -- -- 15 --    03/21/25 1827 -- -- -- -- -- -- -- -- -- -- 7    03/21/25 1540 -- -- -- -- -- -- -- -- -- 15 --    03/21/25 1533 -- -- -- -- -- -- -- -- -- -- 8    03/21/25 15:22:46 -- -- -- 103/63 76 -- -- -- -- -- --    03/21/25 1417 -- -- -- -- -- -- -- -- -- -- 8    03/21/25 1137 -- -- -- -- -- -- -- -- -- -- 8          Weight (last 2 days)       Date/Time Weight    03/20/25 0735 151 (333)            Pertinent Labs/Diagnostic Results:     Results from last 7 days   Lab Units 03/22/25  0512 03/21/25  0554   WBC Thousand/uL 11.97* 7.17   HEMOGLOBIN g/dL 11.4* 10.4*   HEMATOCRIT % 36.5 32.4*   PLATELETS Thousands/uL 111* 80*   TOTAL NEUT ABS Thousands/µL 6.62  --      Network Utilization Review Department  ATTENTION: Please call with any questions or concerns to [phone] and carefully listen to the prompts so that you are directed to the right person. All voicemails are confidential.   For Discharge needs, contact Care Management DC Support Team at [phone] opt. 2  Send all requests for admission clinical reviews, approved or denied determinations and any other requests to dedicated fax number below belonging to  the [facility] where the patient is receiving treatment. List of dedicated fax numbers for the Facilities:  FACILITY NAME UR FAX NUMBER   ADMISSION DENIALS (Administrative/Medical Necessity) [phone]   DISCHARGE SUPPORT TEAM (NETWORK) [phone]   PARENT CHILD HEALTH (Maternity/NICU/Pediatrics) [phone]   [facility] [phone]   [facility] [phone]   [facility] [phone]   [facility] [phone]   [facility] [phone]   [facility] [phone]   [facility] [phone]   [facility] [phone]   [facility] [phone]   [facility] [phone]   [facility] [phone]   [facility] [phone]

## 2025-03-22 NOTE — PLAN OF CARE
Problem: PAIN - ADULT  Goal: Verbalizes/displays adequate comfort level or baseline comfort level  Description: Interventions:  - Encourage patient to monitor pain and request assistance  - Assess pain using appropriate pain scale  - Administer analgesics based on type and severity of pain and evaluate response  - Implement non-pharmacological measures as appropriate and evaluate response  - Consider cultural and social influences on pain and pain management  - Notify physician/advanced practitioner if interventions unsuccessful or patient reports new pain  Outcome: Progressing     Problem: INFECTION - ADULT  Goal: Absence or prevention of progression during hospitalization  Description: INTERVENTIONS:  - Assess and monitor for signs and symptoms of infection  - Monitor lab/diagnostic results  - Monitor all insertion sites, i.e. indwelling lines, tubes, and drains  - Monitor endotracheal if appropriate and nasal secretions for changes in amount and color  - [place] appropriate cooling/warming therapies per order  - Administer medications as ordered  - Instruct and encourage patient and family to use good hand hygiene technique  - Identify and instruct in appropriate isolation precautions for identified infection/condition  Outcome: Progressing  Goal: Absence of fever/infection during neutropenic period  Description: INTERVENTIONS:  - Monitor WBC    Outcome: Progressing     Problem: SAFETY ADULT  Goal: Patient will remain free of falls  Description: INTERVENTIONS:  - Educate patient/family on patient safety including physical limitations  - Instruct patient to call for assistance with activity   - Consult OT/PT to assist with strengthening/mobility   - Keep Call bell within reach  - Keep bed low and locked with side rails adjusted as appropriate  - Keep care items and personal belongings within reach  - Initiate and maintain comfort rounds  - Make Fall Risk Sign visible to staff  - Offer Toileting every x Hours,  in advance of need  - Initiate/Maintain xalarm  - Obtain necessary fall risk management equipment: x  - Apply yellow socks and bracelet for high fall risk patients  - Consider moving patient to room near nurses station  Outcome: Progressing  Goal: Maintain or return to baseline ADL function  Description: INTERVENTIONS:  -  Assess patient's ability to carry out ADLs; assess patient's baseline for ADL function and identify physical deficits which impact ability to perform ADLs (bathing, care of mouth/teeth, toileting, grooming, dressing, etc.)  - Assess/evaluate cause of self-care deficits   - Assess range of motion  - Assess patient's mobility; develop plan if impaired  - Assess patient's need for assistive devices and provide as appropriate  - Encourage maximum independence but intervene and supervise when necessary  - Involve family in performance of ADLs  - Assess for home care needs following discharge   - Consider OT consult to assist with ADL evaluation and planning for discharge  - Provide patient education as appropriate  Outcome: Progressing  Goal: Maintains/Returns to pre admission functional level  Description: INTERVENTIONS:  - Perform AM-PAC 6 Click Basic Mobility/ Daily Activity assessment daily.  - Set and communicate daily mobility goal to care team and patient/family/caregiver.   - Collaborate with rehabilitation services on mobility goals if consulted  - Perform Range of Motion x times a day.  - Reposition patient every x hours.  - Dangle patient x times a day  - Stand patient x times a day  - Ambulate patient x times a day  - Out of bed to chair x times a day   - Out of bed for meals x times a day  - Out of bed for toileting  - Record patient progress and toleration of activity level   Outcome: Progressing     Problem: DISCHARGE PLANNING  Goal: Discharge to home or other facility with appropriate resources  Description: INTERVENTIONS:  - Identify barriers to discharge w/patient and caregiver  -  Arrange for needed discharge resources and transportation as appropriate  - Identify discharge learning needs (meds, wound care, etc.)  - Arrange for interpretive services to assist at discharge as needed  - Refer to Case Management Department for coordinating discharge planning if the patient needs post-hospital services based on physician/advanced practitioner order or complex needs related to functional status, cognitive ability, or social support system  Outcome: Progressing     Problem: Knowledge Deficit  Goal: Patient/family/caregiver demonstrates understanding of disease process, treatment plan, medications, and discharge instructions  Description: Complete learning assessment and assess knowledge base.  Interventions:  - Provide teaching at level of understanding  - Provide teaching via preferred learning methods  Outcome: Progressing     Problem: MOBILITY - ADULT  Goal: Maintain or return to baseline ADL function  Description: INTERVENTIONS:  -  Assess patient's ability to carry out ADLs; assess patient's baseline for ADL function and identify physical deficits which impact ability to perform ADLs (bathing, care of mouth/teeth, toileting, grooming, dressing, etc.)  - Assess/evaluate cause of self-care deficits   - Assess range of motion  - Assess patient's mobility; develop plan if impaired  - Assess patient's need for assistive devices and provide as appropriate  - Encourage maximum independence but intervene and supervise when necessary  - Involve family in performance of ADLs  - Assess for home care needs following discharge   - Consider OT consult to assist with ADL evaluation and planning for discharge  - Provide patient education as appropriate  Outcome: Progressing  Goal: Maintains/Returns to pre admission functional level  Description: INTERVENTIONS:  - Perform AM-PAC 6 Click Basic Mobility/ Daily Activity assessment daily.  - Set and communicate daily mobility goal to care team and  patient/family/caregiver.   - Collaborate with rehabilitation services on mobility goals if consulted  - Perform Range of Motion x times a day.  - Reposition patient every x hours.  - Dangle patient x times a day  - Stand patient x times a day  - Ambulate patient x times a day  - Out of bed to chair x times a day   - Out of bed for meals x times a day  - Out of bed for toileting  - Record patient progress and toleration of activity level   Outcome: Progressing

## 2025-03-22 NOTE — PROGRESS NOTES
Progress Note - Neurosurgery  Name: Tessa Olivarez 49 y.o. female I MRN: [id number]  Unit/Bed#: -01 I Date of Admission: 3/20/2025   Date of Service: 3/22/2025 I Hospital Day: 2    Assessment & Plan  Lumbar stenosis with neurogenic claudication    Pt is 50 y/o F pmhx lumbar stenosis with neurogenic claudication, spondylolisthesis of lumbar region, TERESITA, obesity, GERD, and tobacco abuse who presented to [facility] for elective MIS TLIF L4-5, MIS [facility] microdiscectomy left L5-S1 with Dr. Guy.     POD 2 s/p MIS TLIF L4-5 with bilateral facetectomies, MIS [employer] microdiscectomy left L5-S1    Intraoperative findings - High grade stenosis from ligamentous overgrowth and hypertrophied facets L4-5, calcified disc herniation left L5-S1    XR lumbar spine - Stable postoperative changes. No evidence of osseous or hardware complication.     -Afebrile, VSS   -WBC 11.97     Plan:   Pain control as prescribed, appreciate APS consult. Will trial an opioid rotation to oral Dilaudid for improved pain control per recs.   Cont PO abx - Discharge with 10 days of Keflex   Neurovascular checks Q4H   Encourage OOB, ambulation   IS for pulm hygiene   PT / OT recommending discharge to home. Tentative discharge to home 3/23.   Postoperative pain after spinal surgery  As above  Lumbar radiculopathy  As above   Obstructive sleep apnea  Respiratory protocol ordered   Class 3 severe obesity due to excess calories without serious comorbidity with body mass index (BMI) of 45.0 to 49.9 in adult (HCC)  Lifestyle modifications discussed   Gastroesophageal reflux disease  Protonix 40 mg daily ordered   Nicotine abuse  Smokes 1/2 ppd, declines nicotine patch   Cessation encouraged.     Subjective     Pt seen and examined at bedside. Pt with complaints of incisional back pain, rates pain 7/10 worse with movement and bed repositioning. Tolerating diet. Voiding without difficulty. Complaints of mild lateral right sided occasionally numbness which is new  post operatively.      Objective :  BP: (102-130)/(63-79) 102/79    I/O         03/20 0701  03/21 0700 03/21 0701  03/22 0700 03/22 0701  03/23 0700    P.O. 680 240 60    I.V. (mL/kg) 3818.8 (25.3)      IV Piggyback 725      Total Intake(mL/kg) 5223.8 (34.6) 240 (1.6) 60 (0.4)    Urine (mL/kg/hr) 3100 (0.9) 255 (0.1) 550 (0.8)    Blood 75      Total Output 3175 255 550    Net +2048.8 -15 -490                   Physical Exam  Vitals and nursing note reviewed.   HENT:      Head: Normocephalic and atraumatic.      Right Ear: External ear normal.      Left Ear: External ear normal.   Eyes:      Pupils: Pupils are equal, round, and reactive to light.   Cardiovascular:      Rate and Rhythm: Normal rate and regular rhythm.   Pulmonary:      Effort: Pulmonary effort is normal.      Breath sounds: Normal breath sounds.   Abdominal:      General: There is no distension.      Palpations: Abdomen is soft.      Tenderness: There is no abdominal tenderness.   Musculoskeletal:      Comments: Incision site C/D/I.    Skin:     General: Skin is warm and dry.   Neurological:      Mental Status: She is alert. Mental status is at baseline.      Sensory: Sensation is intact.      Comments: Strength 5/5 b/l UE and LE    Psychiatric:         Mood and Affect: Mood normal.         Behavior: Behavior normal.         Thought Content: Thought content normal.         Judgment: Judgment normal.           Lab Results: I have reviewed the following results:  Recent Labs     03/21/25  0554 03/22/25  0512   WBC 7.17 11.97*   HGB 10.4* 11.4*   HCT 32.4* 36.5   PLT 80* 111*   SODIUM 140  --    K 4.2  --    *  --    CO2 27  --    BUN 12  --    CREATININE 0.78  --    GLUC 156*  --    MG 1.9  --    PHOS 2.3*  --        Imaging Results Review: No pertinent imaging studies reviewed.  Other Study Results Review: No additional pertinent studies reviewed.    VTE Pharmacologic Prophylaxis: Heparin  VTE Mechanical Prophylaxis: sequential compression device  and foot pump applied    Brenda Britt PA-C

## 2025-03-22 NOTE — QUICK NOTE
Discussed with nursing staff and surgical team; patient having an adequate pain control with current Percocet regimen, requiring IV Dilaudid for breakthrough pain.  Will trial an opioid rotation to oral Dilaudid for improved pain control.    Multimodal analgesia:  Start Tylenol 975 mg every 8 hours scheduled  Gabapentin 600 mg 3 times daily  Diclofenac EC 75 mg 2 times daily  Robaxin 750 mg every 6 hours scheduled  Stop current Percocet regimen and opioid rotate oral Dilaudid as follows:  Oral Dilaudid 2 mg every 4 hours as needed for moderate pain  Oral Dilaudid 4 mg every 4 hours as needed for severe pain  IV Dilaudid 0.5 mg every 4 hours as needed for breakthrough pain  Recommend discontinuation of IV opioids over the next 24 hours.  Narcan as needed for respiratory depression/opioid reversal    If patient were to discharge this weekend would recommend the following regimen:  Start Tylenol 975 mg every 8 hours scheduled  Gabapentin 600 mg 3 times daily  Diclofenac EC 75 mg 2 times daily  Robaxin 750 mg every 6 hours scheduled  Oral Dilaudid 2 mg every 4 hours as needed for moderate pain/4 mg every 4 hours as needed for severe pain x 3 to 5 days maximum.    GENESIS Rojas  Acute Pain Service

## 2025-03-22 NOTE — PLAN OF CARE
Problem: PAIN - ADULT  Goal: Verbalizes/displays adequate comfort level or baseline comfort level  Description: Interventions:  - Encourage patient to monitor pain and request assistance  - Assess pain using appropriate pain scale  - Administer analgesics based on type and severity of pain and evaluate response  - Implement non-pharmacological measures as appropriate and evaluate response  - Consider cultural and social influences on pain and pain management  - Notify physician/advanced practitioner if interventions unsuccessful or patient reports new pain  Outcome: Progressing     Problem: INFECTION - ADULT  Goal: Absence or prevention of progression during hospitalization  Description: INTERVENTIONS:  - Assess and monitor for signs and symptoms of infection  - Monitor lab/diagnostic results  - Monitor all insertion sites, i.e. indwelling lines, tubes, and drains  - Monitor endotracheal if appropriate and nasal secretions for changes in amount and color  - [place] appropriate cooling/warming therapies per order  - Administer medications as ordered  - Instruct and encourage patient and family to use good hand hygiene technique  - Identify and instruct in appropriate isolation precautions for identified infection/condition  Outcome: Progressing  Goal: Absence of fever/infection during neutropenic period  Description: INTERVENTIONS:  - Monitor WBC    Outcome: Progressing     Problem: SAFETY ADULT  Goal: Patient will remain free of falls  Description: INTERVENTIONS:  - Educate patient/family on patient safety including physical limitations  - Instruct patient to call for assistance with activity   - Consult OT/PT to assist with strengthening/mobility   - Keep Call bell within reach  - Keep bed low and locked with side rails adjusted as appropriate  - Keep care items and personal belongings within reach  - Initiate and maintain comfort rounds  - Make Fall Risk Sign visible to staff  - Offer Toileting every 2 Hours,  in advance of need  - Initiate/Maintain bed alarm  - Obtain necessary fall risk management equipment: non skid footwear  - Apply yellow socks and bracelet for high fall risk patients  - Consider moving patient to room near nurses station  Outcome: Progressing  Goal: Maintain or return to baseline ADL function  Description: INTERVENTIONS:  -  Assess patient's ability to carry out ADLs; assess patient's baseline for ADL function and identify physical deficits which impact ability to perform ADLs (bathing, care of mouth/teeth, toileting, grooming, dressing, etc.)  - Assess/evaluate cause of self-care deficits   - Assess range of motion  - Assess patient's mobility; develop plan if impaired  - Assess patient's need for assistive devices and provide as appropriate  - Encourage maximum independence but intervene and supervise when necessary  - Involve family in performance of ADLs  - Assess for home care needs following discharge   - Consider OT consult to assist with ADL evaluation and planning for discharge  - Provide patient education as appropriate  Outcome: Progressing  Goal: Maintains/Returns to pre admission functional level  Description: INTERVENTIONS:  - Perform AM-PAC 6 Click Basic Mobility/ Daily Activity assessment daily.  - Set and communicate daily mobility goal to care team and patient/family/caregiver.   - Collaborate with rehabilitation services on mobility goals if consulted  - Perform Range of Motion 2 times a day.  - Reposition patient every 2 hours.  - Dangle patient 2 times a day  - Stand patient 2 times a day  - Ambulate patient 2 times a day  - Out of bed to chair 2 times a day   - Out of bed for meals 2 times a day  - Out of bed for toileting  - Record patient progress and toleration of activity level   Outcome: Progressing     Problem: DISCHARGE PLANNING  Goal: Discharge to home or other facility with appropriate resources  Description: INTERVENTIONS:  - Identify barriers to discharge w/patient  and caregiver  - Arrange for needed discharge resources and transportation as appropriate  - Identify discharge learning needs (meds, wound care, etc.)  - Arrange for interpretive services to assist at discharge as needed  - Refer to Case Management Department for coordinating discharge planning if the patient needs post-hospital services based on physician/advanced practitioner order or complex needs related to functional status, cognitive ability, or social support system  Outcome: Progressing     Problem: Knowledge Deficit  Goal: Patient/family/caregiver demonstrates understanding of disease process, treatment plan, medications, and discharge instructions  Description: Complete learning assessment and assess knowledge base.  Interventions:  - Provide teaching at level of understanding  - Provide teaching via preferred learning methods  Outcome: Progressing     Problem: MOBILITY - ADULT  Goal: Maintain or return to baseline ADL function  Description: INTERVENTIONS:  -  Assess patient's ability to carry out ADLs; assess patient's baseline for ADL function and identify physical deficits which impact ability to perform ADLs (bathing, care of mouth/teeth, toileting, grooming, dressing, etc.)  - Assess/evaluate cause of self-care deficits   - Assess range of motion  - Assess patient's mobility; develop plan if impaired  - Assess patient's need for assistive devices and provide as appropriate  - Encourage maximum independence but intervene and supervise when necessary  - Involve family in performance of ADLs  - Assess for home care needs following discharge   - Consider OT consult to assist with ADL evaluation and planning for discharge  - Provide patient education as appropriate  Outcome: Progressing  Goal: Maintains/Returns to pre admission functional level  Description: INTERVENTIONS:  - Perform AM-PAC 6 Click Basic Mobility/ Daily Activity assessment daily.  - Set and communicate daily mobility goal to care team and  patient/family/caregiver.   - Collaborate with rehabilitation services on mobility goals if consulted  - Perform Range of Motion 2 times a day.  - Reposition patient every 2 hours.  - Dangle patient 2 times a day  - Stand patient 2 times a day  - Ambulate patient 2 times a day  - Out of bed to chair 2 times a day   - Out of bed for meals 2 times a day  - Out of bed for toileting  - Record patient progress and toleration of activity level   Outcome: Progressing

## 2025-03-22 NOTE — ASSESSMENT & PLAN NOTE
Pt is 48 y/o F pmhx lumbar stenosis with neurogenic claudication, spondylolisthesis of lumbar region, TERESITA, obesity, GERD, and tobacco abuse who presented to [facility] for elective MIS TLIF L4-5, MIS [employer] microdiscectomy left L5-S1 with Dr. Guy.     POD 2 s/p MIS TLIF L4-5 with bilateral facetectomies, MIS [employer] microdiscectomy left L5-S1    Intraoperative findings - High grade stenosis from ligamentous overgrowth and hypertrophied facets L4-5, calcified disc herniation left L5-S1    XR lumbar spine - Stable postoperative changes. No evidence of osseous or hardware complication.     -Afebrile, VSS   -WBC 11.97     Plan:   Pain control as prescribed, appreciate APS consult. Will trial an opioid rotation to oral Dilaudid for improved pain control per recs.   Cont PO abx - Discharge with 10 days of Keflex   Neurovascular checks Q4H   Encourage OOB, ambulation   IS for pulm hygiene   PT / OT recommending discharge to home. Tentative discharge to home 3/23.

## 2025-03-23 LAB
BASOPHILS # BLD AUTO: 0.03 THOUSANDS/ÂΜL (ref 0–0.1)
BASOPHILS NFR BLD AUTO: 0 % (ref 0–1)
EOSINOPHIL # BLD AUTO: 0.28 THOUSAND/ÂΜL (ref 0–0.61)
EOSINOPHIL NFR BLD AUTO: 4 % (ref 0–6)
ERYTHROCYTE [DISTWIDTH] IN BLOOD BY AUTOMATED COUNT: 14.1 % (ref 11.6–15.1)
HCT VFR BLD AUTO: 32.7 % (ref 34.8–46.1)
HGB BLD-MCNC: 10.6 G/DL (ref 11.5–15.4)
IMM GRANULOCYTES # BLD AUTO: 0.06 THOUSAND/UL (ref 0–0.2)
IMM GRANULOCYTES NFR BLD AUTO: 1 % (ref 0–2)
LYMPHOCYTES # BLD AUTO: 3.18 THOUSANDS/ÂΜL (ref 0.6–4.47)
LYMPHOCYTES NFR BLD AUTO: 41 % (ref 14–44)
MAGNESIUM SERPL-MCNC: 1.8 MG/DL (ref 1.9–2.7)
MCH RBC QN AUTO: 30.3 PG (ref 26.8–34.3)
MCHC RBC AUTO-ENTMCNC: 32.4 G/DL (ref 31.4–37.4)
MCV RBC AUTO: 93 FL (ref 82–98)
MONOCYTES # BLD AUTO: 0.65 THOUSAND/ÂΜL (ref 0.17–1.22)
MONOCYTES NFR BLD AUTO: 8 % (ref 4–12)
NEUTROPHILS # BLD AUTO: 3.56 THOUSANDS/ÂΜL (ref 1.85–7.62)
NEUTS SEG NFR BLD AUTO: 46 % (ref 43–75)
NRBC BLD AUTO-RTO: 0 /100 WBCS
PLATELET # BLD AUTO: 101 THOUSANDS/UL (ref 149–390)
PMV BLD AUTO: 9.8 FL (ref 8.9–12.7)
RBC # BLD AUTO: 3.5 MILLION/UL (ref 3.81–5.12)
WBC # BLD AUTO: 7.76 THOUSAND/UL (ref 4.31–10.16)

## 2025-03-23 PROCEDURE — 99024 POSTOP FOLLOW-UP VISIT: CPT | Performed by: NEUROLOGICAL SURGERY

## 2025-03-23 PROCEDURE — 83735 ASSAY OF MAGNESIUM: CPT

## 2025-03-23 PROCEDURE — 85025 COMPLETE CBC W/AUTO DIFF WBC: CPT

## 2025-03-23 RX ORDER — HYDROMORPHONE HCL/PF 1 MG/ML
0.5 SYRINGE (ML) INJECTION EVERY 6 HOURS PRN
Status: DISCONTINUED | OUTPATIENT
Start: 2025-03-23 | End: 2025-03-23

## 2025-03-23 RX ORDER — LIDOCAINE 50 MG/G
2 PATCH TOPICAL DAILY
Status: DISCONTINUED | OUTPATIENT
Start: 2025-03-23 | End: 2025-03-24 | Stop reason: HOSPADM

## 2025-03-23 RX ORDER — HYDROMORPHONE HCL/PF 1 MG/ML
0.5 SYRINGE (ML) INJECTION EVERY 4 HOURS PRN
Status: DISCONTINUED | OUTPATIENT
Start: 2025-03-23 | End: 2025-03-24

## 2025-03-23 RX ORDER — LANOLIN ALCOHOL/MO/W.PET/CERES
800 CREAM (GRAM) TOPICAL ONCE
Status: COMPLETED | OUTPATIENT
Start: 2025-03-23 | End: 2025-03-23

## 2025-03-23 RX ADMIN — ACETAMINOPHEN 975 MG: 325 TABLET, FILM COATED ORAL at 07:57

## 2025-03-23 RX ADMIN — CEPHALEXIN 500 MG: 250 CAPSULE ORAL at 05:33

## 2025-03-23 RX ADMIN — HYDROMORPHONE HYDROCHLORIDE 4 MG: 2 TABLET ORAL at 05:32

## 2025-03-23 RX ADMIN — METHOCARBAMOL 750 MG: 500 TABLET ORAL at 05:33

## 2025-03-23 RX ADMIN — B-COMPLEX W/ C & FOLIC ACID TAB 1 TABLET: TAB at 07:57

## 2025-03-23 RX ADMIN — ENOXAPARIN SODIUM 40 MG: 40 INJECTION SUBCUTANEOUS at 21:32

## 2025-03-23 RX ADMIN — METHOCARBAMOL 750 MG: 500 TABLET ORAL at 11:38

## 2025-03-23 RX ADMIN — METHOCARBAMOL 750 MG: 500 TABLET ORAL at 23:17

## 2025-03-23 RX ADMIN — HYDROMORPHONE HYDROCHLORIDE 4 MG: 2 TABLET ORAL at 00:47

## 2025-03-23 RX ADMIN — METHOCARBAMOL 750 MG: 500 TABLET ORAL at 00:07

## 2025-03-23 RX ADMIN — HYDROMORPHONE HYDROCHLORIDE 0.5 MG: 1 INJECTION, SOLUTION INTRAMUSCULAR; INTRAVENOUS; SUBCUTANEOUS at 07:52

## 2025-03-23 RX ADMIN — HYDROMORPHONE HYDROCHLORIDE 4 MG: 2 TABLET ORAL at 09:28

## 2025-03-23 RX ADMIN — ACYCLOVIR 400 MG: 800 TABLET ORAL at 08:03

## 2025-03-23 RX ADMIN — GABAPENTIN 600 MG: 300 CAPSULE ORAL at 07:57

## 2025-03-23 RX ADMIN — CEPHALEXIN 500 MG: 250 CAPSULE ORAL at 21:32

## 2025-03-23 RX ADMIN — ENOXAPARIN SODIUM 40 MG: 40 INJECTION SUBCUTANEOUS at 07:56

## 2025-03-23 RX ADMIN — HYDROMORPHONE HYDROCHLORIDE 4 MG: 2 TABLET ORAL at 13:40

## 2025-03-23 RX ADMIN — CEPHALEXIN 500 MG: 250 CAPSULE ORAL at 13:39

## 2025-03-23 RX ADMIN — HYDROMORPHONE HYDROCHLORIDE 4 MG: 2 TABLET ORAL at 18:01

## 2025-03-23 RX ADMIN — STANDARDIZED SENNA CONCENTRATE 8.6 MG: 8.6 TABLET ORAL at 07:56

## 2025-03-23 RX ADMIN — Medication 800 MG: at 07:56

## 2025-03-23 RX ADMIN — HYDROMORPHONE HYDROCHLORIDE 0.5 MG: 1 INJECTION, SOLUTION INTRAMUSCULAR; INTRAVENOUS; SUBCUTANEOUS at 16:09

## 2025-03-23 RX ADMIN — ACETAMINOPHEN 975 MG: 325 TABLET, FILM COATED ORAL at 13:39

## 2025-03-23 RX ADMIN — METHOCARBAMOL 750 MG: 500 TABLET ORAL at 18:02

## 2025-03-23 RX ADMIN — PANTOPRAZOLE SODIUM 40 MG: 40 TABLET, DELAYED RELEASE ORAL at 05:33

## 2025-03-23 RX ADMIN — ACYCLOVIR 400 MG: 800 TABLET ORAL at 18:04

## 2025-03-23 RX ADMIN — HYDROMORPHONE HYDROCHLORIDE 4 MG: 2 TABLET ORAL at 21:57

## 2025-03-23 RX ADMIN — DOCUSATE SODIUM 100 MG: 100 CAPSULE, LIQUID FILLED ORAL at 07:57

## 2025-03-23 RX ADMIN — DOCUSATE SODIUM 100 MG: 100 CAPSULE, LIQUID FILLED ORAL at 18:02

## 2025-03-23 RX ADMIN — LORATADINE 5 MG: 10 TABLET ORAL at 21:31

## 2025-03-23 RX ADMIN — GABAPENTIN 600 MG: 300 CAPSULE ORAL at 16:09

## 2025-03-23 RX ADMIN — HYDROMORPHONE HYDROCHLORIDE 0.5 MG: 1 INJECTION, SOLUTION INTRAMUSCULAR; INTRAVENOUS; SUBCUTANEOUS at 03:33

## 2025-03-23 RX ADMIN — GABAPENTIN 600 MG: 300 CAPSULE ORAL at 21:31

## 2025-03-23 RX ADMIN — ACETAMINOPHEN 975 MG: 325 TABLET, FILM COATED ORAL at 19:53

## 2025-03-23 RX ADMIN — HYDROMORPHONE HYDROCHLORIDE 0.5 MG: 1 INJECTION, SOLUTION INTRAMUSCULAR; INTRAVENOUS; SUBCUTANEOUS at 12:08

## 2025-03-23 RX ADMIN — HYDROMORPHONE HYDROCHLORIDE 0.5 MG: 1 INJECTION, SOLUTION INTRAMUSCULAR; INTRAVENOUS; SUBCUTANEOUS at 19:54

## 2025-03-23 NOTE — ASSESSMENT & PLAN NOTE
50 y/o F pmhx lumbar stenosis with neurogenic claudication, spondylolisthesis of lumbar region, TERESITA, obesity, GERD, and tobacco abuse  POD#3 s/p elective MIS TLIF L4-5 with bilateral facetectomies, MIS [employer] microdiscectomy left L5-S1 (dr. Guy)     AVSS on RA  No leukocytosis   Hemoglobin 10.6 (11.4, 10.4)  Hypomagnesemia, Mg 1.8 - PO repletion ordered this AM     Intraoperative findings - High grade stenosis from ligamentous overgrowth and hypertrophied facets L4-5, calcified disc herniation left L5-S1    XR lumbar spine - Stable postoperative changes. No evidence of osseous or hardware complication.     Plan:   Discussed discharge planning, at this time patient is still requiring around the clock IV analgesia, will plan to space out IV usage today and re-eval this afternoon for potential dc tomorrow if pain regimen appropriate   APS consulted for assistance with analgesic regimen, recommending tyl 975 q 8 hrs scheduled, gabapentin 600mg TID, diclofenac EC 75 BID, Robaxin 750mg q6hrs Scheduled, transition to PO dilaudid 2mg/4mg q4hrs for moderate/severe pain, IV dilaudid for breakthrough pain with discontinuation of IV analgesia 3/23.   Cont PO abx - Discharge with 10 days of Keflex   Neurovascular checks Q4H   Encourage OOB, ambulation   IS 10x/hr while awake  PT / OT recommending discharge to home, no additional needs inpatient

## 2025-03-23 NOTE — PROGRESS NOTES
Progress Note - Neurosurgery   Name: Tessa Olivarez 49 y.o. female I MRN: [id number]  Unit/Bed#: -01 I Date of Admission: 3/20/2025   Date of Service: 3/23/2025 I Hospital Day: 3    Assessment & Plan  Lumbar stenosis with neurogenic claudication  48 y/o F pmhx lumbar stenosis with neurogenic claudication, spondylolisthesis of lumbar region, TERESITA, obesity, GERD, and tobacco abuse  POD#3 s/p elective MIS TLIF L4-5 with bilateral facetectomies, MIS [employer] microdiscectomy left L5-S1 (dr. Guy)     AVSS on RA  No leukocytosis   Hemoglobin 10.6 (11.4, 10.4)  Hypomagnesemia, Mg 1.8 - PO repletion ordered this AM     Intraoperative findings - High grade stenosis from ligamentous overgrowth and hypertrophied facets L4-5, calcified disc herniation left L5-S1    XR lumbar spine - Stable postoperative changes. No evidence of osseous or hardware complication.     Plan:   Discussed discharge planning, at this time patient is still requiring around the clock IV analgesia, will plan to space out IV usage today and re-eval this afternoon for potential dc tomorrow if pain regimen appropriate   APS consulted for assistance with analgesic regimen, recommending tyl 975 q 8 hrs scheduled, gabapentin 600mg TID, diclofenac EC 75 BID, Robaxin 750mg q6hrs Scheduled, transition to PO dilaudid 2mg/4mg q4hrs for moderate/severe pain, IV dilaudid for breakthrough pain with discontinuation of IV analgesia 3/23.   Cont PO abx - Discharge with 10 days of Keflex   Neurovascular checks Q4H   Encourage OOB, ambulation   IS 10x/hr while awake  PT / OT recommending discharge to home, no additional needs inpatient   Postoperative pain after spinal surgery  As above  Obstructive sleep apnea  Respiratory protocol ordered   Class 3 severe obesity due to excess calories without serious comorbidity with body mass index (BMI) of 45.0 to 49.9 in adult (HCC)  Lifestyle modifications  Gastroesophageal reflux disease  Protonix 40 mg daily ordered   Nicotine  abuse  Smokes 1/2 ppd, declines nicotine patch   Cessation encouraged.     Please contact the SecureChat role for the Neurosurgery service with any questions/concerns.    Subjective   No acute events overnight. Patient reports little to no improvement in back pain today. Pain control only achieved with administration of IV analgesic. Discussed that having pain in post-op period is expected. Patient also concerned that pain is exacerbated by hospital bed and chairs and wonders if she would be more comfortable recovering at home. Discussed that in order to return home she should ideally not be requiring IV analgesia. Tolerating diet without N/V. No urinary complaints. No Fever/chills.     Objective :  BP: (110-120)/(56-76) 120/73    I/O         03/21 0701  03/22 0700 03/22 0701  03/23 0700 03/23 0701  03/24 0700    P.O. 240 540     I.V. (mL/kg)       IV Piggyback       Total Intake(mL/kg) 240 (1.6) 540 (3.6)     Urine (mL/kg/hr) 255 (0.1) 1350 (0.4)     Blood       Total Output 255 1350     Net -15 -810                  Physical Exam  Constitutional:       General: She is awake.      Appearance: She is obese.      Comments: Resting comfortably in bedside chair   HENT:      Head: Normocephalic and atraumatic.   Cardiovascular:      Rate and Rhythm: Normal rate.   Pulmonary:      Effort: No respiratory distress.   Musculoskeletal:      Comments: B/l LE strength 5/5     Skin:     General: Skin is warm and dry.      Comments: Incisions C/D/I   Neurological:      General: No focal deficit present.      Mental Status: She is alert.      Comments: Numbness/tingling R thigh, stable from pre-op per pt   Psychiatric:         Mood and Affect: Mood normal.         Behavior: Behavior normal. Behavior is cooperative.      Neurological Exam  Mental Status  Awake and alert.  Numbness/tingling R thigh, stable from pre-op per pt.        Lab Results: I have reviewed the following results:  Recent Labs     03/21/25  0554 03/22/25  0531  03/23/25  0445   WBC 7.17   < > 7.76   HGB 10.4*   < > 10.6*   HCT 32.4*   < > 32.7*   PLT 80*   < > 101*   SODIUM 140  --   --    K 4.2  --   --    *  --   --    CO2 27  --   --    BUN 12  --   --    CREATININE 0.78  --   --    GLUC 156*  --   --    MG 1.9  --  1.8*   PHOS 2.3*  --   --     < > = values in this interval not displayed.       Imaging Results Review: No pertinent imaging studies reviewed.  Other Study Results Review: No additional pertinent studies reviewed.    VTE Pharmacologic Prophylaxis: VTE covered by:  enoxaparin, Subcutaneous, 40 mg at 03/23/25 0756       Yumiko Olea, PAC  03/23/25

## 2025-03-23 NOTE — PLAN OF CARE
Problem: PAIN - ADULT  Goal: Verbalizes/displays adequate comfort level or baseline comfort level  Description: Interventions:  - Encourage patient to monitor pain and request assistance  - Assess pain using appropriate pain scale  - Administer analgesics based on type and severity of pain and evaluate response  - Implement non-pharmacological measures as appropriate and evaluate response  - Consider cultural and social influences on pain and pain management  - Notify physician/advanced practitioner if interventions unsuccessful or patient reports new pain  Outcome: Progressing     Problem: INFECTION - ADULT  Goal: Absence or prevention of progression during hospitalization  Description: INTERVENTIONS:  - Assess and monitor for signs and symptoms of infection  - Monitor lab/diagnostic results  - Monitor all insertion sites, i.e. indwelling lines, tubes, and drains  - Monitor endotracheal if appropriate and nasal secretions for changes in amount and color  - [place] appropriate cooling/warming therapies per order  - Administer medications as ordered  - Instruct and encourage patient and family to use good hand hygiene technique  - Identify and instruct in appropriate isolation precautions for identified infection/condition  Outcome: Progressing  Goal: Absence of fever/infection during neutropenic period  Description: INTERVENTIONS:  - Monitor WBC    Outcome: Progressing     Problem: SAFETY ADULT  Goal: Patient will remain free of falls  Description: INTERVENTIONS:  - Educate patient/family on patient safety including physical limitations  - Instruct patient to call for assistance with activity   - Consult OT/PT to assist with strengthening/mobility   - Keep Call bell within reach  - Keep bed low and locked with side rails adjusted as appropriate  - Keep care items and personal belongings within reach  - Initiate and maintain comfort rounds  - Make Fall Risk Sign visible to staff  - Offer Toileting every 2 Hours,  in advance of need  - Initiate/Maintain bed alarm  - Obtain necessary fall risk management equipment: non skid footwear  - Apply yellow socks and bracelet for high fall risk patients  - Consider moving patient to room near nurses station  Outcome: Progressing  Goal: Maintain or return to baseline ADL function  Description: INTERVENTIONS:  -  Assess patient's ability to carry out ADLs; assess patient's baseline for ADL function and identify physical deficits which impact ability to perform ADLs (bathing, care of mouth/teeth, toileting, grooming, dressing, etc.)  - Assess/evaluate cause of self-care deficits   - Assess range of motion  - Assess patient's mobility; develop plan if impaired  - Assess patient's need for assistive devices and provide as appropriate  - Encourage maximum independence but intervene and supervise when necessary  - Involve family in performance of ADLs  - Assess for home care needs following discharge   - Consider OT consult to assist with ADL evaluation and planning for discharge  - Provide patient education as appropriate  Outcome: Progressing  Goal: Maintains/Returns to pre admission functional level  Description: INTERVENTIONS:  - Perform AM-PAC 6 Click Basic Mobility/ Daily Activity assessment daily.  - Set and communicate daily mobility goal to care team and patient/family/caregiver.   - Collaborate with rehabilitation services on mobility goals if consulted  - Perform Range of Motion 2 times a day.  - Reposition patient every 2 hours.  - Dangle patient 2 times a day  - Stand patient 2 times a day  - Ambulate patient 2 times a day  - Out of bed to chair 2 times a day   - Out of bed for meals 2 times a day  - Out of bed for toileting  - Record patient progress and toleration of activity level   Outcome: Progressing     Problem: DISCHARGE PLANNING  Goal: Discharge to home or other facility with appropriate resources  Description: INTERVENTIONS:  - Identify barriers to discharge w/patient  and caregiver  - Arrange for needed discharge resources and transportation as appropriate  - Identify discharge learning needs (meds, wound care, etc.)  - Arrange for interpretive services to assist at discharge as needed  - Refer to Case Management Department for coordinating discharge planning if the patient needs post-hospital services based on physician/advanced practitioner order or complex needs related to functional status, cognitive ability, or social support system  Outcome: Progressing     Problem: Knowledge Deficit  Goal: Patient/family/caregiver demonstrates understanding of disease process, treatment plan, medications, and discharge instructions  Description: Complete learning assessment and assess knowledge base.  Interventions:  - Provide teaching at level of understanding  - Provide teaching via preferred learning methods  Outcome: Progressing     Problem: MOBILITY - ADULT  Goal: Maintain or return to baseline ADL function  Description: INTERVENTIONS:  -  Assess patient's ability to carry out ADLs; assess patient's baseline for ADL function and identify physical deficits which impact ability to perform ADLs (bathing, care of mouth/teeth, toileting, grooming, dressing, etc.)  - Assess/evaluate cause of self-care deficits   - Assess range of motion  - Assess patient's mobility; develop plan if impaired  - Assess patient's need for assistive devices and provide as appropriate  - Encourage maximum independence but intervene and supervise when necessary  - Involve family in performance of ADLs  - Assess for home care needs following discharge   - Consider OT consult to assist with ADL evaluation and planning for discharge  - Provide patient education as appropriate  Outcome: Progressing  Goal: Maintains/Returns to pre admission functional level  Description: INTERVENTIONS:  - Perform AM-PAC 6 Click Basic Mobility/ Daily Activity assessment daily.  - Set and communicate daily mobility goal to care team and  patient/family/caregiver.   - Collaborate with rehabilitation services on mobility goals if consulted  - Perform Range of Motion 2 times a day.  - Reposition patient every 2 hours.  - Dangle patient 2 times a day  - Stand patient 2 times a day  - Ambulate patient 2 times a day  - Out of bed to chair 2 times a day   - Out of bed for meals 2 times a day  - Out of bed for toileting  - Record patient progress and toleration of activity level   Outcome: Progressing

## 2025-03-23 NOTE — PLAN OF CARE
Problem: PAIN - ADULT  Goal: Verbalizes/displays adequate comfort level or baseline comfort level  Description: Interventions:  - Encourage patient to monitor pain and request assistance  - Assess pain using appropriate pain scale  - Administer analgesics based on type and severity of pain and evaluate response  - Implement non-pharmacological measures as appropriate and evaluate response  - Consider cultural and social influences on pain and pain management  - Notify physician/advanced practitioner if interventions unsuccessful or patient reports new pain  Outcome: Progressing     Problem: INFECTION - ADULT  Goal: Absence or prevention of progression during hospitalization  Description: INTERVENTIONS:  - Assess and monitor for signs and symptoms of infection  - Monitor lab/diagnostic results  - Monitor all insertion sites, i.e. indwelling lines, tubes, and drains  - Monitor endotracheal if appropriate and nasal secretions for changes in amount and color  - [place] appropriate cooling/warming therapies per order  - Administer medications as ordered  - Instruct and encourage patient and family to use good hand hygiene technique  - Identify and instruct in appropriate isolation precautions for identified infection/condition  Outcome: Progressing  Goal: Absence of fever/infection during neutropenic period  Description: INTERVENTIONS:  - Monitor WBC    Outcome: Progressing     Problem: SAFETY ADULT  Goal: Patient will remain free of falls  Description: INTERVENTIONS:  - Educate patient/family on patient safety including physical limitations  - Instruct patient to call for assistance with activity   - Consult OT/PT to assist with strengthening/mobility   - Keep Call bell within reach  - Keep bed low and locked with side rails adjusted as appropriate  - Keep care items and personal belongings within reach  - Initiate and maintain comfort rounds  - Make Fall Risk Sign visible to staff  - Offer Toileting every 2 Hours,  in advance of need  - Initiate/Maintain bed/chair alarm  - Obtain necessary fall risk management equipment: yellow bracelet, yellow socks, yellow fall risk flag displayed  - Apply yellow socks and bracelet for high fall risk patients  - Consider moving patient to room near nurses station  Outcome: Progressing  Goal: Maintain or return to baseline ADL function  Description: INTERVENTIONS:  -  Assess patient's ability to carry out ADLs; assess patient's baseline for ADL function and identify physical deficits which impact ability to perform ADLs (bathing, care of mouth/teeth, toileting, grooming, dressing, etc.)  - Assess/evaluate cause of self-care deficits   - Assess range of motion  - Assess patient's mobility; develop plan if impaired  - Assess patient's need for assistive devices and provide as appropriate  - Encourage maximum independence but intervene and supervise when necessary  - Involve family in performance of ADLs  - Assess for home care needs following discharge   - Consider OT consult to assist with ADL evaluation and planning for discharge  - Provide patient education as appropriate  Outcome: Progressing  Goal: Maintains/Returns to pre admission functional level  Description: INTERVENTIONS:  - Perform AM-PAC 6 Click Basic Mobility/ Daily Activity assessment daily.  - Set and communicate daily mobility goal to care team and patient/family/caregiver.   - Collaborate with rehabilitation services on mobility goals if consulted  - Perform Range of Motion 2 times a day.  - Reposition patient every 2 hours.  - Dangle patient 2 times a day  - Stand patient 2 times a day  - Ambulate patient 2 times a day  - Out of bed to chair 2 times a day   - Out of bed for meals 2 times a day  - Out of bed for toileting  - Record patient progress and toleration of activity level   Outcome: Progressing     Problem: DISCHARGE PLANNING  Goal: Discharge to home or other facility with appropriate resources  Description:  INTERVENTIONS:  - Identify barriers to discharge w/patient and caregiver  - Arrange for needed discharge resources and transportation as appropriate  - Identify discharge learning needs (meds, wound care, etc.)  - Arrange for interpretive services to assist at discharge as needed  - Refer to Case Management Department for coordinating discharge planning if the patient needs post-hospital services based on physician/advanced practitioner order or complex needs related to functional status, cognitive ability, or social support system  Outcome: Progressing     Problem: Knowledge Deficit  Goal: Patient/family/caregiver demonstrates understanding of disease process, treatment plan, medications, and discharge instructions  Description: Complete learning assessment and assess knowledge base.  Interventions:  - Provide teaching at level of understanding  - Provide teaching via preferred learning methods  Outcome: Progressing     Problem: MOBILITY - ADULT  Goal: Maintain or return to baseline ADL function  Description: INTERVENTIONS:  -  Assess patient's ability to carry out ADLs; assess patient's baseline for ADL function and identify physical deficits which impact ability to perform ADLs (bathing, care of mouth/teeth, toileting, grooming, dressing, etc.)  - Assess/evaluate cause of self-care deficits   - Assess range of motion  - Assess patient's mobility; develop plan if impaired  - Assess patient's need for assistive devices and provide as appropriate  - Encourage maximum independence but intervene and supervise when necessary  - Involve family in performance of ADLs  - Assess for home care needs following discharge   - Consider OT consult to assist with ADL evaluation and planning for discharge  - Provide patient education as appropriate  Outcome: Progressing  Goal: Maintains/Returns to pre admission functional level  Description: INTERVENTIONS:  - Perform AM-PAC 6 Click Basic Mobility/ Daily Activity assessment  daily.  - Set and communicate daily mobility goal to care team and patient/family/caregiver.   - Collaborate with rehabilitation services on mobility goals if consulted  - Perform Range of Motion 2 times a day.  - Reposition patient every 2 hours.  - Dangle patient 2 times a day  - Stand patient 2 times a day  - Ambulate patient 2 times a day  - Out of bed to chair 2 times a day   - Out of bed for meals 2 times a day  - Out of bed for toileting  - Record patient progress and toleration of activity level   Outcome: Progressing

## 2025-03-24 VITALS
RESPIRATION RATE: 20 BRPM | WEIGHT: 293 LBS | HEART RATE: 85 BPM | SYSTOLIC BLOOD PRESSURE: 127 MMHG | HEIGHT: 69 IN | DIASTOLIC BLOOD PRESSURE: 75 MMHG | TEMPERATURE: 98.1 F | OXYGEN SATURATION: 98 % | BODY MASS INDEX: 43.4 KG/M2

## 2025-03-24 LAB — MAGNESIUM SERPL-MCNC: 2 MG/DL (ref 1.9–2.7)

## 2025-03-24 PROCEDURE — 83735 ASSAY OF MAGNESIUM: CPT | Performed by: NEUROLOGICAL SURGERY

## 2025-03-24 PROCEDURE — 99024 POSTOP FOLLOW-UP VISIT: CPT | Performed by: NEUROLOGICAL SURGERY

## 2025-03-24 PROCEDURE — NC001 PR NO CHARGE: Performed by: NEUROLOGICAL SURGERY

## 2025-03-24 RX ORDER — HYDROMORPHONE HYDROCHLORIDE 2 MG/1
2 TABLET ORAL EVERY 4 HOURS PRN
Qty: 10 TABLET | Refills: 0 | Status: SHIPPED | OUTPATIENT
Start: 2025-03-24 | End: 2025-03-24

## 2025-03-24 RX ORDER — CEPHALEXIN 500 MG/1
500 CAPSULE ORAL EVERY 8 HOURS SCHEDULED
Qty: 21 CAPSULE | Refills: 0 | Status: SHIPPED | OUTPATIENT
Start: 2025-03-24 | End: 2025-03-31

## 2025-03-24 RX ORDER — OXYCODONE HYDROCHLORIDE 5 MG/1
5 TABLET ORAL EVERY 4 HOURS PRN
Qty: 10 TABLET | Refills: 0 | Status: SHIPPED | OUTPATIENT
Start: 2025-03-24 | End: 2025-03-25 | Stop reason: SDUPTHER

## 2025-03-24 RX ORDER — CEPHALEXIN 500 MG/1
500 CAPSULE ORAL EVERY 6 HOURS SCHEDULED
Qty: 40 CAPSULE | Refills: 0 | Status: CANCELLED | OUTPATIENT
Start: 2025-03-24 | End: 2025-04-03

## 2025-03-24 RX ORDER — LIDOCAINE 50 MG/G
2 PATCH TOPICAL DAILY
Qty: 10 PATCH | Refills: 0 | Status: CANCELLED | OUTPATIENT
Start: 2025-03-25

## 2025-03-24 RX ORDER — HYDROMORPHONE HYDROCHLORIDE 2 MG/1
2 TABLET ORAL ONCE
Refills: 0 | Status: COMPLETED | OUTPATIENT
Start: 2025-03-24 | End: 2025-03-24

## 2025-03-24 RX ORDER — METHOCARBAMOL 750 MG/1
750 TABLET, FILM COATED ORAL EVERY 8 HOURS PRN
Qty: 15 TABLET | Refills: 0 | Status: SHIPPED | OUTPATIENT
Start: 2025-03-24

## 2025-03-24 RX ADMIN — HYDROMORPHONE HYDROCHLORIDE 2 MG: 2 TABLET ORAL at 12:07

## 2025-03-24 RX ADMIN — GABAPENTIN 600 MG: 300 CAPSULE ORAL at 09:00

## 2025-03-24 RX ADMIN — DOCUSATE SODIUM 100 MG: 100 CAPSULE, LIQUID FILLED ORAL at 09:01

## 2025-03-24 RX ADMIN — METHOCARBAMOL 750 MG: 500 TABLET ORAL at 06:08

## 2025-03-24 RX ADMIN — HYDROMORPHONE HYDROCHLORIDE 4 MG: 2 TABLET ORAL at 06:08

## 2025-03-24 RX ADMIN — STANDARDIZED SENNA CONCENTRATE 8.6 MG: 8.6 TABLET ORAL at 09:01

## 2025-03-24 RX ADMIN — HYDROMORPHONE HYDROCHLORIDE 4 MG: 2 TABLET ORAL at 02:12

## 2025-03-24 RX ADMIN — ACETAMINOPHEN 975 MG: 325 TABLET, FILM COATED ORAL at 09:01

## 2025-03-24 RX ADMIN — METHOCARBAMOL 750 MG: 500 TABLET ORAL at 12:07

## 2025-03-24 RX ADMIN — ENOXAPARIN SODIUM 40 MG: 40 INJECTION SUBCUTANEOUS at 09:00

## 2025-03-24 RX ADMIN — HYDROMORPHONE HYDROCHLORIDE 0.5 MG: 1 INJECTION, SOLUTION INTRAMUSCULAR; INTRAVENOUS; SUBCUTANEOUS at 00:07

## 2025-03-24 RX ADMIN — ACETAMINOPHEN 975 MG: 325 TABLET, FILM COATED ORAL at 01:13

## 2025-03-24 RX ADMIN — ACYCLOVIR 400 MG: 800 TABLET ORAL at 09:00

## 2025-03-24 RX ADMIN — PANTOPRAZOLE SODIUM 40 MG: 40 TABLET, DELAYED RELEASE ORAL at 06:08

## 2025-03-24 RX ADMIN — CEPHALEXIN 500 MG: 250 CAPSULE ORAL at 06:08

## 2025-03-24 RX ADMIN — HYDROMORPHONE HYDROCHLORIDE 4 MG: 2 TABLET ORAL at 10:05

## 2025-03-24 RX ADMIN — B-COMPLEX W/ C & FOLIC ACID TAB 1 TABLET: TAB at 09:01

## 2025-03-24 RX ADMIN — HYDROMORPHONE HYDROCHLORIDE 0.5 MG: 1 INJECTION, SOLUTION INTRAMUSCULAR; INTRAVENOUS; SUBCUTANEOUS at 04:04

## 2025-03-24 NOTE — PROGRESS NOTES
Progress Note - Neurosurgery   Name: Tessa Olivarez 49 y.o. female I MRN: [id number]  Unit/Bed#: -01 I Date of Admission: 3/20/2025   Date of Service: 3/24/2025 I Hospital Day: 4    Assessment & Plan  Lumbar stenosis with neurogenic claudication  50 y/o F pmhx lumbar stenosis with neurogenic claudication, spondylolisthesis of lumbar region, TERESITA, obesity, GERD, and tobacco abuse  POD# 4 s/p elective MIS TLIF L4-5 with bilateral facetectomies, MIS [facility] microdiscectomy left L5-S1 (dr. Guy)     AVSS on RA  No leukocytosis       Intraoperative findings - High grade stenosis from ligamentous overgrowth and hypertrophied facets L4-5, calcified disc herniation left L5-S1    XR lumbar spine - Stable postoperative changes. No evidence of osseous or hardware complication.     Plan:   Discussed discharge planning  Ambulating well, cleared by PT  Pain is well controlled, needs oral regimen at this point in lieu of discharge planning  Xrays look satisfactory  Tolerating diet, +BM  Postoperative pain after spinal surgery  As above  Lumbar radiculopathy  As above   Obstructive sleep apnea  Respiratory protocol ordered   Class 3 severe obesity due to excess calories without serious comorbidity with body mass index (BMI) of 45.0 to 49.9 in adult (HCC)  Lifestyle modifications  Gastroesophageal reflux disease  Protonix 40 mg daily ordered   Nicotine abuse  Smokes 1/2 ppd, declines nicotine patch   Cessation encouraged.         Subjective   Doing ok  Pain at the incision site  Neuro intact otherwise  Ambulating well, voiding well    Objective :  BP: (107-127)/(70-75) 127/75    I/O         03/22 0701  03/23 0700 03/23 0701  03/24 0700 03/24 0701  03/25 0700    P.O. 540 1440     Total Intake(mL/kg) 540 (3.6) 1440 (9.5)     Urine (mL/kg/hr) 1350 (0.4)      Total Output 1350      Net -810 +1440            Unmeasured Urine Occurrence  3 x             Physical Exam  General appearance: alert and oriented, in no acute  distress  Head: Normocephalic, without obvious abnormality, atraumatic, sclerae anicteric, mucous membranes moist  Neck: no JVD and supple, symmetrical, trachea midline  Lungs: clear to auscultation, no wheezes or rales  Heart:   regular rate, regular rhythm, S1-S2 normal, no murmur  Abdomen:  non distended, soft, no guarding, no rebound, active bowel sounds  Extremities:   No edema, redness or tenderness in the calves or thighs  Neuro: strength is intact  5/5  Sensation to LT intact  Hamill in place. Incision c/d/I, dressing removed  Skin: Warm, dry  Nursing notes and vital signs reviewed      Lab Results: I have reviewed the following results:  Recent Labs     03/23/25  0445 03/24/25  0412   WBC 7.76  --    HGB 10.6*  --    HCT 32.7*  --    *  --    MG 1.8* 2.0       Imaging Results Review: I reviewed radiology reports from this admission including: xray(s).  Other Study Results Review: No additional pertinent studies reviewed.    VTE Pharmacologic Prophylaxis: VTE covered by:  enoxaparin, Subcutaneous, 40 mg at 03/24/25 0900     VTE Mechanical Prophylaxis: sequential compression device

## 2025-03-24 NOTE — DISCHARGE SUMMARY
Discharge Summary - Surgery-General   Name: Tessa Olivarez 49 y.o. female I MRN: [id number]  Unit/Bed#: -01 I Date of Admission: 3/20/2025   Date of Service: 3/24/2025 I Hospital Day: 4    Admission Date: 3/20/2025 0652  Discharge Date: 03/24/25  Admitting Diagnosis: Spondylolisthesis of lumbar region [M43.16]  Spinal stenosis of lumbar region, unspecified whether neurogenic claudication present [M48.061]  Lumbar herniated disc [M51.26]  Lumbar stenosis with neurogenic claudication [M48.062]  Discharge Diagnosis:   Medical Problems       Resolved Problems  Date Reviewed: 3/5/2025   None         HPI:50yo female with low back pain and neurogenic claudication, down the back of her bilateral thighs. She has difficulty standing for any length of time, and the pain radiates down her legs left more than right. She has bilateral lower extremity numbness     Procedures Performed: MIS TLIF L4-5 with bilateral facetectomies, MIS [facility] microdiscectomy left L5-S1       Summary of Hospital Course: Patient did well intra op. Post op she had pain control issues and APS was consulted. APS added pain medications to the patient's post op course. Xrays were done and PT/OT was consulted. Pt continued to have drainage from the drains they were left in place. Patient continued to improve slowly, she was continued on pain medications and started on antibiotics. POD 4, she was doing well. Incisions were c/d/I and she was discharged home with instructions and medications.     Significant Findings, Care, Treatment and Services Provided: PT/OT    Complications: none    Condition at Discharge: good       Discharge instructions/Information to patient and family:   See After Visit Summary (AVS) for information provided to patient and family.      Provisions for Follow-Up Care:  See after visit summary for information related to follow-up care and any pertinent home health orders.      PCP: Sara Thomson MD    Disposition:  Home    Planned Readmission: No     Discharge Medications:  See after visit summary for reconciled discharge medications provided to patient and family.      Discharge Statement:  I have spent a total time of 15 minutes in caring for this patient on the day of the visit/encounter. .

## 2025-03-24 NOTE — PLAN OF CARE
Problem: PAIN - ADULT  Goal: Verbalizes/displays adequate comfort level or baseline comfort level  Description: Interventions:  - Encourage patient to monitor pain and request assistance  - Assess pain using appropriate pain scale  - Administer analgesics based on type and severity of pain and evaluate response  - Implement non-pharmacological measures as appropriate and evaluate response  - Consider cultural and social influences on pain and pain management  - Notify physician/advanced practitioner if interventions unsuccessful or patient reports new pain  Outcome: Progressing     Problem: INFECTION - ADULT  Goal: Absence or prevention of progression during hospitalization  Description: INTERVENTIONS:  - Assess and monitor for signs and symptoms of infection  - Monitor lab/diagnostic results  - Monitor all insertion sites, i.e. indwelling lines, tubes, and drains  - Monitor endotracheal if appropriate and nasal secretions for changes in amount and color  - [place] appropriate cooling/warming therapies per order  - Administer medications as ordered  - Instruct and encourage patient and family to use good hand hygiene technique  - Identify and instruct in appropriate isolation precautions for identified infection/condition  Outcome: Progressing  Goal: Absence of fever/infection during neutropenic period  Description: INTERVENTIONS:  - Monitor WBC    Outcome: Progressing     Problem: SAFETY ADULT  Goal: Patient will remain free of falls  Description: INTERVENTIONS:  - Educate patient/family on patient safety including physical limitations  - Instruct patient to call for assistance with activity   - Consult OT/PT to assist with strengthening/mobility   - Keep Call bell within reach  - Keep bed low and locked with side rails adjusted as appropriate  - Keep care items and personal belongings within reach  - Initiate and maintain comfort rounds  - Make Fall Risk Sign visible to staff  - Offer Toileting every 2 Hours,  in advance of need  - Initiate/Maintain alarm  - Obtain necessary fall risk management equipment  - Apply yellow socks and bracelet for high fall risk patients  - Consider moving patient to room near nurses station  Outcome: Progressing  Goal: Maintain or return to baseline ADL function  Description: INTERVENTIONS:  -  Assess patient's ability to carry out ADLs; assess patient's baseline for ADL function and identify physical deficits which impact ability to perform ADLs (bathing, care of mouth/teeth, toileting, grooming, dressing, etc.)  - Assess/evaluate cause of self-care deficits   - Assess range of motion  - Assess patient's mobility; develop plan if impaired  - Assess patient's need for assistive devices and provide as appropriate  - Encourage maximum independence but intervene and supervise when necessary  - Involve family in performance of ADLs  - Assess for home care needs following discharge   - Consider OT consult to assist with ADL evaluation and planning for discharge  - Provide patient education as appropriate  Outcome: Progressing  Goal: Maintains/Returns to pre admission functional level  Description: INTERVENTIONS:  - Perform AM-PAC 6 Click Basic Mobility/ Daily Activity assessment daily.  - Set and communicate daily mobility goal to care team and patient/family/caregiver.   - Collaborate with rehabilitation services on mobility goals if consulted  - Perform Range of Motion 3 times a day.  - Reposition patient every 3 hours.  - Dangle patient 3 times a day  - Stand patient 3 times a day  - Ambulate patient 3 times a day  - Out of bed to chair 3 times a day   - Out of bed for meals 3 times a day  - Out of bed for toileting  - Record patient progress and toleration of activity level   Outcome: Progressing     Problem: DISCHARGE PLANNING  Goal: Discharge to home or other facility with appropriate resources  Description: INTERVENTIONS:  - Identify barriers to discharge w/patient and caregiver  - Arrange  for needed discharge resources and transportation as appropriate  - Identify discharge learning needs (meds, wound care, etc.)  - Arrange for interpretive services to assist at discharge as needed  - Refer to Case Management Department for coordinating discharge planning if the patient needs post-hospital services based on physician/advanced practitioner order or complex needs related to functional status, cognitive ability, or social support system  Outcome: Progressing     Problem: Knowledge Deficit  Goal: Patient/family/caregiver demonstrates understanding of disease process, treatment plan, medications, and discharge instructions  Description: Complete learning assessment and assess knowledge base.  Interventions:  - Provide teaching at level of understanding  - Provide teaching via preferred learning methods  Outcome: Progressing     Problem: MOBILITY - ADULT  Goal: Maintain or return to baseline ADL function  Description: INTERVENTIONS:  -  Assess patient's ability to carry out ADLs; assess patient's baseline for ADL function and identify physical deficits which impact ability to perform ADLs (bathing, care of mouth/teeth, toileting, grooming, dressing, etc.)  - Assess/evaluate cause of self-care deficits   - Assess range of motion  - Assess patient's mobility; develop plan if impaired  - Assess patient's need for assistive devices and provide as appropriate  - Encourage maximum independence but intervene and supervise when necessary  - Involve family in performance of ADLs  - Assess for home care needs following discharge   - Consider OT consult to assist with ADL evaluation and planning for discharge  - Provide patient education as appropriate  Outcome: Progressing  Goal: Maintains/Returns to pre admission functional level  Description: INTERVENTIONS:  - Perform AM-PAC 6 Click Basic Mobility/ Daily Activity assessment daily.  - Set and communicate daily mobility goal to care team and  patient/family/caregiver.   - Collaborate with rehabilitation services on mobility goals if consulted  - Perform Range of Motion 3 times a day.  - Reposition patient every 3 hours.  - Dangle patient 3 times a day  - Stand patient 3 times a day  - Ambulate patient 3 times a day  - Out of bed to chair 3 times a day   - Out of bed for meals 3 times a day  - Out of bed for toileting  - Record patient progress and toleration of activity level   Outcome: Progressing

## 2025-03-24 NOTE — QUICK NOTE
Neurosurgery quick note    Notified of problem with pts analgesic prescription sent to preferred pharmacy.   Called pharmacy to discuss. Unfortunately it seems that due to manufacturing problem they have been unable to stock PO dilaudid at this pharmacy and at other pharmacies in the surrounding area. Asked pharmacy to cancel script for PO dilaudid and that new script would be sent shortly.   Patient informed of this information and PO oxycodone 5mg tabs sent to preferred pharmacy as alternative.     Yumiko Olea, REUBEN  12:05 PM

## 2025-03-24 NOTE — CASE MANAGEMENT
Case Management Discharge Planning Note    Patient name Tessa Olivarez  Location /-01 MRN [id number]  : 1975 Date 3/24/2025       Current Admission Date: 3/20/2025  Current Admission Diagnosis:Lumbar stenosis with neurogenic claudication   Patient Active Problem List    Diagnosis Date Noted Date Diagnosed    Postoperative pain after spinal surgery 2025     Lumbar stenosis with neurogenic claudication 2025     Lumbar radiculopathy 2023     PCOS (polycystic ovarian syndrome) 2023     Genital herpes simplex 06/10/2023     Nicotine abuse 06/10/2023     NSVT (nonsustained ventricular tachycardia) ([facility]) 06/10/2023     Hiatal hernia 2023     Gastroesophageal reflux disease 2023     Anxiety 2023     S/P bilateral oophorectomy 2022     Bilateral tubo-ovarian mass 2022     Family history of ovarian cancer 2022     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     Carcinoid tumor of lung 2022     Kidney stone 2022     Class 3 severe obesity due to excess calories without serious comorbidity with body mass index (BMI) of 45.0 to 49.9 in adult ([facility]) 2022     Tobacco smoker, 1 pack of cigarettes or less per day 2022     Obstructive sleep apnea 2018     Latent tuberculosis by skin test 1986       LOS (days): 4  Geometric Mean LOS (GMLOS) (days): 2.4  Days to GMLOS:-1.4     OBJECTIVE:  Risk of Unplanned Readmission Score: 8.23         Current admission status: Inpatient   Preferred Pharmacy:   [id number] - [address] [address]  Phone: [phone] Fax: [phone]    [facility] [address]  [address]  [address] [address]  Phone: [phone] Fax: [phone]    Primary Care Provider: Sara Thomson MD    Primary Insurance: [employer]  Secondary Insurance:     DISCHARGE DETAILS:     CM was notified that [employer] will transport patients across state lines. CM was able to order a [employer] via [employer] for patient. Pt signed waiver and it was placed in her medical record.

## 2025-03-24 NOTE — UTILIZATION REVIEW
Elective Surgical Continued Stay Review    Date: 3/23/25 - 3/24/25  POD#:   3 & 4 Days Post-Op   Current Patient Class: Inpatient  Current Level of Care: med surg    Assessment/Plan: 49 y.o. female, initial surgery date 3/20/25    3/23/25: POD#3 s/p elective MIS TLIF L4-5 with bilateral facetectomies, MIS MetRx microdiscectomy left L5-S1. Today with little improvement in back pain.  Still requiring round the clock analgesia.   On exam: obese.  Incisions C/D/I.  Numbness and tingling right thigh.  Bilateral LE strength 5/5.  H&H 10.6/32.7.   Plan:  try to decrease IV analgesia.  Continue pain control:  tyl 975 q 8 hrs scheduled, gabapentin 600mg TID, diclofenac EC 75 BID, Robaxin 750mg q6hrs Scheduled, transition to PO dilaudid 2mg/4mg q4hrs for moderate/severe pain, IV dilaudid for breakthrough pain with discontinuation of IV analgesia 3/23 if able - not able to. Continue antibiotics.  Neuro vascular checks every 4 hours.  Encourage OOB, ambulation.   Incentive spirometry.  PT/OT recommend home discharge.      3/24/25 POD# s/p elective MIS TLIF L4-5 with bilateral facetectomies, MIS MetRx microdiscectomy left L5-S1.  Today with continued back pain, hip and leg pain and rates 8/10.   On exam: strength 5/5 all extremities.   Tachy at times. Mg 1.8.   continued pain control: scheduled tylenol, gabapentin, Robaxin. Bowel regimen.  Still used IV dilaudid and try today to stop completely .   Continue keflex. Replete Mg.  OOB.      Medications:   Scheduled Medications:  acetaminophen, 975 mg, Oral, Q6H [facility]  acyclovir, 400 mg, Oral, BID  cefazolin, 1,000 mg, Intravenous, Once  cephalexin, 500 mg, Oral, Q8H [facility]  diclofenac, 75 mg, Oral, BID  docusate sodium, 100 mg, Oral, BID  enoxaparin, 40 mg, Subcutaneous, Q12H [facility]  gabapentin, 600 mg, Oral, TID  hydrOXYzine pamoate, 50 mg, Oral, Daily  lidocaine, 2 patch, Topical, Daily  loratadine, 5 mg, Oral, Daily  methocarbamol, 750 mg, Oral, Q6H [facility]  multivitamin stress formula, 1  tablet, Oral, Daily  pantoprazole, 40 mg, Oral, Early Morning  senna, 1 tablet, Oral, Daily    magnesium Oxide (MAG-OX) tablet 800 mg  Dose: 800 mg  Freq: Once Route: PO  Start: 03/23/25 0800 End: 03/23/25 0756    Continuous IV Infusions:     PRN Meds:  aluminum-magnesium hydroxide-simethicone, 30 mL, Oral, Q6H PRN  HYDROmorphone, 2 mg, Oral, Q4H PRN   Or  HYDROmorphone, 4 mg, Oral, Q4H PRN - x 6 3/23/25.  X 2 3/24  naloxone, 0.04 mg, Intravenous, Q1MIN PRN  ondansetron, 4 mg, Intravenous, Q4H PRN    HYDROmorphone (DILAUDID) injection 0.5 mg - x 5 3/25.  X 2 3/24/25  Dose: 0.5 mg  Freq: Every 4 hours PRN Route: IV  PRN Reasons: breakthrough pain,other  PRN Comment: or if patient qualifies for treatment of moderate or severe pain but cannot take oral medications  Start: 03/23/25 1150 End: 03/24/25 0702    Discharge Plan:  to be determined.     Vital Signs (last 3 days)       Date/Time Pulse BP MAP (mmHg) SpO2 O2 Device Acosta Coma Scale Score Pain    03/24/25 0901 -- -- -- -- -- -- 8 03/24/25 07:02:47 -- 127/75 92 -- -- -- --    03/24/25 0608 -- -- -- -- -- -- 8 03/24/25 0404 -- -- -- -- -- -- 8 03/24/25 0212 -- -- -- -- -- -- 8    03/24/25 0113 -- -- -- -- -- -- 7 03/24/25 0028 -- -- -- -- -- 15 --    03/24/25 0007 -- -- -- -- -- -- 8 03/23/25 2157 -- -- -- -- -- -- 7 03/23/25 21:56:50 -- 120/73 89 -- -- -- --    03/23/25 2135 -- -- -- -- -- 15 --    03/23/25 1953 -- -- -- -- -- -- 8 03/23/25 1801 -- -- -- -- -- -- 7 03/23/25 1609 -- -- -- -- -- -- 6 03/23/25 15:08:36 -- 107/70 82 -- -- -- --    03/23/25 1339 -- -- -- -- -- -- 8 03/23/25 1208 -- -- -- -- -- -- 7 03/23/25 1137 -- -- -- -- -- -- 7 03/23/25 0928 -- -- -- -- -- -- 7 03/23/25 0800 -- -- -- -- None (Room air) 15 --    03/23/25 0757 -- -- -- -- -- -- 8 03/23/25 07:52:27 -- 120/73 89 -- -- -- --    03/23/25 0752 -- -- -- -- -- -- 8 03/23/25 0532 -- -- -- -- -- -- 9 03/23/25 0333 -- -- -- -- -- -- 9     03/23/25 0047 -- -- -- -- -- -- 9 03/22/25 22:11:36 -- 115/76 89 -- -- -- --    03/22/25 2100 -- -- -- -- -- 15 8 03/22/25 1854 -- -- -- -- -- -- 7 03/22/25 1713 -- -- -- -- -- -- 7 03/22/25 15:11:45 -- 110/56 74 -- -- -- --    03/22/25 1453 -- -- -- -- -- -- 6 03/22/25 1310 -- -- -- -- -- -- 6 03/22/25 1047 -- -- -- -- -- -- 9 03/22/25 0855 -- -- -- -- -- -- 9 03/22/25 0815 -- -- -- -- -- 15 --    03/22/25 0756 -- -- -- -- -- -- 9 03/22/25 07:22:54 -- 102/79 87 -- -- -- --    03/22/25 0455 -- -- -- -- -- -- 8 03/22/25 0430 -- -- -- -- -- 15 --    03/22/25 0251 -- -- -- -- -- -- 8 03/22/25 0031 -- -- -- -- -- -- 7 03/22/25 0030 -- -- -- -- -- 15 --    03/21/25 22:33:41 -- 130/75 93 -- -- -- --    03/21/25 2221 -- -- -- -- -- -- 8 03/21/25 2033 -- -- -- -- -- -- 9 03/21/25 2030 -- -- -- -- -- 15 --    03/21/25 1827 -- -- -- -- -- -- 7 03/21/25 1540 -- -- -- -- -- 15 --    03/21/25 1533 -- -- -- -- -- -- 8    03/21/25 15:22:46 -- 103/63 76 -- -- -- --    03/21/25 1417 -- -- -- -- -- -- 8 03/21/25 1137 -- -- -- -- -- -- 8    03/21/25 1046 -- -- -- -- -- -- 8    03/21/25 0925 -- -- -- -- -- -- 8 03/21/25 0918 -- -- -- -- None (Room air) -- --    03/21/25 07:14:03 85 107/75 86 98 % -- -- --    03/21/25 0629 -- -- -- -- -- -- 7    03/21/25 0543 -- -- -- -- -- -- 8 03/21/25 0302 -- -- -- -- -- -- 8    03/21/25 0220 -- -- -- -- -- -- 9    03/21/25 02:17:57 91 128/72 91 97 % -- -- --          Weight (last 2 days)       None          Pertinent Labs/Diagnostic Results:   Radiology:  XR lumbar spine 2 or 3 views   Final Interpretation by Seamus Guevara MD (03/21 1416)      Stable postoperative changes.  No evidence of osseous or hardware complication.         Computerized Assisted Algorithm (CAA) may have been used to analyze all applicable images.         Workstation performed: [id number]         XR spine lumbar 2 or 3 views injury   Final Interpretation by Robin Crabtree  DO Kurt (03/21 0900)      O-arm/fluoroscopic guidance provided for surgical procedure.  Please refer to the separate procedure notes for additional details.         Workstation performed: [id number]           Cardiology:  No orders to display     GI:  No orders to display     Results from last 7 days   Lab Units 03/23/25  0445 03/22/25  0512 03/21/25  0554   WBC Thousand/uL 7.76 11.97* 7.17   HEMOGLOBIN g/dL 10.6* 11.4* 10.4*   HEMATOCRIT % 32.7* 36.5 32.4*   PLATELETS Thousands/uL 101* 111* 80*   TOTAL NEUT ABS Thousands/µL 3.56 6.62  --      Results from last 7 days   Lab Units 03/24/25  0412 03/23/25  0445 03/21/25  0554   SODIUM mmol/L  --   --  140   POTASSIUM mmol/L  --   --  4.2   CHLORIDE mmol/L  --   --  110*   CO2 mmol/L  --   --  27   ANION GAP mmol/L  --   --  3*   BUN mg/dL  --   --  12   CREATININE mg/dL  --   --  0.78   EGFR ml/min/1.73sq m  --   --  88   CALCIUM mg/dL  --   --  7.8*   MAGNESIUM mg/dL 2.0 1.8* 1.9   PHOSPHORUS mg/dL  --   --  2.3*     Results from last 7 days   Lab Units 03/21/25  0554   GLUCOSE RANDOM mg/dL 156*       Network Utilization Review Department  ATTENTION: Please call with any questions or concerns to [phone] and carefully listen to the prompts so that you are directed to the right person. All voicemails are confidential.   For Discharge needs, contact Care Management DC Support Team at [phone] opt. [phone]  Send all requests for admission clinical reviews, approved or denied determinations and any other requests to dedicated fax number below belonging to the campus where the patient is receiving treatment. List of dedicated fax numbers for the Facilities:  FACILITY NAME UR FAX NUMBER   ADMISSION DENIALS (Administrative/Medical Necessity) [phone]   DISCHARGE SUPPORT TEAM (NETWORK) [phone]   PARENT CHILD HEALTH (Maternity/NICU/Pediatrics) [phone]   [facility] [phone]   [facility]  [phone]   [facility] [phone]   [facility] [phone]   [facility] [phone]   [facility] [phone]   [facility] [phone]   [facility] [phone]   [facility] [phone]   [facility] [phone]   [facility] [phone]   [facility] [phone]

## 2025-03-24 NOTE — CASE MANAGEMENT
Case Management Discharge Planning Note    Patient name Tessa Olivarez  Location /-01 MRN [id number]  : 1975 Date 3/24/2025       Current Admission Date: 3/20/2025  Current Admission Diagnosis:Lumbar stenosis with neurogenic claudication   Patient Active Problem List    Diagnosis Date Noted Date Diagnosed    Postoperative pain after spinal surgery 2025     Lumbar stenosis with neurogenic claudication 2025     Lumbar radiculopathy 2023     PCOS (polycystic ovarian syndrome) 2023     Genital herpes simplex 06/10/2023     Nicotine abuse 06/10/2023     NSVT (nonsustained ventricular tachycardia) (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) 2022     Carcinoid tumor of lung 2022     Kidney stone 2022     Class 3 severe obesity due to excess calories without serious comorbidity with body mass index (BMI) of 45.0 to 49.9 in adult ([facility]) 2022     Tobacco smoker, 1 pack of cigarettes or less per day 2022     Obstructive sleep apnea 2018     Latent tuberculosis by skin test 1986       LOS (days): 4  Geometric Mean LOS (GMLOS) (days): 2.4  Days to GMLOS:-1.4     OBJECTIVE:  Risk of Unplanned Readmission Score: 8.23         Current admission status: Inpatient   Preferred Pharmacy:   [id number] - [address] [address]  Phone: [phone] Fax: [phone]    [facility] [address]  [address]  [address] [address]  Phone: [phone] Fax: [phone]    Primary Care Provider: Sara Thomson MD    Primary Insurance: [employer]  Secondary Insurance:     DISCHARGE DETAILS:     Pt is medically stable for discharge per provider. [facility] transport requested via [employer]. Awaiting confirmed  time.

## 2025-03-24 NOTE — PLAN OF CARE
Problem: PAIN - ADULT  Goal: Verbalizes/displays adequate comfort level or baseline comfort level  Description: Interventions:  - Encourage patient to monitor pain and request assistance  - Assess pain using appropriate pain scale  - Administer analgesics based on type and severity of pain and evaluate response  - Implement non-pharmacological measures as appropriate and evaluate response  - Consider cultural and social influences on pain and pain management  - Notify physician/advanced practitioner if interventions unsuccessful or patient reports new pain  Outcome: Progressing     Problem: INFECTION - ADULT  Goal: Absence or prevention of progression during hospitalization  Description: INTERVENTIONS:  - Assess and monitor for signs and symptoms of infection  - Monitor lab/diagnostic results  - Monitor all insertion sites, i.e. indwelling lines, tubes, and drains  - Monitor endotracheal if appropriate and nasal secretions for changes in amount and color  - Odell appropriate cooling/warming therapies per order  - Administer medications as ordered  - Instruct and encourage patient and family to use good hand hygiene technique  - Identify and instruct in appropriate isolation precautions for identified infection/condition  Outcome: Progressing  Goal: Absence of fever/infection during neutropenic period  Description: INTERVENTIONS:  - Monitor WBC    Outcome: Progressing     Problem: SAFETY ADULT  Goal: Patient will remain free of falls  Description: INTERVENTIONS:  - Educate patient/family on patient safety including physical limitations  - Instruct patient to call for assistance with activity   - Consult OT/PT to assist with strengthening/mobility   - Keep Call bell within reach  - Keep bed low and locked with side rails adjusted as appropriate  - Keep care items and personal belongings within reach  - Initiate and maintain comfort rounds  - Make Fall Risk Sign visible to staff  - Offer Toileting every x Hours,  in advance of need  - Initiate/Maintain xalarm  - Obtain necessary fall risk management equipment: x  - Apply yellow socks and bracelet for high fall risk patients  - Consider moving patient to room near nurses station  Outcome: Progressing  Goal: Maintain or return to baseline ADL function  Description: INTERVENTIONS:  -  Assess patient's ability to carry out ADLs; assess patient's baseline for ADL function and identify physical deficits which impact ability to perform ADLs (bathing, care of mouth/teeth, toileting, grooming, dressing, etc.)  - Assess/evaluate cause of self-care deficits   - Assess range of motion  - Assess patient's mobility; develop plan if impaired  - Assess patient's need for assistive devices and provide as appropriate  - Encourage maximum independence but intervene and supervise when necessary  - Involve family in performance of ADLs  - Assess for home care needs following discharge   - Consider OT consult to assist with ADL evaluation and planning for discharge  - Provide patient education as appropriate  Outcome: Progressing  Goal: Maintains/Returns to pre admission functional level  Description: INTERVENTIONS:  - Perform AM-PAC 6 Click Basic Mobility/ Daily Activity assessment daily.  - Set and communicate daily mobility goal to care team and patient/family/caregiver.   - Collaborate with rehabilitation services on mobility goals if consulted  - Perform Range of Motion x times a day.  - Reposition patient every x hours.  - Dangle patient x times a day  - Stand patient x times a day  - Ambulate patient x times a day  - Out of bed to chair x times a day   - Out of bed for meals x times a day  - Out of bed for toileting  - Record patient progress and toleration of activity level   Outcome: Progressing     Problem: DISCHARGE PLANNING  Goal: Discharge to home or other facility with appropriate resources  Description: INTERVENTIONS:  - Identify barriers to discharge w/patient and caregiver  -  Arrange for needed discharge resources and transportation as appropriate  - Identify discharge learning needs (meds, wound care, etc.)  - Arrange for interpretive services to assist at discharge as needed  - Refer to Case Management Department for coordinating discharge planning if the patient needs post-hospital services based on physician/advanced practitioner order or complex needs related to functional status, cognitive ability, or social support system  Outcome: Progressing     Problem: Knowledge Deficit  Goal: Patient/family/caregiver demonstrates understanding of disease process, treatment plan, medications, and discharge instructions  Description: Complete learning assessment and assess knowledge base.  Interventions:  - Provide teaching at level of understanding  - Provide teaching via preferred learning methods  Outcome: Progressing     Problem: MOBILITY - ADULT  Goal: Maintain or return to baseline ADL function  Description: INTERVENTIONS:  -  Assess patient's ability to carry out ADLs; assess patient's baseline for ADL function and identify physical deficits which impact ability to perform ADLs (bathing, care of mouth/teeth, toileting, grooming, dressing, etc.)  - Assess/evaluate cause of self-care deficits   - Assess range of motion  - Assess patient's mobility; develop plan if impaired  - Assess patient's need for assistive devices and provide as appropriate  - Encourage maximum independence but intervene and supervise when necessary  - Involve family in performance of ADLs  - Assess for home care needs following discharge   - Consider OT consult to assist with ADL evaluation and planning for discharge  - Provide patient education as appropriate  Outcome: Progressing  Goal: Maintains/Returns to pre admission functional level  Description: INTERVENTIONS:  - Perform AM-PAC 6 Click Basic Mobility/ Daily Activity assessment daily.  - Set and communicate daily mobility goal to care team and  patient/family/caregiver.   - Collaborate with rehabilitation services on mobility goals if consulted  - Perform Range of Motion x times a day.  - Reposition patient every x hours.  - Dangle patient x times a day  - Stand patient x times a day  - Ambulate patient x times a day  - Out of bed to chair x times a day   - Out of bed for meals x times a day  - Out of bed for toileting  - Record patient progress and toleration of activity level   Outcome: Progressing

## 2025-03-24 NOTE — ASSESSMENT & PLAN NOTE
48 y/o F pmhx lumbar stenosis with neurogenic claudication, spondylolisthesis of lumbar region, TERESITA, obesity, GERD, and tobacco abuse  POD# 4 s/p elective MIS TLIF L4-5 with bilateral facetectomies, MIS MetRx microdiscectomy left L5-S1 (dr. Guy)     AVSS on RA  No leukocytosis       Intraoperative findings - High grade stenosis from ligamentous overgrowth and hypertrophied facets L4-5, calcified disc herniation left L5-S1    XR lumbar spine - Stable postoperative changes. No evidence of osseous or hardware complication.     Plan:   Discussed discharge planning  Ambulating well, cleared by PT  Pain is well controlled, needs oral regimen at this point in lieu of discharge planning  Xrays look satisfactory  Tolerating diet, +BM

## 2025-03-25 ENCOUNTER — TRANSITIONAL CARE MANAGEMENT (OUTPATIENT)
Dept: FAMILY MEDICINE CLINIC | Facility: CLINIC | Age: 50
End: 2025-03-25

## 2025-03-25 ENCOUNTER — TELEPHONE (OUTPATIENT)
Dept: NEUROSURGERY | Facility: CLINIC | Age: 50
End: 2025-03-25

## 2025-03-25 DIAGNOSIS — G89.18 POSTOPERATIVE PAIN AFTER SPINAL SURGERY: ICD-10-CM

## 2025-03-25 RX ORDER — ACETAMINOPHEN 500 MG
1000 TABLET ORAL EVERY 8 HOURS PRN
Qty: 30 TABLET | Refills: 0 | Status: SHIPPED | OUTPATIENT
Start: 2025-03-25

## 2025-03-25 RX ORDER — FLUCONAZOLE 100 MG/1
100 TABLET ORAL DAILY
Qty: 14 TABLET | Refills: 0 | Status: SHIPPED | OUTPATIENT
Start: 2025-03-25 | End: 2025-04-08

## 2025-03-25 RX ORDER — OXYCODONE HYDROCHLORIDE 5 MG/1
5 TABLET ORAL EVERY 4 HOURS PRN
Qty: 30 TABLET | Refills: 0 | Status: SHIPPED | OUTPATIENT
Start: 2025-03-25 | End: 2025-03-30

## 2025-03-25 NOTE — TELEPHONE ENCOUNTER
Spoke to pharmacist who stated they have an allergy listed for patient regarding the diflucan.     This RN reached out to patient who confirmed she has taken this in the past with no issues.     Reached back out to pharmacy and informed of the above. He stated he will fill the script.

## 2025-03-25 NOTE — TELEPHONE ENCOUNTER
Patient reported not being able to  her oxycodone today d/t the pharmacist informing her she ran out sooner than script was allotted and insurance will not cover until tomorrow.     Reviewed with the pharmacist we are aware patient took 2 tablets at times because of postop pain. Advised this RN spoke to her and directed she take medication as prescribed. Also informed pharmacist we ordered tylenol to be taken with oxy as she was not using tylenol previously. He stated patient will need to pay OOP since insurance will not cover until tomorrow and continued to stress the importance of following directions on the script.     Reached out to patient and informed her the need to pay out of pocket. Again stressed that she call the office should she feel her pain is uncontrolled and directed she take medication as prescribed.     She became tearful stating she understands however she lives alone and does not have help. She also expressed frustration with how she was discharged from the hospital stating she was medicated and wheeled in a wheelchair and left alone to wait for her Lyft. She stated she is awaiting a call from the director of nursing.     She was appreciative of this RN assistance.

## 2025-03-25 NOTE — TELEPHONE ENCOUNTER
ARELIS FROM [facility] CALLED FOR CLARIFICATION ON PT'S NEW PRESCRIPTION FOR DIFLUCAN/FLUCANOZOLE.    ARELIS STATES THEIR RECORDS SHOW PT IS ALLERGIC TO DIFLUCAN.    PLEASE CB ARELIS [phone] TO ADVISE FOR AN ALTERNATIVE.      THANK YOU

## 2025-03-25 NOTE — TELEPHONE ENCOUNTER
Pt called refill line stating pharmacy would not fill Rx until tomorrow but pt states she is out and this misunderstanding is due to the script being sent incorrectly last time. Once I got into her chart to further assist her Maxine from the office called her so she placed me on hold to answer. Pt then disconnected call after conversation with Maxine.     Pt did not verbalize the name of the Rx.

## 2025-03-25 NOTE — TELEPHONE ENCOUNTER
Call received from patient looking for a refill of her oxycodone. She reports having 2 tablets left and admits to taking 2 tablets at times d/t her pain not being well controlled on 5 mg Q 4. She reports having dilaudid 4 mg Q 4 in the hospital. She also reports taking robaxin and gabapentin as ordered. Suggested she start taking 1000 mg of tylenol Q 8. Advised this RN will route a refill script to her pharmacy.     She also reports thrush developing on her tongue since starting the keflex. She reports her tongue is sore and raw feeling. Advised this RN will route a script of diflucan to the providers to see if they are in agreement as we likely will want her to continue the abx.     She stated an understanding and was appreciative

## 2025-03-31 DIAGNOSIS — G89.18 POSTOPERATIVE PAIN AFTER SPINAL SURGERY: ICD-10-CM

## 2025-03-31 RX ORDER — ACETAMINOPHEN 500 MG
1000 TABLET ORAL EVERY 8 HOURS PRN
Qty: 30 TABLET | Refills: 0 | Status: SHIPPED | OUTPATIENT
Start: 2025-03-31 | End: 2025-04-04 | Stop reason: SDUPTHER

## 2025-03-31 RX ORDER — OXYCODONE HYDROCHLORIDE 5 MG/1
5 TABLET ORAL EVERY 4 HOURS PRN
Qty: 30 TABLET | Refills: 0 | Status: SHIPPED | OUTPATIENT
Start: 2025-03-31 | End: 2025-04-04 | Stop reason: SDUPTHER

## 2025-03-31 NOTE — TELEPHONE ENCOUNTER
[employer] message received from patient requesting a refill of her oxycodone and tylenol. Will route refill request to providers.

## 2025-04-01 DIAGNOSIS — Z98.1 STATUS POST LUMBAR SPINAL FUSION: Primary | ICD-10-CM

## 2025-04-02 ENCOUNTER — TELEPHONE (OUTPATIENT)
Age: 50
End: 2025-04-02

## 2025-04-02 DIAGNOSIS — E66.813 CLASS 3 SEVERE OBESITY DUE TO EXCESS CALORIES WITHOUT SERIOUS COMORBIDITY WITH BODY MASS INDEX (BMI) OF 45.0 TO 49.9 IN ADULT (HCC): Primary | ICD-10-CM

## 2025-04-02 DIAGNOSIS — E66.01 CLASS 3 SEVERE OBESITY DUE TO EXCESS CALORIES WITHOUT SERIOUS COMORBIDITY WITH BODY MASS INDEX (BMI) OF 45.0 TO 49.9 IN ADULT (HCC): Primary | ICD-10-CM

## 2025-04-02 RX ORDER — TIRZEPATIDE 2.5 MG/.5ML
2.5 INJECTION, SOLUTION SUBCUTANEOUS WEEKLY
Qty: 2 ML | Refills: 0 | Status: SHIPPED | OUTPATIENT
Start: 2025-04-02

## 2025-04-02 NOTE — TELEPHONE ENCOUNTER
Pl, advise pt -  MMR booster and immunity check is not recommended now and would not be covered by insurance

## 2025-04-02 NOTE — TELEPHONE ENCOUNTER
Patient advised. She requested to schedule a nurse visit for tdap and mmr boosters. Are titres required for mmr injection?

## 2025-04-02 NOTE — TELEPHONE ENCOUNTER
Pt called to make an appt next week but Dr Thomson is out of the office. The pt would like to try Mounjaro as Wegovy and Zepbound are not covered on her plan. She realizes she does not have DM but asked that I still send this message. She does not want to go to weight mgt as her co-pay is too high.

## 2025-04-03 ENCOUNTER — TELEPHONE (OUTPATIENT)
Age: 50
End: 2025-04-03

## 2025-04-03 ENCOUNTER — CLINICAL SUPPORT (OUTPATIENT)
Dept: NEUROSURGERY | Facility: CLINIC | Age: 50
End: 2025-04-03

## 2025-04-03 DIAGNOSIS — Z98.1 S/P LUMBAR FUSION: Primary | ICD-10-CM

## 2025-04-03 PROCEDURE — 99024 POSTOP FOLLOW-UP VISIT: CPT | Performed by: NEUROLOGICAL SURGERY

## 2025-04-03 NOTE — TELEPHONE ENCOUNTER
PA Mounjaro 2.5 MG/0.5ML DENIED    Reason:(Screenshot if applicable)        Message sent to office clinical pool Yes    Denial letter scanned into Media Yes    Appeal started No (Provider will need to decide if appeal is warranted and send clinical documentation to Prior Authorization Team for initiation.)    **Please follow up with your patient regarding denial and next steps**

## 2025-04-03 NOTE — PROGRESS NOTES
Post-Op Visit- Neurosurgery    Tessa Olivarez 49 y.o. female MRN: [id number]    Chief Complaint:  Patient presents post: MIS TLIF L4-5, MIS MetRx microdiscectomy left L5-S1    History of Present Illness:  Patient presents for 2 week POV for incision check alone and ambulating without an assistive device.  Patient reports she is doing well overall and denies any incisional issues or fevers.  she denies any new weakness, numbness or tingling since the surgery.  Patient admits to slowly improving surgical pain at this time and rates their pain as a Pain Score:   6/10.      Wound Exam: Incisions well approximated.  No erythema, edema or drainage present.   Location: back    Procedure:  Staple/suture removal.   Procedure Note: staples and were removed. Cleansed area with hydrogen peroxide.  Patient Status: the patient tolerated the procedure well.   Complications: None.       Discussion/Summary:  Doing well postoperatively. Reviewed incision care with patient including daily observation for s/s infection including: increased erythema, edema, drainage, dehiscence of incision or fever >101.  Should these be observed, she understands that she is to call and/or return immediately for reassessment.  Advised patient to continue cleansing area with mild soap and water and pat dry. Not to apply any lotions, creams, or ointments, & not to submerge in any water for 4 more weeks.     She is to maintain activity restrictions until cleared by the surgeon. Activity levels were also reviewed with the patient in detail, she is to lift no greater than 10 pounds and ambulation is encouraged as tolerated.     Verified date/time/location of upcoming POV and reminded her to complete x-rays prior to her next appointment. She is to call the office with any further questions or concerns, or if any incisional issues or fevers would arise.

## 2025-04-03 NOTE — TELEPHONE ENCOUNTER
KULDIP Ahn 2.5 MG/0.5ML SUBMITTED     to [employer]     via    []CMM-KEY:    [x]Surescripts-Case ID # [id number]  []Availity-Auth ID #  NDC #    []Faxed to plan   []Other website    []Phone call Case ID #      []PA sent as URGENT    All office notes, labs and other pertaining documents and studies sent. Clinical questions answered. Awaiting determination from insurance company.     Turnaround time for your insurance to make a decision on your Prior Authorization can take 7-21 business days.

## 2025-04-04 ENCOUNTER — TELEPHONE (OUTPATIENT)
Dept: FAMILY MEDICINE CLINIC | Facility: CLINIC | Age: 50
End: 2025-04-04

## 2025-04-04 DIAGNOSIS — G89.18 POSTOPERATIVE PAIN AFTER SPINAL SURGERY: ICD-10-CM

## 2025-04-04 RX ORDER — OXYCODONE HYDROCHLORIDE 5 MG/1
5 TABLET ORAL EVERY 6 HOURS PRN
Qty: 28 TABLET | Refills: 0 | Status: SHIPPED | OUTPATIENT
Start: 2025-04-05 | End: 2025-04-11 | Stop reason: SDUPTHER

## 2025-04-04 RX ORDER — ACETAMINOPHEN 500 MG
1000 TABLET ORAL EVERY 8 HOURS PRN
Qty: 60 TABLET | Refills: 0 | Status: SHIPPED | OUTPATIENT
Start: 2025-04-05

## 2025-04-05 DIAGNOSIS — M54.16 LUMBAR RADICULOPATHY, CHRONIC: ICD-10-CM

## 2025-04-05 DIAGNOSIS — M51.34 DEGENERATIVE DISC DISEASE, THORACIC: ICD-10-CM

## 2025-04-06 RX ORDER — GABAPENTIN 600 MG/1
600 TABLET ORAL 3 TIMES DAILY
Qty: 270 TABLET | Refills: 1 | Status: SHIPPED | OUTPATIENT
Start: 2025-04-06

## 2025-04-08 ENCOUNTER — EVALUATION (OUTPATIENT)
Dept: PHYSICAL THERAPY | Facility: CLINIC | Age: 50
End: 2025-04-08
Payer: COMMERCIAL

## 2025-04-08 DIAGNOSIS — M54.16 LUMBAR RADICULOPATHY: Primary | ICD-10-CM

## 2025-04-08 PROCEDURE — 97161 PT EVAL LOW COMPLEX 20 MIN: CPT | Performed by: PHYSICAL THERAPIST

## 2025-04-08 NOTE — PROGRESS NOTES
"PT Evaluation     Today's date: 2025  Patient name: Tessa Olivarez  : 1975  MRN: [id number]  Referring provider: Feroz Guy MD  Dx:   Encounter Diagnosis     ICD-10-CM    1. Lumbar radiculopathy  M54.16                      Assessment  Impairments: activity intolerance, impaired physical strength and pain with function    Assessment details: Patient instructed in postural awareness and spinal safety with not bending, lifting or twisting.  She demonstrated understanding of core exercise in sitting maintaining spinal neutral position.      Plan    Planned therapy interventions: postural training, neuromuscular re-education, strengthening and stretching    Plan details: Hold PT    Subjective   Pain :  Current 6/10,   Worst 9/10        Objective           Precautions: Medical History    Diagnosis Date Comment Source   Acid reflux      Allergic  Flagyl; anaphylaxis    Ambulates with cane  bilat canes    Anxiety  situational    Trejo's esophagus      BRCA1 negative      Cancer (HCC)      Chronic pain disorder  generalized --    Degenerative disc disease, thoracic 2023     Depression  situational    GERD (gastroesophageal reflux disease)      Hiatal hernia      Hyperlipidemia      Kidney stone      Latent tuberculosis by skin test  Was treated with INH for 6 months    Ligamentum flavum hypertrophy 2023     Lung cancer (HCC) diagnosed in 2018     Muscle weakness  legs if up too long--    Obesity 2018     Obstructive sleep apnea 2018 Mild TERESITA.  AHI 18.1    Presence of upper and lower permanent dental bridges      Risk for falls      Shortness of breath  \"at times--often with back pain\"    Squamous cell skin cancer  Left buttocks    STD (sexually transmitted disease)  HPV, HSV    Thoracic back pain 2023     Thoracic spondylosis with myelopathy 2023     Urinary tract infection  in the past    Wears glasses            Manuals                                     "                             Neuro Re-Ed             Postural self-correct sit mv                                                                                          Ther Ex             Hip ADD ball squeeze 10x            Hip ABD YTB 10x            Scap retraction sit YTB 2x5                                                                             Ther Activity                                       Gait Training                                       Modalities                                          Patient instructed in HEP from [employer] # [id number]

## 2025-04-11 DIAGNOSIS — G89.18 POSTOPERATIVE PAIN AFTER SPINAL SURGERY: ICD-10-CM

## 2025-04-11 RX ORDER — OXYCODONE HYDROCHLORIDE 5 MG/1
5 TABLET ORAL EVERY 6 HOURS PRN
Qty: 28 TABLET | Refills: 0 | Status: SHIPPED | OUTPATIENT
Start: 2025-04-11 | End: 2025-04-18 | Stop reason: SDUPTHER

## 2025-04-11 NOTE — TELEPHONE ENCOUNTER
Patient is 3 weeks s/p MIS TLIF L4-5, MIS [facility] microdiscectomy left L5-S1 with Dr. Guy on 3/20. She is requesting refill of oxycodone. Just began working with PT.

## 2025-04-12 DIAGNOSIS — M62.838 MUSCLE SPASM: ICD-10-CM

## 2025-04-12 DIAGNOSIS — T78.40XD ALLERGY, SUBSEQUENT ENCOUNTER: ICD-10-CM

## 2025-04-12 DIAGNOSIS — K21.9 GASTROESOPHAGEAL REFLUX DISEASE WITHOUT ESOPHAGITIS: ICD-10-CM

## 2025-04-12 DIAGNOSIS — F41.9 ANXIETY: ICD-10-CM

## 2025-04-12 DIAGNOSIS — A60.00 GENITAL HERPES SIMPLEX, UNSPECIFIED SITE: ICD-10-CM

## 2025-04-14 RX ORDER — ACYCLOVIR 800 MG/1
400 TABLET ORAL 2 TIMES DAILY
Qty: 90 TABLET | Refills: 0 | Status: SHIPPED | OUTPATIENT
Start: 2025-04-14 | End: 2025-07-13

## 2025-04-14 RX ORDER — OMEPRAZOLE 40 MG/1
40 CAPSULE, DELAYED RELEASE ORAL DAILY
Qty: 90 CAPSULE | Refills: 1 | Status: SHIPPED | OUTPATIENT
Start: 2025-04-14

## 2025-04-14 RX ORDER — METHOCARBAMOL 500 MG/1
500 TABLET, FILM COATED ORAL 2 TIMES DAILY
Qty: 180 TABLET | Refills: 0 | Status: SHIPPED | OUTPATIENT
Start: 2025-04-14

## 2025-04-14 RX ORDER — LEVOCETIRIZINE DIHYDROCHLORIDE 5 MG/1
5 TABLET, FILM COATED ORAL EVERY EVENING
Qty: 90 TABLET | Refills: 0 | Status: SHIPPED | OUTPATIENT
Start: 2025-04-14

## 2025-04-14 RX ORDER — HYDROXYZINE PAMOATE 50 MG/1
CAPSULE ORAL
Qty: 90 CAPSULE | Refills: 0 | Status: SHIPPED | OUTPATIENT
Start: 2025-04-14

## 2025-04-18 DIAGNOSIS — G89.18 POSTOPERATIVE PAIN AFTER SPINAL SURGERY: ICD-10-CM

## 2025-04-18 RX ORDER — OXYCODONE HYDROCHLORIDE 5 MG/1
5 TABLET ORAL EVERY 8 HOURS PRN
Qty: 21 TABLET | Refills: 0 | Status: SHIPPED | OUTPATIENT
Start: 2025-04-18 | End: 2025-04-25 | Stop reason: SDUPTHER

## 2025-04-18 NOTE — TELEPHONE ENCOUNTER
Patient is 4 weeks s/p MIS TLIF L4-5, MIS [facility] microdiscectomy left L5-S1 with Dr. Guy on 3/20. She is requesting refill of oxycodone. Will change frequency from Q6 to Q8 this fill

## 2025-04-25 ENCOUNTER — TELEPHONE (OUTPATIENT)
Age: 50
End: 2025-04-25

## 2025-04-25 DIAGNOSIS — G89.18 POSTOPERATIVE PAIN AFTER SPINAL SURGERY: ICD-10-CM

## 2025-04-25 RX ORDER — ACETAMINOPHEN 500 MG
1000 TABLET ORAL EVERY 8 HOURS PRN
Qty: 60 TABLET | Refills: 0 | Status: SHIPPED | OUTPATIENT
Start: 2025-04-25

## 2025-04-25 RX ORDER — OXYCODONE HYDROCHLORIDE 5 MG/1
5 TABLET ORAL EVERY 12 HOURS PRN
Qty: 10 TABLET | Refills: 0 | Status: SHIPPED | OUTPATIENT
Start: 2025-04-25 | End: 2025-04-30

## 2025-04-25 NOTE — TELEPHONE ENCOUNTER
Patient is requesting refills of tylenol and oxycodone. Her 6 week pov for MIS TLIF L4-5, MIS [facility] microdiscectomy left L5-S1 is scheduled for 4/29. Order changed from Q8 to Q12

## 2025-04-25 NOTE — TELEPHONE ENCOUNTER
MOUNJARO will not be covered with or without a prior authorization, MOUNJARO is not FDA approved for obesity, prediabetes, etc.     Mounjaro is only FDA Approved for Type 2 Diabetes and will only be Approved via a Prior Authorization if patient has a diagnosis of Type 2 Diabetes.

## 2025-04-25 NOTE — TELEPHONE ENCOUNTER
"Working on patient request for refill took the \"take\" baton from in basket, however when continuing with process refill locked due to another working on the same thing, process aborted but encounter already created for refill.   "

## 2025-04-28 ENCOUNTER — HOSPITAL ENCOUNTER (OUTPATIENT)
Dept: RADIOLOGY | Facility: HOSPITAL | Age: 50
Discharge: HOME | End: 2025-04-28
Payer: COMMERCIAL

## 2025-04-28 DIAGNOSIS — Z98.1 S/P LUMBAR FUSION: ICD-10-CM

## 2025-04-28 PROCEDURE — 72100 X-RAY EXAM L-S SPINE 2/3 VWS: CPT

## 2025-04-30 ENCOUNTER — OFFICE VISIT (OUTPATIENT)
Dept: NEUROSURGERY | Facility: CLINIC | Age: 50
End: 2025-04-30

## 2025-04-30 VITALS
OXYGEN SATURATION: 97 % | HEART RATE: 96 BPM | DIASTOLIC BLOOD PRESSURE: 86 MMHG | TEMPERATURE: 98.4 F | SYSTOLIC BLOOD PRESSURE: 144 MMHG | BODY MASS INDEX: 43.4 KG/M2 | WEIGHT: 293 LBS | HEIGHT: 69 IN

## 2025-04-30 DIAGNOSIS — Z98.1 S/P LUMBAR FUSION: Primary | ICD-10-CM

## 2025-04-30 PROCEDURE — 99024 POSTOP FOLLOW-UP VISIT: CPT | Performed by: NEUROLOGICAL SURGERY

## 2025-04-30 NOTE — PROGRESS NOTES
Name: Tessa Olivarez      : 1975      MRN: [id number]  Encounter Provider: Feroz Guy MD  Encounter Date: 2025   Encounter department: [facility] NEUROSURGICAL [facility]  :  Assessment & Plan  S/P lumbar fusion    Orders:  •  XR spine lumbar complete w bending minimum 6 views; Future    S/p MIS TLIF L4-5, MIS left L5-S1 microdiscectomy on 3/20/25.     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     Prior to lumbar surgery, was having low back pain and neurogenic claudication down the back of her bilateral thighs.  She had difficulty standing for any length of time, pain would radiate down her legs left more than right.  Has had bilateral lower extremity numbness, on the right extending down the posterior thigh to the lateral calf and into the top of the foot but also her bilateral toes.  She was using a cane occasionally.  Pain would improve by leaning forward.    I am happy to say that she has had significant improvement since her surgery.  She rates her back and lower extremity pain just 2/10, and states that she feels better than she has in years.  She denies any shooting pains into her legs although she does have occasional numbness on the right.  But this is much better than before surgery.  She can get pain on the right side of her low back if she stands for too long.  She is doing some form of PT daily.  She is still limited on how far she can walk or how long she can stand but again this is much improved. Using no aids to walk.     Her incisions have healed quite well.      Her 6-week postop x-rays show stability, good hardware placement.    I relaxed all bathing, skin care restrictions.  I relaxed all of medication restrictions and she will restart taking her Voltaren for pain.  We discussed activity and she will slowly increased that  over the next weeks.    I will plan to see her back in about 6 months postop with bending x-rays which I have ordered.  She does have a chest CT scheduled for the end of 8/2025 as part of her lung cancer surveillance.  She will plan to have her x-rays done around the same time and will see me about a week later to review the results.    07/24/23 Metrics: EQ5D5L 28230=4.616; VAS 70      02/26/25 Metrics: EQ5D5L 16930=7.499; VAS 77    04/30/25 Metrics: EQ5D5L 65139; VAS 77      History of Present Illness     Tessa Olivarez is a 49 y.o. female who presents for postop follow-up.     HPI     Pain Score:   2 - Bilat Legs      Review of Systems   Gastrointestinal: Negative.    Genitourinary: Negative.    Musculoskeletal:  Positive for back pain (intermittent shooting pain down right leg into foot w/ decr sensation laterally,), gait problem (cant walk or stand too long (R)), myalgias and neck pain (shoulder blade into left humerus pain, feels weak).        Can't stand for long periods of time, has to stop between chores for relief  Numbness is constant   Neurological:  Positive for weakness and numbness (pins and needles right thigh, lateral).   Hematological: Negative.    Psychiatric/Behavioral: Negative.     All other systems reviewed and are negative.    I have personally reviewed the MA's review of systems and made changes as necessary.        Past Medical History   Past Medical History:   Diagnosis Date   • Acid reflux    • Allergic 2006    Flagyl; anaphylaxis   • Ambulates with cane     bilat canes   • Anxiety     situational   • Trejo's esophagus    • BRCA1 negative    • Cancer (HCC)    • Chronic pain disorder     generalized --   • Degenerative disc disease, thoracic 06/09/2023   • Depression     situational   • GERD (gastroesophageal reflux disease)    • Hiatal hernia    • Hyperlipidemia    • Kidney stone    • Latent tuberculosis by skin test 1986    Was treated with INH for 6 months   • Ligamentum flavum  "hypertrophy 2023   • Lung cancer (HCC) diagnosed in 2018   • Muscle weakness     legs if up too long--   • Obesity    • Obstructive sleep apnea 2018    Mild TERESITA.  AHI 18.1   • Presence of upper and lower permanent dental bridges    • Risk for falls    • Shortness of breath     \"at times--often with back pain\"   • Squamous cell skin cancer     Left buttocks   • STD (sexually transmitted disease)     HPV, HSV   • Thoracic back pain 2023   • Thoracic spondylosis with myelopathy 2023   • Urinary tract infection     in the past   • Wears glasses      Past Surgical History:   Procedure Laterality Date   • ABDOMINOPLASTY     • AUGMENTATION BREAST Bilateral    • BILATERAL SALPINGOOPHORECTOMY     • BREAST BIOPSY Bilateral     usg bxs- negative--clips implanted   • BREAST SURGERY     • BRONCHOSCOPY N/A 2022    Procedure: BRONCHOSCOPY NAVIGATIONAL;  Surgeon: J Luis Arreola MD;  [facility];  Service: Thoracic   •  SECTION     • CYSTOSCOPY N/A 2022    Procedure: CYSTOSCOPY;  Surgeon: Red Becerril MD;  [facility];  Service: Gynecology Oncology   • EGD     • ENDOBRONCHIAL ULTRASOUND (EBUS) N/A 2022    Procedure: ENDOBRONCHIAL ULTRASOUND (EBUS);  Surgeon: J Luis Arreola MD;  [facility];  Service: Thoracic   • HYSTERECTOMY     • IR BIOPSY LYMPH NODE  2022   • IR CHEST TUBE PLACEMENT  2022   • LAPAROSCOPIC CHOLECYSTECTOMY     • LIPOSUCTION     • LUNG SURGERY      VATS Lobectomy RLL   • LYMPH NODE BIOPSY  2022   • MOHS SURGERY Left 2022    left buttocks   • LA ARTHRODESIS COMBINED TQ 1NTRSPC LUMBAR N/A 3/20/2025    Procedure: MIS TLIF L4-5, MIS [facility] microdiscectomy left L5-S1;  Surgeon: Feroz Guy MD;  [facility];  Service: Neurosurgery   • LA BRNCHSC INCL FLUOR GDNCE DX W/CELL WASHG SPX N/A 2022    Procedure: BRONCHOSCOPY FLEXIBLE;  Surgeon: " J Luis Arreola MD;  [facility];  Service: Thoracic   • NV [facility] INCL FLUOR GDNCE DX W/CELL WASHG SPX N/A 07/28/2022    Procedure: BRONCHOSCOPY FLEXIBLE;  Surgeon: Arnie Bartlett MD;  [facility];  Service: Thoracic   • NV MOLINA FACETECTOMY & FORAMOTOMY 1 VRT SGM THORACIC N/A 06/08/2023    Procedure: Open T8 spinous process, T9 total, superior T10 laminectomy for decompression;  Surgeon: Feroz Guy MD;  [facility];  Service: Neurosurgery   • NV LAPAROSCOPY W/RMVL ADNEXAL STRUCTURES Bilateral 12/20/2022    Procedure: OOPHORECTOMY W/ ROBOTICS, VAGINOTOMY;  Surgeon: Red Becerril MD;  [facility];  Service: Gynecology Oncology   • NV THORACOSCOPY W/LOBECTOMY SINGLE LOBE Right 07/28/2022    Procedure: LOBECTOMY LUNG;  Surgeon: Arnie Bartlett MD;  [facility];  Service: Thoracic   • NV THORACOSCOPY W/LOBECTOMY SINGLE LOBE Right 07/28/2022    Procedure: THORACOSCOPY VIDEO ASSISTED SURGERY (VATS);  Surgeon: Arnie Bartlett MD;  [facility];  Service: Thoracic   • REPAIR RECTOCELE  2018   • SPINE SURGERY  2023   • TONSILLECTOMY  1984   • TUBAL LIGATION  2002   • UPPER GASTROINTESTINAL ENDOSCOPY  2018     Family History   Problem Relation Age of Onset   • Cancer Mother         Ovarian   • Ovarian cancer Mother         in her 40's   • Arthritis Mother         RA   • Autoimmune disease Mother         RA   • Stroke Father    • Dementia Father    • Vision loss Father    • Glaucoma Father    • Cancer Maternal Grandmother         Ovarian   • Diabetes Paternal Grandmother    • Diabetes Paternal Grandfather    • Lung cancer Paternal Uncle      she reports that she has been smoking cigarettes. She started smoking about 2 years ago. She has a 18.7 pack-year smoking history. She has never used smokeless tobacco. She reports that she does not currently use alcohol. She reports that she does not use drugs.  Current Outpatient  "Medications   Medication Instructions   • acetaminophen (TYLENOL) 1,000 mg, Oral, Every 8 hours PRN   • acyclovir (ZOVIRAX) 400 mg, Oral, 2 times daily   • Calcium-Phosphorus-Vitamin D (CALCIUM/D3 ADULT GUMMIES PO) Take by mouth Vitafusion Chew 500mg CA and 25mcg Vit D   • clobetasol propionate (CLOBEX) 0.05 % shampoo APPLY TOPICALLY TWICE WEEKLY   • gabapentin (NEURONTIN) 600 mg, Oral, 3 times daily   • hydrOXYzine pamoate (VISTARIL) 50 mg capsule TAKE 1 CAPSULE BY MOUTH DAILY AS NEEDED FOR ANXIETY   • ketoconazole (NIZORAL) 2 % shampoo APPLY 1 APPLICATION TOPICALLY  TWICE WEEKLY   • levocetirizine (XYZAL) 5 mg, Oral, Every evening   • methocarbamol (ROBAXIN) 750 mg, Oral, Every 8 hours PRN   • methocarbamol (ROBAXIN) 500 mg, Oral, 2 times daily   • Mounjaro 2.5 mg, Subcutaneous, Weekly   • multivitamin (THERAGRAN) TABS 1 tablet, Daily   • naloxone (NARCAN) 4 mg/0.1 mL nasal spray Administer 1 spray into a nostril. If no response after 2-3 minutes, give another dose in the other nostril using a new spray.   • omeprazole (PRILOSEC) 40 mg, Oral, Daily   • oxyCODONE (ROXICODONE) 5 mg, Oral, Every 12 hours PRN     Allergies   Allergen Reactions   • Flagyl [Metronidazole] Anaphylaxis     \"patient states she has been in anaphylaxis due to this medication prior\"     • Nitrofurantoin Shortness Of Breath   • Nickel Itching     Developed raised -redness area-\"plaque from where stethescope hung\"      Objective   /86 (BP Location: Left arm, Patient Position: Sitting, Cuff Size: Adult)   Pulse 96   Temp 98.4 °F (36.9 °C) (Temporal)   Ht 5' 9\" (1.753 m)   Wt (!) 151 kg (333 lb)   SpO2 97%   BMI 49.18 kg/m²     Physical Exam  Neurological Exam    Radiology Results Review: I have reviewed the following images/report studies in PACS: XR spine lumbar 2 or 3 views injury  Result Date: 4/29/2025  Uncomplicated L4-5 fusion. Workstation performed: [id number]              "

## 2025-06-30 DIAGNOSIS — A60.00 GENITAL HERPES SIMPLEX, UNSPECIFIED SITE: ICD-10-CM

## 2025-06-30 DIAGNOSIS — M62.838 MUSCLE SPASM: ICD-10-CM

## 2025-06-30 DIAGNOSIS — F41.9 ANXIETY: ICD-10-CM

## 2025-06-30 DIAGNOSIS — L21.9 SEBORRHEIC DERMATITIS: ICD-10-CM

## 2025-06-30 DIAGNOSIS — T78.40XD ALLERGY, SUBSEQUENT ENCOUNTER: ICD-10-CM

## 2025-07-01 RX ORDER — HYDROXYZINE PAMOATE 50 MG/1
CAPSULE ORAL
Qty: 90 CAPSULE | Refills: 0 | Status: SHIPPED | OUTPATIENT
Start: 2025-07-01

## 2025-07-01 RX ORDER — CLOBETASOL PROPIONATE 0.05 G/100ML
SHAMPOO TOPICAL
Qty: 0 ML | OUTPATIENT
Start: 2025-07-01

## 2025-07-02 RX ORDER — LEVOCETIRIZINE DIHYDROCHLORIDE 5 MG/1
5 TABLET, FILM COATED ORAL EVERY EVENING
Qty: 90 TABLET | Refills: 1 | Status: SHIPPED | OUTPATIENT
Start: 2025-07-02

## 2025-07-02 RX ORDER — ACYCLOVIR 800 MG/1
400 TABLET ORAL 2 TIMES DAILY
Qty: 90 TABLET | Refills: 0 | Status: SHIPPED | OUTPATIENT
Start: 2025-07-02 | End: 2025-09-30

## 2025-07-02 RX ORDER — METHOCARBAMOL 500 MG/1
500 TABLET, FILM COATED ORAL 2 TIMES DAILY
Qty: 180 TABLET | Refills: 0 | Status: SHIPPED | OUTPATIENT
Start: 2025-07-02

## 2025-07-15 ENCOUNTER — HOSPITAL ENCOUNTER (OUTPATIENT)
Dept: RADIOLOGY | Facility: HOSPITAL | Age: 50
Discharge: HOME | End: 2025-07-15
Payer: COMMERCIAL

## 2025-07-15 VITALS — HEIGHT: 69 IN | WEIGHT: 293 LBS | BODY MASS INDEX: 43.4 KG/M2

## 2025-07-15 DIAGNOSIS — Z12.31 ENCOUNTER FOR SCREENING MAMMOGRAM FOR BREAST CANCER: ICD-10-CM

## 2025-07-15 PROCEDURE — 77063 BREAST TOMOSYNTHESIS BI: CPT

## 2025-07-15 PROCEDURE — 77067 SCR MAMMO BI INCL CAD: CPT

## 2025-07-21 ENCOUNTER — TELEPHONE (OUTPATIENT)
Age: 50
End: 2025-07-21

## 2025-07-21 NOTE — TELEPHONE ENCOUNTER
Patient called in to schedule a surgical consult. Warm transferred patient to [place] for further assistance.

## 2025-07-29 ENCOUNTER — TELEPHONE (OUTPATIENT)
Dept: BARIATRICS | Facility: CLINIC | Age: 50
End: 2025-07-29

## 2025-08-19 ENCOUNTER — HOSPITAL ENCOUNTER (OUTPATIENT)
Dept: RADIOLOGY | Facility: HOSPITAL | Age: 50
Discharge: HOME | End: 2025-08-19
Payer: COMMERCIAL

## 2025-08-19 DIAGNOSIS — C7A.00 CARCINOID TUMOR METASTATIC TO INTRATHORACIC LYMPH NODE (HCC): ICD-10-CM

## 2025-08-19 DIAGNOSIS — C7A.090 MALIGNANT CARCINOID TUMOR OF LUNG (HCC): ICD-10-CM

## 2025-08-19 DIAGNOSIS — C7B.09 CARCINOID TUMOR METASTATIC TO INTRATHORACIC LYMPH NODE (HCC): ICD-10-CM

## 2025-08-19 DIAGNOSIS — Z98.1 S/P LUMBAR FUSION: ICD-10-CM

## 2025-08-19 PROCEDURE — 72114 X-RAY EXAM L-S SPINE BENDING: CPT

## 2025-08-19 PROCEDURE — 71250 CT THORAX DX C-: CPT

## (undated) DEVICE — PIN 9733236 150MM STERILE PERC REF

## (undated) DEVICE — MEDI-VAC YANK SUCT HNDL W/TPRD BULBOUS TIP: Brand: CARDINAL HEALTH

## (undated) DEVICE — JP PERF DRN SIL FLT 7MM FULL: Brand: CARDINAL HEALTH

## (undated) DEVICE — HEAVY DUTY TABLE COVER: Brand: CONVERTORS

## (undated) DEVICE — MONITORING SPINAL IMPULSE CASE FEE

## (undated) DEVICE — GLOVE INDICATOR PI UNDERGLOVE SZ 8 BLUE

## (undated) DEVICE — SUT VICRYL 0 CT-1 27 IN J260H

## (undated) DEVICE — INTENDED FOR TISSUE SEPARATION, AND OTHER PROCEDURES THAT REQUIRE A SHARP SURGICAL BLADE TO PUNCTURE OR CUT.: Brand: BARD-PARKER ® CARBON RIB-BACK BLADES

## (undated) DEVICE — SPONGE PVP SCRUB WING STERILE

## (undated) DEVICE — FENESTRATED BIPOLAR FORCEPS: Brand: ENDOWRIST

## (undated) DEVICE — INSTRUMENT POUCH: Brand: CONVERTORS

## (undated) DEVICE — MICRO KOVER: Brand: UNBRANDED

## (undated) DEVICE — TROCAR PORT ACCESS 5 X120MML W/BLDLS OPTICAL TIP AIRSEAL

## (undated) DEVICE — GLOVE INDICATOR PI UNDERGLOVE SZ 7 BLUE

## (undated) DEVICE — STERILE THORACIC PACK: Brand: CARDINAL HEALTH

## (undated) DEVICE — ADHESIVE SKIN HIGH VISCOSITY EXOFIN 1ML

## (undated) DEVICE — SURGIFOAM 8.5 X 12.5

## (undated) DEVICE — TOOL MR8-14MH30 MR8 14CM MATCH 3MM: Brand: MIDAS REX MR8

## (undated) DEVICE — HEMOSTATIC MATRIX SURGIFLO 8ML W/THROMBIN

## (undated) DEVICE — SINGLE USE SUCTION VALVE MAJ-209: Brand: SINGLE USE SUCTION VALVE (STERILE)

## (undated) DEVICE — NEEDLE 25G X 1 1/2

## (undated) DEVICE — BETHLEHEM UNIVERSAL SPINE, KIT: Brand: CARDINAL HEALTH

## (undated) DEVICE — PAD GROUNDING ADULT

## (undated) DEVICE — TRAY FOLEY 16FR URIMETER SILICONE SURESTEP

## (undated) DEVICE — DRESSING MEPILEX AG BORDER POST-OP 4 X 8 IN

## (undated) DEVICE — JACKSON-PRATT 100CC BULB RESERVOIR: Brand: CARDINAL HEALTH

## (undated) DEVICE — ADAPTOR TRACH SWIVEL

## (undated) DEVICE — JACKSON TABLE FOAM POSITIONING KIT: Brand: CARDINAL HEALTH

## (undated) DEVICE — SYRINGE 10ML SLIP TIP LF

## (undated) DEVICE — BLADELESS OBTURATOR: Brand: WECK VISTA

## (undated) DEVICE — INTENDED FOR TISSUE SEPARATION, AND OTHER PROCEDURES THAT REQUIRE A SHARP SURGICAL BLADE TO PUNCTURE OR CUT.: Brand: BARD-PARKER SAFETY BLADES SIZE 15, STERILE

## (undated) DEVICE — DEVON™ KNEE AND BODY STRAP 80" X 4" (1.5 M X 10.2 CM): Brand: CARDINAL HEALTH

## (undated) DEVICE — Device: Brand: TISSUE RETRIEVAL SYSTEM

## (undated) DEVICE — DRESSING MEPILEX FOAM BORDER SACRUM 6.3 X 7.9IN

## (undated) DEVICE — SPECIMEN TRAP: Brand: ARGYLE

## (undated) DEVICE — INTENDED FOR TISSUE SEPARATION, AND OTHER PROCEDURES THAT REQUIRE A SHARP SURGICAL BLADE TO PUNCTURE OR CUT.: Brand: BARD-PARKER SAFETY BLADES SIZE 11, STERILE

## (undated) DEVICE — IV EXTENSION TUBING 33 IN

## (undated) DEVICE — ANTIBACTERIAL VIOLET BRAIDED (POLYGLACTIN 910), SYNTHETIC ABSORBABLE SUTURE: Brand: COATED VICRYL

## (undated) DEVICE — SUT ETHILON 2-0 FSLX 30 IN 1674H

## (undated) DEVICE — LARGE NEEDLE DRIVER: Brand: ENDOWRIST

## (undated) DEVICE — PREP SURGICAL PURPREP 26ML

## (undated) DEVICE — STERILE CYSTO PACK: Brand: CARDINAL HEALTH

## (undated) DEVICE — NEPTUNE E-SEP SMOKE EVACUATION PENCIL, COATED, 70MM BLADE, PUSH BUTTON SWITCH: Brand: NEPTUNE E-SEP

## (undated) DEVICE — BRONCHOSCOPE MONARCH SNGL USE

## (undated) DEVICE — GAUZE SPONGES,16 PLY: Brand: CURITY

## (undated) DEVICE — SUT PROLENE 0 CT-1 30 IN 8424H

## (undated) DEVICE — SINGLE TUBING WITH LARGE CONNECTOR FOR THORACIC SUCTION SYSTEM PUMP: Brand: THOPAZ TUBING SINGLE

## (undated) DEVICE — GLOVE SRG BIOGEL ECLIPSE 8

## (undated) DEVICE — REM POLYHESIVE ADULT PATIENT RETURN ELECTRODE: Brand: VALLEYLAB

## (undated) DEVICE — MINOR PROCEDURE DRAPE: Brand: CONVERTORS

## (undated) DEVICE — BRONCHOSCOPE PATIENT INTRODUCER KIT: Brand: BRONCHOSCOPE PATIENT INTRODUCER KIT

## (undated) DEVICE — 24 FR STRAIGHT – SOFT PVC CATHETER: Brand: PVC THORACIC CATHETERS

## (undated) DEVICE — SINGLE USE BIOPSY VALVE MAJ-210: Brand: SINGLE USE BIOPSY VALVE (STERILE)

## (undated) DEVICE — SYRINGE 20ML LL

## (undated) DEVICE — DRESSING MEPILEX FOAM BORDER SACRUM 8.7 X 9.8IN

## (undated) DEVICE — SUT VICRYL 2-0 SH 27 IN UNDYED J417H

## (undated) DEVICE — ENDOPATH ECHELON VASCULAR  RELOADS, WHITE, 35MM: Brand: ECHELON ENDOPATH

## (undated) DEVICE — THE ECHELON FLEX POWERED PLUS ARTICULATING ENDOSCOPIC LINEAR CUTTERS ARE STERILE, SINGLE PATIENT USE INSTRUMENTS THAT SIMULTANEOUSLYCUT AND STAPLE TISSUE. THERE ARE SIX STAGGERED ROWS OF STAPLES, THREE ON EITHER SIDE OF THE CUT LINE. THE ECHELON FLEX 45 POWERED PLUSINSTRUMENTS HAVE A STAPLE LINE THAT IS APPROXIMATELY 45 MM LONG AND A CUT LINE THAT IS APPROXIMATELY 42 MM LONG. THE SHAFT CAN ROTATE FREELYIN BOTH DIRECTIONS AND AN ARTICULATION MECHANISM ENABLES THE DISTAL PORTION OF THE SHAFT TO PIVOT TO FACILITATE LATERAL ACCESS TO THE OPERATIVESITE.THE INSTRUMENTS ARE PACKAGED WITH A PRIMARY LITHIUM BATTERY PACK THAT MUST BE INSTALLED PRIOR TO USE. THERE ARE SPECIFIC REQUIREMENTS FORDISPOSING OF THE BATTERY PACK. REFER TO THE BATTERY PACK DISPOSAL SECTION.THE INSTRUMENTS ARE PACKAGED WITHOUT A RELOAD AND MUST BE LOADED PRIOR TO USE. A STAPLE RETAINING CAP ON THE RELOAD PROTECTS THE STAPLE LEGPOINTS DURING SHIPPING AND TRANSPORTATION. THE INSTRUMENTS’ LOCK-OUT FEATURE IS DESIGNED TO PREVENT A USED OR IMPROPERLY INSTALLED RELOADFROM BEING REFIRED OR AN INSTRUMENT FROM BEING FIRED WITHOUT A RELOAD.: Brand: ECHELON FLEX

## (undated) DEVICE — TRAY FOLEY 16FR URIMETER SURESTEP

## (undated) DEVICE — PREMIUM DRY TRAY LF: Brand: MEDLINE INDUSTRIES, INC.

## (undated) DEVICE — ECHELON FLEX  POWERED VASCULAR STAPLER WITH ADVANCED PLACEMENT TIP, 35MM: Brand: ECHELON FLEX

## (undated) DEVICE — TUBING SUCTION 5MM X 12 FT

## (undated) DEVICE — PROXIMATE PLUS MD MULTI-DIRECTIONAL RELEASE SKIN STAPLERS CONTAINS 35 STAINLESS STEEL STAPLES APPROXIMATE CLOSED DIMENSIONS: 6.9MM X 3.9MM WIDE: Brand: PROXIMATE

## (undated) DEVICE — MAYO STAND COVER: Brand: CONVERTORS

## (undated) DEVICE — KIT, BETHLEHEM ROBOTIC PROST: Brand: CARDINAL HEALTH

## (undated) DEVICE — BRONCHOSCOPE SHEATH VALVE: Brand: BRONCHOSCOPE SHEATH VALVE

## (undated) DEVICE — INTENDED FOR TISSUE SEPARATION, AND OTHER PROCEDURES THAT REQUIRE A SHARP SURGICAL BLADE TO PUNCTURE OR CUT.: Brand: BARD-PARKER SAFETY BLADES SIZE 10, STERILE

## (undated) DEVICE — LAPAROTOMY DRAPE WITH POUCHES: Brand: CONVERTORS

## (undated) DEVICE — INSULATED BLADE ELECTRODE;CAUTION: FOR MANUFACTURING, PROCESSING, OR REPACKING.: Brand: EDGE

## (undated) DEVICE — Device: Brand: BALLOON

## (undated) DEVICE — SYRINGE 10ML LL

## (undated) DEVICE — SUT VICRYL 0 UR-6 27 IN J603H

## (undated) DEVICE — 1820 FOAM BLOCK NEEDLE COUNTER: Brand: DEVON

## (undated) DEVICE — CHLORAPREP HI-LITE 26ML ORANGE

## (undated) DEVICE — CHLORHEXIDINE 4PCT 4 OZ

## (undated) DEVICE — SURGICEL 4 X 8

## (undated) DEVICE — STERILE EMESIS BASIN                 070: Brand: CARDINAL HEALTH

## (undated) DEVICE — MONOPOLAR CURVED SCISSORS: Brand: ENDOWRIST

## (undated) DEVICE — MARKER REFLECTIVE RADIOPAQUE SPHERE

## (undated) DEVICE — SPECIMEN CONTAINER STERILE PEEL PACK

## (undated) DEVICE — CANNULA SEAL

## (undated) DEVICE — VISUALIZATION SYSTEM: Brand: CLEARIFY

## (undated) DEVICE — DISPOSABLE EQUIPMENT COVER: Brand: SMALL TOWEL DRAPE

## (undated) DEVICE — TOOL MR8-SP14MH30T MR8 14CM SP MH 3F 3MM: Brand: MIDAS REX MR8

## (undated) DEVICE — 40595 XL TRENDELENBURG POSITIONING KIT: Brand: 40595 XL TRENDELENBURG POSITIONING KIT

## (undated) DEVICE — SUT MONOCRYL 4-0 PS-2 27 IN Y426H

## (undated) DEVICE — THE ECHELON, ECHELON ENDOPATH™ AND ECHELON FLEX™ FAMILIES OF ENDOSCOPIC LINEAR CUTTERS AND RELOADS ARE STERILE, SINGLE PATIENT USE INSTRUMENTS THAT SIMULTANEOUSLY CUT AND STAPLE TISSUE. THERE ARE SIX STAGGERED ROWS OF STAPLES, THREE ON EITHER SIDE OF THE CUT LINE. THE 45 MM INSTRUMENTS HAVE A STAPLE LINE THATIS APPROXIMATELY 45 MM LONG AND A CUT LINE THAT IS APPROXIMATELY 42 MM LONG. THE SHAFT CAN ROTATE FREELY IN BOTH DIRECTIONS AND AN ARTICULATION MECHANISM ON ARTICULATING INSTRUMENTS ENABLES BENDING THE DISTAL PORTIONOF THE SHAFT TO FACILITATE LATERAL ACCESS OF THE OPERATIVE SITE.THE INSTRUMENTS ARE SHIPPED WITHOUT A RELOAD AND MUST BE LOADED PRIOR TO USE. A STAPLE RETAINING CAP ON THE RELOAD PROTECTS THE STAPLE LEG POINTS DURING SHIPPING AND TRANSPORTATION. THE INSTRUMENTS’ LOCK-OUT FEATURE IS DESIGNED TO PREVENT A USED RELOAD FROM BEING REFIRED.: Brand: ECHELON ENDOPATH

## (undated) DEVICE — TISSUE RETRIEVAL SYSTEM: Brand: INZII RETRIEVAL SYSTEM

## (undated) DEVICE — GLOVE PI ULTRA TOUCH SZ.7.5

## (undated) DEVICE — UTILITY MARKER,BLACK WITH LABELS: Brand: DEVON

## (undated) DEVICE — SNAP KOVER: Brand: UNBRANDED

## (undated) DEVICE — SUT VICRYL 3-0 SH 27 IN J416H

## (undated) DEVICE — SKN PRP WNG SPNGE PVP SCRB STR: Brand: MEDLINE INDUSTRIES, INC.

## (undated) DEVICE — TIP COVER ACCESSORY

## (undated) DEVICE — SUPPLY FEE STD

## (undated) DEVICE — ENDOCATCH BAG 15MM

## (undated) DEVICE — STOPCOCK 4-WAY

## (undated) DEVICE — CANISTER FOR THORACIC SUCTION SYSTEM: Brand: THOPAZ DISPOSABLE CANISTER 0.8L

## (undated) DEVICE — SUT STRATAFIX SPIRAL 2-0 CT-1 30 CM SXPP1B410

## (undated) DEVICE — ELECTRODE BLADE E-Z CLEAN 4IN -0014A

## (undated) DEVICE — COLUMN DRAPE

## (undated) DEVICE — NEEDLE TBNA PERIVIEW FLEX 21GA 20MM

## (undated) DEVICE — SUT MONOCRYL 4-0 PS-2 18 IN Y496G

## (undated) DEVICE — PROGRASP FORCEPS: Brand: ENDOWRIST

## (undated) DEVICE — FIRST STEP BEDSIDE KIT - STAND-UP POUCH, ENDOSCOPIC CLEANING PAD - 1 POUCH: Brand: FIRST STEP BEDSIDE KIT - STAND-UP POUCH, ENDOSCOPIC CLEANING PAD

## (undated) DEVICE — PLUMEPEN PRO 10FT

## (undated) DEVICE — BIPOLAR SEALER 23-113-1 AQM 2.3: Brand: AQUAMANTYS™

## (undated) DEVICE — AIRSEAL TUBE SMOKE EVAC LUMENX3 FILTERED

## (undated) DEVICE — ARM DRAPE

## (undated) DEVICE — DRAPE SHEET THREE QUARTER

## (undated) DEVICE — SINGLE USE ASPIRATION NEEDLE: Brand: SINGLE USE ASPIRATION NEEDLE

## (undated) DEVICE — LUBRICANT INST ELECTROLUBE ANTISTK WO PAD

## (undated) DEVICE — NAVIGATION PATIENT PATCHES (QUANTITY OF 60): Brand: NAVIGATION PATIENT PATCHES

## (undated) DEVICE — TROCAR: Brand: KII FIOS FIRST ENTRY

## (undated) DEVICE — DRESSING MEPILEX AG BORDER POST-OP 4 X 6 IN

## (undated) DEVICE — WOUND RETRACTOR AND PROTECTOR: Brand: ALEXIS WOUND PROTECTOR-RETRACTOR

## (undated) DEVICE — GLOVE SRG BIOGEL ECLIPSE 7.5

## (undated) DEVICE — SLIM BODY SKIN STAPLER: Brand: APPOSE ULC